# Patient Record
Sex: FEMALE | Race: WHITE | NOT HISPANIC OR LATINO | Employment: UNEMPLOYED | URBAN - METROPOLITAN AREA
[De-identification: names, ages, dates, MRNs, and addresses within clinical notes are randomized per-mention and may not be internally consistent; named-entity substitution may affect disease eponyms.]

---

## 2017-01-12 ENCOUNTER — GENERIC CONVERSION - ENCOUNTER (OUTPATIENT)
Dept: OTHER | Facility: OTHER | Age: 52
End: 2017-01-12

## 2017-01-20 ENCOUNTER — HOSPITAL ENCOUNTER (OUTPATIENT)
Dept: SLEEP CENTER | Facility: CLINIC | Age: 52
Discharge: HOME | End: 2017-01-20
Payer: COMMERCIAL

## 2017-01-20 DIAGNOSIS — G47.33 OBSTRUCTIVE SLEEP APNEA (ADULT) (PEDIATRIC): ICD-10-CM

## 2017-01-20 PROCEDURE — 95811 POLYSOM 6/>YRS CPAP 4/> PARM: CPT

## 2017-01-25 ENCOUNTER — HOSPITAL ENCOUNTER (OUTPATIENT)
Dept: MAMMOGRAPHY | Facility: MEDICAL CENTER | Age: 52
Discharge: HOME | End: 2017-01-25
Payer: COMMERCIAL

## 2017-01-25 DIAGNOSIS — Z12.31 ENCOUNTER FOR SCREENING MAMMOGRAM FOR MALIGNANT NEOPLASM OF BREAST: ICD-10-CM

## 2017-01-25 PROCEDURE — G0202 SCR MAMMO BI INCL CAD: HCPCS

## 2017-02-21 ENCOUNTER — GENERIC CONVERSION - ENCOUNTER (OUTPATIENT)
Dept: OTHER | Facility: OTHER | Age: 52
End: 2017-02-21

## 2017-02-23 ENCOUNTER — HOSPITAL ENCOUNTER (OUTPATIENT)
Dept: SLEEP CENTER | Facility: CLINIC | Age: 52
Discharge: HOME | End: 2017-02-23
Payer: COMMERCIAL

## 2017-02-23 DIAGNOSIS — G47.33 OBSTRUCTIVE SLEEP APNEA (ADULT) (PEDIATRIC): ICD-10-CM

## 2017-03-10 ENCOUNTER — APPOINTMENT (OUTPATIENT)
Dept: LAB | Facility: CLINIC | Age: 52
End: 2017-03-10
Payer: COMMERCIAL

## 2017-03-10 ENCOUNTER — TRANSCRIBE ORDERS (OUTPATIENT)
Dept: LAB | Facility: CLINIC | Age: 52
End: 2017-03-10

## 2017-03-10 ENCOUNTER — ALLSCRIPTS OFFICE VISIT (OUTPATIENT)
Dept: OTHER | Facility: OTHER | Age: 52
End: 2017-03-10

## 2017-03-10 DIAGNOSIS — M79.7 FIBROMYALGIA: ICD-10-CM

## 2017-03-10 DIAGNOSIS — M70.62 TROCHANTERIC BURSITIS OF LEFT HIP: ICD-10-CM

## 2017-03-10 DIAGNOSIS — M70.61 TROCHANTERIC BURSITIS OF RIGHT HIP: ICD-10-CM

## 2017-03-10 LAB
BASOPHILS # BLD AUTO: 0.05 THOUSANDS/ΜL (ref 0–0.1)
BASOPHILS NFR BLD AUTO: 0 % (ref 0–1)
EOSINOPHIL # BLD AUTO: 0.56 THOUSAND/ΜL (ref 0–0.61)
EOSINOPHIL NFR BLD AUTO: 4 % (ref 0–6)
ERYTHROCYTE [DISTWIDTH] IN BLOOD BY AUTOMATED COUNT: 13.8 % (ref 11.6–15.1)
HCT VFR BLD AUTO: 45.3 % (ref 34.8–46.1)
HGB BLD-MCNC: 15.1 G/DL (ref 11.5–15.4)
LYMPHOCYTES # BLD AUTO: 4.12 THOUSANDS/ΜL (ref 0.6–4.47)
LYMPHOCYTES NFR BLD AUTO: 32 % (ref 14–44)
MCH RBC QN AUTO: 29.8 PG (ref 26.8–34.3)
MCHC RBC AUTO-ENTMCNC: 33.3 G/DL (ref 31.4–37.4)
MCV RBC AUTO: 89 FL (ref 82–98)
MONOCYTES # BLD AUTO: 0.73 THOUSAND/ΜL (ref 0.17–1.22)
MONOCYTES NFR BLD AUTO: 6 % (ref 4–12)
NEUTROPHILS # BLD AUTO: 7.49 THOUSANDS/ΜL (ref 1.85–7.62)
NEUTS SEG NFR BLD AUTO: 58 % (ref 43–75)
NRBC BLD AUTO-RTO: 0 /100 WBCS
PLATELET # BLD AUTO: 348 THOUSANDS/UL (ref 149–390)
PMV BLD AUTO: 10.8 FL (ref 8.9–12.7)
RBC # BLD AUTO: 5.07 MILLION/UL (ref 3.81–5.12)
TSH SERPL DL<=0.05 MIU/L-ACNC: 0.7 UIU/ML (ref 0.36–3.74)
WBC # BLD AUTO: 12.98 THOUSAND/UL (ref 4.31–10.16)

## 2017-03-10 PROCEDURE — 84443 ASSAY THYROID STIM HORMONE: CPT

## 2017-03-10 PROCEDURE — 86618 LYME DISEASE ANTIBODY: CPT

## 2017-03-10 PROCEDURE — 86617 LYME DISEASE ANTIBODY: CPT

## 2017-03-10 PROCEDURE — 36415 COLL VENOUS BLD VENIPUNCTURE: CPT

## 2017-03-10 PROCEDURE — 85025 COMPLETE CBC W/AUTO DIFF WBC: CPT

## 2017-03-13 LAB
B BURGDOR IGG SER IA-ACNC: 0.34
B BURGDOR IGM SER IA-ACNC: 1.59

## 2017-03-15 LAB
B BURGDOR IGG PATRN SER IB-IMP: NEGATIVE
B BURGDOR IGM PATRN SER IB-IMP: NEGATIVE
B BURGDOR18KD IGG SER QL IB: ABNORMAL
B BURGDOR23KD IGG SER QL IB: ABNORMAL
B BURGDOR23KD IGM SER QL IB: PRESENT
B BURGDOR28KD IGG SER QL IB: ABNORMAL
B BURGDOR30KD IGG SER QL IB: ABNORMAL
B BURGDOR39KD IGG SER QL IB: ABNORMAL
B BURGDOR39KD IGM SER QL IB: ABNORMAL
B BURGDOR41KD IGG SER QL IB: ABNORMAL
B BURGDOR41KD IGM SER QL IB: ABNORMAL
B BURGDOR45KD IGG SER QL IB: ABNORMAL
B BURGDOR58KD IGG SER QL IB: ABNORMAL
B BURGDOR66KD IGG SER QL IB: ABNORMAL
B BURGDOR93KD IGG SER QL IB: ABNORMAL
LABORATORY COMMENT REPORT: NORMAL

## 2017-03-30 ENCOUNTER — GENERIC CONVERSION - ENCOUNTER (OUTPATIENT)
Dept: OTHER | Facility: OTHER | Age: 52
End: 2017-03-30

## 2017-04-03 ENCOUNTER — GENERIC CONVERSION - ENCOUNTER (OUTPATIENT)
Dept: OTHER | Facility: OTHER | Age: 52
End: 2017-04-03

## 2017-04-04 ENCOUNTER — GENERIC CONVERSION - ENCOUNTER (OUTPATIENT)
Dept: OTHER | Facility: OTHER | Age: 52
End: 2017-04-04

## 2017-04-06 ENCOUNTER — GENERIC CONVERSION - ENCOUNTER (OUTPATIENT)
Dept: OTHER | Facility: OTHER | Age: 52
End: 2017-04-06

## 2017-04-13 ENCOUNTER — ALLSCRIPTS OFFICE VISIT (OUTPATIENT)
Dept: OTHER | Facility: OTHER | Age: 52
End: 2017-04-13

## 2017-04-13 DIAGNOSIS — R21 RASH AND OTHER NONSPECIFIC SKIN ERUPTION: ICD-10-CM

## 2017-04-13 DIAGNOSIS — A69.20 LYME DISEASE: ICD-10-CM

## 2017-04-13 DIAGNOSIS — H81.12 BENIGN PAROXYSMAL VERTIGO OF LEFT EAR: ICD-10-CM

## 2017-04-19 ENCOUNTER — GENERIC CONVERSION - ENCOUNTER (OUTPATIENT)
Dept: OTHER | Facility: OTHER | Age: 52
End: 2017-04-19

## 2017-05-08 ENCOUNTER — ALLSCRIPTS OFFICE VISIT (OUTPATIENT)
Dept: OTHER | Facility: OTHER | Age: 52
End: 2017-05-08

## 2017-05-12 ENCOUNTER — APPOINTMENT (OUTPATIENT)
Dept: LAB | Facility: CLINIC | Age: 52
End: 2017-05-12
Payer: COMMERCIAL

## 2017-05-12 ENCOUNTER — ALLSCRIPTS OFFICE VISIT (OUTPATIENT)
Dept: OTHER | Facility: OTHER | Age: 52
End: 2017-05-12

## 2017-05-12 ENCOUNTER — TRANSCRIBE ORDERS (OUTPATIENT)
Dept: LAB | Facility: CLINIC | Age: 52
End: 2017-05-12

## 2017-05-12 DIAGNOSIS — R21 RASH AND OTHER NONSPECIFIC SKIN ERUPTION: ICD-10-CM

## 2017-05-12 DIAGNOSIS — A69.20 LYME DISEASE: ICD-10-CM

## 2017-05-12 PROCEDURE — 86618 LYME DISEASE ANTIBODY: CPT

## 2017-05-12 PROCEDURE — 86617 LYME DISEASE ANTIBODY: CPT

## 2017-05-12 PROCEDURE — 36415 COLL VENOUS BLD VENIPUNCTURE: CPT

## 2017-05-15 LAB
B BURGDOR IGG SER IA-ACNC: 0.38
B BURGDOR IGM SER IA-ACNC: 1.57

## 2017-05-17 LAB
B BURGDOR IGG PATRN SER IB-IMP: NEGATIVE
B BURGDOR IGM PATRN SER IB-IMP: NEGATIVE
B BURGDOR18KD IGG SER QL IB: ABNORMAL
B BURGDOR23KD IGG SER QL IB: ABNORMAL
B BURGDOR23KD IGM SER QL IB: PRESENT
B BURGDOR28KD IGG SER QL IB: ABNORMAL
B BURGDOR30KD IGG SER QL IB: PRESENT
B BURGDOR39KD IGG SER QL IB: ABNORMAL
B BURGDOR39KD IGM SER QL IB: ABNORMAL
B BURGDOR41KD IGG SER QL IB: ABNORMAL
B BURGDOR41KD IGM SER QL IB: ABNORMAL
B BURGDOR45KD IGG SER QL IB: ABNORMAL
B BURGDOR58KD IGG SER QL IB: ABNORMAL
B BURGDOR66KD IGG SER QL IB: ABNORMAL
B BURGDOR93KD IGG SER QL IB: ABNORMAL
LABORATORY COMMENT REPORT: NORMAL

## 2017-05-23 ENCOUNTER — GENERIC CONVERSION - ENCOUNTER (OUTPATIENT)
Dept: OTHER | Facility: OTHER | Age: 52
End: 2017-05-23

## 2017-05-25 ENCOUNTER — ALLSCRIPTS OFFICE VISIT (OUTPATIENT)
Dept: OTHER | Facility: OTHER | Age: 52
End: 2017-05-25

## 2017-05-25 DIAGNOSIS — A69.20 LYME DISEASE: ICD-10-CM

## 2017-05-25 DIAGNOSIS — M46.1 SACROILIITIS, NOT ELSEWHERE CLASSIFIED (HCC): ICD-10-CM

## 2017-05-25 DIAGNOSIS — M79.10 MYALGIA: ICD-10-CM

## 2017-05-25 DIAGNOSIS — R53.83 OTHER FATIGUE: ICD-10-CM

## 2017-05-25 DIAGNOSIS — M54.16 RADICULOPATHY OF LUMBAR REGION: ICD-10-CM

## 2017-05-26 ENCOUNTER — APPOINTMENT (OUTPATIENT)
Dept: LAB | Facility: CLINIC | Age: 52
End: 2017-05-26
Payer: COMMERCIAL

## 2017-05-26 DIAGNOSIS — R53.83 OTHER FATIGUE: ICD-10-CM

## 2017-05-26 DIAGNOSIS — M79.10 MYALGIA: ICD-10-CM

## 2017-05-26 DIAGNOSIS — M54.16 RADICULOPATHY OF LUMBAR REGION: ICD-10-CM

## 2017-05-26 DIAGNOSIS — A69.20 LYME DISEASE: ICD-10-CM

## 2017-05-26 DIAGNOSIS — M46.1 SACROILIITIS, NOT ELSEWHERE CLASSIFIED (HCC): ICD-10-CM

## 2017-05-26 LAB
25(OH)D3 SERPL-MCNC: 17.6 NG/ML (ref 30–100)
BASOPHILS # BLD AUTO: 0.06 THOUSANDS/ΜL (ref 0–0.1)
BASOPHILS NFR BLD AUTO: 1 % (ref 0–1)
EOSINOPHIL # BLD AUTO: 0.36 THOUSAND/ΜL (ref 0–0.61)
EOSINOPHIL NFR BLD AUTO: 3 % (ref 0–6)
ERYTHROCYTE [DISTWIDTH] IN BLOOD BY AUTOMATED COUNT: 14.6 % (ref 11.6–15.1)
ERYTHROCYTE [SEDIMENTATION RATE] IN BLOOD: 13 MM/HOUR (ref 0–20)
HCT VFR BLD AUTO: 42.5 % (ref 34.8–46.1)
HGB BLD-MCNC: 14.1 G/DL (ref 11.5–15.4)
LYMPHOCYTES # BLD AUTO: 3.93 THOUSANDS/ΜL (ref 0.6–4.47)
LYMPHOCYTES NFR BLD AUTO: 30 % (ref 14–44)
MCH RBC QN AUTO: 29.6 PG (ref 26.8–34.3)
MCHC RBC AUTO-ENTMCNC: 33.2 G/DL (ref 31.4–37.4)
MCV RBC AUTO: 89 FL (ref 82–98)
MONOCYTES # BLD AUTO: 0.94 THOUSAND/ΜL (ref 0.17–1.22)
MONOCYTES NFR BLD AUTO: 7 % (ref 4–12)
NEUTROPHILS # BLD AUTO: 7.79 THOUSANDS/ΜL (ref 1.85–7.62)
NEUTS SEG NFR BLD AUTO: 59 % (ref 43–75)
NRBC BLD AUTO-RTO: 0 /100 WBCS
PLATELET # BLD AUTO: 320 THOUSANDS/UL (ref 149–390)
PMV BLD AUTO: 11.1 FL (ref 8.9–12.7)
RBC # BLD AUTO: 4.76 MILLION/UL (ref 3.81–5.12)
WBC # BLD AUTO: 13.14 THOUSAND/UL (ref 4.31–10.16)

## 2017-05-26 PROCEDURE — 86038 ANTINUCLEAR ANTIBODIES: CPT

## 2017-05-26 PROCEDURE — 36415 COLL VENOUS BLD VENIPUNCTURE: CPT

## 2017-05-26 PROCEDURE — 85652 RBC SED RATE AUTOMATED: CPT

## 2017-05-26 PROCEDURE — 82306 VITAMIN D 25 HYDROXY: CPT

## 2017-05-26 PROCEDURE — 86200 CCP ANTIBODY: CPT

## 2017-05-26 PROCEDURE — 85025 COMPLETE CBC W/AUTO DIFF WBC: CPT

## 2017-05-28 LAB — CCP IGA+IGG SERPL IA-ACNC: 7 UNITS (ref 0–19)

## 2017-05-30 ENCOUNTER — GENERIC CONVERSION - ENCOUNTER (OUTPATIENT)
Dept: OTHER | Facility: OTHER | Age: 52
End: 2017-05-30

## 2017-05-30 LAB — RYE IGE QN: NEGATIVE

## 2017-08-23 ENCOUNTER — ALLSCRIPTS OFFICE VISIT (OUTPATIENT)
Dept: OTHER | Facility: OTHER | Age: 52
End: 2017-08-23

## 2017-08-23 DIAGNOSIS — Z00.00 ENCOUNTER FOR GENERAL ADULT MEDICAL EXAMINATION WITHOUT ABNORMAL FINDINGS: ICD-10-CM

## 2017-08-30 DIAGNOSIS — E55.9 VITAMIN D DEFICIENCY: ICD-10-CM

## 2017-09-20 ENCOUNTER — GENERIC CONVERSION - ENCOUNTER (OUTPATIENT)
Dept: OTHER | Facility: OTHER | Age: 52
End: 2017-09-20

## 2017-10-31 ENCOUNTER — GENERIC CONVERSION - ENCOUNTER (OUTPATIENT)
Dept: OTHER | Facility: OTHER | Age: 52
End: 2017-10-31

## 2017-11-08 ENCOUNTER — APPOINTMENT (OUTPATIENT)
Dept: LAB | Facility: HOSPITAL | Age: 52
End: 2017-11-08
Payer: COMMERCIAL

## 2017-11-08 DIAGNOSIS — Z00.00 ENCOUNTER FOR GENERAL ADULT MEDICAL EXAMINATION WITHOUT ABNORMAL FINDINGS: ICD-10-CM

## 2017-11-08 LAB — HEMOCCULT STL QL IA: NEGATIVE

## 2017-11-08 PROCEDURE — G0328 FECAL BLOOD SCRN IMMUNOASSAY: HCPCS

## 2017-11-09 ENCOUNTER — GENERIC CONVERSION - ENCOUNTER (OUTPATIENT)
Dept: OTHER | Facility: OTHER | Age: 52
End: 2017-11-09

## 2018-01-09 NOTE — RESULT NOTES
Message   Recorded as Task   Date: 06/02/2016 01:45 PM, Created By: Deisy Powell   Task Name: Follow Up   Assigned To: [id number] [address] clinical,Team   Regarding Patient: Janessa Valle, Status: In Progress   Comment:    Rae Helm - 02 Jun 2016 1:45 PM     TASK CREATED    Pt S/P LT L4-5  L5-S1 RFA~6/1/16~DR Maik Pedraza  no follow up    Attempted to contact pt, LMOM at home and cell for pt to [facility]  **********   Rae Helm - 02 Jun 2016 1:45 PM     TASK IN PROGRESS   Rae Helm - 06 Jun 2016 3:35 PM     TASK EDITED    I spoke with pt, states she has developed more twinges of pain when moving  States she is in excruciating pain when standing at the register at [facility]  Pt asking if this is normal  I advised that she can have more pain from the procedure until the nerves start dying off  Advised this can take 4-6 weeks to see the full effect  No S/S infection  F/U scheduled for 7/15/16 at 0915  Advised I will inform Dr Maik Pedraza of this conversation and CB with any recommendations  *****************   Roberto Lewis - 06 Jun 2016 4:24 PM     TASK REPLIED TO: Previously Assigned To Aleksey Salguero  aware and agree, no further recommendations currently   Rae Helm - 06 Jun 2016 4:26 PM     TASK IN PROGRESS        Signatures   Electronically signed by :  Sudeep Zayas, ; Jun 6 2016  4:26PM EST                       (Author)

## 2018-01-10 NOTE — PROGRESS NOTES
Assessment    1  Greater trochanteric bursitis of both hips (726 5) (M70 61,M70 62)   2  Lumbar spondylosis (721 3) (M47 816)   3  MARIE (obstructive sleep apnea) (327 23) (G47 33)   4  Current every day smoker (305 1) (F17 200)   5  Allergic rhinitis (477 9) (J30 9)   6  Fibromyalgia (729 1) (M79 7)    Plan  Allergic rhinitis    · Fexofenadine HCl - 180 MG Oral Tablet; Take one daily as needed   · Montelukast Sodium 10 MG Oral Tablet; Take one daily  Fibromyalgia    · (1) CBC/PLT/DIFF; Status:Active; Requested for:10Mar2017;    · (1) LYME ANTIBODY PROFILE W/REFLEX TO WESTERN BLOT; Status:Active; Requested for:10Mar2017;    · (1) TSH; Status:Active; Requested for:10Mar2017;   Greater trochanteric bursitis of both hips    · Meloxicam 15 MG Oral Tablet; TAKE 1 TABLET DAILY   · *1 - [facility] Physical Therapy  Consult  Status: Active  Requested for:  33KCM3778  Care Summary provided  : Yes  Lumbar spondylosis    · 3 - [facility] (ANESTHESIOLOGY ) Physician Referral  Consult Only: the  expectation is that the referring provider will communicate back to the patient on  treatment options  Evaluation and Treatment: the expectation is that the referred to  provider will communicate back to the patient on treatment options  Status: Hold For  - Scheduling  Requested for: 02XHR7541  are Referring to a non-[facility] Preferred Provider : 2nd/3rd Opinion  Care Summary provided  : Yes  SocHx: Current every day smoker    · *1 - [facility]oking Cessation [facility] Referral Physician Referral  Consult Only: the  expectation is that the referring provider will communicate back to the patient on  treatment options  Evaluation and Treatment: the expectation is that the referred to  provider will communicate back to the patient on treatment options    Status: Hold For  - Scheduling  Requested for: 14YQY9052  Care Summary provided  : Yes   · You need to quit smoking ; Status:Complete;   Done: 76KWQ1893 08:37AM   · Call 911 if: You have any symptoms of a stroke ; Status:Complete;   Done: 31VXP7805  08:38AM    Discussion/Summary    #1: Greater trochanteric bursitis: Patient does appear to have bilateral greater trochanteric bursitis  There is tenderness of both of them  At this point, would recommend physical therapy, meloxicam  Reviewed with her the rationale of nonsteroidal anti-inflammatories for one month straight, needed or not  #2: Lumbar spondylosis: Patient continues with significant pain and irritation  This is despite injections and ablations  Would recommend second opinion for pain management as she is not confident 100% at the moment  #3: Sleep apnea: Stable at the moment  #4: Current smoker: Recommend discontinue  Patient understands risks  She will consider for the future  #5: Allergic rhinitis: Patient does have a significant amount of allergic rhinitis symptoms  It is likely secondary to the new puppy that she has  If it is feasible, she should keep the dog away as much as possible  I would recommend that she change from Claritin to Allegra, add Singulair, and follow-up in one month  Previously she tried Flonase, and failed  #6: Fibromyalgia: Based on the multiple areas that she has of pain, it is likely that she does have fibromyalgia  Other options would include hypothyroid and Lyme  Based on that, we will check Lyme testing and TSH  Possible side effects of new medications were reviewed with the patient/guardian today  The treatment plan was reviewed with the patient/guardian  The patient/guardian understands and agrees with the treatment plan      Chief Complaint  Pt c/o increased allergy symptoms, clear mucous, watery eyes, worsening over the last month, it makes it difficult to use CPAP machine  Also pt is seeing Dr Baudilio Monge for her back but she has terrible pain in the hips and he has not addressed that   Also pt having weakness in both legs and pain, she questions if it's Fibromyalgia  kw      History of Present Illness  HPI: For her allergies, the patient is having a significant amount of problems  It is increased over the last month or so  She is using Claritin, however needs to use 3-4 tabs a day, and it still has not made a significant difference  She does have a new dog  Back pain: She has seen Addy Almonte, but no help  Her hips ache quite a bit  Worse in the cold  She has aches in multiple areas as well  Due to the large amount of aches, she wondered about FMS  Note: She was on Flonase in past, but no help  She has been on Antidepressant per Pain Management, but had severe SE, so D/C'd  Review of Systems    Constitutional: as noted in HPI    ENT: no ear ache, no loss of hearing, no nosebleeds or nasal discharge, no sore throat or hoarseness  Cardiovascular: no complaints of slow or fast heart rate, no chest pain, no palpitations, no leg claudication or lower extremity edema  Respiratory: no complaints of shortness of breath, no wheezing, no dyspnea on exertion, no orthopnea or PND  Breasts: no complaints of breast pain, breast lump or nipple discharge  Gastrointestinal: no complaints of abdominal pain, no constipation, no nausea or diarrhea, no vomiting, no bloody stools  Genitourinary: no complaints of dysuria, no incontinence, no pelvic pain, no dysmenorrhea, no vaginal discharge or abnormal vaginal bleeding  Musculoskeletal: as noted in HPI  Integumentary: no complaints of skin rash or lesion, no itching or dry skin, no skin wounds  Neurological: no complaints of headache, no confusion, no numbness or tingling, no dizziness or fainting  Active Problems    1  Allergic rhinitis (477 9) (J30 9)   2  Bacterial vaginosis (616 10,041 9) (N76 0,B96 89)   3  Depression (311) (F32 9)   4  Diarrhea (787 91) (R19 7)   5  Dyslipidemia (272 4) (E78 5)   6  Encounter for routine gynecological examination (V72 31) (Z01 419)   7   Encounter for screening mammogram for malignant neoplasm of breast (V76 12)   (Z12 31)   8  Endometriosis (617 9) (N80 9)   9  Fatigue (780 79) (R53 83)   10  Greater trochanteric bursitis of both hips (726 5) (M70 61,M70 62)   11  Heel pain (729 5) (M79 673)   12  Hydradenitis (705 83) (L73 2)   13  Hyperglycemia (790 29) (R73 9)   14  Hypertension (401 9) (I10)   15  Hypokalemia (276 8) (E87 6)   16  Hypothyroidism (244 9) (E03 9)   17  Low back pain (724 2) (M54 5)   18  Lumbar radiculopathy (724 4) (M54 16)   19  Lumbar spondylosis (721 3) (M47 816)   20  Lumbosacral spondylosis (721 3) (M47 817)   21  Need for prophylactic vaccination and inoculation against influenza (V04 81) (Z23)   22  MARIE (obstructive sleep apnea) (327 23) (G47 33)   23  Right upper quadrant pain (789 01) (R10 11)   24  Sacroiliitis (720 2) (M46 1)   25  Vitamin B12 deficiency (266 2) (E53 8)   26  Vulvar cyst (624 8) (N90 7)    Past Medical History    1  History of Acute bronchitis, unspecified organism (466 0) (J20 9)   2  History of Acute otitis media, unspecified laterality   3  History of Chronic venous embolism and thrombosis of deep vessels of lower extremity   (453 50) (I82 509)   4  History of Costochondritis (733 6) (M94 0)   5  History of Dysuria (788 1) (R30 0)   6  History of Endometriosis (617 9) (N80 9)   7  History of Fracture Of The Ankle (824 8)   8  History of fever (V13 89) (Z87 898)   9  History of hematuria (V13 09) (Z87 448)   10  History of sinusitis (V12 69) (Z87 09)   11  History of urinary frequency (V13 09) (Z87 898)   12  Hypertension (401 9) (I10)   13  History of Pulmonary Embolism (V12 55)   14  History of Type B influenza (487 1) (J10 1)   15  History of Umbilical hernia (547 5) (K42 9)   16  History of Urinary Tract Infection (V13 02)  Active Problems And Past Medical History Reviewed: The active problems and past medical history were reviewed and updated today  Family History  Mother    1  Family history of Hypertension (V17 49)  Father    2   Family history of Diabetes Mellitus (V18 0)   3  Family history of Hypertension (V17 49)   4  Family history of Obesity  Family History Reviewed: The family history was reviewed and updated today  Social History    · Being A Social Drinker   · Cigarette smoker (305 1) (F17 210)   · Current every day smoker (305 1) (F17 200)   · Denied: History of Drug Use   ·    · Occupation   · Cleaning Offices  The social history was reviewed and updated today  The social history was reviewed and is unchanged  Surgical History    1  History of Hernia Repair   2  History of Hysterectomy  Surgical History Reviewed: The surgical history was reviewed and updated today  Current Meds   1  Adult Aspirin Low Strength 81 MG TBDP; CHEW AND SWALLOW 1 TABLET DAILY AS   DIRECTED Recorded   2  Atenolol-Chlorthalidone 50-25 MG Oral Tablet; take 1 tablet by mouth every day; Therapy: 31IIS2855 to (Evaluate:11Vfx1602)  Requested for: 24QRN7283; Last   Rx:95Bbh3903 Ordered   3  B-12 1000 MCG Oral Capsule; Therapy: 17KXB7120 to Recorded   4  Claritin 10 MG Oral Tablet; TAKE 1 TABLET DAILY AS NEEDED; Therapy: (Recorded:03Iyd2232) to Recorded   5  Zantac 75 TABS Recorded    The medication list was reviewed and updated today  Allergies    1  Penicillins   2  Erythromycin TABS   3  Toradol SOLN   4  Ventolin HFA   5  Vicodin TABS    Vitals   Recorded: 76EHD4094 08:09AM   Heart Rate 64   Respiration 16   Systolic 171   Diastolic 82   Height 5 ft 6 in   Weight 238 lb 5 oz   BMI Calculated 38 47   BSA Calculated 2 15     Physical Exam    Constitutional   General appearance: No acute distress, well appearing and well nourished  Ears, Nose, Mouth, and Throat   External inspection of ears and nose: Normal     Otoscopic examination: Abnormal   The right tympanic membrane was red and was bulging, but was not obscured  The left tympanic membrane was red and was bulging, but was not obscured   The right external canal was normal  The left external canal was normal    Pulmonary   Respiratory effort: No increased work of breathing or signs of respiratory distress  Auscultation of lungs: Clear to auscultation  Cardiovascular   Auscultation of heart: Normal rate and rhythm, normal S1 and S2, without murmurs  Future Appointments    Date/Time Provider Specialty Site   03/30/2017 03:15 PM Debo Yadav, DO Pain Management [place] [address]   06/14/2017 03:00 PM GABBY Diamond  [address] [address]     Signatures   Electronically signed by :  GABBY Bailon ; Mar 10 2017  8:47AM EST                       (Author)

## 2018-01-11 NOTE — MISCELLANEOUS
Message   Recorded as Task   Date: 04/27/2016 10:19 AM, Created By: [facility]   Task Name: Call Back   Assigned To: [id number] [place] [address] clinical,Team   Regarding Patient: Darline Cruz, Status: Active   Comment:    Rae Helm - 27 Apr 2016 10:19 AM     TASK CREATED  Caller: Self; General Medical Question; [phone] (Home)    Pt called, asking if we could order anything for pain until she can get her injection  We called to reschedule due to Dr Mavis Davis unable to do injection today  Pt was scheduled for LEFT L4-5 L5-S1 MBB #2, did not reschedule at this time, having a hard time finding , but will not be able to schedule until 5/16 or 5/18  Pt states she was taking some meds that her PCP prescribed but they did not help with the pain    Advised I will need to discuss with [facility] and CB with recommendations  ************   Edward Ludwig - 27 Apr 2016 10:21 AM     TASK REPLIED TO: Previously Assigned To [id number] [place] [address] clinical,Team  patient can take ibuprofen 800mg TID, or Tylenol 1000mg three times daily   Rae Helm - 27 Apr 2016 11:05 AM     TASK EDITED      Pt did say she was taking tylenol and it was not helping  ************   Edward Ludwig - 27 Apr 2016 11:54 AM     TASK REPLIED TO: Previously Assigned To Mily Richardson  ok, try the ibuprofen if no relief let us know I can try a topical such as voltaren gel  Rae Helm - 27 Apr 2016 1:28 PM     TASK EDITED    I spoke with pt, states she also is taking ibuprofen 4 at a time and is not helping  I advised that [employer] suggested voltaren gel  Asked if that would be something she is interested in? Pt states "I have to do something "  Advised will send to her pharmacy  **********   Mliy Richardson - 27 Apr 2016 2:04 PM     TASK REPLIED TO: Previously Assigned To Mily Richardson sent to pharmacy [place]        Active Problems    1  Acute bronchitis, unspecified organism (466 0) (J20 9)   2  Allergic rhinitis (477 9) (J30 9)   3   Bacterial vaginosis (616 10,041 9) (N76 0,B96 89)   4  Depression (311) (F32 9)   5  Diarrhea (787 91) (R19 7)   6  Dyslipidemia (272 4) (E78 5)   7  Encounter for routine gynecological examination (V72 31) (Z01 419)   8  Encounter for screening mammogram for malignant neoplasm of breast (V76 12)   (Z12 31)   9  Endometriosis (617 9) (N80 9)   10  Fatigue (780 79) (R53 83)   11  Greater trochanteric bursitis of both hips (726 5) (M70 62,M70 61)   12  Heel pain (729 5) (M79 673)   13  Hydradenitis (705 83) (L73 2)   14  Hypertension (401 9) (I10)   15  Hypokalemia (276 8) (E87 6)   16  Hypothyroidism (244 9) (E03 9)   17  Low back pain (724 2) (M54 5)   18  Lumbar radiculopathy (724 4) (M54 16)   19  Lumbar spondylosis (721 3) (M47 816)   20  Lumbosacral spondylosis (721 3) (M47 817)   21  Need for prophylactic vaccination and inoculation against influenza (V04 81) (Z23)   22  MARIE (obstructive sleep apnea) (327 23) (G47 33)   23  Right upper quadrant pain (789 01) (R10 11)   24  Trochanteric bursitis, unspecified laterality (726 5) (M70 60)   25  Vitamin B12 deficiency (266 2) (E53 8)    Current Meds   1  Adult Aspirin Low Strength 81 MG TBDP; CHEW AND SWALLOW 1 TABLET DAILY AS   DIRECTED Recorded   2  Advair Diskus 100-50 MCG/DOSE Inhalation Aerosol Powder Breath Activated; INHALE   1 PUFF TWICE DAILY; Therapy: 73SHS4616 to [phone]  Requested for: 37RXQ6798; Last   Rx:02Mar2016 Ordered   3  Atenolol-Chlorthalidone 50-25 MG Oral Tablet; Take 1 tablet daily; Therapy: 44YKS1443 to (Evaluate:20Apr2016)  Requested for: 80QFO6117; Last   Rx:23Oct2015 Ordered   4  B-12 1000 MCG Oral Capsule; Therapy: 75ADN5946 to Recorded   5  Claritin 10 MG Oral Tablet Recorded   6  Sulfamethoxazole-Trimethoprim 800-160 MG Oral Tablet (Bactrim DS); Take 1 twice   daily; Therapy: 76XPK5352 to (Evaluate:12Mar2016)  Requested for: 32ABZ8243; Last   Rx:02Mar2016 Ordered   7   Voltaren 1 % Transdermal Gel (Diclofenac Sodium); apply to affected  area up to 4 times   daily; Therapy: 27Apr2016 to ([facility])  Requested for: 27Apr2016; Last   Rx:27Apr2016 Ordered   8  Zantac 75 TABS Recorded    Allergies    1  Penicillins   2  Erythromycin TABS   3  Toradol SOLN   4  Ventolin HFA   5  Vicodin TABS    Signatures   Electronically signed by :  Joanie Lyons, ; Apr 27 2016  3:26PM EST                       (Author)

## 2018-01-11 NOTE — MISCELLANEOUS
Message   Recorded as Task   Date: 12/23/2016 08:50 AM, Created By: Valarie Cortez   Task Name: Call Back   Assigned To: Markie Magdaleno   Regarding Patient: Levi Han, Status: Active   Comment:    Zainab Martin - 23 Dec 2016 8:50 AM     TASK CREATED  pt called stating she is starting with outbreak of hydradenitis - despite completing 7 day course of antibiotic (cipro 500mg b i d ) that was rx'd by GYN for "cyst on her private"  requests antibiotic - you rx'd Bactrim in the past   please call back on C# [phone]  she uses [facility] on [address] - 23 Dec 2016 2:23 PM     TASK EDITED  per [facility], ok to call in Bactrim DS to pharmacy  pt notified she needs OV in there is no improvement        Active Problems    1  Acute bronchitis, unspecified organism (466 0) (J20 9)   2  Allergic rhinitis (477 9) (J30 9)   3  Bacterial vaginosis (616 10,041 9) (N76 0,B96 89)   4  Depression (311) (F32 9)   5  Diarrhea (787 91) (R19 7)   6  Dyslipidemia (272 4) (E78 5)   7  Encounter for routine gynecological examination (V72 31) (Z01 419)   8  Encounter for screening mammogram for malignant neoplasm of breast (V76 12)   (Z12 31)   9  Endometriosis (617 9) (N80 9)   10  Fatigue (780 79) (R53 83)   11  Greater trochanteric bursitis of both hips (726 5) (M70 61,M70 62)   12  Heel pain (729 5) (M79 673)   13  Hydradenitis (705 83) (L73 2)   14  Hyperglycemia (790 29) (R73 9)   15  Hypertension (401 9) (I10)   16  Hypokalemia (276 8) (E87 6)   17  Hypothyroidism (244 9) (E03 9)   18  Low back pain (724 2) (M54 5)   19  Lumbar radiculopathy (724 4) (M54 16)   20  Lumbar spondylosis (721 3) (M47 816)   21  Lumbosacral spondylosis (721 3) (M47 817)   22  Need for prophylactic vaccination and inoculation against influenza (V04 81) (Z23)   23  MARIE (obstructive sleep apnea) (327 23) (G47 33)   24  Right upper quadrant pain (789 01) (R10 11)   25  Sacroiliitis (720 2) (M46 1)   26   Trochanteric bursitis, unspecified laterality (726 5) (M70 60)   27  Vitamin B12 deficiency (266 2) (E53 8)   28  Vulvar cyst (624 8) (N90 7)    Current Meds   1  Adult Aspirin Low Strength 81 MG TBDP; CHEW AND SWALLOW 1 TABLET DAILY AS   DIRECTED Recorded   2  Atenolol-Chlorthalidone 50-25 MG Oral Tablet; take 1 tablet by mouth every day; Therapy: 99HGW3771 to (Evaluate:51Hpj5339)  Requested for: 70DON5929; Last   Rx:97Lzo8273 Ordered   3  B-12 1000 MCG Oral Capsule; Therapy: 49OJV3283 to Recorded   4  Ciprofloxacin HCl - 500 MG Oral Tablet; TAKE 1 TABLET TWICE DAILY; Therapy: 25NRJ0718 to (Evaluate:87Osd1380)  Requested for: 13RBN1178; Last   Rx:54Wff1351 Ordered   5  Claritin 10 MG Oral Tablet Recorded   6  Zantac 75 TABS Recorded    Allergies    1  Penicillins   2  Erythromycin TABS   3  Toradol SOLN   4  Ventolin HFA   5  Vicodin TABS    Plan  Hydradenitis    · Sulfamethoxazole-Trimethoprim 800-160 MG Oral Tablet (Bactrim DS);  Take 1  tablet twice daily    Signatures   Electronically signed by : Christie Lim, ; Dec 23 2016  2:23PM EST                       (Author)

## 2018-01-12 VITALS
WEIGHT: 236 LBS | HEIGHT: 66 IN | DIASTOLIC BLOOD PRESSURE: 72 MMHG | HEART RATE: 64 BPM | BODY MASS INDEX: 37.93 KG/M2 | SYSTOLIC BLOOD PRESSURE: 110 MMHG

## 2018-01-12 NOTE — RESULT NOTES
Message   Recorded as Task   Date: 04/28/2016 10:57 AM, Created By: Husam Giles   Task Name: Follow Up   Assigned To: Desiree Garland   Regarding Patient: Navneet Martinez, Status: In Progress   Comment:    Desiree Garland - 28 Apr 2016 10:57 AM     TASK CREATED  PA request received rearding Voltaren 1% gel  PA completed on [employer] com  Awaiting responed for approval/denialColonel Donyaiel - 05 May 2016 9:24 AM     TASK EDITED  PT calling today to see if PA was approved, Sarah Busby, can you check your covermymeds account to see status of this PA? Thanks! Rae Helm - 05 May 2016 10:08 AM     TASK EDITED    Breanne Boateng (pt ) called, states he called insurance comp RE: PA  and they are requesting a call from our office at [phone]  Advised I will have someone look into this  Breanne Boateng upset that pt is waiting a long to get some pain relief   ***************   Desiree Garland - 05 May 2016 1:08 PM     TASK EDITED  I called the insurance company and was notified that they will only cover Voltaren gel if the pt has tried a generic on 30 day  Wilhemenia Runner - 05 May 2016 1:09 PM     TASK REPLIED TO: Previously Assigned To Wilhemenia Runner  ok will send diclofenac to her pharmacy which is the generic form  Desiree Garland - 05 May 2016 2:11 PM     TASK EDITED  I called pt, LM notifing pt the diclofenac was sent to pharmacy  Deshawn Carr - 06 May 2016 10:28 AM     TASK EDITED  Attempted to contact pt to follow up, and see if they were able to  diclofenac  Desiree Garland - 10 May 2016 8:29 AM     TASK EDITED  attempted to call pt again  LM for pt to CB  Signatures   Electronically signed by :  Daksha Mendez, ; May 10 2016  3:28PM EST                       (Author)

## 2018-01-12 NOTE — RESULT NOTES
Message   Recorded as Task   Date: 08/02/2016 06:50 AM, Created By: Samantha Neely   Task Name: Document Appointment   Assigned To: Samantha Neely   Regarding Patient: Reynold Goldstein, Status: Active   Comment:    Radhames Gonzalez - 02 Aug 2016 6:50 AM     TASK CREATED  Pt called to cx'l proc for 08 03 2016 wbc to r/s; no reason given          Signatures   Electronically signed by : Abelardo Mariee, ; Aug  2 2016  6:50AM EST                       (Author)

## 2018-01-12 NOTE — MISCELLANEOUS
Message  Received call from patient this morning Thursday 04/06/17 to provide me with Dr Gurdeep Neves Office Fax # , [phone]  I advised patient that her records will be faxed over right away  Her office notes and procedure notes were ALL faxed over to Dr Ricardo Abarca  Active Problems    1  Allergic rhinitis (477 9) (J30 9)   2  Bacterial vaginosis (616 10,041 9) (N76 0,B96 89)   3  Depression (311) (F32 9)   4  Diarrhea (787 91) (R19 7)   5  Dyslipidemia (272 4) (E78 5)   6  Encounter for routine gynecological examination (V72 31) (Z01 419)   7  Encounter for screening mammogram for malignant neoplasm of breast (V76 12)   (Z12 31)   8  Endometriosis (617 9) (N80 9)   9  Fatigue (780 79) (R53 83)   10  Fibromyalgia (729 1) (M79 7)   11  Greater trochanteric bursitis of both hips (726 5) (M70 61,M70 62)   12  Heel pain (729 5) (M79 673)   13  Hydradenitis (705 83) (L73 2)   14  Hyperglycemia (790 29) (R73 9)   15  Hypertension (401 9) (I10)   16  Hypokalemia (276 8) (E87 6)   17  Hypothyroidism (244 9) (E03 9)   18  Low back pain (724 2) (M54 5)   19  Lumbar radiculopathy (724 4) (M54 16)   20  Lumbar spondylosis (721 3) (M47 816)   21  Lumbosacral spondylosis (721 3) (M47 817)   22  Need for prophylactic vaccination and inoculation against influenza (V04 81) (Z23)   23  MARIE (obstructive sleep apnea) (327 23) (G47 33)   24  Right upper quadrant pain (789 01) (R10 11)   25  Sacroiliitis (720 2) (M46 1)   26  Vitamin B12 deficiency (266 2) (E53 8)   27  Vulvar cyst (624 8) (N90 7)    Current Meds   1  Adult Aspirin Low Strength 81 MG TBDP; CHEW AND SWALLOW 1 TABLET DAILY AS   DIRECTED Recorded   2  Atenolol-Chlorthalidone 50-25 MG Oral Tablet; take 1 tablet by mouth every day; Therapy: 79BFT6959 to (Evaluate:39Xds0385)  Requested for: 63PSZ4174; Last   Rx:35Atf5456 Ordered   3  B-12 1000 MCG Oral Capsule; Therapy: 65TKB5080 to Recorded   4   Doxycycline Hyclate 100 MG Oral Tablet; TAKE 1 TABLET TWICE DAILY Braxton Rod;   Therapy: 18CAV5190 to (Evaluate:20Apr2017)  Requested for: 42XUX6610; Last   Rx:30Mar2017 Ordered   5  Fexofenadine HCl - 180 MG Oral Tablet; Take one daily as needed; Therapy: 58AJY0973 to (Last Rx:10Mar2017)  Requested for: 33PQL3646 Ordered   6  Meloxicam 15 MG Oral Tablet; TAKE 1 TABLET DAILY; Therapy: 66TMZ9460 to (Evaluate:09Apr2017)  Requested for: 16ZFR9785; Last   Rx:10Mar2017 Ordered   7  Montelukast Sodium 10 MG Oral Tablet (Singulair); Take one daily; Therapy: 98ZWN7267 to (Last Rx:10Mar2017)  Requested for: 95DRZ6924 Ordered   8  Zantac 75 TABS Recorded    Allergies    1  Penicillins   2  Erythromycin TABS   3  Toradol SOLN   4  Ventolin HFA   5   Vicodin TABS    Signatures   Electronically signed by : Davy Zamarripa, ; Apr 6 2017 11:20AM EST                       (Author)

## 2018-01-12 NOTE — RESULT NOTES
Message   Recorded as Task   Date: 04/12/2016 11:22 AM, Created By: Swetha Ojeda   Task Name: Follow Up   Assigned To: [address] [place] [id number] clinical,Team   Regarding Patient: Jarvis Mendoza, Status: In Progress   Comment:    Desiree Garland - 12 Apr 2016 11:22 AM     TASK CREATED  Pt called stating she is scheduled for diagnostic testing on April 27 but can not take the pain and woulds like to know if there is anything else she could do  Please advise    Brice Atkinson - 12 Apr 2016 11:28 AM     TASK REPLIED TO: Previously Assigned To [address] [place] [id number] clinical,Team  VM left to cb  Brice Atkinson - 12 Apr 2016 11:28 AM     TASK IN PROGRESS   Brice Atkinson - 12 Apr 2016 1:37 PM     TASK REPLIED TO: Previously Assigned To Sheela Dorado  ***call pt on cell****    Spoke with pt who states she is scheduled for MBB on 4-27-16  States her pain was really bad on Sunday to her low back and B/L hips  States it has been getting worse  Rates her pain 9/10  Using ice/heat prn  Ibuprofen does not help  States she has taken flexeril in the past and it does not help  States she does alot of lifting at [address] [place] states she has to work and does not know what to do  You have one opening on the 25th to move up her procedure,but that's still a couple wks away  Please advise  Sheela Dorado - 12 Apr 2016 3:15 PM     TASK REPLIED TO: Previously Assigned To Sheela Dorado  double book at 1:45 tomorrow for procedure as long as she has had it approved   Brice Atkinson - 12 Apr 2016 3:59 PM     TASK REPLIED TO: Previously Assigned To [address] [place] [id number] [employer] with [address] required  Spoke with pt,ok to come in for procedure tomorrow but has no   DR Jaycee Obrien aware she will have to stay after procedure for about 1/2-1 hour  Pre procedure instructions reviewed again with pt     Sheela Dorado - 12 Apr 2016 4:02 PM     TASK REPLIED TO: Previously Assigned To Sheela Dorado  aware agree        [facility] Electronically signed by :  Frieda Gamez, ; Apr 12 2016  4:07PM EST                       (Author)

## 2018-01-13 VITALS
TEMPERATURE: 98.5 F | WEIGHT: 235.6 LBS | HEIGHT: 66 IN | DIASTOLIC BLOOD PRESSURE: 70 MMHG | HEART RATE: 68 BPM | BODY MASS INDEX: 37.86 KG/M2 | SYSTOLIC BLOOD PRESSURE: 102 MMHG

## 2018-01-13 VITALS
HEART RATE: 64 BPM | BODY MASS INDEX: 38.09 KG/M2 | SYSTOLIC BLOOD PRESSURE: 120 MMHG | DIASTOLIC BLOOD PRESSURE: 84 MMHG | TEMPERATURE: 99.2 F | WEIGHT: 236 LBS

## 2018-01-13 NOTE — MISCELLANEOUS
Message   Recorded as Task   Date: 12/07/2016 05:06 PM, Created By: Gilbert Abbott   Task Name: Follow Up   Assigned To: [address] ,Team   Regarding Patient: Mona Adam, Status: Active   CommentRussell Francois - 07 Dec 2016 5:06 PM     TASK CREATED  Pt  is S/P B/L L4-5 L5-S1 FJI on 11/30 by Dr Brittni Velazquez  needs appt  Gilbert Abbott - 08 Dec 2016 5:10 PM     TASK EDITED  1st attempt to call, no answer  LMOM for CB  Gilbert Dine - 13 Dec 2016 3:52 PM     TASK EDITED  2nd attempt to call, noanswer  LMOM for CB  Gilbert Dine - 76 Dec 2016 10:42 AM     TASK EDITED  please send [address] letter  Letter Sent      Active Problems    1  Acute bronchitis, unspecified organism (466 0) (J20 9)   2  Allergic rhinitis (477 9) (J30 9)   3  Bacterial vaginosis (616 10,041 9) (N76 0,B96 89)   4  Depression (311) (F32 9)   5  Diarrhea (787 91) (R19 7)   6  Dyslipidemia (272 4) (E78 5)   7  Encounter for routine gynecological examination (V72 31) (Z01 419)   8  Encounter for screening mammogram for malignant neoplasm of breast (V76 12)   (Z12 31)   9  Endometriosis (617 9) (N80 9)   10  Fatigue (780 79) (R53 83)   11  Greater trochanteric bursitis of both hips (726 5) (M70 61,M70 62)   12  Heel pain (729 5) (M79 673)   13  Hydradenitis (705 83) (L73 2)   14  Hyperglycemia (790 29) (R73 9)   15  Hypertension (401 9) (I10)   16  Hypokalemia (276 8) (E87 6)   17  Hypothyroidism (244 9) (E03 9)   18  Low back pain (724 2) (M54 5)   19  Lumbar radiculopathy (724 4) (M54 16)   20  Lumbar spondylosis (721 3) (M47 816)   21  Lumbosacral spondylosis (721 3) (M47 817)   22  Need for prophylactic vaccination and inoculation against influenza (V04 81) (Z23)   23  MARIE (obstructive sleep apnea) (327 23) (G47 33)   24  Right upper quadrant pain (789 01) (R10 11)   25  Sacroiliitis (720 2) (M46 1)   26  Trochanteric bursitis, unspecified laterality (726 5) (M70 60)   27  Vitamin B12 deficiency (266 2) (E53 8)   28   Vulvar cyst (624 8) (N90 7)    Current Meds   1  Adult Aspirin Low Strength 81 MG TBDP; CHEW AND SWALLOW 1 TABLET DAILY AS   DIRECTED Recorded   2  Atenolol-Chlorthalidone 50-25 MG Oral Tablet; take 1 tablet by mouth every day; Therapy: 32WQY8189 to (Evaluate:47Krb3172)  Requested for: 13XCJ6113; Last   Rx:76Ayc3688 Ordered   3  B-12 1000 MCG Oral Capsule; Therapy: 13MYL7954 to Recorded   4  Ciprofloxacin HCl - 500 MG Oral Tablet; TAKE 1 TABLET TWICE DAILY; Therapy: 03FSV7055 to (Evaluate:87Zgu6182)  Requested for: 83NLO0539; Last   Rx:85Jci4039 Ordered   5  Claritin 10 MG Oral Tablet Recorded   6  Zantac 75 TABS Recorded    Allergies    1  Penicillins   2  Erythromycin TABS   3  Toradol SOLN   4  Ventolin HFA   5   Vicodin TABS    Signatures   Electronically signed by : Dane Rooney, ; Dec 22 2016 10:51AM EST                       (Author)

## 2018-01-13 NOTE — RESULT NOTES
Message   Recorded as Task   Date: 04/21/2016 05:04 PM, Created By: Holly Noonan   Task Name: Follow Up   Assigned To: [address] [place] [address] clinical,Team   Regarding Patient: Era Jose, Status: Active   Comment:    Holly Noonan - 21 Apr 2016 5:04 PM     TASK CREATED  please schedule MBB#2   Rae Helm - 22 Apr 2016 9:42 AM     TASK REASSIGNED: Previously Assigned To [address] [place] [address] clinical,Team   Brice Atkinson - 25 Apr 2016 8:40 AM     TASK REASSIGNED: Previously Assigned To [facility] sched,Team  Pt left msg this am-she is under the impression she has MBB #2 scheduled for this Weds  Pt was originally scheduled on 4-27-16 for #1- this appt was moved up b/c of her pain and the 4-27 appt was cx  Would you like to fit her in this Weds? Otherwise, first available will be the week of 5-9-16  Thanks   Holly Noonan - 25 Apr 2016 8:51 AM     TASK REPLIED TO: Previously Assigned To [address] [place] [address] clinical,Team  1:30 pm on Wednesday would work   [facility] - 25 Apr 2016 9:12 AM     TASK REPLIED TO: Previously Assigned To [address] [place] [address] [employer] with pt and advised MBB #2 recommended by Dr Griselda aDugherty  Pt aware to arrive at 1315 on 4-27-16 and that pain level should be 5 or greater  Signatures   Electronically signed by :  Brice Atkinson, ; Apr 25 2016  9:12AM EST                       (Author)

## 2018-01-13 NOTE — MISCELLANEOUS
Message   Recorded as Task   Date: 08/18/2016 10:44 AM, Created By: Yamini Pool   Task Name: Follow Up   Assigned To: [id number] [address] clinical,Team   Regarding Patient: Jeny Massey, Status: Active   CommentReda Carrel - 18 Aug 2016 10:44 AM     TASK CREATED  Caller: Self; General Medical Question; [phone] (Home); [phone] (Work)  Received call from pt  on [facility] triage line  Pt  states that she is supposed to schedule a R side MBB, however, pt  states that she had the RFA for her L side done on 7/15/16  Per pt  she was feeling pretty good, and then about a week ago, the L side started feeling different, and now pt  states the pain is back to where it was to start  Pt  states that over the weekend she even started feeling the numbness in her L thigh again  Per pt  she does not want to proceed w/ scheduling anything for the R side yet, until Dr Stephon Bell reviews this information and decides if anything further needs to be done w/ the L side  Pt  was advised that Dr Stephon Bell is not in the office this week, however, he will be back in on Monday, at that time he can review this information and provide recommendations on how to proceed  Pt  verbalized understanding and was appreciative  Pt  states to please call her cell phone on Monday, and LMOM if she does not answer  Please advise  Thank you  [place] - 21 Aug 2016 3:27 PM     TASK REPLIED TO: Previously Assigned To [id number] [address] clinical,Team                      aware, please schedule a followup visit with me to review left-sided complaints further   Angela Berger - 22 Aug 2016 10:08 AM     TASK EDITED     Pt scheduled for appt on 8/30 @ 2:45pm per Dr Salguero  Active Problems    1  Acute bronchitis, unspecified organism (466 0) (J20 9)   2  Allergic rhinitis (477 9) (J30 9)   3  Bacterial vaginosis (616 10,041 9) (N76 0,B96 89)   4  Depression (311) (F32 9)   5  Diarrhea (787 91) (R19 7)   6   Dyslipidemia (272 4) (E78 5)   7  Encounter for routine gynecological examination (V72 31) (Z01 419)   8  Encounter for screening mammogram for malignant neoplasm of breast (V76 12)   (Z12 31)   9  Endometriosis (617 9) (N80 9)   10  Fatigue (780 79) (R53 83)   11  Greater trochanteric bursitis of both hips (726 5) (M70 62,M70 61)   12  Heel pain (729 5) (M79 673)   13  Hydradenitis (705 83) (L73 2)   14  Hypertension (401 9) (I10)   15  Hypokalemia (276 8) (E87 6)   16  Hypothyroidism (244 9) (E03 9)   17  Low back pain (724 2) (M54 5)   18  Lumbar radiculopathy (724 4) (M54 16)   19  Lumbar spondylosis (721 3) (M47 816)   20  Lumbosacral spondylosis (721 3) (M47 817)   21  Need for prophylactic vaccination and inoculation against influenza (V04 81) (Z23)   22  MARIE (obstructive sleep apnea) (327 23) (G47 33)   23  Right upper quadrant pain (789 01) (R10 11)   24  Trochanteric bursitis, unspecified laterality (726 5) (M70 60)   25  Vitamin B12 deficiency (266 2) (E53 8)    Current Meds   1  Adult Aspirin Low Strength 81 MG TBDP; CHEW AND SWALLOW 1 TABLET DAILY AS   DIRECTED Recorded   2  Atenolol-Chlorthalidone 50-25 MG Oral Tablet; take 1 tablet by mouth every day; Therapy: 41UOQ8753 to (Evaluate:68Bcj6771)  Requested for: 84MMB2717; Last   Rx:92Csb2131 Ordered   3  B-12 1000 MCG Oral Capsule; Therapy: 28CGJ6156 to Recorded   4  Claritin 10 MG Oral Tablet Recorded   5  Zantac 75 TABS Recorded    Allergies    1  Penicillins   2  Erythromycin TABS   3  Toradol SOLN   4  Ventolin HFA   5   Vicodin TABS    Signatures   Electronically signed by : Anyi Lantigua RN; Aug 22 2016 10:08AM EST                       (Author)

## 2018-01-13 NOTE — MISCELLANEOUS
Message   Recorded as Task   Date: 01/12/2017 02:09 PM, Created By: Denny Najera   Task Name: Follow Up   Assigned To: [id number] [place] [address] clinical,Team   Regarding Patient: Juliano Akins, Status: In Progress   Comment:    Rae Helm - 12 Jan 2017 2:09 PM     TASK CREATED  Caller: Self; General Medical Question; [phone] (Home); [phone] (Work)    *****Chelsie Mccann*****  Pt LM on [facility] triage line today at 91 [phone], said she is getting back to us after her last round of injections and she is worse than ever and wants to know what the next step is  I spoke with pt, states she seems to get worse after each injection  (last injection 11/30/16 B/L L4-5  L5-S1 FJI), I advised we will need to eval her in the office  Pt states she will need to CB to schedule  ***********   Meera Elder - 12 Jan 2017 2:28 PM     TASK REPLIED TO: Previously Assigned To [address] [address] clinical,Team                      yes agree with plan, thanks! Rae Helm - 12 Jan 2017 2:30 PM     TASK IN PROGRESS        Active Problems    1  Acute bronchitis, unspecified organism (466 0) (J20 9)   2  Allergic rhinitis (477 9) (J30 9)   3  Bacterial vaginosis (616 10,041 9) (N76 0,B96 89)   4  Depression (311) (F32 9)   5  Diarrhea (787 91) (R19 7)   6  Dyslipidemia (272 4) (E78 5)   7  Encounter for routine gynecological examination (V72 31) (Z01 419)   8  Encounter for screening mammogram for malignant neoplasm of breast (V76 12)   (Z12 31)   9  Endometriosis (617 9) (N80 9)   10  Fatigue (780 79) (R53 83)   11  Greater trochanteric bursitis of both hips (726 5) (M70 61,M70 62)   12  Heel pain (729 5) (M79 673)   13  Hydradenitis (705 83) (L73 2)   14  Hyperglycemia (790 29) (R73 9)   15  Hypertension (401 9) (I10)   16  Hypokalemia (276 8) (E87 6)   17  Hypothyroidism (244 9) (E03 9)   18  Low back pain (724 2) (M54 5)   19  Lumbar radiculopathy (724 4) (M54 16)   20  Lumbar spondylosis (721 3) (M47 816)   21   Lumbosacral spondylosis (721 3) (M47 817)   22  Need for prophylactic vaccination and inoculation against influenza (V04 81) (Z23)   23  MARIE (obstructive sleep apnea) (327 23) (G47 33)   24  Right upper quadrant pain (789 01) (R10 11)   25  Sacroiliitis (720 2) (M46 1)   26  Trochanteric bursitis, unspecified laterality (726 5) (M70 60)   27  Vitamin B12 deficiency (266 2) (E53 8)   28  Vulvar cyst (624 8) (N90 7)    Current Meds   1  Adult Aspirin Low Strength 81 MG TBDP; CHEW AND SWALLOW 1 TABLET DAILY AS   DIRECTED Recorded   2  Atenolol-Chlorthalidone 50-25 MG Oral Tablet; take 1 tablet by mouth every day; Therapy: 96PAJ9089 to (Evaluate:78Cte3592)  Requested for: 49AUX4344; Last   Rx:38Nan0855 Ordered   3  B-12 1000 MCG Oral Capsule; Therapy: 48TTA6030 to Recorded   4  Ciprofloxacin HCl - 500 MG Oral Tablet; TAKE 1 TABLET TWICE DAILY; Therapy: 15ZGE1583 to (Evaluate:23Myn1279)  Requested for: 21XDX0944; Last   Rx:91Etk7104 Ordered   5  Claritin 10 MG Oral Tablet Recorded   6  Sulfamethoxazole-Trimethoprim 800-160 MG Oral Tablet (Bactrim DS); Take 1 tablet   twice daily; Therapy: 08Pwn7413 to (Last Rx:34Ueu7352)  Requested for: 18Cbx6387; Status:   ACTIVE - Retrospective By Protocol Authorization Ordered   7  Zantac 75 TABS Recorded    Allergies    1  Penicillins   2  Erythromycin TABS   3  Toradol SOLN   4  Ventolin HFA   5  Vicodin TABS    Signatures   Electronically signed by :  Laura Harley, ; Jan 12 2017  2:31PM EST                       (Author)

## 2018-01-14 VITALS
HEIGHT: 66 IN | RESPIRATION RATE: 16 BRPM | DIASTOLIC BLOOD PRESSURE: 82 MMHG | SYSTOLIC BLOOD PRESSURE: 120 MMHG | HEART RATE: 64 BPM | BODY MASS INDEX: 38.3 KG/M2 | WEIGHT: 238.31 LBS

## 2018-01-14 VITALS
WEIGHT: 238.2 LBS | BODY MASS INDEX: 38.28 KG/M2 | DIASTOLIC BLOOD PRESSURE: 70 MMHG | SYSTOLIC BLOOD PRESSURE: 110 MMHG | HEART RATE: 72 BPM | HEIGHT: 66 IN

## 2018-01-14 VITALS
DIASTOLIC BLOOD PRESSURE: 68 MMHG | HEART RATE: 72 BPM | HEIGHT: 66 IN | WEIGHT: 238.8 LBS | BODY MASS INDEX: 38.38 KG/M2 | SYSTOLIC BLOOD PRESSURE: 110 MMHG

## 2018-01-15 NOTE — RESULT NOTES
Discussion/Summary   Hemoccult is negative     Verified Results  (1) OCCULT BLOOD, FECAL IMMUNOCHEMICAL TEST 28ZPY5238 02:59PM Hernando Velazquez    Order Number: [id number]     Test Name Result Flag Reference   OCCULT BLD, FECAL IMMUNOLOGICAL Negative  Negative   Performed by Fecal Immunochemical Test

## 2018-01-15 NOTE — RESULT NOTES
Verified Results  (1) LYME ANTIBODY PROFILE W/REFLEX TO WESTERN BLOT 01ASN7159 01:39PM Chrystal Marino    Order Number: [id number]     Test Name Result Flag Reference   LYME IGG 0 38  0 00-0 79   NEGATIVE(0 00-0 79)-Absence of detectable Borrelia IgG Antibodies  A negative result does not exclude the possibility of Borrelia infection  If early Lyme disease is suspected,a second sample should be collected & tested 4 weeks after initial testing  LYME IGM 1 57 H 0 00-0 79   POSITIVE (> or = 1 20) - Presence of Borrelia IgM Antibodies  Current testing guidelines recommend that all positive samples be supplemented by further testing  Sample forwarded to reference lab for western blot assay

## 2018-01-15 NOTE — RESULT NOTES
Verified Results  (1) CBC/PLT/DIFF 60RPA1175 07:55AM Kathya MALDONADO Order Number: [id number]     Test Name Result Flag Reference   WBC COUNT 13 14 Thousand/uL H 4 31-10 16   RBC COUNT 4 76 Million/uL  3 81-5 12   HEMOGLOBIN 14 1 g/dL  11 5-15 4   HEMATOCRIT 42 5 %  34 8-46  1   MCV 89 fL  82-98   MCH 29 6 pg  26 8-34 3   MCHC 33 2 g/dL  31 4-37 4   RDW 14 6 %  11 6-15 1   MPV 11 1 fL  8 9-12 7   PLATELET COUNT 101 Thousands/uL  149-390   nRBC AUTOMATED 0 /100 WBCs     NEUTROPHILS RELATIVE PERCENT 59 %  43-75   LYMPHOCYTES RELATIVE PERCENT 30 %  14-44   MONOCYTES RELATIVE PERCENT 7 %  4-12   EOSINOPHILS RELATIVE PERCENT 3 %  0-6   BASOPHILS RELATIVE PERCENT 1 %  0-1   NEUTROPHILS ABSOLUTE COUNT 7 79 Thousands/? ??L H 1 85-7 62   LYMPHOCYTES ABSOLUTE COUNT 3 93 Thousands/? ??L  0 60-4 47   MONOCYTES ABSOLUTE COUNT 0 94 Thousand/? ??L  0 17-1 22   EOSINOPHILS ABSOLUTE COUNT 0 36 Thousand/? ??L  0 00-0 61   BASOPHILS ABSOLUTE COUNT 0 06 Thousands/? ??L  0 00-0 10

## 2018-01-15 NOTE — MISCELLANEOUS
Message   Recorded as Task   Date: 11/01/2016 04:21 PM, Created By: Reid Pena   Task Name: Call Back   Assigned To: [facility] sched,Team   Regarding Patient: Kady Mccord, Status: In Progress   Comment:    Rae Helm - 01 Nov 2016 4:21 PM     TASK CREATED    Pt to be scheduled for B/L L4-5 L5-S1 FJI  LMOM at home and cell # for pt to CB to schedule  ************   [facility] - 02 Nov 7216 0:36 AM     TASK EDITED                 Lm [facility] Vm to [facility] - 02 Nov 7692 1:84 AM     TASK IN PROGRESS   [facility] - 03 Nov 4288 15:61 AM     TASK EDITED                 LM on cell # to [facility] - 11 Nov 2821 06:08 AM     TASK EDITED                 Lm on Home & Cell # to CB to schedule   Lilian Angeles - 11 Nov 2016 1:15 PM     TASK REASSIGNED: Previously Assigned To [facility] sched,Team  please send can not reach letter  Minus Chana - 23 Nov 3877 36:41 AM     TASK EDITED                 Patient called back and was scheduled for b/l l4-5, l5-s1 FJI  All preprocedure instructions were reviewed with patient  NPO 1 hour prior   No Antbx   Needs      Patient aware  Confirmed date, time, location        Active Problems    1  Acute bronchitis, unspecified organism (466 0) (J20 9)   2  Allergic rhinitis (477 9) (J30 9)   3  Bacterial vaginosis (616 10,041 9) (N76 0,B96 89)   4  Depression (311) (F32 9)   5  Diarrhea (787 91) (R19 7)   6  Dyslipidemia (272 4) (E78 5)   7  Encounter for routine gynecological examination (V72 31) (Z01 419)   8  Encounter for screening mammogram for malignant neoplasm of breast (V76 12)   (Z12 31)   9  Endometriosis (617 9) (N80 9)   10  Fatigue (780 79) (R53 83)   11  Greater trochanteric bursitis of both hips (726 5) (M70 62,M70 61)   12  Heel pain (729 5) (M79 673)   13  Hydradenitis (705 83) (L73 2)   14  Hypertension (401 9) (I10)   15  Hypokalemia (276 8) (E87 6)   16  Hypothyroidism (244 9) (E03 9)   17   Low back pain (724 2) (M54 5)   18  Lumbar radiculopathy (724 4) (M54 16)   19  Lumbar spondylosis (721 3) (M47 816)   20  Lumbosacral spondylosis (721 3) (M47 817)   21  Need for prophylactic vaccination and inoculation against influenza (V04 81) (Z23)   22  MARIE (obstructive sleep apnea) (327 23) (G47 33)   23  Right upper quadrant pain (789 01) (R10 11)   24  Sacroiliitis (720 2) (M46 1)   25  Trochanteric bursitis, unspecified laterality (726 5) (M70 60)   26  Vitamin B12 deficiency (266 2) (E53 8)    Current Meds   1  Adult Aspirin Low Strength 81 MG TBDP; CHEW AND SWALLOW 1 TABLET DAILY AS   DIRECTED Recorded   2  Atenolol-Chlorthalidone 50-25 MG Oral Tablet; take 1 tablet by mouth every day; Therapy: 96EPU9733 to (Evaluate:57Gwf6784)  Requested for: 71UAL3199; Last   Rx:13Jun2016 Ordered   3  B-12 1000 MCG Oral Capsule; Therapy: 68UAL8764 to Recorded   4  Claritin 10 MG Oral Tablet Recorded   5  DULoxetine HCl - 30 MG Oral Capsule Delayed Release Particles; TAKE 1 CAPSULE   EVERY DAY; Therapy: 55QQR1327 to (Evaluate:69Hzr9496)  Requested for: 84CJP0214; Last   Rx:27Oct2016 Ordered   6  Zantac 75 TABS Recorded    Allergies    1  Penicillins   2  Erythromycin TABS   3  Toradol SOLN   4  Ventolin HFA   5   Vicodin TABS    Signatures   Electronically signed by : Gabriela Harmon, ; Nov 16 2016 10:15AM EST                       (Author)

## 2018-01-15 NOTE — RESULT NOTES
Message   Lyme IgM pos, IgG neg  Can treat  Has had other labs, and WBC's were slightly elevated  Follow in 2 weeks  Will Rx Doxy  Verified Results  (1) CBC/PLT/DIFF [id number] 09:04AM [facility]    Order Number: [id number]     Test Name Result Flag Reference   WBC COUNT 12 98 Thousand/uL H 4 31-10 16   RBC COUNT 5 07 Million/uL  3 81-5 12   HEMOGLOBIN 15 1 g/dL  11 5-15 4   HEMATOCRIT 45 3 %  34 8-46  1   MCV 89 fL  82-98   MCH 29 8 pg  26 8-34 3   MCHC 33 3 g/dL  31 4-37 4   RDW 13 8 %  11 6-15 1   MPV 10 8 fL  8 9-12 7   PLATELET COUNT 596 Thousands/uL  149-390   nRBC AUTOMATED 0 /100 WBCs     NEUTROPHILS RELATIVE PERCENT 58 %  43-75   LYMPHOCYTES RELATIVE PERCENT 32 %  14-44   MONOCYTES RELATIVE PERCENT 6 %  4-12   EOSINOPHILS RELATIVE PERCENT 4 %  0-6   BASOPHILS RELATIVE PERCENT 0 %  0-1   NEUTROPHILS ABSOLUTE COUNT 7 49 Thousands/? ??L  1 85-7 62   LYMPHOCYTES ABSOLUTE COUNT 4 12 Thousands/? ??L  0 60-4 47   MONOCYTES ABSOLUTE COUNT 0 73 Thousand/? ??L  0 17-1 22   EOSINOPHILS ABSOLUTE COUNT 0 56 Thousand/? ??L  0 00-0 61   BASOPHILS ABSOLUTE COUNT 0 05 Thousands/? ??L  0 00-0 10   - Patient Instructions: This bloodwork is non-fasting  Please drink two glasses of water morning of bloodwork  - Patient Instructions: This bloodwork is non-fasting  Please drink two glasses of water morning of bloodwork  (1) TSH [id number] 09:04AM [facility]    Order Number: [id number]     Test Name Result Flag Reference   TSH 0 701 uIU/mL  0 358-3 740   - Patient Instructions: This bloodwork is non-fasting  Please drink two glasses of water morning of bloodwork  - Patient Instructions: This bloodwork is non-fasting  Please drink two glasses of water morning of bloodwork  Patients undergoing fluorescein dye angiography may retain small amounts of fluorescein in the body for 48-72 hours post procedure  Samples containing fluorescein can produce falsely depressed TSH values   If the patient had this procedure,a specimen should be resubmitted post fluorescein clearance  The recommended reference ranges for TSH during pregnancy are as follows:  First trimester 0 1 to 2 5 uIU/mL  Second trimester  0 2 to 3 0 uIU/mL  Third trimester 0 3 to 3 0 uIU/m     (1) LYME ANTIBODY PROFILE W/REFLEX TO WESTERN BLOT [id number] 09:04AM [facility] Order Number: [id number]     Test Name Result Flag Reference   LYME IGG 0 34  0 00-0 79   NEGATIVE(0 00-0 79)-Absence of detectable Borrelia IgG Antibodies  A negative result does not exclude the possibility of Borrelia infection  If early Lyme disease is suspected,a second sample should be collected & tested 4 weeks after initial testing  LYME IGM 1 59 H 0 00-0 79   POSITIVE (> or = 1 20) - Presence of Borrelia IgM Antibodies  Current testing guidelines recommend that all positive samples be supplemented by further testing  Sample forwarded to reference lab for western blot assay  (1) LYME ANTIBODY, WESTERN BLOT [id number] 09:04AM [facility] Order Number: [id number]     Test Name Result Flag Reference   PLEASE NOTE Comment     The date recorded on the requisition indicates the sample(s) received  were greater than 67 hours old upon arrival in our laboratory  Performed at:  [address] [place] [address]  [id number]  : Andrey Perez MD, Phone:  [phone]     (1) LYME ANTIBODY PROFILE W/REFLEX TO WESTERN BLOT [id number] 09:04AM [facility] Order Number: [id number]     Test Name Result Flag Reference   LYME IGG 0 34  0 00-0 79   NEGATIVE(0 00-0 79)-Absence of detectable Borrelia IgG Antibodies  A negative result does not exclude the possibility of Borrelia infection  If early Lyme disease is suspected,a second sample should be collected & tested 4 weeks after initial testing  LYME IGM 1 59 H 0 00-0 79   POSITIVE (> or = 1 20) - Presence of Borrelia IgM Antibodies    Current testing guidelines recommend that all positive samples be supplemented by further testing  Sample forwarded to reference lab for western blot assay  LYME 18 KD IGG Absent     LYME 23 KD IGG Absent     LYME 28 KD IGG Absent     LYME 30 KD IGG Absent     LYME 39 KD IGG Absent     LYME 39 KD IGM Absent     LYME 41 KD IGG Absent     LYME 45 KD IGG Absent     LYME 58 KD IGG Absent     LYME 66 KD IGG Absent     LYME 93 KD IGG Absent     LYME 23 KD IGM Present A    LYME 41 KD IGM Absent     LYME IGG WB INTERP  Negative     Positive: 5 of the following                                 Borrelia-specific bands:                                 18,23,28,30,39,41,45,58,                                 66, and 93  Negative: No bands or banding                                 patterns which do not                                 meet positive criteria  LYME IGM WB INTERP  Negative     Note: An equivocal or positive EIA result followed by a negative  Western Blot result is considered NEGATIVE  An equivocal or positive  EIA result followed by a positive Western Blot is considered POSITIVE  by the CDC  Positive: 2 of the following bands: 23,39 or 41  Negative: No bands or banding patterns which do not meet positive  criteria  Criteria for positivity are those recommended by CDC/ASTPHLD   p23=Osp C, k27=dfkdbravo  Note:  Sera from individuals with the following may cross react in the  Lyme Western Blot assays: other spirochetal diseases (periodontal  disease, leptospirosis, relapsing fever, yaws, and pinta);  connective autoimmune (Rheumatoid Arthritis and Systemic Lupus  Erythematosus and also individuals with Antinuclear Antibody);  other infections COFFEE [facility] Spotted Fever; Aparna-Barr Virus,  and Cytomegalovirus)      Performed at:  [address]  [id number]  : Sarah Dobson MD, Phone:  [phone]       Plan  Fibromyalgia, Lumbosacral spondylosis · Doxycycline Hyclate 100 MG Oral Tablet; TAKE 1 TABLET TWICE DAILY UNTIL  GONE

## 2018-01-17 NOTE — MISCELLANEOUS
Message   Recorded as Task   Date: 05/05/2016 01:11 PM, Created By: Mirza Restrepo   Task Name: Care Coordination   Assigned To: [address] end clinical,Team   Regarding Patient: Navid Morrow, Status: In Progress   CommentAmbrose Carter - 05 May 2016 1:11 PM     TASK CREATED  please let patient know her insurance will not cover voltaren gel until she has tried a generic for 30 days  I did send Diclofenac cream to her pharmacy, she can apply to painful areas up to 4 times daily TY   Rae Helm - 05 May 2016 1:16 PM     TASK EDITED    Late entry: pt called at [facility], asking about auth on her voltaren gel, states if she does not hear anything by today or early tomorrow, she will be pulling her records, and going somewhere else  Advised her  had called earlier and advised I have sent a message to my coordinator  Advised we will be contacting her after we hear back from her insurance comp  ************    Attempted to contact pt, LMOM for pt to CB to advise above  *************   Rae Helm - 06 May 2016 8:26 AM     TASK EDITED    Attempted to contact pt, LMOM at home and cell # for pt to Elma Noriega  **************   Rae Helm - 06 May 2016 8:30 AM     TASK EDITED    I spoke with pt, advised above  Pt verbalizes understanding  **********        Active Problems    1  Acute bronchitis, unspecified organism (466 0) (J20 9)   2  Allergic rhinitis (477 9) (J30 9)   3  Bacterial vaginosis (616 10,041 9) (N76 0,B96 89)   4  Depression (311) (F32 9)   5  Diarrhea (787 91) (R19 7)   6  Dyslipidemia (272 4) (E78 5)   7  Encounter for routine gynecological examination (V72 31) (Z01 419)   8  Encounter for screening mammogram for malignant neoplasm of breast (V76 12)   (Z12 31)   9  Endometriosis (617 9) (N80 9)   10  Fatigue (780 79) (R53 83)   11  Greater trochanteric bursitis of both hips (726 5) (M70 62,M70 61)   12  Heel pain (729 5) (M79 673)   13  Hydradenitis (705 83) (L73 2)   14   Hypertension (401 9) (I10)   15  Hypokalemia (276 8) (E87 6)   16  Hypothyroidism (244 9) (E03 9)   17  Low back pain (724 2) (M54 5)   18  Lumbar radiculopathy (724 4) (M54 16)   19  Lumbar spondylosis (721 3) (M47 816)   20  Lumbosacral spondylosis (721 3) (M47 817)   21  Need for prophylactic vaccination and inoculation against influenza (V04 81) (Z23)   22  MARIE (obstructive sleep apnea) (327 23) (G47 33)   23  Right upper quadrant pain (789 01) (R10 11)   24  Trochanteric bursitis, unspecified laterality (726 5) (M70 60)   25  Vitamin B12 deficiency (266 2) (E53 8)    Current Meds   1  Adult Aspirin Low Strength 81 MG TBDP; CHEW AND SWALLOW 1 TABLET DAILY AS   DIRECTED Recorded   2  Advair Diskus 100-50 MCG/DOSE Inhalation Aerosol Powder Breath Activated; INHALE   1 PUFF TWICE DAILY; Therapy: 69JBV0366 to ([id number])  Requested for: 75FQN2614; Last   Rx:02Mar2016 Ordered   3  Atenolol-Chlorthalidone 50-25 MG Oral Tablet; Take 1 tablet daily; Therapy: 93RAQ1085 to (Evaluate:20Apr2016)  Requested for: 31RPM7172; Last   Rx:62Ikt4562 Ordered   4  B-12 1000 MCG Oral Capsule; Therapy: 09MMI4386 to Recorded   5  Claritin 10 MG Oral Tablet Recorded   6  Diclofenac Sodium 1 5 % Transdermal Solution; apply to affected areas as directed; Therapy: 03WOF7343 to (Evaluate:30Ihc6542)  Requested for: 22GUX0624; Last   KD:68WGW5346 Ordered   7  Sulfamethoxazole-Trimethoprim 800-160 MG Oral Tablet (Bactrim DS); Take 1 twice   daily; Therapy: 52ERJ2771 to (Evaluate:12Mar2016)  Requested for: 67QRN7757; Last   Rx:02Mar2016 Ordered   8  Voltaren 1 % Transdermal Gel (Diclofenac Sodium); apply to affected  area up to 4 times   daily; Therapy: 58Llh7592 to ([place])  Requested for: 30Dqe6849; Last   Rx:27Apr2016 Ordered   9  Zantac 75 TABS Recorded    Allergies    1  Penicillins   2  Erythromycin TABS   3  Toradol SOLN   4  Ventolin HFA   5  Vicodin TABS    Signatures   Electronically signed by :  Dipesh Shields, ; May  6 2016  8:30AM EST                       (Author)

## 2018-01-17 NOTE — MISCELLANEOUS
Message   Recorded as Task   Date: 03/29/2017 11:15 AM, Created By: Rosey Mcnair   Task Name: Miscellaneous   Assigned To: [address] ,Team   Regarding Patient: Jai Patrick, Status: Active   CommentGwraquelmaximiliano Palma - 29 Mar 2017 11:15 AM     TASK CREATED  Pt called and was wanting to get 2nd opinion and states that the office she wants to see informed pt she needs to get a referral from Dr Eric Harmon  Pt has an appt  tomorrow, but is considering on cancelling it  Call back number: [phone]  [place]ant[place]ina - 29 Mar 2017 11:47 AM     TASK EDITED  S/w pt  regarding above  Per pt  she did receive a referral to go get a second opinion for her pcp, but [facility], per pt , is requiring a referral from Jolene Loving  Pt  requesting this referral     Per pt  she will also need her ov and opro notes for this visit with [facility]  Advised pt  that I would send this information to Jolene Loving and that he is in opro's at present and I will c/b with his suggestions and recommendations for same  Per pt  she spoke to her pcp and he feels she might also have fibromyalgia and along with her bilat  hip issues  RN questioned pt  as to the last part of the task and her not being sure if she wanted to cx her f/u apt with Jolene Loving for 3/30  Per pt  she is not sure why she should come in and pay for an ov when she doesn't feel that Jolene Loving has anything more to offer her in the line of pain relief  Per pt  if she needs to keep it to obtain the referral then she will  Advised pt  to keep ov until I receive JE's recommendations and we will decide on the ov after that  Pt  appreciative and pleasant  ***please advise, will you provide a referral to [facility], and should she cx the f/u ov w/JE for 3/30? ***   Aleksey Salguero - 29 Mar 2017 11:58 AM     TASK REPLIED TO: Previously Assigned To Jojo Jiménez                      referral printed, please assist patient with transition of care Rhonda Ross - 29 Mar 2017 1:30 PM     TASK REPLIED TO: Previously Assigned To [address] clinical,Team  **FYI**    Patient cx f/u with answering service  No reason provided  Nina Nelson - 29 Mar 2017 2:59 PM     TASK EDITED  S/w pt  regarding above  Per pt  she did cx tomorrow's f/u appt  due to not feeling there was a "good" reason to keep it  Pt  requested that we fax the new referral for pain management to her fax # instead of mailing it at Fax# [phone]  Pt  stated once she receives the referral and calls to make her appt  she will call to request the documents that she will need for the new office  Pt  appreciative of assistance  Nina Nelson - 29 Mar 2017 3:00 PM     TASK IN PROGRESS   Jose C Hagan - 30 Mar 2017 11:56 AM     TASK EDITED  patient called back requesting to s/w Nina stating she was calling back with info requested  Please call   [phone]   Nina Nelson - 30 Mar 2017 1:20 PM     TASK EDITED  ***please assist pt  in obtaining her [facility]  records***    S/w pt  regarding above  Per pt  she called the pain management office that Jonathan Small referred her to and they require the pt 's records before they can see her  Dr Courtney Joyner office # is [phone]  Advised pt  that I would send this request to our  team   Advised that [facility]  may call back for more information  Pt  requested a call on her cell ph# of [phone]    Pt  appreciative of assistance  Left message for patient this morning, Monday, 04/03/17 @ 11:08 am in regards to her request for her records getting sent to Dr Courtney Joyner office  Called patient 's cell ph# [phone] as she requested  Active Problems    1  Allergic rhinitis (477 9) (J30 9)   2  Bacterial vaginosis (616 10,041 9) (N76 0,B96 89)   3  Depression (311) (F32 9)   4  Diarrhea (787 91) (R19 7)   5  Dyslipidemia (272 4) (E78 5)   6  Encounter for routine gynecological examination (V72 31) (Z01 419)   7   Encounter for screening mammogram for malignant neoplasm of breast (V76 12)   (Z12 31)   8  Endometriosis (617 9) (N80 9)   9  Fatigue (780 79) (R53 83)   10  Fibromyalgia (729 1) (M79 7)   11  Greater trochanteric bursitis of both hips (726 5) (M70 61,M70 62)   12  Heel pain (729 5) (M79 673)   13  Hydradenitis (705 83) (L73 2)   14  Hyperglycemia (790 29) (R73 9)   15  Hypertension (401 9) (I10)   16  Hypokalemia (276 8) (E87 6)   17  Hypothyroidism (244 9) (E03 9)   18  Low back pain (724 2) (M54 5)   19  Lumbar radiculopathy (724 4) (M54 16)   20  Lumbar spondylosis (721 3) (M47 816)   21  Lumbosacral spondylosis (721 3) (M47 817)   22  Need for prophylactic vaccination and inoculation against influenza (V04 81) (Z23)   23  MARIE (obstructive sleep apnea) (327 23) (G47 33)   24  Right upper quadrant pain (789 01) (R10 11)   25  Sacroiliitis (720 2) (M46 1)   26  Vitamin B12 deficiency (266 2) (E53 8)   27  Vulvar cyst (624 8) (N90 7)    Current Meds   1  Adult Aspirin Low Strength 81 MG TBDP; CHEW AND SWALLOW 1 TABLET DAILY AS   DIRECTED Recorded   2  Atenolol-Chlorthalidone 50-25 MG Oral Tablet; take 1 tablet by mouth every day; Therapy: 53GME9889 to (Evaluate:64Xpa5078)  Requested for: 74ULC0096; Last   Rx:89Vca7379 Ordered   3  B-12 1000 MCG Oral Capsule; Therapy: 71UXU4083 to Recorded   4  Doxycycline Hyclate 100 MG Oral Tablet; TAKE 1 TABLET TWICE DAILY UNTIL GONE;   Therapy: 32CTC2540 to (Evaluate:20Apr2017)  Requested for: 47BSK7885; Last   Rx:30Mar2017 Ordered   5  Fexofenadine HCl - 180 MG Oral Tablet; Take one daily as needed; Therapy: 15ZCC1974 to (Last Rx:10Mar2017)  Requested for: 36UCX4441 Ordered   6  Meloxicam 15 MG Oral Tablet; TAKE 1 TABLET DAILY; Therapy: 51FFY5296 to (Evaluate:09Apr2017)  Requested for: 23WYO5222; Last   Rx:10Mar2017 Ordered   7  Montelukast Sodium 10 MG Oral Tablet (Singulair); Take one daily; Therapy: 03EAV4316 to (Last Rx:10Mar2017)  Requested for: 91YIW4172 Ordered   8   Zantac 75 TABS Recorded    Allergies    1  Penicillins   2  Erythromycin TABS   3  Toradol SOLN   4  Ventolin HFA   5   Vicodin TABS    Signatures   Electronically signed by : Sy Young, ; Apr  3 2017 11:09AM EST                       (Author)

## 2018-01-25 ENCOUNTER — OFFICE VISIT (OUTPATIENT)
Dept: FAMILY MEDICINE CLINIC | Facility: CLINIC | Age: 53
End: 2018-01-25
Payer: COMMERCIAL

## 2018-01-25 VITALS
BODY MASS INDEX: 38.89 KG/M2 | HEIGHT: 66 IN | DIASTOLIC BLOOD PRESSURE: 74 MMHG | SYSTOLIC BLOOD PRESSURE: 120 MMHG | WEIGHT: 242 LBS

## 2018-01-25 DIAGNOSIS — M79.7 FIBROMYALGIA: ICD-10-CM

## 2018-01-25 DIAGNOSIS — J30.89 CHRONIC NONSEASONAL ALLERGIC RHINITIS DUE TO POLLEN: ICD-10-CM

## 2018-01-25 DIAGNOSIS — G47.33 OSA (OBSTRUCTIVE SLEEP APNEA): ICD-10-CM

## 2018-01-25 DIAGNOSIS — M54.16 LUMBAR RADICULOPATHY: ICD-10-CM

## 2018-01-25 DIAGNOSIS — J40 BRONCHITIS: Primary | ICD-10-CM

## 2018-01-25 DIAGNOSIS — Z23 NEED FOR INFLUENZA VACCINATION: ICD-10-CM

## 2018-01-25 DIAGNOSIS — F33.1 MODERATE EPISODE OF RECURRENT MAJOR DEPRESSIVE DISORDER (HCC): ICD-10-CM

## 2018-01-25 DIAGNOSIS — I10 ESSENTIAL HYPERTENSION: ICD-10-CM

## 2018-01-25 DIAGNOSIS — E03.9 ACQUIRED HYPOTHYROIDISM: ICD-10-CM

## 2018-01-25 PROBLEM — E55.9 VITAMIN D DEFICIENCY: Status: ACTIVE | Noted: 2017-05-30

## 2018-01-25 PROBLEM — K64.9 BLEEDING HEMORRHOIDS: Status: ACTIVE | Noted: 2017-04-13

## 2018-01-25 PROCEDURE — 99214 OFFICE O/P EST MOD 30 MIN: CPT | Performed by: FAMILY MEDICINE

## 2018-01-25 PROCEDURE — 90686 IIV4 VACC NO PRSV 0.5 ML IM: CPT | Performed by: FAMILY MEDICINE

## 2018-01-25 PROCEDURE — 90471 IMMUNIZATION ADMIN: CPT | Performed by: FAMILY MEDICINE

## 2018-01-25 PROCEDURE — 94640 AIRWAY INHALATION TREATMENT: CPT | Performed by: FAMILY MEDICINE

## 2018-01-25 PROCEDURE — 90688 IIV4 VACCINE SPLT 0.5 ML IM: CPT

## 2018-01-25 RX ORDER — RANITIDINE 150 MG/1
TABLET ORAL
COMMUNITY
End: 2018-09-15

## 2018-01-25 RX ORDER — CHOLECALCIFEROL (VITAMIN D3) 1250 MCG
1 CAPSULE ORAL WEEKLY
COMMUNITY
Start: 2017-05-30 | End: 2018-10-02

## 2018-01-25 RX ORDER — CLINDAMYCIN PHOSPHATE 10 UG/ML
LOTION TOPICAL
Refills: 3 | COMMUNITY
Start: 2017-11-09

## 2018-01-25 RX ORDER — LEVALBUTEROL INHALATION SOLUTION 1.25 MG/3ML
1.25 SOLUTION RESPIRATORY (INHALATION) EVERY 8 HOURS PRN
Status: DISCONTINUED | OUTPATIENT
Start: 2018-01-25 | End: 2018-02-22 | Stop reason: ALTCHOICE

## 2018-01-25 RX ORDER — LORATADINE 10 MG/1
10 TABLET ORAL
COMMUNITY
Start: 2007-10-08

## 2018-01-25 RX ORDER — OMEPRAZOLE 40 MG/1
CAPSULE, DELAYED RELEASE ORAL
Refills: 6 | COMMUNITY
Start: 2018-01-09 | End: 2018-09-15

## 2018-01-25 RX ORDER — CHOLECALCIFEROL (VITAMIN D3) 25 MCG
TABLET,CHEWABLE ORAL
COMMUNITY
Start: 2012-09-06

## 2018-01-25 RX ORDER — MONTELUKAST SODIUM 10 MG/1
10 TABLET ORAL DAILY
Refills: 0 | COMMUNITY
Start: 2017-12-04 | End: 2018-03-09 | Stop reason: SDUPTHER

## 2018-01-25 RX ORDER — GABAPENTIN 100 MG/1
CAPSULE ORAL
Refills: 1 | COMMUNITY
Start: 2017-12-28 | End: 2018-09-26

## 2018-01-25 RX ORDER — LEVALBUTEROL TARTRATE 45 UG/1
1-2 AEROSOL, METERED ORAL EVERY 4 HOURS PRN
Qty: 1 INHALER | Refills: 0 | Status: SHIPPED | OUTPATIENT
Start: 2018-01-25 | End: 2018-02-22 | Stop reason: SDUPTHER

## 2018-01-25 RX ORDER — ATENOLOL AND CHLORTHALIDONE TABLET 50; 25 MG/1; MG/1
1 TABLET ORAL DAILY
Refills: 1 | COMMUNITY
Start: 2017-12-04 | End: 2018-06-03 | Stop reason: SDUPTHER

## 2018-01-25 RX ORDER — OLOPATADINE HYDROCHLORIDE 1 MG/ML
SOLUTION/ DROPS OPHTHALMIC
COMMUNITY
Start: 2017-04-13 | End: 2020-02-28

## 2018-01-25 RX ADMIN — LEVALBUTEROL INHALATION SOLUTION 1.25 MG: 1.25 SOLUTION RESPIRATORY (INHALATION) at 10:49

## 2018-01-25 NOTE — ASSESSMENT & PLAN NOTE
Continues to have increased symptoms of fibromyalgia  She is seeing specialist   No changes recommended at the moment

## 2018-01-25 NOTE — ASSESSMENT & PLAN NOTE
Continues with some symptoms  Currently is trying gabapentin, but with mixed effect  For the moment I would wonder if we would need to change this at all verses waiting till we see what Rheumatology would want to do

## 2018-01-25 NOTE — ASSESSMENT & PLAN NOTE
Blood pressure seems to be fairly well controlled today  No particular concerns, and will follow up in the future

## 2018-01-25 NOTE — ASSESSMENT & PLAN NOTE
Patient continues to have some symptoms, but she did not wish to have any treatment, i e  no medications  This was because she was having side effects previously on SNRI medications

## 2018-01-25 NOTE — ASSESSMENT & PLAN NOTE
She is not currently using her CPAP, mostly because she says it does not fit well, and she also has to lean over, then it leaks, and she is quite bothered by the machine in general   Consider re-evaluation with different mask, consider different machine, follow up with sleep specialist in the future

## 2018-01-25 NOTE — PATIENT INSTRUCTIONS
Diagnoses and all orders for this visit:    Bronchitis  Comments:  Patient seems to be doing relatively well at the moment, but she is having some increased shortness of breath  Recommend Xopenex, follow-up  Orders:  -     levalbuterol (XOPENEX) inhalation solution 1 25 mg; Take 3 mL by nebulization every 8 (eight) hours as needed for wheezing   -     Mini neb; Future  -     levalbuterol (XOPENEX HFA) 45 mcg/act inhaler; Inhale 1-2 puffs every 4 (four) hours as needed for wheezing    Fibromyalgia    Essential hypertension  -     TSH, 3rd generation; Future  -     TSH, 3rd generation    Acquired hypothyroidism  -     TSH, 3rd generation; Future  -     TSH, 3rd generation    Lumbar radiculopathy    MARIE (obstructive sleep apnea)    Moderate episode of recurrent major depressive disorder (HCC)    Chronic nonseasonal allergic rhinitis due to pollen    Fibromyalgia  Continues to have increased symptoms of fibromyalgia  She is seeing specialist   No changes recommended at the moment  Essential hypertension  Blood pressure seems to be fairly well controlled today  No particular concerns, and will follow up in the future  Acquired hypothyroidism  Stable at the moment  Will need to check in the future  Will confirm that the labs do not need to be ordered, but if they do we will take care of that  Chronic nonseasonal allergic rhinitis due to pollen  Stable at the moment  On Claritin  No changes  Depression  Patient continues to have some symptoms, but she did not wish to have any treatment, i e  no medications  This was because she was having side effects previously on SNRI medications  Lumbar radiculopathy  Continues with some symptoms  Currently is trying gabapentin, but with mixed effect  For the moment I would wonder if we would need to change this at all verses waiting till we see what Rheumatology would want to do      MARIE (obstructive sleep apnea)  She is not currently using her CPAP, mostly because she says it does not fit well, and she also has to lean over, then it leaks, and she is quite bothered by the machine in general   Consider re-evaluation with different mask, consider different machine, follow up with sleep specialist in the future

## 2018-01-25 NOTE — ASSESSMENT & PLAN NOTE
Stable at the moment  Will need to check in the future  Will confirm that the labs do not need to be ordered, but if they do we will take care of that

## 2018-02-13 ENCOUNTER — APPOINTMENT (OUTPATIENT)
Dept: LAB | Facility: CLINIC | Age: 53
End: 2018-02-13
Payer: COMMERCIAL

## 2018-02-13 ENCOUNTER — TRANSCRIBE ORDERS (OUTPATIENT)
Dept: LAB | Facility: CLINIC | Age: 53
End: 2018-02-13

## 2018-02-13 LAB — TSH SERPL DL<=0.05 MIU/L-ACNC: 0.77 UIU/ML (ref 0.36–3.74)

## 2018-02-13 PROCEDURE — 36415 COLL VENOUS BLD VENIPUNCTURE: CPT | Performed by: FAMILY MEDICINE

## 2018-02-13 PROCEDURE — 84443 ASSAY THYROID STIM HORMONE: CPT | Performed by: FAMILY MEDICINE

## 2018-02-14 NOTE — PROGRESS NOTES
Tests were normal  Please follow up at next office visit as scheduled  TSH was normal and unchanged

## 2018-02-22 ENCOUNTER — OFFICE VISIT (OUTPATIENT)
Dept: FAMILY MEDICINE CLINIC | Facility: CLINIC | Age: 53
End: 2018-02-22
Payer: COMMERCIAL

## 2018-02-22 VITALS
HEIGHT: 66 IN | WEIGHT: 243.6 LBS | SYSTOLIC BLOOD PRESSURE: 120 MMHG | HEART RATE: 64 BPM | DIASTOLIC BLOOD PRESSURE: 74 MMHG | BODY MASS INDEX: 39.15 KG/M2

## 2018-02-22 DIAGNOSIS — I10 ESSENTIAL HYPERTENSION: ICD-10-CM

## 2018-02-22 DIAGNOSIS — J40 BRONCHITIS: ICD-10-CM

## 2018-02-22 DIAGNOSIS — E03.9 ACQUIRED HYPOTHYROIDISM: ICD-10-CM

## 2018-02-22 DIAGNOSIS — J40 BRONCHITIS: Primary | ICD-10-CM

## 2018-02-22 DIAGNOSIS — M79.7 FIBROMYALGIA: ICD-10-CM

## 2018-02-22 PROCEDURE — 99214 OFFICE O/P EST MOD 30 MIN: CPT | Performed by: FAMILY MEDICINE

## 2018-02-22 RX ORDER — LEVALBUTEROL TARTRATE 45 UG/1
1-2 AEROSOL, METERED ORAL EVERY 4 HOURS PRN
Qty: 1 INHALER | Refills: 11 | Status: SHIPPED | OUTPATIENT
Start: 2018-02-22 | End: 2019-02-22

## 2018-02-22 RX ORDER — AMITRIPTYLINE HYDROCHLORIDE 10 MG/1
75 TABLET, FILM COATED ORAL
COMMUNITY
End: 2018-10-24

## 2018-02-22 NOTE — PROGRESS NOTES
Assessment/Plan:    Bronchitis  Patient has continued symptoms of bronchitis  At this point, given that she is needing to use the Xopenex, would recommend adding Stiolto 0  Should be done as 2 puffs daily  Follow-up in approximately 1 month  If this Stiolto works, would recommend get a prescription for at that we can sent to the pharmacy  She can call for this prescription  Essential hypertension  Blood pressure is excellent  No changes  Fibromyalgia  Patient is going to be following with Rheumatology in the near future  No changes at the moment  She does have amitriptyline, and will be taking that starting tomorrow  Diagnoses and all orders for this visit:    Bronchitis  -     Tiotropium Bromide-Olodaterol (STIOLTO RESPIMAT) 2 5-2 5 MCG/ACT AERS; Inhale 2 puffs daily for 15 days  -     POCT spirometry; Future  -     levalbuterol (XOPENEX HFA) 45 mcg/act inhaler; Inhale 1-2 puffs every 4 (four) hours as needed for wheezing    Essential hypertension    Acquired hypothyroidism    Fibromyalgia    Bronchitis  Comments:  Patient seems to be doing relatively well at the moment, but she is having some increased shortness of breath  Recommend Xopenex, follow-up  Orders:  -     Tiotropium Bromide-Olodaterol (STIOLTO RESPIMAT) 2 5-2 5 MCG/ACT AERS; Inhale 2 puffs daily for 15 days  -     POCT spirometry; Future  -     levalbuterol (XOPENEX HFA) 45 mcg/act inhaler; Inhale 1-2 puffs every 4 (four) hours as needed for wheezing    Other orders  -     amitriptyline (ELAVIL) 10 mg tablet; Take 25 mg by mouth daily at bedtime          Subjective:      Patient ID: Cierra Cuevas is a 50 y o  female  CC:  Follow up on Bronchitis and chronic health issues including hypothyroidism, fibromyalgia, HTN  Review labs  Pt will need order for mammo, dexa, and colonoscopy  She needs to schedule appt with her GYN (dr Chrystie Meigs) for routine pap  Patient wishes to discuss her hypothyroidism    She is not currently on medication  Current TSH is 0 766  With regard to Bronchitis, she is currently on Xopenex  Patient mentioned that she has had mostly good days since the diagnosis of bronchitis, but there are times where she will wake up and need to use the inhaler, or other times throughout the day which she needs to use it  It has not been completely resolved  Patient had been having coughing almost daily prior, and her allergist thought that it may have been allergies versus reflux  Multiple medications were changed then  Please see the last note with regard to that  At this point, she is continuing to have a coughing on and off  When she uses the Xopenex, the cough almost completely resolves  She does remember being on MDI as a kid  She does continue to smoke  The following portions of the patient's history were reviewed and updated as appropriate: allergies, current medications, past family history, past medical history, past social history, past surgical history and problem list     Review of Systems   Constitutional: Negative  HENT: Negative  Eyes: Negative  Respiratory:        Per HPI   Cardiovascular: Negative  Gastrointestinal: Negative  Endocrine: Negative  Genitourinary: Negative  Musculoskeletal: Negative  Skin: Negative  Allergic/Immunologic: Negative  Neurological: Negative  Hematological: Negative  Psychiatric/Behavioral: Negative  Objective:      Vitals:    02/22/18 1140   BP: 120/74   BP Location: Left arm   Patient Position: Sitting   Cuff Size: Large   Pulse: 64   Weight: 110 kg (243 lb 9 6 oz)   Height: 5' 5 5" (1 664 m)            Physical Exam   Constitutional: She appears well-developed and well-nourished  HENT:   Head: Normocephalic and atraumatic  Cardiovascular: Normal rate, regular rhythm and normal heart sounds  Exam reveals no gallop and no friction rub  No murmur heard    Pulmonary/Chest: Effort normal and breath sounds normal  She has no wheezes  She has no rales

## 2018-02-22 NOTE — ASSESSMENT & PLAN NOTE
Patient is going to be following with Rheumatology in the near future  No changes at the moment  She does have amitriptyline, and will be taking that starting tomorrow

## 2018-02-22 NOTE — PATIENT INSTRUCTIONS
Problem List Items Addressed This Visit     Fibromyalgia     Patient is going to be following with Rheumatology in the near future  No changes at the moment  She does have amitriptyline, and will be taking that starting tomorrow  Essential hypertension     Blood pressure is excellent  No changes  Acquired hypothyroidism    Bronchitis - Primary     Patient has continued symptoms of bronchitis  At this point, given that she is needing to use the Xopenex, would recommend adding Stiolto 0  Should be done as 2 puffs daily  Follow-up in approximately 1 month  If this Stiolto works, would recommend get a prescription for at that we can sent to the pharmacy  She can call for this prescription           Relevant Medications    Tiotropium Bromide-Olodaterol (STIOLTO RESPIMAT) 2 5-2 5 MCG/ACT AERS    levalbuterol (XOPENEX HFA) 45 mcg/act inhaler    Other Relevant Orders    POCT spirometry

## 2018-02-22 NOTE — ASSESSMENT & PLAN NOTE
Patient has continued symptoms of bronchitis  At this point, given that she is needing to use the Xopenex, would recommend adding Stiolto 0  Should be done as 2 puffs daily  Follow-up in approximately 1 month  If this Stiolto works, would recommend get a prescription for at that we can sent to the pharmacy  She can call for this prescription

## 2018-03-09 DIAGNOSIS — J30.89 CHRONIC NONSEASONAL ALLERGIC RHINITIS DUE TO POLLEN: Primary | ICD-10-CM

## 2018-03-10 PROBLEM — J30.9 CHRONIC ALLERGIC RHINITIS: Status: RESOLVED | Noted: 2018-03-10 | Resolved: 2018-03-10

## 2018-03-10 PROBLEM — J30.9 CHRONIC ALLERGIC RHINITIS: Status: ACTIVE | Noted: 2018-03-10

## 2018-03-10 RX ORDER — MONTELUKAST SODIUM 10 MG/1
TABLET ORAL
Qty: 90 TABLET | Refills: 0 | Status: SHIPPED | OUTPATIENT
Start: 2018-03-10 | End: 2018-06-05 | Stop reason: SDUPTHER

## 2018-06-03 DIAGNOSIS — I10 ESSENTIAL HYPERTENSION: Primary | ICD-10-CM

## 2018-06-05 DIAGNOSIS — J30.89 CHRONIC NONSEASONAL ALLERGIC RHINITIS DUE TO POLLEN: ICD-10-CM

## 2018-06-05 RX ORDER — MONTELUKAST SODIUM 10 MG/1
TABLET ORAL
Qty: 90 TABLET | Refills: 0 | Status: SHIPPED | OUTPATIENT
Start: 2018-06-05 | End: 2018-08-31 | Stop reason: SDUPTHER

## 2018-06-05 RX ORDER — ATENOLOL AND CHLORTHALIDONE TABLET 50; 25 MG/1; MG/1
TABLET ORAL
Qty: 30 TABLET | Refills: 1 | Status: SHIPPED | OUTPATIENT
Start: 2018-06-05 | End: 2018-08-04 | Stop reason: SDUPTHER

## 2018-08-04 DIAGNOSIS — I10 ESSENTIAL HYPERTENSION: ICD-10-CM

## 2018-08-07 RX ORDER — ATENOLOL AND CHLORTHALIDONE TABLET 50; 25 MG/1; MG/1
TABLET ORAL
Qty: 30 TABLET | Refills: 0 | Status: SHIPPED | OUTPATIENT
Start: 2018-08-07 | End: 2018-09-05 | Stop reason: SDUPTHER

## 2018-08-10 ENCOUNTER — OFFICE VISIT (OUTPATIENT)
Dept: FAMILY MEDICINE CLINIC | Facility: CLINIC | Age: 53
End: 2018-08-10
Payer: COMMERCIAL

## 2018-08-10 VITALS
BODY MASS INDEX: 39.98 KG/M2 | HEART RATE: 76 BPM | HEIGHT: 66 IN | WEIGHT: 248.8 LBS | DIASTOLIC BLOOD PRESSURE: 80 MMHG | SYSTOLIC BLOOD PRESSURE: 122 MMHG

## 2018-08-10 DIAGNOSIS — R73.9 HYPERGLYCEMIA: ICD-10-CM

## 2018-08-10 DIAGNOSIS — G47.33 OSA (OBSTRUCTIVE SLEEP APNEA): ICD-10-CM

## 2018-08-10 DIAGNOSIS — M46.1 SACROILIITIS (HCC): ICD-10-CM

## 2018-08-10 DIAGNOSIS — J40 BRONCHITIS: ICD-10-CM

## 2018-08-10 DIAGNOSIS — I10 ESSENTIAL HYPERTENSION: Primary | ICD-10-CM

## 2018-08-10 DIAGNOSIS — E78.5 DYSLIPIDEMIA: ICD-10-CM

## 2018-08-10 DIAGNOSIS — L73.2 HYDRADENITIS: ICD-10-CM

## 2018-08-10 PROCEDURE — 99214 OFFICE O/P EST MOD 30 MIN: CPT | Performed by: FAMILY MEDICINE

## 2018-08-10 NOTE — ASSESSMENT & PLAN NOTE
Patient does have sleep apnea  She is mentioning she will need to go to the sleep specialist to review mask and perhaps a change in machine  I reviewed with her about auto Pap, but it still requires a mask  This is likely worsened by higher BMI

## 2018-08-10 NOTE — ASSESSMENT & PLAN NOTE
Patient mentioned that the hidradenitis seems to be getting somewhat worse, i e  she can feel an area that seems to be starting  She is requesting to go back to Dermatology  [facility]  Referral entered for Dermatology

## 2018-08-10 NOTE — ASSESSMENT & PLAN NOTE
Patient's blood sugar was slightly elevated previously  Recheck labs, follow up after that  I did ask for an A1c as well  Of note, the patient likely has some hyperglycemia aspect secondary to her obesity

## 2018-08-10 NOTE — ASSESSMENT & PLAN NOTE
Patient continues to have a significant amount of sacroiliitis  Follow up with pain management as appropriate

## 2018-08-10 NOTE — PROGRESS NOTES
Assessment and Plan:    Problem List Items Addressed This Visit        Respiratory    MARIE (obstructive sleep apnea)     Patient does have sleep apnea  She is mentioning she will need to go to the sleep specialist to review mask and perhaps a change in machine  I reviewed with her about auto Pap, but it still requires a mask  This is likely worsened by higher BMI  Relevant Orders    Ambulatory referral to Sleep Medicine    Bronchitis     Breathing seems to be doing relatively well  She has used the Xopenex inhaler, but not a significant amount  If she is needing it more than 2 or 3 times per week, I would then at a controller medication to the Singulair that she is already taking  Cardiovascular and Mediastinum    Essential hypertension - Primary     Blood pressure today is quite good  No changes  Continue current treatments  Relevant Orders    TSH, 3rd generation    CBC and differential       Musculoskeletal and Integument    Hydradenitis     Patient mentioned that the hidradenitis seems to be getting somewhat worse, i e  she can feel an area that seems to be starting  She is requesting to go back to Dermatology  [facility]  Referral entered for Dermatology  Relevant Orders    Ambulatory referral to Dermatology    Sacroiliitis [facility])     Patient continues to have a significant amount of sacroiliitis  Follow up with pain management as appropriate  Other    Dyslipidemia     Patient did have high cholesterol previously  Will need to check labs, and follow up after that  Relevant Orders    Lipid Panel with Direct LDL reflex    Comprehensive metabolic panel    Hyperglycemia     Patient's blood sugar was slightly elevated previously  Recheck labs, follow up after that  I did ask for an A1c as well  Of note, the patient likely has some hyperglycemia aspect secondary to her obesity           Relevant Orders    Hemoglobin A1C    BMI 40 0-44 9, adult [facility])     Patient's BMI is quite elevated  She has effects from this, including hypertension, osteoarthritis, sleep apnea  She did ask about weight loss surgery  At this point, I would recommend that she talk with the weight loss surgery program, and consider medical bariatric as well  Relevant Orders    Ambulatory referral to Weight Management    TSH, 3rd generation    Hemoglobin A1C             Diagnoses and all orders for this visit:    Essential hypertension  -     TSH, 3rd generation; Future  -     CBC and differential; Future    Dyslipidemia  -     Lipid Panel with Direct LDL reflex; Future  -     Comprehensive metabolic panel; Future    MARIE (obstructive sleep apnea)  -     Ambulatory referral to Sleep Medicine; Future    Sacroiliitis (HCC)    Bronchitis    BMI 40 0-44 9, adult ([facility] [address] )  -     Ambulatory referral to Weight Management; Future  -     TSH, 3rd generation; Future  -     Hemoglobin A1C; Future    Hyperglycemia  -     Hemoglobin A1C; Future    Hydradenitis  -     Ambulatory referral to Dermatology; Future              Subjective: pt is here for follow up of hypertension~cd     Patient ID: Nicky Fay is a 48 y o  female  CC:    No chief complaint on file  HPI:    Patient is here to follow-up on her hypertension  She did go to rheum  She had CPAP, but is not using it  She has been feeling more sedation  In the AM after using the CPAP, she notes increased phlegm  FMS: Seeing Rheum  Bronchitis: She improved, so did not use the Stiolto  In the last 2-3 weeks, with the increased relative humidity, she has needed xopenex MDI about 2-3 times in those 3 weeks  She is using Singulair and Claritin  Weight: She is concerned about the weight as she is having increased DJD symptoms, has HTN, and had FMS  She did mention that she has done increased exercise, but the DJD has hampered those efforts              The following portions of the patient's history were reviewed and updated as appropriate: allergies, current medications, past family history, past medical history, past social history, past surgical history and problem list       Review of Systems   Constitutional: Positive for activity change and fatigue  HENT: Negative  Eyes: Negative  Respiratory: Negative  Cardiovascular: Negative  Gastrointestinal: Negative  Endocrine: Negative  Genitourinary: Negative  Musculoskeletal: Positive for myalgias  Skin: Negative  Allergic/Immunologic: Negative  Neurological: Negative  Hematological: Negative  Psychiatric/Behavioral: Negative  Data to review:   TSH from February was 0 766  Reviewed rheumatology note from July of 2018  Objective:    Vitals:    08/10/18 1456   BP: 122/80   BP Location: Left arm   Patient Position: Sitting   Cuff Size: Large   Pulse: 76   Weight: 113 kg (248 lb 12 8 oz)   Height: 5' 5 5" (1 664 m)        Physical Exam   Constitutional: She appears well-developed and well-nourished  HENT:   Head: Normocephalic and atraumatic  Cardiovascular: Normal rate, regular rhythm and normal heart sounds  Pulses:       Carotid pulses are 2+ on the right side, and 2+ on the left side  Pulmonary/Chest: Effort normal and breath sounds normal  She has no wheezes  She has no rales  She exhibits no tenderness  Nursing note and vitals reviewed

## 2018-08-10 NOTE — PATIENT INSTRUCTIONS
Problem List Items Addressed This Visit        Respiratory    MARIE (obstructive sleep apnea)     Patient does have sleep apnea  She is mentioning she will need to go to the sleep specialist to review mask and perhaps a change in machine  I reviewed with her about auto Pap, but it still requires a mask  This is likely worsened by higher BMI  Bronchitis     Breathing seems to be doing relatively well  She has used the Xopenex inhaler, but not a significant amount  If she is needing it more than 2 or 3 times per week, I would then at a controller medication to the Singulair that she is already taking  Cardiovascular and Mediastinum    Essential hypertension - Primary     Blood pressure today is quite good  No changes  Continue current treatments  Relevant Orders    TSH, 3rd generation    CBC and differential       Musculoskeletal and Integument    Sacroiliitis ([facility] [address] )     Patient continues to have a significant amount of sacroiliitis  Follow up with pain management as appropriate  Other    Dyslipidemia     Patient did have high cholesterol previously  Will need to check labs, and follow up after that  Relevant Orders    Lipid Panel with Direct LDL reflex    Comprehensive metabolic panel    Hyperglycemia     Patient's blood sugar was slightly elevated previously  Recheck labs, follow up after that  I did ask for an A1c as well  Of note, the patient likely has some hyperglycemia aspect secondary to her obesity  Relevant Orders    Hemoglobin A1C    BMI 40 0-44 9, adult ([facility] [address] )     Patient's BMI is quite elevated  She has effects from this, including hypertension, osteoarthritis, sleep apnea  She did ask about weight loss surgery  At this point, I would recommend that she talk with the weight loss surgery program, and consider medical bariatric as well           Relevant Orders    Ambulatory referral to Weight Management    TSH, 3rd generation    Hemoglobin A1C

## 2018-08-10 NOTE — ASSESSMENT & PLAN NOTE
Breathing seems to be doing relatively well  She has used the Xopenex inhaler, but not a significant amount  If she is needing it more than 2 or 3 times per week, I would then at a controller medication to the Singulair that she is already taking

## 2018-08-31 DIAGNOSIS — J30.89 CHRONIC NONSEASONAL ALLERGIC RHINITIS DUE TO POLLEN: ICD-10-CM

## 2018-09-01 RX ORDER — MONTELUKAST SODIUM 10 MG/1
TABLET ORAL
Qty: 90 TABLET | Refills: 0 | Status: SHIPPED | OUTPATIENT
Start: 2018-09-01 | End: 2018-12-26 | Stop reason: SDUPTHER

## 2018-09-05 DIAGNOSIS — I10 ESSENTIAL HYPERTENSION: ICD-10-CM

## 2018-09-05 RX ORDER — ATENOLOL AND CHLORTHALIDONE TABLET 50; 25 MG/1; MG/1
TABLET ORAL
Qty: 30 TABLET | Refills: 0 | Status: SHIPPED | OUTPATIENT
Start: 2018-09-05 | End: 2018-10-07 | Stop reason: SDUPTHER

## 2018-09-13 ENCOUNTER — HOSPITAL ENCOUNTER (OUTPATIENT)
Dept: ULTRASOUND IMAGING | Facility: HOSPITAL | Age: 53
Discharge: HOME | End: 2018-09-13
Payer: COMMERCIAL

## 2018-09-13 ENCOUNTER — LAB (OUTPATIENT)
Dept: LAB | Facility: HOSPITAL | Age: 53
End: 2018-09-13
Payer: COMMERCIAL

## 2018-09-13 ENCOUNTER — OFFICE VISIT (OUTPATIENT)
Dept: FAMILY MEDICINE CLINIC | Facility: CLINIC | Age: 53
End: 2018-09-13
Payer: COMMERCIAL

## 2018-09-13 VITALS
SYSTOLIC BLOOD PRESSURE: 120 MMHG | BODY MASS INDEX: 41.02 KG/M2 | HEART RATE: 68 BPM | TEMPERATURE: 97.7 F | HEIGHT: 65 IN | WEIGHT: 246.2 LBS | DIASTOLIC BLOOD PRESSURE: 84 MMHG

## 2018-09-13 DIAGNOSIS — R10.11 RIGHT UPPER QUADRANT ABDOMINAL PAIN: ICD-10-CM

## 2018-09-13 DIAGNOSIS — I45.10 RIGHT BUNDLE BRANCH BLOCK: ICD-10-CM

## 2018-09-13 DIAGNOSIS — E03.9 ACQUIRED HYPOTHYROIDISM: ICD-10-CM

## 2018-09-13 DIAGNOSIS — R07.9 CHEST PAIN, UNSPECIFIED TYPE: Primary | ICD-10-CM

## 2018-09-13 DIAGNOSIS — E78.5 DYSLIPIDEMIA: ICD-10-CM

## 2018-09-13 DIAGNOSIS — R07.9 CHEST PAIN, UNSPECIFIED TYPE: ICD-10-CM

## 2018-09-13 DIAGNOSIS — R10.11 ABDOMINAL PAIN, RIGHT UPPER QUADRANT: Primary | ICD-10-CM

## 2018-09-13 DIAGNOSIS — I10 ESSENTIAL HYPERTENSION: ICD-10-CM

## 2018-09-13 DIAGNOSIS — R10.11 ABDOMINAL PAIN, RIGHT UPPER QUADRANT: ICD-10-CM

## 2018-09-13 DIAGNOSIS — R73.9 HYPERGLYCEMIA: ICD-10-CM

## 2018-09-13 PROBLEM — R10.9 ABDOMINAL PAIN: Status: ACTIVE | Noted: 2018-09-13

## 2018-09-13 LAB
ALBUMIN SERPL BCP-MCNC: 3.4 G/DL (ref 3.5–5)
ALP SERPL-CCNC: 78 U/L (ref 46–116)
ALT SERPL W P-5'-P-CCNC: 86 U/L (ref 12–78)
AMYLASE SERPL-CCNC: 40 IU/L (ref 25–115)
ANION GAP SERPL CALCULATED.3IONS-SCNC: 7 MMOL/L (ref 4–13)
AST SERPL W P-5'-P-CCNC: 78 U/L (ref 5–45)
BASOPHILS # BLD AUTO: 0.06 THOUSANDS/ΜL (ref 0–0.1)
BASOPHILS NFR BLD AUTO: 1 % (ref 0–1)
BILIRUB SERPL-MCNC: 0.67 MG/DL (ref 0.2–1)
BUN SERPL-MCNC: 13 MG/DL (ref 5–25)
CALCIUM SERPL-MCNC: 9.2 MG/DL (ref 8.3–10.1)
CHLORIDE SERPL-SCNC: 102 MMOL/L (ref 100–108)
CHOLEST SERPL-MCNC: 176 MG/DL (ref 50–200)
CO2 SERPL-SCNC: 32 MMOL/L (ref 21–32)
CREAT SERPL-MCNC: 0.76 MG/DL (ref 0.6–1.3)
EOSINOPHIL # BLD AUTO: 0.29 THOUSAND/ΜL (ref 0–0.61)
EOSINOPHIL NFR BLD AUTO: 3 % (ref 0–6)
ERYTHROCYTE [DISTWIDTH] IN BLOOD BY AUTOMATED COUNT: 14.1 % (ref 11.6–15.1)
EST. AVERAGE GLUCOSE BLD GHB EST-MCNC: 137 MG/DL
GFR SERPL CREATININE-BSD FRML MDRD: 91 ML/MIN/1.73SQ M
GLUCOSE P FAST SERPL-MCNC: 122 MG/DL (ref 65–99)
HBA1C MFR BLD: 6.4 % (ref 4.2–6.3)
HCT VFR BLD AUTO: 41.5 % (ref 34.8–46.1)
HDLC SERPL-MCNC: 34 MG/DL (ref 40–60)
HGB BLD-MCNC: 13.9 G/DL (ref 11.5–15.4)
IMM GRANULOCYTES # BLD AUTO: 0.04 THOUSAND/UL (ref 0–0.2)
IMM GRANULOCYTES NFR BLD AUTO: 0 % (ref 0–2)
LDLC SERPL CALC-MCNC: 111 MG/DL (ref 0–100)
LIPASE SERPL-CCNC: 123 U/L (ref 73–393)
LYMPHOCYTES # BLD AUTO: 2.62 THOUSANDS/ΜL (ref 0.6–4.47)
LYMPHOCYTES NFR BLD AUTO: 28 % (ref 14–44)
MCH RBC QN AUTO: 29.7 PG (ref 26.8–34.3)
MCHC RBC AUTO-ENTMCNC: 33.5 G/DL (ref 31.4–37.4)
MCV RBC AUTO: 89 FL (ref 82–98)
MONOCYTES # BLD AUTO: 0.43 THOUSAND/ΜL (ref 0.17–1.22)
MONOCYTES NFR BLD AUTO: 5 % (ref 4–12)
NEUTROPHILS # BLD AUTO: 6.02 THOUSANDS/ΜL (ref 1.85–7.62)
NEUTS SEG NFR BLD AUTO: 63 % (ref 43–75)
NRBC BLD AUTO-RTO: 0 /100 WBCS
PLATELET # BLD AUTO: 319 THOUSANDS/UL (ref 149–390)
PMV BLD AUTO: 10.1 FL (ref 8.9–12.7)
POTASSIUM SERPL-SCNC: 3.7 MMOL/L (ref 3.5–5.3)
PROT SERPL-MCNC: 7.5 G/DL (ref 6.4–8.2)
RBC # BLD AUTO: 4.68 MILLION/UL (ref 3.81–5.12)
SODIUM SERPL-SCNC: 141 MMOL/L (ref 136–145)
TRIGL SERPL-MCNC: 156 MG/DL
TSH SERPL DL<=0.05 MIU/L-ACNC: 0.47 UIU/ML (ref 0.36–3.74)
WBC # BLD AUTO: 9.46 THOUSAND/UL (ref 4.31–10.16)

## 2018-09-13 PROCEDURE — 93000 ELECTROCARDIOGRAM COMPLETE: CPT | Performed by: FAMILY MEDICINE

## 2018-09-13 PROCEDURE — 82150 ASSAY OF AMYLASE: CPT

## 2018-09-13 PROCEDURE — 84443 ASSAY THYROID STIM HORMONE: CPT

## 2018-09-13 PROCEDURE — 83690 ASSAY OF LIPASE: CPT

## 2018-09-13 PROCEDURE — 76700 US EXAM ABDOM COMPLETE: CPT

## 2018-09-13 PROCEDURE — 83036 HEMOGLOBIN GLYCOSYLATED A1C: CPT

## 2018-09-13 PROCEDURE — 3074F SYST BP LT 130 MM HG: CPT | Performed by: FAMILY MEDICINE

## 2018-09-13 PROCEDURE — 80053 COMPREHEN METABOLIC PANEL: CPT

## 2018-09-13 PROCEDURE — 3079F DIAST BP 80-89 MM HG: CPT | Performed by: FAMILY MEDICINE

## 2018-09-13 PROCEDURE — 36415 COLL VENOUS BLD VENIPUNCTURE: CPT

## 2018-09-13 PROCEDURE — 80061 LIPID PANEL: CPT

## 2018-09-13 PROCEDURE — 85025 COMPLETE CBC W/AUTO DIFF WBC: CPT

## 2018-09-13 PROCEDURE — 99214 OFFICE O/P EST MOD 30 MIN: CPT | Performed by: FAMILY MEDICINE

## 2018-09-13 NOTE — ASSESSMENT & PLAN NOTE
Patient does appear to have chest pain, but I am not convinced that it is clearly cardiac  There are some changes on EKG, but minor  At this point, to further define the issues, I would recommend stress echo

## 2018-09-13 NOTE — PROGRESS NOTES
Assessment/Plan:    Right bundle branch block  Patient does have a new right bundle branch block on EKG  There is no old EKGs to compare to, so this is defined as new  At this point, there are no ST changes  Would recommend stress echo  The stress test is secondary to having some of the chest discomfort as well  Chest pain  Patient does appear to have chest pain, but I am not convinced that it is clearly cardiac  There are some changes on EKG, but minor  At this point, to further define the issues, I would recommend stress echo  Abdominal pain  Patient is having significant amount of abdominal pain that comes and goes  It is in the right upper quadrant  She has tenderness on palpation over the liver and gallbladder  At this point, recommend laboratory studies and ultrasound  Follow up after that  Acquired hypothyroidism  Patient does have some history of hypothyroidism  No changes at the moment  Will re-evaluate in the future  Essential hypertension  Blood pressure doing well today  No changes  Diagnoses and all orders for this visit:    Chest pain, unspecified type  -     POCT ECG  -     CBC and differential; Future  -     TSH, 3rd generation; Future  -     Echo stress test w contrast if indicated; Future    Right bundle branch block  -     TSH, 3rd generation; Future  -     Echo stress test w contrast if indicated; Future    Right upper quadrant abdominal pain  -     Amylase; Future  -     CBC and differential; Future  -     Comprehensive metabolic panel; Future  -     Cancel: CT abdomen pelvis w contrast; Future  -     Lipase; Future  -     US abdomen and pelvis with transvaginal; Future    Acquired hypothyroidism  -     TSH, 3rd generation; Future    Essential hypertension  -     CBC and differential; Future  -     Comprehensive metabolic panel; Future  -     TSH, 3rd generation; Future          Subjective: Pt reports episodes of chest pain accompanied by a warm rush feeling   Also episodes of  abdominal pain  She believes these may be caused from anxiety or panic  States they do not last more than a minute and seem to come on for no apparent reason  --bb     Patient ID: Britton Neely is a 48 y o  female  Patient is here because she is having an increased amount of chest discomfort, some abdominal discomfort, and wondered if it was anxiety or panic attack symptoms  Please see the chief complaint  She did have an EKG done today  Started Thursday when patient's  had CT  She was feeling "rushing" feeling going all over her body  Initially was chest, but later was the lower abdomen  Patient does have a history of hypertension, but no particular problems at the moment  Patient also does have a history of hypothyroid, but again she is not having any specific issues at the moment  The following portions of the patient's history were reviewed and updated as appropriate: allergies, current medications, past family history, past medical history, past social history, past surgical history and problem list     Review of Systems   Constitutional: Negative  HENT: Negative  Respiratory: Positive for chest tightness  Cardiovascular: Positive for chest pain and palpitations  Gastrointestinal: Positive for abdominal pain  All other systems reviewed and are negative  Objective:      /84 (BP Location: Left arm, Patient Position: Sitting, Cuff Size: Standard)   Pulse 68   Temp 97 7 °F (36 5 °C) (Tympanic)   Ht 5' 5" (1 651 m)   Wt 112 kg (246 lb 3 2 oz)   BMI 40 97 kg/m²          Physical Exam   Constitutional: She appears well-developed and well-nourished  HENT:   Head: Normocephalic and atraumatic  Cardiovascular: Normal rate, regular rhythm and normal heart sounds  Pulses:       Carotid pulses are 2+ on the right side, and 2+ on the left side  Pulmonary/Chest: Effort normal and breath sounds normal  She has no wheezes  She has no rales   She exhibits no tenderness  Nursing note and vitals reviewed

## 2018-09-13 NOTE — ASSESSMENT & PLAN NOTE
Patient is having significant amount of abdominal pain that comes and goes  It is in the right upper quadrant  She has tenderness on palpation over the liver and gallbladder  At this point, recommend laboratory studies and ultrasound  Follow up after that

## 2018-09-13 NOTE — ASSESSMENT & PLAN NOTE
Patient does have a new right bundle branch block on EKG  There is no old EKGs to compare to, so this is defined as new  At this point, there are no ST changes  Would recommend stress echo  The stress test is secondary to having some of the chest discomfort as well

## 2018-09-13 NOTE — ASSESSMENT & PLAN NOTE
Patient does have some history of hypothyroidism  No changes at the moment  Will re-evaluate in the future

## 2018-09-13 NOTE — PATIENT INSTRUCTIONS
Problem List Items Addressed This Visit        Endocrine    Acquired hypothyroidism     Patient does have some history of hypothyroidism  No changes at the moment  Will re-evaluate in the future  Relevant Orders    TSH, 3rd generation       Cardiovascular and Mediastinum    Essential hypertension     Blood pressure doing well today  No changes  Relevant Orders    CBC and differential    Comprehensive metabolic panel    TSH, 3rd generation    Right bundle branch block     Patient does have a new right bundle branch block on EKG  There is no old EKGs to compare to, so this is defined as new  At this point, there are no ST changes  Would recommend stress echo  The stress test is secondary to having some of the chest discomfort as well  Relevant Orders    TSH, 3rd generation    Echo stress test w contrast if indicated       Other    Chest pain - Primary     Patient does appear to have chest pain, but I am not convinced that it is clearly cardiac  There are some changes on EKG, but minor  At this point, to further define the issues, I would recommend stress echo  Relevant Orders    POCT ECG (Completed)    CBC and differential    TSH, 3rd generation    Echo stress test w contrast if indicated    Abdominal pain     Patient is having significant amount of abdominal pain that comes and goes  It is in the right upper quadrant  She has tenderness on palpation over the liver and gallbladder  At this point, recommend laboratory studies and ultrasound  Follow up after that           Relevant Orders    Amylase    CBC and differential    Comprehensive metabolic panel    Lipase    US abdomen and pelvis with transvaginal

## 2018-09-15 ENCOUNTER — HOSPITAL ENCOUNTER (EMERGENCY)
Facility: HOSPITAL | Age: 53
Discharge: HOME | End: 2018-09-15
Attending: EMERGENCY MEDICINE | Admitting: EMERGENCY MEDICINE
Payer: COMMERCIAL

## 2018-09-15 ENCOUNTER — APPOINTMENT (EMERGENCY)
Dept: RADIOLOGY | Facility: HOSPITAL | Age: 53
End: 2018-09-15
Payer: COMMERCIAL

## 2018-09-15 VITALS
OXYGEN SATURATION: 98 % | HEART RATE: 71 BPM | TEMPERATURE: 98.2 F | SYSTOLIC BLOOD PRESSURE: 155 MMHG | RESPIRATION RATE: 20 BRPM | DIASTOLIC BLOOD PRESSURE: 82 MMHG

## 2018-09-15 DIAGNOSIS — R10.13 COLICKY EPIGASTRIC PAIN: Primary | ICD-10-CM

## 2018-09-15 LAB
ALBUMIN SERPL BCP-MCNC: 3.5 G/DL (ref 3.5–5)
ALP SERPL-CCNC: 88 U/L (ref 46–116)
ALT SERPL W P-5'-P-CCNC: 71 U/L (ref 12–78)
ANION GAP SERPL CALCULATED.3IONS-SCNC: 5 MMOL/L (ref 4–13)
AST SERPL W P-5'-P-CCNC: 43 U/L (ref 5–45)
BASOPHILS # BLD AUTO: 0.1 THOUSANDS/ΜL (ref 0–0.1)
BASOPHILS NFR BLD AUTO: 1 % (ref 0–1)
BILIRUB SERPL-MCNC: 0.45 MG/DL (ref 0.2–1)
BUN SERPL-MCNC: 10 MG/DL (ref 5–25)
CALCIUM SERPL-MCNC: 9.2 MG/DL (ref 8.3–10.1)
CHLORIDE SERPL-SCNC: 100 MMOL/L (ref 100–108)
CO2 SERPL-SCNC: 30 MMOL/L (ref 21–32)
CREAT SERPL-MCNC: 0.87 MG/DL (ref 0.6–1.3)
EOSINOPHIL # BLD AUTO: 0.48 THOUSAND/ΜL (ref 0–0.61)
EOSINOPHIL NFR BLD AUTO: 4 % (ref 0–6)
ERYTHROCYTE [DISTWIDTH] IN BLOOD BY AUTOMATED COUNT: 13.7 % (ref 11.6–15.1)
GFR SERPL CREATININE-BSD FRML MDRD: 77 ML/MIN/1.73SQ M
GLUCOSE SERPL-MCNC: 178 MG/DL (ref 65–140)
HCT VFR BLD AUTO: 43.2 % (ref 34.8–46.1)
HGB BLD-MCNC: 14.3 G/DL (ref 11.5–15.4)
IMM GRANULOCYTES # BLD AUTO: 0.04 THOUSAND/UL (ref 0–0.2)
IMM GRANULOCYTES NFR BLD AUTO: 0 % (ref 0–2)
LIPASE SERPL-CCNC: 133 U/L (ref 73–393)
LYMPHOCYTES # BLD AUTO: 4.13 THOUSANDS/ΜL (ref 0.6–4.47)
LYMPHOCYTES NFR BLD AUTO: 35 % (ref 14–44)
MCH RBC QN AUTO: 29.2 PG (ref 26.8–34.3)
MCHC RBC AUTO-ENTMCNC: 33.1 G/DL (ref 31.4–37.4)
MCV RBC AUTO: 88 FL (ref 82–98)
MONOCYTES # BLD AUTO: 0.67 THOUSAND/ΜL (ref 0.17–1.22)
MONOCYTES NFR BLD AUTO: 6 % (ref 4–12)
NEUTROPHILS # BLD AUTO: 6.52 THOUSANDS/ΜL (ref 1.85–7.62)
NEUTS SEG NFR BLD AUTO: 54 % (ref 43–75)
NRBC BLD AUTO-RTO: 0 /100 WBCS
PLATELET # BLD AUTO: 343 THOUSANDS/UL (ref 149–390)
PMV BLD AUTO: 10.1 FL (ref 8.9–12.7)
POTASSIUM SERPL-SCNC: 3.1 MMOL/L (ref 3.5–5.3)
PROT SERPL-MCNC: 7.8 G/DL (ref 6.4–8.2)
RBC # BLD AUTO: 4.9 MILLION/UL (ref 3.81–5.12)
SODIUM SERPL-SCNC: 135 MMOL/L (ref 136–145)
TROPONIN I SERPL-MCNC: <0.02 NG/ML
WBC # BLD AUTO: 11.94 THOUSAND/UL (ref 4.31–10.16)

## 2018-09-15 PROCEDURE — 80053 COMPREHEN METABOLIC PANEL: CPT | Performed by: EMERGENCY MEDICINE

## 2018-09-15 PROCEDURE — 83690 ASSAY OF LIPASE: CPT | Performed by: EMERGENCY MEDICINE

## 2018-09-15 PROCEDURE — 36415 COLL VENOUS BLD VENIPUNCTURE: CPT

## 2018-09-15 PROCEDURE — C9113 INJ PANTOPRAZOLE SODIUM, VIA: HCPCS | Performed by: EMERGENCY MEDICINE

## 2018-09-15 PROCEDURE — 96374 THER/PROPH/DIAG INJ IV PUSH: CPT

## 2018-09-15 PROCEDURE — 85025 COMPLETE CBC W/AUTO DIFF WBC: CPT | Performed by: EMERGENCY MEDICINE

## 2018-09-15 PROCEDURE — 99285 EMERGENCY DEPT VISIT HI MDM: CPT

## 2018-09-15 PROCEDURE — 93005 ELECTROCARDIOGRAM TRACING: CPT

## 2018-09-15 PROCEDURE — 84484 ASSAY OF TROPONIN QUANT: CPT | Performed by: EMERGENCY MEDICINE

## 2018-09-15 PROCEDURE — 71046 X-RAY EXAM CHEST 2 VIEWS: CPT

## 2018-09-15 RX ORDER — OMEPRAZOLE 20 MG/1
40 CAPSULE, DELAYED RELEASE ORAL 2 TIMES DAILY
Qty: 120 CAPSULE | Refills: 0 | Status: SHIPPED | OUTPATIENT
Start: 2018-09-15 | End: 2018-09-26

## 2018-09-15 RX ORDER — ASPIRIN 81 MG/1
162 TABLET, CHEWABLE ORAL ONCE
Status: COMPLETED | OUTPATIENT
Start: 2018-09-15 | End: 2018-09-15

## 2018-09-15 RX ORDER — MAGNESIUM HYDROXIDE/ALUMINUM HYDROXICE/SIMETHICONE 120; 1200; 1200 MG/30ML; MG/30ML; MG/30ML
30 SUSPENSION ORAL ONCE
Status: COMPLETED | OUTPATIENT
Start: 2018-09-15 | End: 2018-09-15

## 2018-09-15 RX ORDER — SUCRALFATE ORAL 1 G/10ML
1 SUSPENSION ORAL
Qty: 420 ML | Refills: 0 | Status: SHIPPED | OUTPATIENT
Start: 2018-09-15 | End: 2018-10-24

## 2018-09-15 RX ORDER — PANTOPRAZOLE SODIUM 40 MG/1
40 INJECTION, POWDER, FOR SOLUTION INTRAVENOUS ONCE
Status: COMPLETED | OUTPATIENT
Start: 2018-09-15 | End: 2018-09-15

## 2018-09-15 RX ORDER — POTASSIUM CHLORIDE 20 MEQ/1
40 TABLET, EXTENDED RELEASE ORAL ONCE
Status: COMPLETED | OUTPATIENT
Start: 2018-09-15 | End: 2018-09-15

## 2018-09-15 RX ORDER — SUCRALFATE ORAL 1 G/10ML
1000 SUSPENSION ORAL ONCE
Status: COMPLETED | OUTPATIENT
Start: 2018-09-15 | End: 2018-09-15

## 2018-09-15 RX ADMIN — ALUMINUM HYDROXIDE, MAGNESIUM HYDROXIDE, AND SIMETHICONE 30 ML: 200; 200; 20 SUSPENSION ORAL at 21:27

## 2018-09-15 RX ADMIN — PANTOPRAZOLE SODIUM 40 MG: 40 INJECTION, POWDER, FOR SOLUTION INTRAVENOUS at 21:27

## 2018-09-15 RX ADMIN — SUCRALFATE 1000 MG: 1 SUSPENSION ORAL at 22:29

## 2018-09-15 RX ADMIN — POTASSIUM CHLORIDE 40 MEQ: 1500 TABLET, EXTENDED RELEASE ORAL at 21:44

## 2018-09-15 RX ADMIN — ASPIRIN 81 MG 162 MG: 81 TABLET ORAL at 21:26

## 2018-09-15 RX ADMIN — LIDOCAINE HYDROCHLORIDE 10 ML: 20 SOLUTION ORAL; TOPICAL at 21:27

## 2018-09-16 LAB
ATRIAL RATE: 75 BPM
P AXIS: 56 DEGREES
PR INTERVAL: 152 MS
QRS AXIS: 34 DEGREES
QRSD INTERVAL: 118 MS
QT INTERVAL: 412 MS
QTC INTERVAL: 460 MS
T WAVE AXIS: 40 DEGREES
VENTRICULAR RATE: 75 BPM

## 2018-09-16 PROCEDURE — 93010 ELECTROCARDIOGRAM REPORT: CPT | Performed by: INTERNAL MEDICINE

## 2018-09-16 NOTE — ED PROVIDER NOTES
History  Chief Complaint   Patient presents with    Chest Pain     cp, burning across upper chest into back and left arm, pain x1wk  seen by pcp dx with RBB, scheduled stress test fri  pt reports some sob  49 yo female with HTN, smoker, c/o onset of discomfort she localizes to the epigastric area, first onset while she was actually waiting for her  at a CT scan, she herself began to get a burning discomfort, and a flushed feeling, without nausea, vomiting, and she denies shortness  She has then been getting a similar syndrome over the past week, not clearly exacerbated or relieved by any particular factor  She saw her PCP 9/13/18 for this discomfort, and had labs and RUQ ultrasound, and is set up for stress test on 9/21/18, however, tonight it has been more continuous and more intense  History provided by:  Patient  Chest Pain   Pain location:  Epigastric  Pain quality: burning    Pain radiates to:  L shoulder, R shoulder and upper back  Pain radiates to the back: yes    Pain severity:  Moderate  Onset quality:  Gradual  Duration:  1 week  Timing:  Intermittent  Progression:  Waxing and waning  Chronicity:  New  Relieved by:  Nothing  Worsened by:  Nothing tried  Ineffective treatments:  Rest  Associated symptoms: abdominal pain and heartburn    Associated symptoms: no cough, no diaphoresis ("feels flushed"), no dizziness, no fever, no headache, no nausea, no palpitations, no shortness of breath, not vomiting and no weakness        Prior to Admission Medications   Prescriptions Last Dose Informant Patient Reported? Taking?    Aspirin 81 MG EC tablet   Yes Yes   Sig: Take by mouth   Cholecalciferol (VITAMIN D3) 39987 units CAPS   Yes Yes   Sig: Take 1 tablet by mouth once a week   Cyanocobalamin (B-12) 1000 MCG CAPS   Yes Yes   Sig: Take by mouth   POTASSIUM GLUCONATE PO   Yes Yes   Sig: Take by mouth   amitriptyline (ELAVIL) 10 mg tablet   Yes Yes   Sig: Take 75 mg by mouth daily at bedtime atenolol-chlorthalidone (TENORETIC) 50-25 mg per tablet   No Yes   Sig: TAKE 1 TABLET BY MOUTH EVERY DAY   clindamycin (CLEOCIN T) 1 % lotion   Yes Yes   Sig: APPLY ONCE DAILY TO AFFECTED AREAS UNDER ARMS    gabapentin (NEURONTIN) 100 mg capsule   Yes Yes   Si CAP AT BED FOR 3 DAYS, 2 CAPS AT BED FOR 3 DAYS, 3 CAPS AT BED FOR 3 DAYS, THEN 3 TABS 2/XDAY   levalbuterol (XOPENEX HFA) 45 mcg/act inhaler   No Yes   Sig: Inhale 1-2 puffs every 4 (four) hours as needed for wheezing   loratadine (CLARITIN) 10 mg tablet   Yes Yes   Sig: Take 10 mg by mouth   montelukast (SINGULAIR) 10 mg tablet   No Yes   Sig: TAKE 1 TABLET BY MOUTH DAILY   olopatadine (PATANOL) 0 1 % ophthalmic solution   Yes Yes   Sig: Apply to eye   omeprazole (PriLOSEC) 40 MG capsule   Yes Yes   Sig: TAKE 1 CAPSULE EVERY MORNING   ranitidine (ZANTAC) 150 mg tablet   Yes Yes   Sig: Take by mouth      Facility-Administered Medications: None       Past Medical History:   Diagnosis Date    Ankle fracture     Chronic venous embolism and thrombosis of deep vessels of lower extremity (HCC)     Costochondritis     RESOLVED: [id number]    Endometriosis     Fibromyalgia     Hematuria     LAST ASSESSED: [id number]    Hypertension     LAST ASSESSED: [id number]    Pulmonary embolism (HCC)     RESOLED: [id number]    Umbilical hernia     LAST ASSESSED: [id number]       Past Surgical History:   Procedure Laterality Date    HERNIA REPAIR      HYSTERECTOMY         Family History   Problem Relation Age of Onset    Hypertension Mother     Diabetes Father     Hypertension Father     Obesity Father      I have reviewed and agree with the history as documented      Social History   Substance Use Topics    Smoking status: Former Smoker     Types: Cigarettes     Quit date: 8/10/2018    Smokeless tobacco: Former User     Quit date: 8/10/2018      Comment: smokes 1 to 1 1/2 packs per day    Alcohol use Yes      Comment: SOCIAL         Review of Systems Constitutional: Negative for appetite change, chills, diaphoresis ("feels flushed") and fever  HENT: Negative for sore throat  Respiratory: Negative for cough, shortness of breath and wheezing  Cardiovascular: Positive for chest pain  Negative for palpitations  Gastrointestinal: Positive for abdominal pain and heartburn  Negative for diarrhea, nausea and vomiting  Genitourinary: Negative for dysuria and hematuria  Musculoskeletal: Negative for neck pain  Skin: Negative for rash  Neurological: Negative for dizziness, weakness and headaches  Psychiatric/Behavioral: Negative for suicidal ideas  All other systems reviewed and are negative  Physical Exam  Physical Exam   Constitutional: She is oriented to person, place, and time  Vital signs are normal  She appears well-developed and well-nourished  Non-toxic appearance  HENT:   Head: Normocephalic and atraumatic  Right Ear: Tympanic membrane and external ear normal    Left Ear: Tympanic membrane and external ear normal    Nose: Nose normal    Mouth/Throat: Oropharynx is clear and moist    Eyes: Conjunctivae and EOM are normal  Pupils are equal, round, and reactive to light  Neck: Normal range of motion and full passive range of motion without pain  Neck supple  No Brudzinski's sign and no Kernig's sign noted  Cardiovascular: Normal rate, regular rhythm, normal heart sounds, intact distal pulses and normal pulses  No murmur heard  Pulmonary/Chest: Effort normal and breath sounds normal  No tachypnea  No respiratory distress  She has no wheezes  Abdominal: Soft  Bowel sounds are normal  She exhibits no distension  There is tenderness in the epigastric area  There is no rigidity, no rebound and no guarding  Musculoskeletal: Normal range of motion  Right lower leg: She exhibits no swelling  Left lower leg: She exhibits no swelling  Lymphadenopathy:     She has no cervical adenopathy     Neurological: She is alert and oriented to person, place, and time  She has normal strength and normal reflexes  No cranial nerve deficit or sensory deficit  Coordination and gait normal  GCS eye subscore is 4  GCS verbal subscore is 5  GCS motor subscore is 6  Skin: Skin is warm and dry  No rash noted  She is not diaphoretic  No pallor  Psychiatric: She has a normal mood and affect  Her speech is normal and behavior is normal  Judgment and thought content normal  Cognition and memory are normal    Nursing note and vitals reviewed        Vital Signs  ED Triage Vitals   Temperature Pulse Respirations Blood Pressure SpO2   09/15/18 2029 09/15/18 2029 09/15/18 2029 09/15/18 2029 09/15/18 2029   98 2 °F (36 8 °C) 84 22 (!) 172/84 100 %      Temp Source Heart Rate Source Patient Position - Orthostatic VS BP Location FiO2 (%)   09/15/18 2029 09/15/18 2227 09/15/18 2029 09/15/18 2029 --   Oral Monitor Sitting Right arm       Pain Score       09/15/18 2029       7           Vitals:    09/15/18 2029 09/15/18 2227   BP: (!) 172/84 155/82   Pulse: 84 71   Patient Position - Orthostatic VS: Sitting Lying       Visual Acuity      ED Medications  Medications   aspirin chewable tablet 162 mg (162 mg Oral Given 9/15/18 2126)   pantoprazole (PROTONIX) injection 40 mg (40 mg Intravenous Given 9/15/18 2127)   lidocaine viscous (XYLOCAINE) 2 % mucosal solution 10 mL (10 mL Swish & Swallow Given 9/15/18 2127)   aluminum-magnesium hydroxide-simethicone (MYLANTA) 200-200-20 mg/5 mL oral suspension 30 mL (30 mL Oral Given 9/15/18 2127)   potassium chloride (K-DUR,KLOR-CON) CR tablet 40 mEq (40 mEq Oral Given 9/15/18 2144)   sucralfate (CARAFATE) oral suspension 1,000 mg (1,000 mg Oral Given 9/15/18 2229)       Diagnostic Studies  Results Reviewed     Procedure Component Value Units Date/Time    Lipase [[id number]]  (Normal) Collected:  09/15/18 2034    Lab Status:  Final result Specimen:  Blood from Arm, Right Updated:  09/15/18 2122     Lipase 133 u/L Comprehensive metabolic panel [[id number]]  (Abnormal) Collected:  09/15/18 2034    Lab Status:  Final result Specimen:  Blood from Arm, Right Updated:  09/15/18 2104     Sodium 135 (L) mmol/L      Potassium 3 1 (L) mmol/L      Chloride 100 mmol/L      CO2 30 mmol/L      ANION GAP 5 mmol/L      BUN 10 mg/dL      Creatinine 0 87 mg/dL      Glucose 178 (H) mg/dL      Calcium 9 2 mg/dL      AST 43 U/L      ALT 71 U/L      Alkaline Phosphatase 88 U/L      Total Protein 7 8 g/dL      Albumin 3 5 g/dL      Total Bilirubin 0 45 mg/dL      eGFR 77 ml/min/1 73sq m     Narrative:         National Kidney Disease Education Program recommendations are as follows:  GFR calculation is accurate only with a steady state creatinine  Chronic Kidney disease less than 60 ml/min/1 73 sq  meters  Kidney failure less than 15 ml/min/1 73 sq  meters      Troponin I [[id number]]  (Normal) Collected:  09/15/18 2034    Lab Status:  Final result Specimen:  Blood from Arm, Right Updated:  09/15/18 2058     Troponin I <0 02 ng/mL     CBC and differential [[id number]]  (Abnormal) Collected:  09/15/18 2034    Lab Status:  Final result Specimen:  Blood from Arm, Right Updated:  09/15/18 2039     WBC 11 94 (H) Thousand/uL      RBC 4 90 Million/uL      Hemoglobin 14 3 g/dL      Hematocrit 43 2 %      MCV 88 fL      MCH 29 2 pg      MCHC 33 1 g/dL      RDW 13 7 %      MPV 10 1 fL      Platelets 429 Thousands/uL      nRBC 0 /100 WBCs      Neutrophils Relative 54 %      Immat GRANS % 0 %      Lymphocytes Relative 35 %      Monocytes Relative 6 %      Eosinophils Relative 4 %      Basophils Relative 1 %      Neutrophils Absolute 6 52 Thousands/µL      Immature Grans Absolute 0 04 Thousand/uL      Lymphocytes Absolute 4 13 Thousands/µL      Monocytes Absolute 0 67 Thousand/µL      Eosinophils Absolute 0 48 Thousand/µL      Basophils Absolute 0 10 Thousands/µL                  X-ray chest 2 views   ED Interpretation by Daren Kilpatrick MD (09/15 2124)   Normal chest                 Procedures  Procedures       Phone Contacts  ED Phone Contact    ED Course  ED Course as of Sep 15 2237   Sat Sep 15, 2018   2229 Reviewed results with patient at bedside and updated on the plan  She has relief of most of the burning discomfort at this time  Troponin is negative and she is very consistent that she has had the discomfort at the level that brought her in all throughout the day, so with several hours of atypical discomfort, this is very much more c/w  GI source  Recent O/P testing is also suggestive, with mild elevation LFTs, US showing hepatic steatosis and gallstone  Today she is afebrile, mild leukocytosis, normal LFTs, so that I am inclined to treat with more directed treatment at GERD/PUD and referral to GI/Surgery to consider additional testing and cholecystectomy if recommended  MDM  Number of Diagnoses or Management Options  Colicky epigastric pain:      Amount and/or Complexity of Data Reviewed  Clinical lab tests: ordered and reviewed  Tests in the radiology section of CPT®: ordered and reviewed  Review and summarize past medical records: (From O/P ultrasound 9/13/18  Hepatomegaly and findings suggesting severe steatosis      The bladder remains normal in caliber and overall appearance  Solitary nonmobile calculus noted within the neck of uncertain significance in the absence of supporting findings  Labs reviewed)      CritCare Time    Disposition  Final diagnoses:   Colicky epigastric pain     Time reflects when diagnosis was documented in both MDM as applicable and the Disposition within this note     Time User Action Codes Description Comment    9/15/2018 10:34 PM Ba Flores Add [B75 87] Colicky epigastric pain       ED Disposition     ED Disposition Condition Comment    Discharge  Terrance Macias discharge to home/self care      Condition at discharge: Good        Follow-up Information     Follow up With Specialties Details Why Contact Junior Multani MD General Surgery Call For followup, If symptoms worsen [address] [facility]  [phone]            Patient's Medications   Discharge Prescriptions    OMEPRAZOLE (PRILOSEC) 20 MG DELAYED RELEASE CAPSULE    Take 2 capsules (40 mg total) by mouth 2 (two) times a day for 30 days       Start Date: 9/15/2018 End Date: 10/15/2018       Order Dose: 40 mg       Quantity: 120 capsule    Refills: 0    SUCRALFATE (CARAFATE) 1 G/10 ML SUSPENSION    Take 10 mL (1 g total) by mouth 4 (four) times a day (with meals and at bedtime) for 30 days       Start Date: 9/15/2018 End Date: 10/15/2018       Order Dose: 1 g       Quantity: 420 mL    Refills: 0     No discharge procedures on file      ED Provider  Electronically Signed by           Alyssa Hester MD  09/15/18 2766

## 2018-09-16 NOTE — ED NOTES
Patient transported to [address] [address], [address] [facility]  09/15/18 6833

## 2018-09-21 ENCOUNTER — HOSPITAL ENCOUNTER (OUTPATIENT)
Dept: NON INVASIVE DIAGNOSTICS | Facility: CLINIC | Age: 53
Discharge: HOME | End: 2018-09-21
Payer: COMMERCIAL

## 2018-09-21 ENCOUNTER — TELEPHONE (OUTPATIENT)
Dept: FAMILY MEDICINE CLINIC | Facility: CLINIC | Age: 53
End: 2018-09-21

## 2018-09-21 DIAGNOSIS — R07.9 CHEST PAIN, UNSPECIFIED TYPE: Primary | ICD-10-CM

## 2018-09-21 DIAGNOSIS — I45.10 RIGHT BUNDLE BRANCH BLOCK: ICD-10-CM

## 2018-09-21 DIAGNOSIS — R07.9 CHEST PAIN, UNSPECIFIED TYPE: ICD-10-CM

## 2018-09-21 LAB
ARRHY DURING EX: NORMAL
CHEST PAIN STATEMENT: NORMAL
MAX DIASTOLIC BP: 94 MMHG
MAX HEART RATE: 164 BPM
MAX PREDICTED HEART RATE: 169 BPM
MAX. SYSTOLIC BP: 188 MMHG
PROTOCOL NAME: NORMAL
TARGET HR FORMULA: NORMAL
TIME IN EXERCISE PHASE: NORMAL

## 2018-09-21 PROCEDURE — 93350 STRESS TTE ONLY: CPT

## 2018-09-21 PROCEDURE — 93351 STRESS TTE COMPLETE: CPT | Performed by: INTERNAL MEDICINE

## 2018-09-21 RX ADMIN — PERFLUTREN 4 ML/MIN: 6.52 INJECTION, SUSPENSION INTRAVENOUS at 10:19

## 2018-09-21 NOTE — PROGRESS NOTES
Pt was made aware of these results and she will attempt to increase fluids to flush her system and follow up with [facility] [place] 9/26/18

## 2018-09-21 NOTE — PROGRESS NOTES
Reviewed the stress test   It was negative for cardiac abnormalities  She is continuing to have some symptoms  I did review the ultrasound  The ultrasound appeared to be a problem, more specifically that there may be a stone in the bladder  This could be responsible for some of her discomfort  At this point, would encourage her to increase her water intake, and follow-up afterwards  This is likely kidney stone type pain which would explain some of her symptoms  Alternatively, the patient could have just panic with anxiety disorder, or reflux  At this point, would not make any adjustments in medication, rather use which she currently is using, then re-evaluate

## 2018-09-26 ENCOUNTER — OFFICE VISIT (OUTPATIENT)
Dept: FAMILY MEDICINE CLINIC | Facility: CLINIC | Age: 53
End: 2018-09-26
Payer: COMMERCIAL

## 2018-09-26 VITALS
SYSTOLIC BLOOD PRESSURE: 128 MMHG | BODY MASS INDEX: 40.15 KG/M2 | WEIGHT: 241 LBS | HEIGHT: 65 IN | HEART RATE: 72 BPM | DIASTOLIC BLOOD PRESSURE: 88 MMHG

## 2018-09-26 DIAGNOSIS — K21.9 GASTROESOPHAGEAL REFLUX DISEASE, ESOPHAGITIS PRESENCE NOT SPECIFIED: ICD-10-CM

## 2018-09-26 DIAGNOSIS — F33.1 MODERATE EPISODE OF RECURRENT MAJOR DEPRESSIVE DISORDER (HCC): Primary | ICD-10-CM

## 2018-09-26 DIAGNOSIS — Z23 NEED FOR INFLUENZA VACCINATION: ICD-10-CM

## 2018-09-26 DIAGNOSIS — R07.9 CHEST PAIN, UNSPECIFIED TYPE: ICD-10-CM

## 2018-09-26 PROCEDURE — 90682 RIV4 VACC RECOMBINANT DNA IM: CPT

## 2018-09-26 PROCEDURE — 99214 OFFICE O/P EST MOD 30 MIN: CPT | Performed by: FAMILY MEDICINE

## 2018-09-26 PROCEDURE — 3008F BODY MASS INDEX DOCD: CPT | Performed by: FAMILY MEDICINE

## 2018-09-26 PROCEDURE — 90471 IMMUNIZATION ADMIN: CPT

## 2018-09-26 RX ORDER — ESCITALOPRAM OXALATE 10 MG/1
10 TABLET ORAL DAILY
Qty: 30 TABLET | Refills: 2 | Status: SHIPPED | OUTPATIENT
Start: 2018-09-26 | End: 2018-11-14 | Stop reason: SDUPTHER

## 2018-09-26 RX ORDER — OMEPRAZOLE 40 MG/1
40 CAPSULE, DELAYED RELEASE ORAL EVERY MORNING
Refills: 6 | COMMUNITY
Start: 2018-09-20 | End: 2018-09-26 | Stop reason: ALTCHOICE

## 2018-09-26 RX ORDER — PANTOPRAZOLE SODIUM 40 MG/1
40 TABLET, DELAYED RELEASE ORAL DAILY
Qty: 30 TABLET | Refills: 2 | Status: SHIPPED | OUTPATIENT
Start: 2018-09-26 | End: 2018-12-26 | Stop reason: SDUPTHER

## 2018-09-26 NOTE — ASSESSMENT & PLAN NOTE
The symptoms that she has do have some aspect of reflux  Omeprazole at 40 b i d  has not really helped her  Recommend trial of Protonix 40 mg daily  We can from there add H2 blockers such as Zantac or Pepcid  Dosage for Zantac would be 150 mg twice a day  For Pepcid would be 40 mg daily

## 2018-09-26 NOTE — PROGRESS NOTES
Assessment/Plan:    Chest pain  At this point, the question would be if this is truly cardiac related, but it is extremely doubtful given the stress test and the fact that she seems to have given a maximal effort for the stress test   Based on this, is more likely that she is having anxiety, as well as reflux  We will treat both of these, and failing positive outcomes with that, I would recommend further evaluation with Cardiology  Depression  Patient does seem to have increased panic/anxiety symptoms  They are worse than before  This is resulting in some somatization in the chest   I would prefer to use duloxetine that she had significant side effects from that before  Based on this, trial of Lexapro 10 mg daily  Prescription sent to [facility]     GERD (gastroesophageal reflux disease)  The symptoms that she has do have some aspect of reflux  Omeprazole at 40 b i d  has not really helped her  Recommend trial of Protonix 40 mg daily  We can from there add H2 blockers such as Zantac or Pepcid  Dosage for Zantac would be 150 mg twice a day  For Pepcid would be 40 mg daily  Diagnoses and all orders for this visit:    Moderate episode of recurrent major depressive disorder (HCC)  -     escitalopram (LEXAPRO) 10 mg tablet; Take 1 tablet (10 mg total) by mouth daily for 90 days    Gastroesophageal reflux disease, esophagitis presence not specified  -     pantoprazole (PROTONIX) 40 mg tablet; Take 1 tablet (40 mg total) by mouth daily for 90 days    Chest pain, unspecified type    Other orders  -     Discontinue: omeprazole (PriLOSEC) 40 MG capsule; 40 mg every morning          Subjective:   1 month follow up to chest sensation  Continues with intermittent symptoms  Also goes into abdomen  mjs     Patient ID: Cathleen Pizarro is a 48 y o  female  Reviewed stress test   It was negative  Patient did mention that after doing the test she was quite tired for a bit  Ultrasound abdomen did show fatty liver    She did have some question about whether not there was a gallstone or a bladder stone based on the report of the ultrasound  I reviewed the report with her, and the description suggest that this is a gallstone, though the impression says bladder stone  In either case, there did not appear to be any inflammation or irritation of the gallbladder  She has hot or cold symptoms at times in waves  Comes over the chest or upper abdomen  It was chest before  It is both sides of chest to the center of the chest     She is on Carafate from ER as she had GERD dx from ER  Omeprazole is 40mg BID, and the carafate  She was remembering that she had panic  She recalls using Lorazepam before  The following portions of the patient's history were reviewed and updated as appropriate: allergies, current medications, past family history, past medical history, past social history, past surgical history and problem list     Review of Systems   Constitutional: Negative  HENT: Negative  Respiratory: Negative  Cardiovascular: Negative  Gastrointestinal:        Per HPI   Musculoskeletal: Negative  All other systems reviewed and are negative  Objective:      /88   Pulse 72   Ht 5' 5" (1 651 m)   Wt 109 kg (241 lb)   BMI 40 10 kg/m²          Physical Exam   Constitutional: She appears well-developed and well-nourished  HENT:   Head: Normocephalic and atraumatic  Cardiovascular: Normal rate, regular rhythm and normal heart sounds  Pulses:       Carotid pulses are 2+ on the right side, and 2+ on the left side  Pulmonary/Chest: Effort normal and breath sounds normal  She has no wheezes  She has no rales  She exhibits no tenderness  Nursing note and vitals reviewed

## 2018-09-26 NOTE — PATIENT INSTRUCTIONS
Problem List Items Addressed This Visit        Digestive    GERD (gastroesophageal reflux disease)     The symptoms that she has do have some aspect of reflux  Omeprazole at 40 b i d  has not really helped her  Recommend trial of Protonix 40 mg daily  We can from there add H2 blockers such as Zantac or Pepcid  Dosage for Zantac would be 150 mg twice a day  For Pepcid would be 40 mg daily  Relevant Medications    pantoprazole (PROTONIX) 40 mg tablet       Other    Depression - Primary     Patient does seem to have increased panic/anxiety symptoms  They are worse than before  This is resulting in some somatization in the chest   I would prefer to use duloxetine that she had significant side effects from that before  Based on this, trial of Lexapro 10 mg daily  Prescription sent to [facility]          Relevant Medications    escitalopram (LEXAPRO) 10 mg tablet    Chest pain     At this point, the question would be if this is truly cardiac related, but it is extremely doubtful given the stress test and the fact that she seems to have given a maximal effort for the stress test   Based on this, is more likely that she is having anxiety, as well as reflux  We will treat both of these, and failing positive outcomes with that, I would recommend further evaluation with Cardiology

## 2018-09-26 NOTE — ASSESSMENT & PLAN NOTE
Patient does seem to have increased panic/anxiety symptoms  They are worse than before  This is resulting in some somatization in the chest   I would prefer to use duloxetine that she had significant side effects from that before  Based on this, trial of Lexapro 10 mg daily    Prescription sent to [facility]

## 2018-09-26 NOTE — ASSESSMENT & PLAN NOTE
At this point, the question would be if this is truly cardiac related, but it is extremely doubtful given the stress test and the fact that she seems to have given a maximal effort for the stress test   Based on this, is more likely that she is having anxiety, as well as reflux  We will treat both of these, and failing positive outcomes with that, I would recommend further evaluation with Cardiology

## 2018-10-02 ENCOUNTER — HOSPITAL ENCOUNTER (OUTPATIENT)
Dept: RADIOLOGY | Facility: HOSPITAL | Age: 53
Discharge: HOME | End: 2018-10-02
Payer: COMMERCIAL

## 2018-10-02 ENCOUNTER — OFFICE VISIT (OUTPATIENT)
Dept: FAMILY MEDICINE CLINIC | Facility: CLINIC | Age: 53
End: 2018-10-02
Payer: COMMERCIAL

## 2018-10-02 VITALS
HEART RATE: 80 BPM | WEIGHT: 242 LBS | OXYGEN SATURATION: 98 % | DIASTOLIC BLOOD PRESSURE: 80 MMHG | BODY MASS INDEX: 40.32 KG/M2 | SYSTOLIC BLOOD PRESSURE: 126 MMHG | TEMPERATURE: 98.2 F | HEIGHT: 65 IN

## 2018-10-02 DIAGNOSIS — I45.10 RIGHT BUNDLE BRANCH BLOCK: ICD-10-CM

## 2018-10-02 DIAGNOSIS — R07.9 CHEST PAIN, UNSPECIFIED TYPE: ICD-10-CM

## 2018-10-02 DIAGNOSIS — F33.1 MODERATE EPISODE OF RECURRENT MAJOR DEPRESSIVE DISORDER (HCC): ICD-10-CM

## 2018-10-02 DIAGNOSIS — R10.13 EPIGASTRIC PAIN: ICD-10-CM

## 2018-10-02 DIAGNOSIS — K21.9 GASTROESOPHAGEAL REFLUX DISEASE, ESOPHAGITIS PRESENCE NOT SPECIFIED: ICD-10-CM

## 2018-10-02 DIAGNOSIS — R23.2 FLUSHING: ICD-10-CM

## 2018-10-02 DIAGNOSIS — R23.2 FLUSHING: Primary | ICD-10-CM

## 2018-10-02 PROBLEM — F41.9 ANXIETY: Status: ACTIVE | Noted: 2018-10-02

## 2018-10-02 PROBLEM — J40 BRONCHITIS: Status: RESOLVED | Noted: 2018-02-22 | Resolved: 2018-10-02

## 2018-10-02 PROCEDURE — 99214 OFFICE O/P EST MOD 30 MIN: CPT | Performed by: FAMILY MEDICINE

## 2018-10-02 PROCEDURE — 71275 CT ANGIOGRAPHY CHEST: CPT

## 2018-10-02 RX ADMIN — IOHEXOL 85 ML: 350 INJECTION, SOLUTION INTRAVENOUS at 19:33

## 2018-10-02 NOTE — ASSESSMENT & PLAN NOTE
Patient feels flushing sensation all over her body that happen randomly, differential diagnosis is broad, could be menopausal symptoms verses anxiety verses other  Patient had same symptoms when she had her pulmonary embolism many years ago, with the presence of right bundle branch block, and epigastric pain/chest pain need to rule out pulmonary embolism, CTA chest to be done stat

## 2018-10-02 NOTE — ASSESSMENT & PLAN NOTE
Had a stress test that was completely unremarkable, it still could be anxiety related but with a history of pulmonary embolism need to rule out new PE

## 2018-10-02 NOTE — PATIENT INSTRUCTIONS
Menopause   WHAT YOU NEED TO KNOW:   Menopause is a normal stage in a woman's life when her monthly periods stop  Menopause starts when the ovaries slowly stop making the female hormones estrogen and progesterone  A woman who has not had a period for a full year after the age of 39 is considered to be in menopause  Perimenopause is a stage before menopause that may cause signs and symptoms similar to menopause  Perimenopause can last an average of 4 to 5 years  DISCHARGE INSTRUCTIONS:   Follow up with your healthcare provider as directed:  Write down your questions so you remember to ask them during your visits  Self-care:   · Manage hot flashes  Hot flashes are brief periods of feeling very warm, flushed, and sweaty  Hot flashes can last from a few seconds to several minutes  They may happen many times during the day, and are common at night  Layer your clothing so that you can easily remove some clothing and cool yourself during a hot flash  Cold drinks may also be helpful  · Reduce vaginal dryness  by using over-the-counter vaginal creams  Vaginal dryness may cause you to have pain or discomfort during sex  Only use creams that are made for vaginal use  Do  not  use petroleum jelly  You may need an estrogen cream to put in and around your vagina  Estrogen cream may help decrease vaginal dryness and lower your risk of vaginal infections  · Continue to use birth control  during perimenopause if you do not want to get pregnant  You may need to use birth control until it has been 1 year since your periods stopped  Ask your healthcare provider when you can stop using birth control to prevent pregnancy  Lead a healthy lifestyle:  After menopause, your risk for heart disease and bone loss increases  Ask about these and other ways to stay healthy:  · Exercise regularly  Exercise helps you maintain a healthy weight  Exercise can also help to control your blood pressure and cholesterol levels   Include weight-bearing exercise for strong bones  Ask your healthcare provider about the best exercise plan for you  · Eat a variety of healthy foods  Include fruits, vegetables, whole grains (whole-wheat bread, pasta, and cereals), low-fat dairy, and lean protein foods (beans, poultry, and fish)  Limit foods high in sodium (salt)  Ask your healthcare provider for more information about a meal plan that is right for you  · Maintain a healthy weight  Check with your healthcare provider before you start any weight loss program      · Take supplements as directed  You may need extra calcium and vitamin D to help prevent osteoporosis  · Limit alcohol and caffeine  Alcohol and caffeine may worsen your symptoms  · Do not smoke  If you smoke, it is never too late to quit  You are more likely to have a heart attack, lung disease, blood clots, and cancer if you smoke  Ask your healthcare provider for information if you need help quitting  Contact your healthcare provider if:   · You have vaginal bleeding after menopause  · You have questions or concerns about your condition or care  © 2017 [facility] Information is for End User's use only and may not be sold, redistributed or otherwise used for commercial purposes  All illustrations and images included in CareNotes® are the copyrighted property of A [employer]  or [employer]  The above information is an  only  It is not intended as medical advice for individual conditions or treatments  Talk to your doctor, nurse or pharmacist before following any medical regimen to see if it is safe and effective for you

## 2018-10-02 NOTE — ASSESSMENT & PLAN NOTE
Patient was started recently on Lexapro 10 mg daily, this to early to  if the medication is working or not, advised patient to continue with the medication for now, patient denying it  I explained to the patient that SSRI is helpful if it is anxiety or if it is atypical menopausal symptoms  Patient to continue with Lexapro for now, to consider stopping the medication if no improvement in 4-6 weeks

## 2018-10-02 NOTE — ASSESSMENT & PLAN NOTE
Advised patient to continue with Carafate, pantoprazole 40 mg daily  To return to the office on her next scheduled appointment with Dr Torie Tran   If not better with more symptoms of indigestion and excess burping then to consider Gastroenterology follow-up

## 2018-10-02 NOTE — PROGRESS NOTES
Assessment/Plan:    Flushing  Patient feels flushing sensation all over her body that happen randomly, differential diagnosis is broad, could be menopausal symptoms verses anxiety verses other  Patient had same symptoms when she had her pulmonary embolism many years ago, with the presence of right bundle branch block, and epigastric pain/chest pain need to rule out pulmonary embolism, CTA chest to be done stat  Chest pain  Had a stress test that was completely unremarkable, it still could be anxiety related but with a history of pulmonary embolism need to rule out new PE  Abdominal pain  Advised patient to continue with Carafate, pantoprazole 40 mg daily  To return to the office on her next scheduled appointment with Dr Jules Borges   If not better with more symptoms of indigestion and excess burping then to consider Gastroenterology follow-up  Right bundle branch block  This seem to be a new finding, pulmonary embolism to be done to rule out any correlation with these  GERD (gastroesophageal reflux disease)  To continue with pantoprazole  Anxiety  Patient was started recently on Lexapro 10 mg daily, this to early to  if the medication is working or not, advised patient to continue with the medication for now, patient denying it  I explained to the patient that SSRI is helpful if it is anxiety or if it is atypical menopausal symptoms  Patient to continue with Lexapro for now, to consider stopping the medication if no improvement in 4-6 weeks  Diagnoses and all orders for this visit:    Flushing  -     CTA chest pe study; Future  -     CBC and differential  -     Basic metabolic panel    Chest pain, unspecified type  -     CTA chest pe study; Future    Right bundle branch block  -     CTA chest pe study;  Future    Moderate episode of recurrent major depressive disorder (HCC)    Gastroesophageal reflux disease, esophagitis presence not specified    Epigastric pain          Subjective: Burning in chest, metallic taste in mouth off / on x "weeks"  She also notes that she had an unusual pain in her left leg today and then a "weird rush" that comes over her body like "when you have contrast dye injected"  She questions if any of this is related to new meds started over the weekend (protonix & Lexapro)  - [facility]     Patient ID: Randy Yee is a 48 y o  female  This is here for multiple issues   Stomach issues for the last few weeks , mainly epigastric burning sensation followed by a flush feeling that lasts for few seconds,  Indigestion, burping, and had burp undigested food sometimes , metal taste,   Patient was started on Protonix 2 days ago,   she quit smoking recently 8/10/2018  Flushing fee like if she injected of contrast dye, no specific aggravating or relieving factors, avoidance of food help with the epigastric burning sensation, the flushing epigastric pain not related to meals, patient denied any chest pain, shortness of breath or any other symptoms  Pt with h/o PE before that was treated with [facility] for 6 months           The following portions of the patient's history were reviewed and updated as appropriate: allergies, current medications, past family history, past medical history, past social history, past surgical history and problem list     Review of Systems   Constitutional: Positive for diaphoresis  Negative for appetite change, chills, fever and unexpected weight change  HENT: Negative for congestion, sore throat and voice change  Eyes: Negative for pain and visual disturbance  Respiratory: Negative for cough  Cardiovascular: Negative for chest pain and palpitations  Gastrointestinal: Positive for abdominal pain and nausea  Negative for abdominal distention, constipation, diarrhea and rectal pain  Endocrine: Positive for cold intolerance and heat intolerance  Negative for polyuria  Genitourinary: Negative for dysuria, enuresis, flank pain and frequency  Musculoskeletal: Negative for gait problem, joint swelling and neck pain  Skin: Negative for color change, pallor, rash and wound  Neurological: Negative for dizziness, syncope, speech difficulty, numbness and headaches  Psychiatric/Behavioral: Negative for behavioral problems and confusion  The patient is not nervous/anxious  Objective:      /80 (BP Location: Left arm, Patient Position: Sitting, Cuff Size: Large)   Pulse 80   Temp 98 2 °F (36 8 °C) (Oral)   Ht 5' 5" (1 651 m)   Wt 110 kg (242 lb)   SpO2 98%   BMI 40 27 kg/m²        Physical Exam   Constitutional: She is oriented to person, place, and time  She appears well-developed and well-nourished  HENT:   Head: Normocephalic and atraumatic  Eyes: Pupils are equal, round, and reactive to light  Conjunctivae and EOM are normal    Neck: Normal range of motion  Neck supple  Cardiovascular: Normal rate, regular rhythm, normal heart sounds and intact distal pulses  Pulmonary/Chest: Effort normal and breath sounds normal    Abdominal: Soft  Bowel sounds are normal    Musculoskeletal: Normal range of motion  Neurological: She is alert and oriented to person, place, and time  She has normal reflexes  Skin: Skin is warm and dry  Psychiatric: She has a normal mood and affect  Her behavior is normal    Vitals reviewed

## 2018-10-07 DIAGNOSIS — I10 ESSENTIAL HYPERTENSION: ICD-10-CM

## 2018-10-08 RX ORDER — ATENOLOL AND CHLORTHALIDONE TABLET 50; 25 MG/1; MG/1
TABLET ORAL
Qty: 30 TABLET | Refills: 0 | Status: SHIPPED | OUTPATIENT
Start: 2018-10-08 | End: 2018-11-03 | Stop reason: SDUPTHER

## 2018-10-24 ENCOUNTER — OFFICE VISIT (OUTPATIENT)
Dept: FAMILY MEDICINE CLINIC | Facility: CLINIC | Age: 53
End: 2018-10-24
Payer: COMMERCIAL

## 2018-10-24 VITALS
HEART RATE: 84 BPM | BODY MASS INDEX: 40.32 KG/M2 | SYSTOLIC BLOOD PRESSURE: 132 MMHG | DIASTOLIC BLOOD PRESSURE: 84 MMHG | HEIGHT: 65 IN | WEIGHT: 242 LBS

## 2018-10-24 DIAGNOSIS — I10 ESSENTIAL HYPERTENSION: ICD-10-CM

## 2018-10-24 DIAGNOSIS — K21.00 GASTROESOPHAGEAL REFLUX DISEASE WITH ESOPHAGITIS: Primary | ICD-10-CM

## 2018-10-24 DIAGNOSIS — N95.1 MENOPAUSE SYNDROME: ICD-10-CM

## 2018-10-24 DIAGNOSIS — K76.0 FATTY LIVER: ICD-10-CM

## 2018-10-24 DIAGNOSIS — J30.89 CHRONIC NONSEASONAL ALLERGIC RHINITIS DUE TO POLLEN: ICD-10-CM

## 2018-10-24 PROCEDURE — 3075F SYST BP GE 130 - 139MM HG: CPT | Performed by: FAMILY MEDICINE

## 2018-10-24 PROCEDURE — 99214 OFFICE O/P EST MOD 30 MIN: CPT | Performed by: FAMILY MEDICINE

## 2018-10-24 PROCEDURE — 3079F DIAST BP 80-89 MM HG: CPT | Performed by: FAMILY MEDICINE

## 2018-10-24 RX ORDER — AMITRIPTYLINE HYDROCHLORIDE 25 MG/1
75 TABLET, FILM COATED ORAL
Refills: 3 | COMMUNITY
Start: 2018-10-03 | End: 2019-03-14 | Stop reason: ALTCHOICE

## 2018-10-24 RX ORDER — FAMOTIDINE 40 MG/1
40 TABLET, FILM COATED ORAL DAILY
Qty: 30 TABLET | Refills: 2 | Status: SHIPPED | OUTPATIENT
Start: 2018-10-24 | End: 2018-12-18 | Stop reason: SDUPTHER

## 2018-10-24 NOTE — ASSESSMENT & PLAN NOTE
Patient's reflux appears to be somewhat worse  I would recommend that we add Pepcid 40 mg daily, continue on the pantoprazole, and if this improves her symptoms overall, no further treatment is necessary at this time  If it does not help, or is only minimally assisting her, patient should follow with ENT and Gastroenterology

## 2018-10-24 NOTE — PROGRESS NOTES
Assessment/Plan:    Chronic nonseasonal allergic rhinitis due to pollen  Has continued to have some issues with this  At this time, no changes  Follow with OV in a couple weeks to a month  Essential hypertension  Stable  BP is good  Continue current treatment  Fatty liver  Noted on CTA  No changes  Follow in future  Weight loss and limiting carbohydrates will help  GERD (gastroesophageal reflux disease)  Patient's reflux appears to be somewhat worse  I would recommend that we add Pepcid 40 mg daily, continue on the pantoprazole, and if this improves her symptoms overall, no further treatment is necessary at this time  If it does not help, or is only minimally assisting her, patient should follow with ENT and Gastroenterology  Menopause syndrome  Patient does continue to have a significant amount of menopause symptoms  Reviewed the potential of hormone replacement, no treatment, herbal over-the-counter treatment  Patient is concerned about her risk of DVT and PE, so I would recommend she continue without treatment changes  Diagnoses and all orders for this visit:    Gastroesophageal reflux disease with esophagitis  -     famotidine (PEPCID) 40 MG tablet; Take 1 tablet (40 mg total) by mouth daily for 90 days    Essential hypertension    Fatty liver    Chronic nonseasonal allergic rhinitis due to pollen    Menopause syndrome    Other orders  -     amitriptyline (ELAVIL) 25 mg tablet; Take 75 mg by mouth daily at bedtime          Subjective: Follow up to chronic conditions  [facility]     Patient ID: Kathie Gonzáles is a 48 y o  female  Patient reports that the symptoms she was having previously, i e  Flushing and chest discomfort  Patient did mention that she has a significant amount of belching  No matter what she does, she has a significant amount  It does not matter if she eats or drinks anything, she will have to belch  She did mention she has nausea along with this    She also mention that she had an odd taste in her mouth at times, initially metallic, later became much more difficult to describe  Patient does continue on Protonix for reflux, and was on Carafate at 1 point, but she is no longer on that  Patient did have a pulmonary CTA recently, on that it noted that she had fatty liver, but no specific mention of gallbladder or gallstones  Of note, there was no pulmonary embolus noted  She did mention that she has a significant amount of symptoms consistent with menopause as well  Vasomotor flushing, polyuria, except that that has improved a bit on amitriptyline, mood swings  She did mention that she feels that her neck has increased swelling at times, my glands are acting up at times    She does have a significant amount of postnasal drip and drainage  After stopping smoking, the amount of postnasal drip is gotten better, but is not gone yet  The following portions of the patient's history were reviewed and updated as appropriate: allergies, current medications, past family history, past medical history, past social history, past surgical history and problem list     Review of Systems   Constitutional: Negative  HENT:        Per HPI   Eyes: Negative  Respiratory: Negative  Cardiovascular: Negative  Gastrointestinal:        Per HPI   All other systems reviewed and are negative  Objective:      /84   Pulse 84   Ht 5' 5" (1 651 m)   Wt 110 kg (242 lb)   BMI 40 27 kg/m²          Physical Exam   Constitutional: She appears well-developed and well-nourished  HENT:   Head: Normocephalic and atraumatic  Nose: Nose normal    Mouth/Throat: Uvula is midline, oropharynx is clear and moist and mucous membranes are normal    Neck: Trachea normal and normal range of motion  Neck supple  Normal carotid pulses present  Carotid bruit is not present  No thyroid mass and no thyromegaly present     Cardiovascular: Normal rate, regular rhythm and normal heart sounds  Pulses:       Carotid pulses are 2+ on the right side, and 2+ on the left side  Pulmonary/Chest: Effort normal and breath sounds normal  She has no wheezes  She has no rales  She exhibits no tenderness  Lymphadenopathy:     She has no cervical adenopathy  Nursing note and vitals reviewed

## 2018-10-24 NOTE — PATIENT INSTRUCTIONS
Problem List Items Addressed This Visit        Digestive    GERD (gastroesophageal reflux disease) - Primary     Patient's reflux appears to be somewhat worse  I would recommend that we add Pepcid 40 mg daily, continue on the pantoprazole, and if this improves her symptoms overall, no further treatment is necessary at this time  If it does not help, or is only minimally assisting her, patient should follow with ENT and Gastroenterology  Relevant Medications    famotidine (PEPCID) 40 MG tablet    Fatty liver     Noted on CTA  No changes  Follow in future  Weight loss and limiting carbohydrates will help  Respiratory    Chronic nonseasonal allergic rhinitis due to pollen     Has continued to have some issues with this  At this time, no changes  Follow with OV in a couple weeks to a month  Cardiovascular and Mediastinum    Essential hypertension     Stable  BP is good  Continue current treatment  Other    Menopause syndrome     Patient does continue to have a significant amount of menopause symptoms  Reviewed the potential of hormone replacement, no treatment, herbal over-the-counter treatment  Patient is concerned about her risk of DVT and PE, so I would recommend she continue without treatment changes

## 2018-10-24 NOTE — ASSESSMENT & PLAN NOTE
Has continued to have some issues with this  At this time, no changes  Follow with OV in a couple weeks to a month

## 2018-10-24 NOTE — ASSESSMENT & PLAN NOTE
Patient does continue to have a significant amount of menopause symptoms  Reviewed the potential of hormone replacement, no treatment, herbal over-the-counter treatment  Patient is concerned about her risk of DVT and PE, so I would recommend she continue without treatment changes

## 2018-11-03 DIAGNOSIS — I10 ESSENTIAL HYPERTENSION: ICD-10-CM

## 2018-11-05 RX ORDER — ATENOLOL AND CHLORTHALIDONE TABLET 50; 25 MG/1; MG/1
TABLET ORAL
Qty: 30 TABLET | Refills: 0 | Status: SHIPPED | OUTPATIENT
Start: 2018-11-05 | End: 2018-12-04 | Stop reason: SDUPTHER

## 2018-11-14 DIAGNOSIS — F33.1 MODERATE EPISODE OF RECURRENT MAJOR DEPRESSIVE DISORDER (HCC): ICD-10-CM

## 2018-11-14 RX ORDER — ESCITALOPRAM OXALATE 10 MG/1
10 TABLET ORAL DAILY
Qty: 90 TABLET | Refills: 1 | Status: SHIPPED | OUTPATIENT
Start: 2018-11-14 | End: 2019-05-20 | Stop reason: SDUPTHER

## 2018-11-26 ENCOUNTER — OFFICE VISIT (OUTPATIENT)
Dept: FAMILY MEDICINE CLINIC | Facility: CLINIC | Age: 53
End: 2018-11-26
Payer: COMMERCIAL

## 2018-11-26 VITALS
BODY MASS INDEX: 41.15 KG/M2 | HEART RATE: 80 BPM | WEIGHT: 247 LBS | SYSTOLIC BLOOD PRESSURE: 132 MMHG | DIASTOLIC BLOOD PRESSURE: 92 MMHG | HEIGHT: 65 IN

## 2018-11-26 DIAGNOSIS — F33.1 MODERATE EPISODE OF RECURRENT MAJOR DEPRESSIVE DISORDER (HCC): ICD-10-CM

## 2018-11-26 DIAGNOSIS — K21.00 GASTROESOPHAGEAL REFLUX DISEASE WITH ESOPHAGITIS: Primary | ICD-10-CM

## 2018-11-26 DIAGNOSIS — J30.89 CHRONIC NONSEASONAL ALLERGIC RHINITIS DUE TO POLLEN: ICD-10-CM

## 2018-11-26 PROCEDURE — 1036F TOBACCO NON-USER: CPT | Performed by: FAMILY MEDICINE

## 2018-11-26 PROCEDURE — 3008F BODY MASS INDEX DOCD: CPT | Performed by: FAMILY MEDICINE

## 2018-11-26 PROCEDURE — 99213 OFFICE O/P EST LOW 20 MIN: CPT | Performed by: FAMILY MEDICINE

## 2018-11-26 NOTE — PROGRESS NOTES
Assessment/Plan:    GERD (gastroesophageal reflux disease)  Patient is having significant increase in her symptoms, i e  When she had Thanksgiving dinner she had a significant amount of reflux  She noticed it, to the point where she was almost vomiting  Recommend follow with Gastroenterology  This is despite using pantoprazole, as well as Pepcid  Chronic nonseasonal allergic rhinitis due to pollen  Continue with some symptoms related to allergies  Follow-up with ENT at some point  Depression  Improved after restarting Lexapro  Continue on same  No changes for the moment  Negative SI, positive contract  Diagnoses and all orders for this visit:    Gastroesophageal reflux disease with esophagitis  -     Ambulatory referral to Gastroenterology; Future  -     Ambulatory Referral to Otolaryngology; Future    Chronic nonseasonal allergic rhinitis due to pollen  -     Ambulatory Referral to Otolaryngology; Future    Moderate episode of recurrent major depressive disorder (HCC)          Subjective:   1 month follow up to GERD  mjs     Patient ID: Lina Velazco is a 48 y o  female  Patient reports that she had some increased gas lately, with malodorous gas  She has had increase in belching  Increasing in nausea, along with near vomiting of food  She is quite bothered by all of this  This is despite having the increase in PPI and H2 blocker  She has an increased amount of stress with family, leading to her using the Lexapro  She was off the Lexapro for a while  She was doing quite well prior to that, but then needed to restart the Lexapro with the increased stress that was going on  That has seemed to help with the stress level              The following portions of the patient's history were reviewed and updated as appropriate: allergies, current medications, past family history, past medical history, past social history, past surgical history and problem list     Review of Systems   Constitutional: Negative  HENT: Negative  Respiratory: Negative  Cardiovascular: Negative  Objective:      /92   Pulse 80   Ht 5' 5" (1 651 m)   Wt 112 kg (247 lb)   BMI 41 10 kg/m²          Physical Exam   Constitutional: She appears well-developed and well-nourished  HENT:   Head: Normocephalic and atraumatic  Cardiovascular: Normal rate, regular rhythm and normal heart sounds  Pulses:       Carotid pulses are 2+ on the right side, and 2+ on the left side  Pulmonary/Chest: Effort normal and breath sounds normal  She has no wheezes  She has no rales  She exhibits no tenderness  Nursing note and vitals reviewed

## 2018-11-26 NOTE — ASSESSMENT & PLAN NOTE
Improved after restarting Lexapro  Continue on same  No changes for the moment  Negative SI, positive contract

## 2018-11-26 NOTE — ASSESSMENT & PLAN NOTE
Patient is having significant increase in her symptoms, i e  When she had Thanksgiving dinner she had a significant amount of reflux  She noticed it, to the point where she was almost vomiting  Recommend follow with Gastroenterology  This is despite using pantoprazole, as well as Pepcid

## 2018-11-26 NOTE — PATIENT INSTRUCTIONS
Problem List Items Addressed This Visit        Digestive    GERD (gastroesophageal reflux disease) - Primary     Patient is having significant increase in her symptoms, i e  When she had Thanksgiving dinner she had a significant amount of reflux  She noticed it, to the point where she was almost vomiting  Recommend follow with Gastroenterology  This is despite using pantoprazole, as well as Pepcid  Relevant Orders    Ambulatory referral to Gastroenterology    Ambulatory Referral to Otolaryngology       Respiratory    Chronic nonseasonal allergic rhinitis due to pollen     Continue with some symptoms related to allergies  Follow-up with ENT at some point  Relevant Orders    Ambulatory Referral to Otolaryngology       Other    Depression     Improved after restarting Lexapro  Continue on same  No changes for the moment  Negative SI, positive contract

## 2018-11-27 ENCOUNTER — APPOINTMENT (OUTPATIENT)
Dept: LAB | Facility: CLINIC | Age: 53
End: 2018-11-27
Payer: COMMERCIAL

## 2018-11-27 LAB
ANION GAP SERPL CALCULATED.3IONS-SCNC: 7 MMOL/L (ref 4–13)
BASOPHILS # BLD AUTO: 0.09 THOUSANDS/ΜL (ref 0–0.1)
BASOPHILS NFR BLD AUTO: 1 % (ref 0–1)
BUN SERPL-MCNC: 15 MG/DL (ref 5–25)
CALCIUM SERPL-MCNC: 9.5 MG/DL (ref 8.3–10.1)
CHLORIDE SERPL-SCNC: 99 MMOL/L (ref 100–108)
CO2 SERPL-SCNC: 30 MMOL/L (ref 21–32)
CREAT SERPL-MCNC: 0.8 MG/DL (ref 0.6–1.3)
EOSINOPHIL # BLD AUTO: 0.41 THOUSAND/ΜL (ref 0–0.61)
EOSINOPHIL NFR BLD AUTO: 4 % (ref 0–6)
ERYTHROCYTE [DISTWIDTH] IN BLOOD BY AUTOMATED COUNT: 14.3 % (ref 11.6–15.1)
GFR SERPL CREATININE-BSD FRML MDRD: 86 ML/MIN/1.73SQ M
GLUCOSE P FAST SERPL-MCNC: 132 MG/DL (ref 65–99)
HCT VFR BLD AUTO: 43.3 % (ref 34.8–46.1)
HGB BLD-MCNC: 14.1 G/DL (ref 11.5–15.4)
IMM GRANULOCYTES # BLD AUTO: 0.04 THOUSAND/UL (ref 0–0.2)
IMM GRANULOCYTES NFR BLD AUTO: 0 % (ref 0–2)
LYMPHOCYTES # BLD AUTO: 3.76 THOUSANDS/ΜL (ref 0.6–4.47)
LYMPHOCYTES NFR BLD AUTO: 37 % (ref 14–44)
MCH RBC QN AUTO: 29.1 PG (ref 26.8–34.3)
MCHC RBC AUTO-ENTMCNC: 32.6 G/DL (ref 31.4–37.4)
MCV RBC AUTO: 90 FL (ref 82–98)
MONOCYTES # BLD AUTO: 0.68 THOUSAND/ΜL (ref 0.17–1.22)
MONOCYTES NFR BLD AUTO: 7 % (ref 4–12)
NEUTROPHILS # BLD AUTO: 5.13 THOUSANDS/ΜL (ref 1.85–7.62)
NEUTS SEG NFR BLD AUTO: 51 % (ref 43–75)
NRBC BLD AUTO-RTO: 0 /100 WBCS
PLATELET # BLD AUTO: 343 THOUSANDS/UL (ref 149–390)
PMV BLD AUTO: 10.5 FL (ref 8.9–12.7)
POTASSIUM SERPL-SCNC: 3.3 MMOL/L (ref 3.5–5.3)
RBC # BLD AUTO: 4.84 MILLION/UL (ref 3.81–5.12)
SODIUM SERPL-SCNC: 136 MMOL/L (ref 136–145)
WBC # BLD AUTO: 10.11 THOUSAND/UL (ref 4.31–10.16)

## 2018-11-27 PROCEDURE — 85025 COMPLETE CBC W/AUTO DIFF WBC: CPT | Performed by: FAMILY MEDICINE

## 2018-11-27 PROCEDURE — 80048 BASIC METABOLIC PNL TOTAL CA: CPT | Performed by: FAMILY MEDICINE

## 2018-11-27 PROCEDURE — 36415 COLL VENOUS BLD VENIPUNCTURE: CPT | Performed by: FAMILY MEDICINE

## 2018-12-04 DIAGNOSIS — I10 ESSENTIAL HYPERTENSION: ICD-10-CM

## 2018-12-04 RX ORDER — ATENOLOL AND CHLORTHALIDONE TABLET 50; 25 MG/1; MG/1
TABLET ORAL
Qty: 30 TABLET | Refills: 0 | Status: SHIPPED | OUTPATIENT
Start: 2018-12-04 | End: 2019-01-06 | Stop reason: SDUPTHER

## 2018-12-17 ENCOUNTER — APPOINTMENT (OUTPATIENT)
Dept: LAB | Facility: CLINIC | Age: 53
End: 2018-12-17
Payer: COMMERCIAL

## 2018-12-17 ENCOUNTER — OFFICE VISIT (OUTPATIENT)
Dept: FAMILY MEDICINE CLINIC | Facility: CLINIC | Age: 53
End: 2018-12-17
Payer: COMMERCIAL

## 2018-12-17 VITALS
DIASTOLIC BLOOD PRESSURE: 68 MMHG | SYSTOLIC BLOOD PRESSURE: 144 MMHG | HEART RATE: 64 BPM | WEIGHT: 244 LBS | HEIGHT: 66 IN | BODY MASS INDEX: 39.21 KG/M2

## 2018-12-17 DIAGNOSIS — E87.6 HYPOKALEMIA: Primary | ICD-10-CM

## 2018-12-17 DIAGNOSIS — E87.6 HYPOKALEMIA: ICD-10-CM

## 2018-12-17 LAB
ANION GAP SERPL CALCULATED.3IONS-SCNC: 9 MMOL/L (ref 4–13)
BUN SERPL-MCNC: 13 MG/DL (ref 5–25)
CALCIUM SERPL-MCNC: 9.3 MG/DL (ref 8.3–10.1)
CHLORIDE SERPL-SCNC: 101 MMOL/L (ref 100–108)
CO2 SERPL-SCNC: 30 MMOL/L (ref 21–32)
CREAT SERPL-MCNC: 0.95 MG/DL (ref 0.6–1.3)
GFR SERPL CREATININE-BSD FRML MDRD: 70 ML/MIN/1.73SQ M
GLUCOSE SERPL-MCNC: 118 MG/DL (ref 65–140)
POTASSIUM SERPL-SCNC: 3.2 MMOL/L (ref 3.5–5.3)
SODIUM SERPL-SCNC: 140 MMOL/L (ref 136–145)

## 2018-12-17 PROCEDURE — 1036F TOBACCO NON-USER: CPT | Performed by: FAMILY MEDICINE

## 2018-12-17 PROCEDURE — 3008F BODY MASS INDEX DOCD: CPT | Performed by: FAMILY MEDICINE

## 2018-12-17 PROCEDURE — 80048 BASIC METABOLIC PNL TOTAL CA: CPT

## 2018-12-17 PROCEDURE — 36415 COLL VENOUS BLD VENIPUNCTURE: CPT

## 2018-12-17 PROCEDURE — 99213 OFFICE O/P EST LOW 20 MIN: CPT | Performed by: FAMILY MEDICINE

## 2018-12-17 RX ORDER — POTASSIUM CHLORIDE 20 MEQ/1
20 TABLET, EXTENDED RELEASE ORAL DAILY
Qty: 30 TABLET | Refills: 2 | Status: SHIPPED | OUTPATIENT
Start: 2018-12-17 | End: 2019-03-16 | Stop reason: SDUPTHER

## 2018-12-17 NOTE — PROGRESS NOTES
Assessment/Plan:    Hypokalemia  Patient's potassium was quite a bit low  Question if this was fasting hypokalemia  At this point, recheck basic metabolic profile, then follow up after that  Of note, the patient was on potassium supplementation before, but did not purchase more over-the-counter  Diagnoses and all orders for this visit:    Hypokalemia  -     Basic metabolic panel; Future          Subjective: patient is here for a 1 month f/u re: GERD, AR, depression  Patient needs to review blood work results  ak     Patient ID: Sabiha Sebastian is a 48 y o  female  Labs reviewed  Potassium was low  She is not having cardiac problems  No palpitations or other issues  She did mention that she has had some issues with sweating  Reviewed Lexapro with regard to sweating  The following portions of the patient's history were reviewed and updated as appropriate: allergies, current medications and problem list     Review of Systems   Constitutional: Negative  HENT: Negative  Eyes: Negative  Respiratory: Negative  Cardiovascular: Negative  Gastrointestinal: Negative  Endocrine: Negative  Genitourinary: Negative  Musculoskeletal: Negative  Skin: Negative  Allergic/Immunologic: Negative  Neurological: Negative  Hematological: Negative  Psychiatric/Behavioral: Negative  Labs reviewed  Please see copies on the chart  CBC was normal   Potassium 3 3  Objective:      /68   Pulse 64   Ht 5' 6" (1 676 m)   Wt 111 kg (244 lb)   BMI 39 38 kg/m²          Physical Exam   Constitutional: She appears well-developed and well-nourished  HENT:   Head: Normocephalic and atraumatic  Cardiovascular: Normal rate, regular rhythm and normal heart sounds  Pulses:       Carotid pulses are 2+ on the right side, and 2+ on the left side  Pulmonary/Chest: Effort normal and breath sounds normal  She has no wheezes  She has no rales  She exhibits no tenderness  Nursing note and vitals reviewed

## 2018-12-17 NOTE — ASSESSMENT & PLAN NOTE
Patient's potassium was quite a bit low  Question if this was fasting hypokalemia  At this point, recheck basic metabolic profile, then follow up after that  Of note, the patient was on potassium supplementation before, but did not purchase more over-the-counter

## 2018-12-17 NOTE — PATIENT INSTRUCTIONS
Problem List Items Addressed This Visit        Other    Hypokalemia - Primary     Patient's potassium was quite a bit low  Question if this was fasting hypokalemia  At this point, recheck basic metabolic profile, then follow up after that  Of note, the patient was on potassium supplementation before, but did not purchase more over-the-counter           Relevant Orders    Basic metabolic panel

## 2018-12-18 DIAGNOSIS — K21.00 GASTROESOPHAGEAL REFLUX DISEASE WITH ESOPHAGITIS: ICD-10-CM

## 2018-12-18 RX ORDER — FAMOTIDINE 40 MG/1
40 TABLET, FILM COATED ORAL DAILY
Qty: 90 TABLET | Refills: 3 | Status: SHIPPED | OUTPATIENT
Start: 2018-12-18 | End: 2019-12-12 | Stop reason: SDUPTHER

## 2018-12-26 DIAGNOSIS — J30.89 CHRONIC NONSEASONAL ALLERGIC RHINITIS DUE TO POLLEN: ICD-10-CM

## 2018-12-26 DIAGNOSIS — K21.9 GASTROESOPHAGEAL REFLUX DISEASE, ESOPHAGITIS PRESENCE NOT SPECIFIED: ICD-10-CM

## 2018-12-26 RX ORDER — PANTOPRAZOLE SODIUM 40 MG/1
40 TABLET, DELAYED RELEASE ORAL DAILY
Qty: 30 TABLET | Refills: 0 | Status: SHIPPED | OUTPATIENT
Start: 2018-12-26 | End: 2019-01-29 | Stop reason: SDUPTHER

## 2018-12-27 RX ORDER — MONTELUKAST SODIUM 10 MG/1
TABLET ORAL
Qty: 90 TABLET | Refills: 0 | Status: SHIPPED | OUTPATIENT
Start: 2018-12-27 | End: 2019-03-30 | Stop reason: SDUPTHER

## 2019-01-02 DIAGNOSIS — K21.9 GASTROESOPHAGEAL REFLUX DISEASE, ESOPHAGITIS PRESENCE NOT SPECIFIED: ICD-10-CM

## 2019-01-02 RX ORDER — PANTOPRAZOLE SODIUM 40 MG/1
40 TABLET, DELAYED RELEASE ORAL DAILY
Qty: 30 TABLET | Refills: 2 | OUTPATIENT
Start: 2019-01-02 | End: 2019-04-02

## 2019-01-06 DIAGNOSIS — I10 ESSENTIAL HYPERTENSION: ICD-10-CM

## 2019-01-06 RX ORDER — ATENOLOL AND CHLORTHALIDONE TABLET 50; 25 MG/1; MG/1
TABLET ORAL
Qty: 30 TABLET | Refills: 0 | Status: SHIPPED | OUTPATIENT
Start: 2019-01-06 | End: 2019-01-07

## 2019-01-07 RX ORDER — ATENOLOL AND CHLORTHALIDONE TABLET 50; 25 MG/1; MG/1
TABLET ORAL
Qty: 30 TABLET | Refills: 0 | Status: SHIPPED | OUTPATIENT
Start: 2019-01-07 | End: 2019-02-04 | Stop reason: SDUPTHER

## 2019-01-16 ENCOUNTER — APPOINTMENT (OUTPATIENT)
Dept: LAB | Facility: CLINIC | Age: 54
End: 2019-01-16
Payer: COMMERCIAL

## 2019-01-16 DIAGNOSIS — E87.6 HYPOKALEMIA: ICD-10-CM

## 2019-01-16 LAB
ANION GAP SERPL CALCULATED.3IONS-SCNC: 7 MMOL/L (ref 4–13)
BUN SERPL-MCNC: 15 MG/DL (ref 5–25)
CALCIUM SERPL-MCNC: 9.2 MG/DL (ref 8.3–10.1)
CHLORIDE SERPL-SCNC: 97 MMOL/L (ref 100–108)
CO2 SERPL-SCNC: 30 MMOL/L (ref 21–32)
CREAT SERPL-MCNC: 0.65 MG/DL (ref 0.6–1.3)
GFR SERPL CREATININE-BSD FRML MDRD: 103 ML/MIN/1.73SQ M
GLUCOSE SERPL-MCNC: 142 MG/DL (ref 65–140)
POTASSIUM SERPL-SCNC: 3.6 MMOL/L (ref 3.5–5.3)
SODIUM SERPL-SCNC: 134 MMOL/L (ref 136–145)

## 2019-01-16 PROCEDURE — 36415 COLL VENOUS BLD VENIPUNCTURE: CPT

## 2019-01-16 PROCEDURE — 80048 BASIC METABOLIC PNL TOTAL CA: CPT

## 2019-01-28 DIAGNOSIS — K21.9 GASTROESOPHAGEAL REFLUX DISEASE, ESOPHAGITIS PRESENCE NOT SPECIFIED: ICD-10-CM

## 2019-01-28 RX ORDER — PANTOPRAZOLE SODIUM 40 MG/1
TABLET, DELAYED RELEASE ORAL
Qty: 30 TABLET | Refills: 0 | OUTPATIENT
Start: 2019-01-28

## 2019-01-29 DIAGNOSIS — K21.9 GASTROESOPHAGEAL REFLUX DISEASE, ESOPHAGITIS PRESENCE NOT SPECIFIED: ICD-10-CM

## 2019-01-29 RX ORDER — PANTOPRAZOLE SODIUM 40 MG/1
TABLET, DELAYED RELEASE ORAL
Qty: 30 TABLET | Refills: 0 | Status: SHIPPED | OUTPATIENT
Start: 2019-01-29 | End: 2019-02-22 | Stop reason: SDUPTHER

## 2019-02-04 ENCOUNTER — OFFICE VISIT (OUTPATIENT)
Dept: FAMILY MEDICINE CLINIC | Facility: CLINIC | Age: 54
End: 2019-02-04
Payer: COMMERCIAL

## 2019-02-04 VITALS
BODY MASS INDEX: 40.18 KG/M2 | HEIGHT: 66 IN | SYSTOLIC BLOOD PRESSURE: 122 MMHG | HEART RATE: 80 BPM | WEIGHT: 250 LBS | DIASTOLIC BLOOD PRESSURE: 80 MMHG

## 2019-02-04 DIAGNOSIS — M79.604 PAIN OF RIGHT LOWER EXTREMITY: ICD-10-CM

## 2019-02-04 DIAGNOSIS — E87.6 HYPOKALEMIA: ICD-10-CM

## 2019-02-04 DIAGNOSIS — Z12.39 SCREENING FOR BREAST CANCER: Primary | ICD-10-CM

## 2019-02-04 DIAGNOSIS — M79.7 FIBROMYALGIA: ICD-10-CM

## 2019-02-04 DIAGNOSIS — I10 ESSENTIAL HYPERTENSION: ICD-10-CM

## 2019-02-04 PROBLEM — M79.606 LEG PAIN: Status: ACTIVE | Noted: 2019-02-04

## 2019-02-04 PROCEDURE — 3008F BODY MASS INDEX DOCD: CPT | Performed by: FAMILY MEDICINE

## 2019-02-04 PROCEDURE — 3079F DIAST BP 80-89 MM HG: CPT | Performed by: FAMILY MEDICINE

## 2019-02-04 PROCEDURE — 3074F SYST BP LT 130 MM HG: CPT | Performed by: FAMILY MEDICINE

## 2019-02-04 PROCEDURE — 99214 OFFICE O/P EST MOD 30 MIN: CPT | Performed by: FAMILY MEDICINE

## 2019-02-04 RX ORDER — ATENOLOL AND CHLORTHALIDONE TABLET 50; 25 MG/1; MG/1
TABLET ORAL
Qty: 30 TABLET | Refills: 0 | Status: SHIPPED | OUTPATIENT
Start: 2019-02-04 | End: 2019-03-25 | Stop reason: SDUPTHER

## 2019-02-04 NOTE — PROGRESS NOTES
Assessment/Plan:    Essential hypertension  Blood pressure today is doing quite well  No changes  Fibromyalgia  At this point the patient is still having increasing symptoms of fibromyalgia verses polymyalgia  Check labs  Follow up after that  She is currently on amitriptyline, and does have some dry mouth with that  She is not noting any changes with sleep despite reasonable dose  I would recommend that she at this point decrease the amitriptyline to 50 mg for a week, then decrease to 25 for 1 week, then discontinue  It does not seem to be helping at all  Hypokalemia  Potassium level seems to be doing quite well at this point  No changes  Of note, she was talking about dysphagia, i e  Some difficulty with swallowing with this  Question if this is secondary to the Elavil or other issues  Would re-evaluate after Elavil as discontinued, and then consider further steps such as GI for possible dysphagia  Leg pain  She does have right lower extremity pains  Question this is arthritis  Check x-ray of the knee  Follow up after that  Diagnoses and all orders for this visit:    Screening for breast cancer  -     Mammo screening bilateral w 3d & cad; Future    Fibromyalgia  -     Sedimentation rate, automated; Future  -     C-reactive protein; Future  -     TSH, 3rd generation; Future  -     CBC and differential; Future    Essential hypertension  -     TSH, 3rd generation; Future  -     CBC and differential; Future    Hypokalemia    Pain of right lower extremity  -     XR knee 4+ vw right injury; Future          Subjective: Follow up to chronic conditions and review bw results  C/o right knee hurts and clicking with certain movements  Onset a couple weeks  mjs     Patient ID: Sabiha Reason is a 48 y o  female  Patient is taking the potassium supplementation, however she mentioned that the pills sometimes feel like they are getting stuck    She did try half pills, but it seemed to not make that much of a difference  She has noticed on occasion that she does have some problems with things getting stuck or having some issues with swallowing  Right knee seems to be more problematic for her lately  She notices that at full extension and flexion she has discomfort behind the knee  She did mention that she also has some pain on the outside of the leg, especially with driving  She is now driving a [facility], something she does not do on a regular basis  This is relatively new or for her  Since driving she noted the increased discomfort  With her driving, she is in the [facility] from 7:45AM - 4-5 PM   She is getting up from the [facility] multiple times in a day  This is 2-3 times a day  The following portions of the patient's history were reviewed and updated as appropriate: allergies, current medications, past family history, past medical history, past social history, past surgical history and problem list     Review of Systems   Constitutional: Positive for activity change and fatigue  HENT: Negative  Respiratory: Negative  Cardiovascular: Negative  Musculoskeletal: Positive for joint swelling and myalgias  Skin: Negative  All other systems reviewed and are negative  Sodium 134, K 3 6  Creat 0 65    Blood sugar 142  Non fasting  Objective:      /80   Pulse 80   Ht 5' 6" (1 676 m)   Wt 113 kg (250 lb)   BMI 40 35 kg/m²          Physical Exam   Constitutional: She appears well-developed and well-nourished  HENT:   Head: Normocephalic and atraumatic  Cardiovascular: Normal rate, regular rhythm and normal heart sounds  Pulses:       Carotid pulses are 2+ on the right side, and 2+ on the left side  Pulmonary/Chest: Effort normal and breath sounds normal  She has no wheezes  She has no rales  She exhibits no tenderness  Musculoskeletal:   Some swelling of the right lower extremity, but minimal   Skin is not taut in the calf    There is some discomfort when you palpate posterior aspect of the right knee  Nursing note and vitals reviewed

## 2019-02-04 NOTE — PATIENT INSTRUCTIONS
Problem List Items Addressed This Visit     Fibromyalgia     At this point the patient is still having increasing symptoms of fibromyalgia verses polymyalgia  Check labs  Follow up after that  She is currently on amitriptyline, and does have some dry mouth with that  She is not noting any changes with sleep despite reasonable dose  I would recommend that she at this point decrease the amitriptyline to 50 mg for a week, then decrease to 25 for 1 week, then discontinue  It does not seem to be helping at all  Relevant Orders    Sedimentation rate, automated    C-reactive protein    TSH, 3rd generation    CBC and differential    Essential hypertension     Blood pressure today is doing quite well  No changes  Relevant Orders    TSH, 3rd generation    CBC and differential    Hypokalemia     Potassium level seems to be doing quite well at this point  No changes  Of note, she was talking about dysphagia, i e  Some difficulty with swallowing with this  Question if this is secondary to the Elavil or other issues  Would re-evaluate after Elavil as discontinued, and then consider further steps such as GI for possible dysphagia  Leg pain     She does have right lower extremity pains  Question this is arthritis  Check x-ray of the knee  Follow up after that           Relevant Orders    XR knee 4+ vw right injury      Other Visit Diagnoses     Screening for breast cancer    -  Primary    Relevant Orders    Mammo screening bilateral w 3d & cad

## 2019-02-04 NOTE — ASSESSMENT & PLAN NOTE
Potassium level seems to be doing quite well at this point  No changes  Of note, she was talking about dysphagia, i e  Some difficulty with swallowing with this  Question if this is secondary to the Elavil or other issues  Would re-evaluate after Elavil as discontinued, and then consider further steps such as GI for possible dysphagia

## 2019-02-04 NOTE — ASSESSMENT & PLAN NOTE
At this point the patient is still having increasing symptoms of fibromyalgia verses polymyalgia  Check labs  Follow up after that  She is currently on amitriptyline, and does have some dry mouth with that  She is not noting any changes with sleep despite reasonable dose  I would recommend that she at this point decrease the amitriptyline to 50 mg for a week, then decrease to 25 for 1 week, then discontinue  It does not seem to be helping at all

## 2019-02-05 ENCOUNTER — APPOINTMENT (OUTPATIENT)
Dept: LAB | Facility: CLINIC | Age: 54
End: 2019-02-05
Payer: COMMERCIAL

## 2019-02-05 DIAGNOSIS — M79.7 FIBROMYALGIA: ICD-10-CM

## 2019-02-05 DIAGNOSIS — I10 ESSENTIAL HYPERTENSION: ICD-10-CM

## 2019-02-05 LAB
BASOPHILS # BLD AUTO: 0.11 THOUSANDS/ΜL (ref 0–0.1)
BASOPHILS NFR BLD AUTO: 1 % (ref 0–1)
CRP SERPL QL: 26.2 MG/L
EOSINOPHIL # BLD AUTO: 0.38 THOUSAND/ΜL (ref 0–0.61)
EOSINOPHIL NFR BLD AUTO: 4 % (ref 0–6)
ERYTHROCYTE [DISTWIDTH] IN BLOOD BY AUTOMATED COUNT: 13.7 % (ref 11.6–15.1)
ERYTHROCYTE [SEDIMENTATION RATE] IN BLOOD: 25 MM/HOUR (ref 0–20)
HCT VFR BLD AUTO: 44.5 % (ref 34.8–46.1)
HGB BLD-MCNC: 14.3 G/DL (ref 11.5–15.4)
IMM GRANULOCYTES # BLD AUTO: 0.04 THOUSAND/UL (ref 0–0.2)
IMM GRANULOCYTES NFR BLD AUTO: 0 % (ref 0–2)
LYMPHOCYTES # BLD AUTO: 4.27 THOUSANDS/ΜL (ref 0.6–4.47)
LYMPHOCYTES NFR BLD AUTO: 39 % (ref 14–44)
MCH RBC QN AUTO: 28.6 PG (ref 26.8–34.3)
MCHC RBC AUTO-ENTMCNC: 32.1 G/DL (ref 31.4–37.4)
MCV RBC AUTO: 89 FL (ref 82–98)
MONOCYTES # BLD AUTO: 0.67 THOUSAND/ΜL (ref 0.17–1.22)
MONOCYTES NFR BLD AUTO: 6 % (ref 4–12)
NEUTROPHILS # BLD AUTO: 5.51 THOUSANDS/ΜL (ref 1.85–7.62)
NEUTS SEG NFR BLD AUTO: 50 % (ref 43–75)
NRBC BLD AUTO-RTO: 0 /100 WBCS
PLATELET # BLD AUTO: 400 THOUSANDS/UL (ref 149–390)
PMV BLD AUTO: 10.2 FL (ref 8.9–12.7)
RBC # BLD AUTO: 5 MILLION/UL (ref 3.81–5.12)
TSH SERPL DL<=0.05 MIU/L-ACNC: 0.95 UIU/ML (ref 0.36–3.74)
WBC # BLD AUTO: 10.98 THOUSAND/UL (ref 4.31–10.16)

## 2019-02-05 PROCEDURE — 85652 RBC SED RATE AUTOMATED: CPT

## 2019-02-05 PROCEDURE — 85025 COMPLETE CBC W/AUTO DIFF WBC: CPT

## 2019-02-05 PROCEDURE — 36415 COLL VENOUS BLD VENIPUNCTURE: CPT

## 2019-02-05 PROCEDURE — 84443 ASSAY THYROID STIM HORMONE: CPT

## 2019-02-05 PROCEDURE — 86140 C-REACTIVE PROTEIN: CPT

## 2019-02-07 DIAGNOSIS — M35.3 PMR (POLYMYALGIA RHEUMATICA) (HCC): ICD-10-CM

## 2019-02-07 DIAGNOSIS — M79.7 FIBROMYALGIA: Primary | ICD-10-CM

## 2019-02-07 RX ORDER — PREDNISONE 1 MG/1
5 TABLET ORAL DAILY
Qty: 15 TABLET | Refills: 0 | Status: SHIPPED | OUTPATIENT
Start: 2019-02-07 | End: 2019-02-21 | Stop reason: SDUPTHER

## 2019-02-07 NOTE — PROGRESS NOTES
Problem List Items Addressed This Visit     Fibromyalgia - Primary    Relevant Medications    predniSONE 5 mg tablet    PMR (polymyalgia rheumatica) (HCC)    Relevant Medications    predniSONE 5 mg tablet

## 2019-02-21 DIAGNOSIS — M35.3 PMR (POLYMYALGIA RHEUMATICA) (HCC): ICD-10-CM

## 2019-02-21 DIAGNOSIS — M79.7 FIBROMYALGIA: ICD-10-CM

## 2019-02-22 DIAGNOSIS — K21.9 GASTROESOPHAGEAL REFLUX DISEASE, ESOPHAGITIS PRESENCE NOT SPECIFIED: ICD-10-CM

## 2019-02-25 ENCOUNTER — OFFICE VISIT (OUTPATIENT)
Dept: GASTROENTEROLOGY | Facility: MEDICAL CENTER | Age: 54
End: 2019-02-25
Payer: COMMERCIAL

## 2019-02-25 VITALS
BODY MASS INDEX: 40.02 KG/M2 | DIASTOLIC BLOOD PRESSURE: 62 MMHG | SYSTOLIC BLOOD PRESSURE: 124 MMHG | TEMPERATURE: 97.6 F | HEART RATE: 68 BPM | HEIGHT: 66 IN | WEIGHT: 249 LBS

## 2019-02-25 DIAGNOSIS — K21.00 GASTROESOPHAGEAL REFLUX DISEASE WITH ESOPHAGITIS: Primary | ICD-10-CM

## 2019-02-25 DIAGNOSIS — K64.9 BLEEDING HEMORRHOIDS: ICD-10-CM

## 2019-02-25 DIAGNOSIS — K58.2 IRRITABLE BOWEL SYNDROME WITH BOTH CONSTIPATION AND DIARRHEA: ICD-10-CM

## 2019-02-25 DIAGNOSIS — E53.8 VITAMIN B12 DEFICIENCY: ICD-10-CM

## 2019-02-25 DIAGNOSIS — R13.19 ESOPHAGEAL DYSPHAGIA: ICD-10-CM

## 2019-02-25 DIAGNOSIS — K76.0 FATTY LIVER: ICD-10-CM

## 2019-02-25 PROCEDURE — 99244 OFF/OP CNSLTJ NEW/EST MOD 40: CPT | Performed by: INTERNAL MEDICINE

## 2019-02-25 RX ORDER — PANTOPRAZOLE SODIUM 40 MG/1
40 TABLET, DELAYED RELEASE ORAL DAILY
Qty: 90 TABLET | Refills: 3 | Status: SHIPPED | OUTPATIENT
Start: 2019-02-25 | End: 2020-03-02

## 2019-02-25 RX ORDER — PREDNISONE 1 MG/1
5 TABLET ORAL DAILY
Qty: 15 TABLET | Refills: 0 | Status: SHIPPED | OUTPATIENT
Start: 2019-02-25 | End: 2019-03-12

## 2019-02-25 NOTE — PATIENT INSTRUCTIONS
The patient is scheduled at [facility] for an EGD with Dr Zan Hope on 5/2/19  Prep was gone over with by the MA  The Patient is aware that she will be called the day prior with an arrival time

## 2019-02-25 NOTE — LETTER
February 25, 2019     Saad Mcdowell MD  [address] [place] [address] [address]    Patient: Alma Dempsey   YOB: 1965   Date of Visit: 2/25/2019       Dear Dr Cornelius Dennis: Thank you for referring Alma Dempsey to me for evaluation  Below are my notes for this consultation  If you have questions, please do not hesitate to call me  I look forward to following your patient along with you  Sincerely,        Lynette Ramesh MD        CC: No Recipients  Lynette Ramesh MD  2/25/2019 10:20 AM  Sign at close encounter  [facility] - Outpatient Consultation  Alma Dempsey 48 y o  female MRN: [id number]  Encounter: [id number]          ASSESSMENT AND PLAN:      1  Gastroesophageal reflux disease with esophagitis-  discuss anti-reflux measures  EGD plan for further evaluation especially in setting of longstanding reflux  Discuss side effects of PPI  Continue PPI and H2 blocker for now  2  Fatty liver  -discuss 5-10% weight loss  3  Bleeding hemorrhoids 2 complicated by anal fissure currently getting followed up by colorectal surgery Donny Hinds for this  we discuss importance of getting random biopsy when undergoing colonoscopy for further evaluation of microscopic colitis especially in setting of longstanding change in bowel habits   - Calprotectin,Fecal; Future    4  Vitamin B12 deficiency- repeat labs due to longstanding history of vitamin B12 deficiency  She is currently taking supplements  - Vitamin B12; Future    5   Esophageal dysphagia-this is mostly to solids this may be secondary to polymyalgia rheumatica resulting in a motility disorder versus pill esophagitis as she is taking potassium versus esophageal stricture versus esophageal lesion versus esophagitis  -discuss upright positioning after taking potassium for at least 30 minutes  -EGD plan for further evaluation  -discussed weight loss which will help with fatty liver disease as well as reflux episodes  - Case request operating room: ESOPHAGOGASTRODUODENOSCOPY (EGD)    6  Irritable bowel syndrome with both constipation and diarrhea-she has longstanding history of IBS D currently describes her [place] stool as type of 4  Due to longstanding history of a change in bowel habits will check for celiac disease and H pylori  Since her bowel movements have normalized will not plan for extensive evaluation at this time  Will also check for fecal calprotectin due to elevation inflammatory markers of likely related to polymyalgia rheumatica  - Celiac Disease Antibody Profile; Future  - H  pylori antigen, stool; Future    ______________________________________________________________________    HPI:      She is a 60-year-old female presents here for multiple GI complaints including history of IBS, new onset of esophageal dysphagia associated with solid food dysphagia, history of anal fissure/hemorrhoids, history of fatty liver disease and history of longstanding reflux  She has been recently diagnosed with polymyalgia rheumatica currently being worked up by rheumatologist   Diagnosis is not official and will follow-up Rheumatology for this  She was started on low-dose prednisone which has helped with her symptoms of myalgia and also has helped with esophageal dysphagia  She states esophageal dysphagia to solids with food getting stuck in the substernal area  No prior endoscopic evaluation  She also has longstanding reflux which has been overall well controlled currently taking PPI and H2 blocker  Denies any alarm symptoms  She does have a elevated inflammatory markers which are likely secondary to polymyalgia rheumatica but she has not been ruled out for inflammatory bowel disease especially in setting of longstanding history of change in bowel habits  She was diagnosed with anal fissures and plan to get further evaluation with colonoscopy by colorectal surgery    She has not had a colonoscopy in the past   Hemoccult testing the past has been negative  She does have history abdominal surgeries with hysterectomy secondary to endometriosis  And previous history of vitamin B12 deficiency  Lastly abdominal ultrasounds have been within normal limits without any evidence of gallstones but did identify fatty liver disease  Her BMI is 40  REVIEW OF SYSTEMS:    CONSTITUTIONAL: Denies any fever, chills, rigors, and weight loss  HEENT: No earache or tinnitus  Denies hearing loss or visual disturbances  CARDIOVASCULAR: No chest pain or palpitations  RESPIRATORY: Denies any cough, hemoptysis, shortness of breath or dyspnea on exertion  GASTROINTESTINAL: As noted in the History of Present Illness  GENITOURINARY: No problems with urination  Denies any hematuria or dysuria  NEUROLOGIC: No dizziness or vertigo, denies headaches  MUSCULOSKELETAL: Denies any muscle or joint pain  SKIN: Denies skin rashes or itching  ENDOCRINE: Denies excessive thirst  Denies intolerance to heat or cold  PSYCHOSOCIAL: Denies depression or anxiety  Denies any recent memory loss         Historical Information   Past Medical History:   Diagnosis Date    Ankle fracture     Chronic venous embolism and thrombosis of deep vessels of lower extremity (HCC)     Costochondritis     RESOLVED: [id number]    Endometriosis     Fibromyalgia     Hematuria     LAST ASSESSED: [id number]    Hypertension     LAST ASSESSED: [id number]    Pulmonary embolism ([facility] [address] )     RESOLED: [id number]    Umbilical hernia     LAST ASSESSED: [id number]     Past Surgical History:   Procedure Laterality Date    HERNIA REPAIR      HYSTERECTOMY       Social History   Social History     Substance and Sexual Activity   Alcohol Use No     Social History     Substance and Sexual Activity   Drug Use No     Social History     Tobacco Use   Smoking Status Former Smoker    Types: Cigarettes    Last attempt to quit: 8/10/2018    Years since quittin 5   Smokeless Tobacco Never Used   Tobacco Comment    smokes 1 to 1 1/2 packs per day     Family History   Problem Relation Age of Onset    Hypertension Mother     Diabetes Father     Hypertension Father     Obesity Father        Meds/Allergies       Current Outpatient Medications:     amitriptyline (ELAVIL) 25 mg tablet    Aspirin 81 MG EC tablet    atenolol-chlorthalidone (TENORETIC) 50-25 mg per tablet    clindamycin (CLEOCIN T) 1 % lotion    Cyanocobalamin (B-12) 1000 MCG CAPS    escitalopram (LEXAPRO) 10 mg tablet    famotidine (PEPCID) 40 MG tablet    loratadine (CLARITIN) 10 mg tablet    montelukast (SINGULAIR) 10 mg tablet    olopatadine (PATANOL) 0 1 % ophthalmic solution    pantoprazole (PROTONIX) 40 mg tablet    potassium chloride (K-DUR,KLOR-CON) 20 mEq tablet    POTASSIUM GLUCONATE PO    Allergies   Allergen Reactions    Albuterol      Other reaction(s): felt weird    Azithromycin GI Intolerance    Duloxetine      Category: Adverse Reaction; Annotation - [id number]: Sweating    Erythromycin Vomiting    Hydrocodone-Acetaminophen      Other reaction(s): sweating, feel faint, diarrhea    Ketorolac Diarrhea and Nausea Only     Category: Adverse Reaction; Annotation - [id number]: Symptoms were noted after injection into bursa with Toradol at orthopedics    Penicillins Hives and Vomiting           Objective     Blood pressure 124/62, pulse 68, temperature 97 6 °F (36 4 °C), temperature source Tympanic, height 5' 6" (1 676 m), weight 113 kg (249 lb)  Body mass index is 40 19 kg/m²  PHYSICAL EXAM:      General Appearance:   Alert, cooperative, no distress   HEENT:   Normocephalic, atraumatic, anicteric      Neck:  Supple, symmetrical, trachea midline   Lungs:   Clear to auscultation bilaterally; no rales, rhonchi or wheezing; respirations unlabored    Heart[de-identified]   Regular rate and rhythm; no murmur, rub, or gallop     Abdomen:   Soft, non-tender, non-distended; normal bowel sounds; no masses, no organomegaly    Genitalia:   Deferred    Rectal:   Deferred    Extremities:  No cyanosis, clubbing or edema    Pulses:  2+ and symmetric    Skin:  No jaundice, rashes, or lesions    Lymph nodes:  No palpable cervical lymphadenopathy        Lab Results:   No visits with results within 1 Day(s) from this visit  Latest known visit with results is:   Appointment on 02/05/2019   Component Date Value    Sed Rate 02/05/2019 25*    CRP 02/05/2019 26 2*    TSH 3RD GENERATON 02/05/2019 0 948     WBC 02/05/2019 10 98*    RBC 02/05/2019 5 00     Hemoglobin 02/05/2019 14 3     Hematocrit 02/05/2019 44 5     MCV 02/05/2019 89     MCH 02/05/2019 28 6     MCHC 02/05/2019 32 1     RDW 02/05/2019 13 7     MPV 02/05/2019 10 2     Platelets 14/13/1427 400*    nRBC 02/05/2019 0     Neutrophils Relative 02/05/2019 50     Immat GRANS % 02/05/2019 0     Lymphocytes Relative 02/05/2019 39     Monocytes Relative 02/05/2019 6     Eosinophils Relative 02/05/2019 4     Basophils Relative 02/05/2019 1     Neutrophils Absolute 02/05/2019 5 51     Immature Grans Absolute 02/05/2019 0 04     Lymphocytes Absolute 02/05/2019 4 27     Monocytes Absolute 02/05/2019 0 67     Eosinophils Absolute 02/05/2019 0 38     Basophils Absolute 02/05/2019 0 11*         Radiology Results:   No results found

## 2019-02-25 NOTE — PROGRESS NOTES
[facility] - Outpatient Consultation  Cierra Cuevas 48 y o  female MRN: [id number]  Encounter: [id number]          ASSESSMENT AND PLAN:      1  Gastroesophageal reflux disease with esophagitis-  discuss anti-reflux measures  EGD plan for further evaluation especially in setting of longstanding reflux  Discuss side effects of PPI  Continue PPI and H2 blocker for now  2  Fatty liver  -discuss 5-10% weight loss  3  Bleeding hemorrhoids 2 complicated by anal fissure currently getting followed up by colorectal surgery Janice Manzo for this  we discuss importance of getting random biopsy when undergoing colonoscopy for further evaluation of microscopic colitis especially in setting of longstanding change in bowel habits   - Calprotectin,Fecal; Future    4  Vitamin B12 deficiency- repeat labs due to longstanding history of vitamin B12 deficiency  She is currently taking supplements  - Vitamin B12; Future    5  Esophageal dysphagia-this is mostly to solids this may be secondary to polymyalgia rheumatica resulting in a motility disorder versus pill esophagitis as she is taking potassium versus esophageal stricture versus esophageal lesion versus esophagitis  -discuss upright positioning after taking potassium for at least 30 minutes  -EGD plan for further evaluation  -discussed weight loss which will help with fatty liver disease as well as reflux episodes  - Case request operating room: ESOPHAGOGASTRODUODENOSCOPY (EGD)    6  Irritable bowel syndrome with both constipation and diarrhea-she has longstanding history of IBS D currently describes her [place] stool as type of 4  Due to longstanding history of a change in bowel habits will check for celiac disease and H pylori  Since her bowel movements have normalized will not plan for extensive evaluation at this time  Will also check for fecal calprotectin due to elevation inflammatory markers of likely related to polymyalgia rheumatica    - Celiac Disease Antibody Profile; Future  - H  pylori antigen, stool; Future    ______________________________________________________________________    HPI:      She is a 61-year-old female presents here for multiple GI complaints including history of IBS, new onset of esophageal dysphagia associated with solid food dysphagia, history of anal fissure/hemorrhoids, history of fatty liver disease and history of longstanding reflux  She has been recently diagnosed with polymyalgia rheumatica currently being worked up by rheumatologist   Diagnosis is not official and will follow-up Rheumatology for this  She was started on low-dose prednisone which has helped with her symptoms of myalgia and also has helped with esophageal dysphagia  She states esophageal dysphagia to solids with food getting stuck in the substernal area  No prior endoscopic evaluation  She also has longstanding reflux which has been overall well controlled currently taking PPI and H2 blocker  Denies any alarm symptoms  She does have a elevated inflammatory markers which are likely secondary to polymyalgia rheumatica but she has not been ruled out for inflammatory bowel disease especially in setting of longstanding history of change in bowel habits  She was diagnosed with anal fissures and plan to get further evaluation with colonoscopy by colorectal surgery  She has not had a colonoscopy in the past   Hemoccult testing the past has been negative  She does have history abdominal surgeries with hysterectomy secondary to endometriosis  And previous history of vitamin B12 deficiency  Lastly abdominal ultrasounds have been within normal limits without any evidence of gallstones but did identify fatty liver disease  Her BMI is 40  REVIEW OF SYSTEMS:    CONSTITUTIONAL: Denies any fever, chills, rigors, and weight loss  HEENT: No earache or tinnitus  Denies hearing loss or visual disturbances  CARDIOVASCULAR: No chest pain or palpitations  RESPIRATORY: Denies any cough, hemoptysis, shortness of breath or dyspnea on exertion  GASTROINTESTINAL: As noted in the History of Present Illness  GENITOURINARY: No problems with urination  Denies any hematuria or dysuria  NEUROLOGIC: No dizziness or vertigo, denies headaches  MUSCULOSKELETAL: Denies any muscle or joint pain  SKIN: Denies skin rashes or itching  ENDOCRINE: Denies excessive thirst  Denies intolerance to heat or cold  PSYCHOSOCIAL: Denies depression or anxiety  Denies any recent memory loss         Historical Information   Past Medical History:   Diagnosis Date    Ankle fracture     Chronic venous embolism and thrombosis of deep vessels of lower extremity (HCC)     Costochondritis     RESOLVED: [id number]    Endometriosis     Fibromyalgia     Hematuria     LAST ASSESSED: [id number]    Hypertension     LAST ASSESSED: [id number]    Pulmonary embolism ([facility] [address] )     RESOLED: [id number]    Umbilical hernia     LAST ASSESSED: [id number]     Past Surgical History:   Procedure Laterality Date    HERNIA REPAIR      HYSTERECTOMY       Social History   Social History     Substance and Sexual Activity   Alcohol Use No     Social History     Substance and Sexual Activity   Drug Use No     Social History     Tobacco Use   Smoking Status Former Smoker    Types: Cigarettes    Last attempt to quit: 8/10/2018    Years since quittin 5   Smokeless Tobacco Never Used   Tobacco Comment    smokes 1 to 1 1/2 packs per day     Family History   Problem Relation Age of Onset    Hypertension Mother     Diabetes Father     Hypertension Father     Obesity Father        Meds/Allergies       Current Outpatient Medications:     amitriptyline (ELAVIL) 25 mg tablet    Aspirin 81 MG EC tablet    atenolol-chlorthalidone (TENORETIC) 50-25 mg per tablet    clindamycin (CLEOCIN T) 1 % lotion    Cyanocobalamin (B-12) 1000 MCG CAPS    escitalopram (LEXAPRO) 10 mg tablet    famotidine (PEPCID) 40 MG tablet   loratadine (CLARITIN) 10 mg tablet    montelukast (SINGULAIR) 10 mg tablet    olopatadine (PATANOL) 0 1 % ophthalmic solution    pantoprazole (PROTONIX) 40 mg tablet    potassium chloride (K-DUR,KLOR-CON) 20 mEq tablet    POTASSIUM GLUCONATE PO    Allergies   Allergen Reactions    Albuterol      Other reaction(s): felt weird    Azithromycin GI Intolerance    Duloxetine      Category: Adverse Reaction; Annotation - [id number]: Sweating    Erythromycin Vomiting    Hydrocodone-Acetaminophen      Other reaction(s): sweating, feel faint, diarrhea    Ketorolac Diarrhea and Nausea Only     Category: Adverse Reaction; Annotation - [id number]: Symptoms were noted after injection into bursa with Toradol at orthopedics    Penicillins Hives and Vomiting           Objective     Blood pressure 124/62, pulse 68, temperature 97 6 °F (36 4 °C), temperature source Tympanic, height 5' 6" (1 676 m), weight 113 kg (249 lb)  Body mass index is 40 19 kg/m²  PHYSICAL EXAM:      General Appearance:   Alert, cooperative, no distress   HEENT:   Normocephalic, atraumatic, anicteric      Neck:  Supple, symmetrical, trachea midline   Lungs:   Clear to auscultation bilaterally; no rales, rhonchi or wheezing; respirations unlabored    Heart[de-identified]   Regular rate and rhythm; no murmur, rub, or gallop  Abdomen:   Soft, non-tender, non-distended; normal bowel sounds; no masses, no organomegaly    Genitalia:   Deferred    Rectal:   Deferred    Extremities:  No cyanosis, clubbing or edema    Pulses:  2+ and symmetric    Skin:  No jaundice, rashes, or lesions    Lymph nodes:  No palpable cervical lymphadenopathy        Lab Results:   No visits with results within 1 Day(s) from this visit     Latest known visit with results is:   Appointment on 02/05/2019   Component Date Value    Sed Rate 02/05/2019 25*    CRP 02/05/2019 26 2*    TSH 3RD GENERATON 02/05/2019 0 948     WBC 02/05/2019 10 98*    RBC 02/05/2019 5 00     Hemoglobin 02/05/2019 14 3     Hematocrit 02/05/2019 44 5     MCV 02/05/2019 89     MCH 02/05/2019 28 6     MCHC 02/05/2019 32 1     RDW 02/05/2019 13 7     MPV 02/05/2019 10 2     Platelets 96/35/4458 400*    nRBC 02/05/2019 0     Neutrophils Relative 02/05/2019 50     Immat GRANS % 02/05/2019 0     Lymphocytes Relative 02/05/2019 39     Monocytes Relative 02/05/2019 6     Eosinophils Relative 02/05/2019 4     Basophils Relative 02/05/2019 1     Neutrophils Absolute 02/05/2019 5 51     Immature Grans Absolute 02/05/2019 0 04     Lymphocytes Absolute 02/05/2019 4 27     Monocytes Absolute 02/05/2019 0 67     Eosinophils Absolute 02/05/2019 0 38     Basophils Absolute 02/05/2019 0 11*         Radiology Results:   No results found

## 2019-03-14 ENCOUNTER — OFFICE VISIT (OUTPATIENT)
Dept: FAMILY MEDICINE CLINIC | Facility: CLINIC | Age: 54
End: 2019-03-14
Payer: COMMERCIAL

## 2019-03-14 VITALS
DIASTOLIC BLOOD PRESSURE: 84 MMHG | SYSTOLIC BLOOD PRESSURE: 120 MMHG | HEART RATE: 68 BPM | TEMPERATURE: 97.3 F | HEIGHT: 65 IN | BODY MASS INDEX: 41.44 KG/M2

## 2019-03-14 DIAGNOSIS — H65.03 BILATERAL ACUTE SEROUS OTITIS MEDIA, RECURRENCE NOT SPECIFIED: ICD-10-CM

## 2019-03-14 DIAGNOSIS — J01.40 ACUTE NON-RECURRENT PANSINUSITIS: Primary | ICD-10-CM

## 2019-03-14 PROCEDURE — 1036F TOBACCO NON-USER: CPT | Performed by: FAMILY MEDICINE

## 2019-03-14 PROCEDURE — 99213 OFFICE O/P EST LOW 20 MIN: CPT | Performed by: FAMILY MEDICINE

## 2019-03-14 RX ORDER — DOXYCYCLINE HYCLATE 100 MG/1
100 CAPSULE ORAL EVERY 12 HOURS SCHEDULED
Qty: 20 CAPSULE | Refills: 0 | Status: SHIPPED | OUTPATIENT
Start: 2019-03-14 | End: 2019-03-24

## 2019-03-14 NOTE — PATIENT INSTRUCTIONS
Problem List Items Addressed This Visit     None      Visit Diagnoses     Acute non-recurrent pansinusitis    -  Primary    Positive sinus infection  Recommend doxycycline secondary to allergies  Follow-up in 2 weeks if needed  Flonase, Mucinex to continue  Relevant Medications    doxycycline hyclate (VIBRAMYCIN) 100 mg capsule    Bilateral acute serous otitis media, recurrence not specified        As per sinus infection, doxycycline      Relevant Medications    doxycycline hyclate (VIBRAMYCIN) 100 mg capsule

## 2019-03-14 NOTE — PROGRESS NOTES
Assessment and Plan:    Problem List Items Addressed This Visit     None      Visit Diagnoses     Acute non-recurrent pansinusitis    -  Primary    Positive sinus infection  Recommend doxycycline secondary to allergies  Follow-up in 2 weeks if needed  Flonase, Mucinex to continue  Relevant Medications    doxycycline hyclate (VIBRAMYCIN) 100 mg capsule    Bilateral acute serous otitis media, recurrence not specified        As per sinus infection, doxycycline  Relevant Medications    doxycycline hyclate (VIBRAMYCIN) 100 mg capsule                 Diagnoses and all orders for this visit:    Acute non-recurrent pansinusitis  Comments:  Positive sinus infection  Recommend doxycycline secondary to allergies  Follow-up in 2 weeks if needed  Flonase, Mucinex to continue  Orders:  -     doxycycline hyclate (VIBRAMYCIN) 100 mg capsule; Take 1 capsule (100 mg total) by mouth every 12 (twelve) hours for 10 days    Bilateral acute serous otitis media, recurrence not specified  Comments:  As per sinus infection, doxycycline  Orders:  -     doxycycline hyclate (VIBRAMYCIN) 100 mg capsule; Take 1 capsule (100 mg total) by mouth every 12 (twelve) hours for 10 days              Subjective:      Patient ID: Lisandra Yip is a 48 y o  female  CC:    Chief Complaint   Patient presents with    Cold Like Symptoms       HPI:    Patient has been having some increasing cold symptoms  Fever, sore throat, swollen glands, postnasal drip, chest congestion, runny nose, watery eyes, increased myalgia  She does have tooth pain along with this  Right-sided facial pain, both maxillary and frontal sinus areas  She did have DayQuil for a little bit, and then also tried Mucinex  Neither seem to make a difference  She also is using Tylenol and ibuprofen for the myalgias  Tylenol works better on her fever, ibuprofen works better on her pain        The following portions of the patient's history were reviewed and updated as appropriate: allergies, current medications, past family history, past medical history, past social history, past surgical history and problem list       Review of Systems   Constitutional: Positive for activity change, chills, diaphoresis, fatigue and fever  Negative for appetite change  HENT: Positive for congestion, dental problem, postnasal drip, rhinorrhea, sinus pressure, sinus pain and sore throat  Respiratory: Positive for cough and wheezing (at times)  Negative for shortness of breath  Cardiovascular: Negative  All other systems reviewed and are negative  Data to review:       Objective:    Vitals:    03/14/19 1530   BP: 120/84   BP Location: Left arm   Patient Position: Sitting   Cuff Size: Large   Pulse: 68   Temp: (!) 97 3 °F (36 3 °C)   TempSrc: Tympanic   Height: 5' 5" (1 651 m)        Physical Exam   Constitutional: She appears well-developed and well-nourished  HENT:   Head: Normocephalic and atraumatic  Right Ear: Tympanic membrane is erythematous and bulging  Left Ear: Tympanic membrane is erythematous and bulging  Nose: Right sinus exhibits maxillary sinus tenderness and frontal sinus tenderness  Left sinus exhibits maxillary sinus tenderness  Left sinus exhibits no frontal sinus tenderness  Mouth/Throat: Uvula is midline, oropharynx is clear and moist and mucous membranes are normal    Cardiovascular: Normal rate, regular rhythm and normal heart sounds  Pulses:       Carotid pulses are 2+ on the right side, and 2+ on the left side  Pulmonary/Chest: Effort normal and breath sounds normal  She has no wheezes  She has no rales  She exhibits no tenderness  Lymphadenopathy:     She has no cervical adenopathy  Nursing note and vitals reviewed

## 2019-03-16 DIAGNOSIS — E87.6 HYPOKALEMIA: ICD-10-CM

## 2019-03-18 RX ORDER — POTASSIUM CHLORIDE 1500 MG/1
TABLET, EXTENDED RELEASE ORAL
Qty: 30 TABLET | Refills: 2 | Status: SHIPPED | OUTPATIENT
Start: 2019-03-18 | End: 2019-06-18 | Stop reason: SDUPTHER

## 2019-03-25 DIAGNOSIS — I10 ESSENTIAL HYPERTENSION: ICD-10-CM

## 2019-03-25 RX ORDER — ATENOLOL AND CHLORTHALIDONE TABLET 50; 25 MG/1; MG/1
TABLET ORAL
Qty: 30 TABLET | Refills: 5 | Status: SHIPPED | OUTPATIENT
Start: 2019-03-25 | End: 2019-09-27 | Stop reason: SDUPTHER

## 2019-03-30 DIAGNOSIS — J30.89 CHRONIC NONSEASONAL ALLERGIC RHINITIS DUE TO POLLEN: ICD-10-CM

## 2019-04-02 RX ORDER — MONTELUKAST SODIUM 10 MG/1
TABLET ORAL
Qty: 90 TABLET | Refills: 0 | Status: SHIPPED | OUTPATIENT
Start: 2019-04-02 | End: 2019-07-07 | Stop reason: SDUPTHER

## 2019-04-30 ENCOUNTER — APPOINTMENT (OUTPATIENT)
Dept: LAB | Facility: CLINIC | Age: 54
End: 2019-04-30
Payer: COMMERCIAL

## 2019-04-30 DIAGNOSIS — K58.2 IRRITABLE BOWEL SYNDROME WITH BOTH CONSTIPATION AND DIARRHEA: ICD-10-CM

## 2019-04-30 DIAGNOSIS — E53.8 VITAMIN B12 DEFICIENCY: ICD-10-CM

## 2019-04-30 LAB — VIT B12 SERPL-MCNC: 1403 PG/ML (ref 100–900)

## 2019-04-30 PROCEDURE — 86255 FLUORESCENT ANTIBODY SCREEN: CPT

## 2019-04-30 PROCEDURE — 82784 ASSAY IGA/IGD/IGG/IGM EACH: CPT

## 2019-04-30 PROCEDURE — 82607 VITAMIN B-12: CPT

## 2019-04-30 PROCEDURE — 83516 IMMUNOASSAY NONANTIBODY: CPT

## 2019-04-30 PROCEDURE — 36415 COLL VENOUS BLD VENIPUNCTURE: CPT

## 2019-05-01 ENCOUNTER — ANESTHESIA EVENT (OUTPATIENT)
Dept: GASTROENTEROLOGY | Facility: HOSPITAL | Age: 54
End: 2019-05-01
Payer: COMMERCIAL

## 2019-05-01 ENCOUNTER — TELEPHONE (OUTPATIENT)
Dept: GASTROENTEROLOGY | Facility: MEDICAL CENTER | Age: 54
End: 2019-05-01

## 2019-05-01 LAB
ENDOMYSIUM IGA SER QL: NEGATIVE
GLIADIN PEPTIDE IGA SER-ACNC: 4 UNITS (ref 0–19)
GLIADIN PEPTIDE IGG SER-ACNC: 2 UNITS (ref 0–19)
IGA SERPL-MCNC: 175 MG/DL (ref 87–352)
TTG IGA SER-ACNC: <2 U/ML (ref 0–3)
TTG IGG SER-ACNC: 3 U/ML (ref 0–5)

## 2019-05-02 ENCOUNTER — ANESTHESIA (OUTPATIENT)
Dept: GASTROENTEROLOGY | Facility: HOSPITAL | Age: 54
End: 2019-05-02
Payer: COMMERCIAL

## 2019-05-02 ENCOUNTER — HOSPITAL ENCOUNTER (OUTPATIENT)
Facility: HOSPITAL | Age: 54
Setting detail: OUTPATIENT SURGERY
Discharge: HOME | End: 2019-05-02
Attending: INTERNAL MEDICINE | Admitting: INTERNAL MEDICINE
Payer: COMMERCIAL

## 2019-05-02 VITALS
OXYGEN SATURATION: 96 % | DIASTOLIC BLOOD PRESSURE: 55 MMHG | HEART RATE: 66 BPM | RESPIRATION RATE: 22 BRPM | BODY MASS INDEX: 42.68 KG/M2 | HEIGHT: 64 IN | SYSTOLIC BLOOD PRESSURE: 99 MMHG | TEMPERATURE: 98.9 F | WEIGHT: 250 LBS

## 2019-05-02 DIAGNOSIS — R13.19 ESOPHAGEAL DYSPHAGIA: ICD-10-CM

## 2019-05-02 PROCEDURE — 88305 TISSUE EXAM BY PATHOLOGIST: CPT | Performed by: PATHOLOGY

## 2019-05-02 PROCEDURE — 43239 EGD BIOPSY SINGLE/MULTIPLE: CPT | Performed by: INTERNAL MEDICINE

## 2019-05-02 PROCEDURE — NC001 PR NO CHARGE: Performed by: INTERNAL MEDICINE

## 2019-05-02 RX ORDER — SODIUM CHLORIDE 9 MG/ML
125 INJECTION, SOLUTION INTRAVENOUS CONTINUOUS
Status: DISCONTINUED | OUTPATIENT
Start: 2019-05-02 | End: 2019-05-02 | Stop reason: HOSPADM

## 2019-05-02 RX ORDER — ONDANSETRON 2 MG/ML
4 INJECTION INTRAMUSCULAR; INTRAVENOUS EVERY 6 HOURS PRN
Status: DISCONTINUED | OUTPATIENT
Start: 2019-05-02 | End: 2019-05-02 | Stop reason: HOSPADM

## 2019-05-02 RX ORDER — PROPOFOL 10 MG/ML
INJECTION, EMULSION INTRAVENOUS AS NEEDED
Status: DISCONTINUED | OUTPATIENT
Start: 2019-05-02 | End: 2019-05-02 | Stop reason: SURG

## 2019-05-02 RX ORDER — LOPERAMIDE HYDROCHLORIDE 2 MG/1
2 TABLET ORAL 4 TIMES DAILY PRN
COMMUNITY

## 2019-05-02 RX ADMIN — PROPOFOL 160 MG: 10 INJECTION, EMULSION INTRAVENOUS at 13:57

## 2019-05-02 RX ADMIN — SODIUM CHLORIDE: 0.9 INJECTION, SOLUTION INTRAVENOUS at 13:52

## 2019-05-02 RX ADMIN — LIDOCAINE HYDROCHLORIDE 50 MG: 20 INJECTION, SOLUTION INTRAVENOUS at 13:57

## 2019-05-02 RX ADMIN — PROPOFOL 40 MG: 10 INJECTION, EMULSION INTRAVENOUS at 13:58

## 2019-05-06 ENCOUNTER — TELEPHONE (OUTPATIENT)
Dept: GASTROENTEROLOGY | Facility: MEDICAL CENTER | Age: 54
End: 2019-05-06

## 2019-05-20 DIAGNOSIS — F33.1 MODERATE EPISODE OF RECURRENT MAJOR DEPRESSIVE DISORDER (HCC): ICD-10-CM

## 2019-05-21 RX ORDER — ESCITALOPRAM OXALATE 10 MG/1
10 TABLET ORAL DAILY
Qty: 90 TABLET | Refills: 1 | Status: SHIPPED | OUTPATIENT
Start: 2019-05-21 | End: 2019-09-26 | Stop reason: SDUPTHER

## 2019-06-04 ENCOUNTER — OFFICE VISIT (OUTPATIENT)
Dept: FAMILY MEDICINE CLINIC | Facility: CLINIC | Age: 54
End: 2019-06-04
Payer: COMMERCIAL

## 2019-06-04 ENCOUNTER — TELEPHONE (OUTPATIENT)
Dept: GASTROENTEROLOGY | Facility: AMBULARY SURGERY CENTER | Age: 54
End: 2019-06-04

## 2019-06-04 VITALS
HEART RATE: 72 BPM | WEIGHT: 255 LBS | TEMPERATURE: 98.3 F | SYSTOLIC BLOOD PRESSURE: 102 MMHG | HEIGHT: 64 IN | BODY MASS INDEX: 43.54 KG/M2 | RESPIRATION RATE: 16 BRPM | DIASTOLIC BLOOD PRESSURE: 60 MMHG

## 2019-06-04 DIAGNOSIS — J01.40 ACUTE NON-RECURRENT PANSINUSITIS: Primary | ICD-10-CM

## 2019-06-04 DIAGNOSIS — E66.01 CLASS 3 SEVERE OBESITY DUE TO EXCESS CALORIES WITH SERIOUS COMORBIDITY AND BODY MASS INDEX (BMI) OF 40.0 TO 44.9 IN ADULT (HCC): ICD-10-CM

## 2019-06-04 PROBLEM — E66.9 OBESITY: Status: ACTIVE | Noted: 2018-08-10

## 2019-06-04 PROCEDURE — 99213 OFFICE O/P EST LOW 20 MIN: CPT | Performed by: FAMILY MEDICINE

## 2019-06-04 PROCEDURE — 4004F PT TOBACCO SCREEN RCVD TLK: CPT | Performed by: FAMILY MEDICINE

## 2019-06-04 PROCEDURE — 3008F BODY MASS INDEX DOCD: CPT | Performed by: FAMILY MEDICINE

## 2019-06-04 RX ORDER — SULFAMETHOXAZOLE AND TRIMETHOPRIM 800; 160 MG/1; MG/1
1 TABLET ORAL EVERY 12 HOURS SCHEDULED
Qty: 20 TABLET | Refills: 0 | Status: SHIPPED | OUTPATIENT
Start: 2019-06-04 | End: 2019-06-14

## 2019-06-18 DIAGNOSIS — E87.6 HYPOKALEMIA: ICD-10-CM

## 2019-06-18 RX ORDER — POTASSIUM CHLORIDE 1500 MG/1
TABLET, EXTENDED RELEASE ORAL
Qty: 30 TABLET | Refills: 2 | Status: SHIPPED | OUTPATIENT
Start: 2019-06-18 | End: 2019-07-13 | Stop reason: SDUPTHER

## 2019-07-07 DIAGNOSIS — J30.89 CHRONIC NONSEASONAL ALLERGIC RHINITIS DUE TO POLLEN: ICD-10-CM

## 2019-07-08 RX ORDER — MONTELUKAST SODIUM 10 MG/1
TABLET ORAL
Qty: 90 TABLET | Refills: 3 | Status: SHIPPED | OUTPATIENT
Start: 2019-07-08 | End: 2020-06-26

## 2019-07-11 NOTE — PROGRESS NOTES
Assessment/Plan:    Fibromyalgia  Continues to have increased symptoms of fibromyalgia  She is seeing specialist   No changes recommended at the moment  Essential hypertension  Blood pressure seems to be fairly well controlled today  No particular concerns, and will follow up in the future  Acquired hypothyroidism  Stable at the moment  Will need to check in the future  Will confirm that the labs do not need to be ordered, but if they do we will take care of that  Chronic nonseasonal allergic rhinitis due to pollen  Stable at the moment  On Claritin  No changes  Depression  Patient continues to have some symptoms, but she did not wish to have any treatment, i e  no medications  This was because she was having side effects previously on SNRI medications  Lumbar radiculopathy  Continues with some symptoms  Currently is trying gabapentin, but with mixed effect  For the moment I would wonder if we would need to change this at all verses waiting till we see what Rheumatology would want to do  MARIE (obstructive sleep apnea)  She is not currently using her CPAP, mostly because she says it does not fit well, and she also has to lean over, then it leaks, and she is quite bothered by the machine in general   Consider re-evaluation with different mask, consider different machine, follow up with sleep specialist in the future  Diagnoses and all orders for this visit:    Bronchitis  Comments:  Patient seems to be doing relatively well at the moment, but she is having some increased shortness of breath  Recommend Xopenex, follow-up  Orders:  -     levalbuterol (XOPENEX) inhalation solution 1 25 mg; Take 3 mL by nebulization every 8 (eight) hours as needed for wheezing   -     Mini neb; Future  -     levalbuterol (XOPENEX HFA) 45 mcg/act inhaler; Inhale 1-2 puffs every 4 (four) hours as needed for wheezing    Fibromyalgia    Essential hypertension  -     TSH, 3rd generation;  Future  -     TSH, 3rd generation    Acquired hypothyroidism  -     TSH, 3rd generation; Future  -     TSH, 3rd generation    Lumbar radiculopathy    MARIE (obstructive sleep apnea)    Moderate episode of recurrent major depressive disorder (HCC)    Chronic nonseasonal allergic rhinitis due to pollen    Other orders  -     Aspirin 81 MG EC tablet; Take by mouth  -     atenolol-chlorthalidone (TENORETIC) 50-25 mg per tablet; Take 1 tablet by mouth daily  -     Cyanocobalamin (B-12) 1000 MCG CAPS; Take by mouth  -     clindamycin (CLEOCIN T) 1 % lotion; APPLY ONCE DAILY TO AFFECTED AREAS UNDER ARMS  -     gabapentin (NEURONTIN) 100 mg capsule; 1 CAP AT BED FOR 3 DAYS, 2 CAPS AT BED FOR 3 DAYS, 3 CAPS AT BED FOR 3 DAYS, THEN 3 TABS 2/XDAY  -     loratadine (CLARITIN) 10 mg tablet; Take 10 mg by mouth  -     montelukast (SINGULAIR) 10 mg tablet; Take 10 mg by mouth daily  -     olopatadine (PATANOL) 0 1 % ophthalmic solution; Apply to eye  -     omeprazole (PriLOSEC) 40 MG capsule; TAKE 1 CAPSULE EVERY MORNING  -     POTASSIUM GLUCONATE PO; Take by mouth  -     ranitidine (ZANTAC) 150 mg tablet; Take by mouth  -     Cholecalciferol (VITAMIN D3) 76461 units CAPS; Take 1 tablet by mouth once a week          Subjective:      Patient ID: Carlos Enrique Penaloza is a 50 y o  female  Seen at urgent care 1 week ago and was dx with bronchitis  Completed ABT but symptoms continue  BB  Please see chief complaint  Patient continues to have some symptoms of bronchitis  She is not sure what's going on with that  She is currently using gabapentin for chronic leg pain and radiculopathy  She is unfortunately experiencing some side effects:  Sweating, some changes in the voice, some sore throat  She was unsure if this was related to bronchitis symptoms, or if it was actually related to gabapentin              The following portions of the patient's history were reviewed and updated as appropriate: allergies, current medications, past family history, past medical history, past social history, past surgical history and problem list     Review of Systems   Constitutional: Negative  HENT: Positive for rhinorrhea and sore throat  Negative for sinus pain and sinus pressure  Ear pain: Right ear seems to have some clicking and pain at times, but was normal on exam at urgent care  Eyes: Negative  Respiratory: Positive for cough  Cardiovascular: Negative  Gastrointestinal: Negative  Endocrine: Negative  Genitourinary: Negative  Musculoskeletal: Negative  Skin: Negative  Allergic/Immunologic: Negative  Neurological: Negative  Hematological: Negative  Psychiatric/Behavioral: Negative  Objective:  Vitals:    01/25/18 0940   BP: 120/74   BP Location: Left arm   Patient Position: Sitting   Cuff Size: Large   Weight: 110 kg (242 lb)   Height: 5' 5 5" (1 664 m)      Physical Exam   Constitutional: She appears well-developed and well-nourished  HENT:   Head: Normocephalic and atraumatic  Nose: Nose normal    Mouth/Throat: Oropharynx is clear and moist    Cardiovascular: Normal rate and normal heart sounds  Pulmonary/Chest: Effort normal  No accessory muscle usage  No respiratory distress  She has rhonchi in the left upper field  None at this time

## 2019-07-13 DIAGNOSIS — E87.6 HYPOKALEMIA: ICD-10-CM

## 2019-07-15 DIAGNOSIS — J30.89 CHRONIC NONSEASONAL ALLERGIC RHINITIS DUE TO POLLEN: Primary | ICD-10-CM

## 2019-07-15 RX ORDER — ALBUTEROL SULFATE 90 UG/1
2 AEROSOL, METERED RESPIRATORY (INHALATION) EVERY 6 HOURS PRN
COMMUNITY
End: 2019-07-15 | Stop reason: SDUPTHER

## 2019-07-15 RX ORDER — ALBUTEROL SULFATE 90 UG/1
2 AEROSOL, METERED RESPIRATORY (INHALATION) EVERY 6 HOURS PRN
Qty: 1 INHALER | Refills: 1 | Status: SHIPPED | OUTPATIENT
Start: 2019-07-15 | End: 2019-09-20 | Stop reason: SDUPTHER

## 2019-07-15 RX ORDER — POTASSIUM CHLORIDE 1500 MG/1
TABLET, EXTENDED RELEASE ORAL
Qty: 30 TABLET | Refills: 2 | Status: SHIPPED | OUTPATIENT
Start: 2019-07-15 | End: 2019-10-11 | Stop reason: SDUPTHER

## 2019-09-20 DIAGNOSIS — J30.89 CHRONIC NONSEASONAL ALLERGIC RHINITIS DUE TO POLLEN: ICD-10-CM

## 2019-09-20 RX ORDER — ALBUTEROL SULFATE 90 UG/1
AEROSOL, METERED RESPIRATORY (INHALATION)
Qty: 18 INHALER | Refills: 1 | Status: SHIPPED | OUTPATIENT
Start: 2019-09-20 | End: 2021-01-23

## 2019-09-26 ENCOUNTER — OFFICE VISIT (OUTPATIENT)
Dept: FAMILY MEDICINE CLINIC | Facility: CLINIC | Age: 54
End: 2019-09-26
Payer: COMMERCIAL

## 2019-09-26 VITALS
WEIGHT: 256 LBS | HEIGHT: 64 IN | DIASTOLIC BLOOD PRESSURE: 72 MMHG | BODY MASS INDEX: 43.71 KG/M2 | RESPIRATION RATE: 18 BRPM | TEMPERATURE: 97.6 F | SYSTOLIC BLOOD PRESSURE: 120 MMHG | HEART RATE: 56 BPM

## 2019-09-26 DIAGNOSIS — F33.1 MODERATE EPISODE OF RECURRENT MAJOR DEPRESSIVE DISORDER (HCC): ICD-10-CM

## 2019-09-26 DIAGNOSIS — E03.9 ACQUIRED HYPOTHYROIDISM: ICD-10-CM

## 2019-09-26 DIAGNOSIS — I10 ESSENTIAL HYPERTENSION: ICD-10-CM

## 2019-09-26 DIAGNOSIS — M25.562 ACUTE PAIN OF LEFT KNEE: Primary | ICD-10-CM

## 2019-09-26 PROBLEM — M25.569 KNEE PAIN: Status: ACTIVE | Noted: 2019-09-26

## 2019-09-26 PROCEDURE — 3074F SYST BP LT 130 MM HG: CPT | Performed by: FAMILY MEDICINE

## 2019-09-26 PROCEDURE — 3008F BODY MASS INDEX DOCD: CPT | Performed by: FAMILY MEDICINE

## 2019-09-26 PROCEDURE — 3078F DIAST BP <80 MM HG: CPT | Performed by: FAMILY MEDICINE

## 2019-09-26 PROCEDURE — 99214 OFFICE O/P EST MOD 30 MIN: CPT | Performed by: FAMILY MEDICINE

## 2019-09-26 RX ORDER — BIOTIN 1 MG
2000 TABLET ORAL
COMMUNITY

## 2019-09-26 RX ORDER — ESCITALOPRAM OXALATE 10 MG/1
20 TABLET ORAL DAILY
Qty: 90 TABLET | Refills: 1 | Status: SHIPPED | OUTPATIENT
Start: 2019-09-26 | End: 2019-10-03 | Stop reason: SDUPTHER

## 2019-09-26 RX ORDER — NAPROXEN SODIUM 220 MG
440 TABLET ORAL 2 TIMES DAILY WITH MEALS
Qty: 120 TABLET | Refills: 0
Start: 2019-09-26 | End: 2020-08-10

## 2019-09-26 NOTE — ASSESSMENT & PLAN NOTE
Increasing symptoms of depression anxiety recently with stress from family  Recommend increase Lexapro to 2 tablets daily, 20 mg total   Recheck in the future  Negative SI, positive contract

## 2019-09-26 NOTE — PATIENT INSTRUCTIONS
Problem List Items Addressed This Visit     Depression     Increasing symptoms of depression anxiety recently with stress from family  Recommend increase Lexapro to 2 tablets daily, 20 mg total   Recheck in the future  Negative SI, positive contract  Relevant Medications    escitalopram (LEXAPRO) 10 mg tablet    Hypertension     Stable for now  No changes  Hypothyroid     Stable  No changes  Follow as sched  Knee pain - Primary     Patient is a significant amount of discomfort in the left knee  Where the pain is appears to be more in the calf  There is also laxity of the medial collateral ligament  Recommend ACE-wrap, consider physical therapy  Certainly ice, heat, range of motion  Also, ibuprofen, 200 mg tablets, 4 every 6 hours, or Aleve 2 pills twice a day  If there is no improvement with this, physical therapy, x-ray, MRI, ortho  Relevant Medications    naproxen sodium (ALEVE) 220 MG tablet        Knee Sprain   AMBULATORY CARE:   A knee sprain  A knee sprain occurs when one or more ligaments in your knee are suddenly stretched or torn  Ligaments are tissues that hold bones together  Ligaments support the knee and keep the joint and bones in the correct position  Common symptoms include the following:   · Stiffness or decreased movement     · Pain or tenderness     · Painful pop that you can hear or feel    · Swelling or bruising    · Knee that manish or gives out when you try to walk  Seek care immediately if:   · Any part of your leg feels cold, numb, or looks pale     Contact your healthcare provider if:   · You have new or increased swelling, bruising, or pain in your knee  · Your symptoms do not improve within 6 weeks, even with treatment  · You have questions or concerns about your condition or care  Treatment for a knee sprain  depends on the type and cause of your knee sprain   You may need any of the following:  · NSAIDs , such as ibuprofen, help decrease swelling, pain, and fever  This medicine is available with or without a doctor's order  NSAIDs can cause stomach bleeding or kidney problems in certain people  If you take blood thinner medicine, always ask your healthcare provider if NSAIDs are safe for you  Always read the medicine label and follow directions  · Acetaminophen  decreases pain and fever  It is available without a doctor's order  Ask how much to take and how often to take it  Follow directions  Read the labels of all other medicines you are using to see if they also contain acetaminophen, or ask your doctor or pharmacist  Acetaminophen can cause liver damage if not taken correctly  Do not use more than 4 grams (4,000 milligrams) total of acetaminophen in one day  · Prescription pain medicine  may be given  Ask how to take this medicine safely  · Support devices  such as a splint or brace may be needed  These devices limit movement and protect your joint while it heals  You may be given crutches to use until you can stand on your injured leg without pain  Use devices as directed  · Physical therapy  may be needed  A physical therapist teaches you exercises to help improve movement and strength, and to decrease pain  · Surgery  may be needed if other treatments do not work or your strain is severe  Surgery may include a knee arthroscopy to look inside your knee joint and repair damage  Self-care:   · Rest  your knee and do not exercise  You may be told to keep weight off your knee  This means that you should not walk on your injured leg  Rest helps decrease swelling and allows the injury to heal  You can do gentle range of motion (ROM) exercises as directed  This will prevent stiffness  · Apply ice  on your knee for 15 to 20 minutes every hour or as directed  Use an ice pack, or put crushed ice in a plastic bag  Cover it with a towel  Ice helps prevent tissue damage and decreases swelling and pain      · Apply compression to your knee as directed  You may need to wear an elastic bandage  This helps keep your injured knee from moving too much while it heals  You can loosen or tighten the elastic bandage to make it comfortable  It should be tight enough for you to feel support  It should not be so tight that it causes your toes to feel numb or tingly  If you are wearing an elastic bandage, take it off and rewrap it once a day  · Elevate your knee  above the level of your heart as often as you can  This will help decrease swelling and pain  Prop your leg on pillows or blankets to keep it elevated comfortably  Do not put pillows directly behind your knee  Prevent another knee sprain:  Exercise your legs to keep your muscles strong  Strong leg muscles help protect your knee and prevent strain  The following may also prevent a knee sprain:  · Slowly start your exercise or training program   Slowly increase the time, distance, and intensity of your exercise  Sudden increases in training may cause you to injure your knee again  · Wear protective braces and equipment as directed  Braces may prevent your knee from moving the wrong way and causing another sprain  Protective equipment may support your bones and ligaments to prevent injury  · Warm up and stretch before exercise  Warm up by walking or using an exercise bike before starting your regular exercise  Do gentle stretches after warming up  This helps to loosen your muscles and decrease stress on your knee  Cool down and stretch after you exercise  · Wear shoes that fit correctly and support your feet  Replace your running or exercise shoes before the padding or shock absorption is worn out  Ask your healthcare provider which exercise shoes are best for you  Ask if you should wear special shoe inserts  Shoe inserts can help support your heels and arches or keep your foot lined up correctly in your shoes  Exercise on flat surfaces    Follow up with your healthcare provider as directed:  Write down your questions so you remember to ask them during your visits  © 2017 [employer] Information is for End User's use only and may not be sold, redistributed or otherwise used for commercial purposes  All illustrations and images included in CareNotes® are the copyrighted property of [employer]  or [employer]  The above information is an  only  It is not intended as medical advice for individual conditions or treatments  Talk to your doctor, nurse or pharmacist before following any medical regimen to see if it is safe and effective for you

## 2019-09-26 NOTE — ASSESSMENT & PLAN NOTE
Patient is a significant amount of discomfort in the left knee  Where the pain is appears to be more in the calf  There is also laxity of the medial collateral ligament  Recommend ACE-wrap, consider physical therapy  Certainly ice, heat, range of motion  Also, ibuprofen, 200 mg tablets, 4 every 6 hours, or Aleve 2 pills twice a day  If there is no improvement with this, physical therapy, x-ray, MRI, ortho

## 2019-09-26 NOTE — PROGRESS NOTES
Assessment and Plan:    Problem List Items Addressed This Visit     Depression     Increasing symptoms of depression anxiety recently with stress from family  Recommend increase Lexapro to 2 tablets daily, 20 mg total   Recheck in the future  Negative SI, positive contract  Relevant Medications    escitalopram (LEXAPRO) 10 mg tablet    Hypertension     Stable for now  No changes  Hypothyroid     Stable  No changes  Follow as sched  Knee pain - Primary     Patient is a significant amount of discomfort in the left knee  Where the pain is appears to be more in the calf  There is also laxity of the medial collateral ligament  Recommend ACE-wrap, consider physical therapy  Certainly ice, heat, range of motion  Also, ibuprofen, 200 mg tablets, 4 every 6 hours, or Aleve 2 pills twice a day  If there is no improvement with this, physical therapy, x-ray, MRI, ortho  Relevant Medications    naproxen sodium (ALEVE) 220 MG tablet                 Diagnoses and all orders for this visit:    Acute pain of left knee  -     naproxen sodium (ALEVE) 220 MG tablet; Take 2 tablets (440 mg total) by mouth 2 (two) times a day with meals    Essential hypertension    Acquired hypothyroidism    Moderate episode of recurrent major depressive disorder (HCC)  -     escitalopram (LEXAPRO) 10 mg tablet; Take 2 tablets (20 mg total) by mouth daily    Other orders  -     Cholecalciferol (VITAMIN D3) 1000 units CAPS; Take 2,000 Units by mouth              Subjective:      Patient ID: Verna Barnard is a 48 y o  female  CC:    Chief Complaint   Patient presents with    Knee Pain     Patient present today for left knee soreness radiating down to her calf for a few months  Per patient sheis having problems bending it and walking  HPI:    Patient is here because her left knee bothers her  Previously she had right knee problems, but that has resolved on its own      Left knee seems to continue to ache for quite a while now  Has been at least several months for it bothering her  Activities of Daily Living seem to make it worse, i e  Getting dressed  Monday and Tuesday it was quite significant  She did note an increased amount of pressure in the left knee on Tuesday after her 3rd  With her work, felt a pop when she got out of the car, and then had significant difficulty with walking  There was pain down into the calf, as well as in the knee  Using rest, ice, ibuprofen, Ace wrap  Today it is a little bit better  Lateral superior knee hurt Monday  Hypertension: Has been OK  No concerns at the moment  Has been on meds  Hypothyroid: No issues today  Increased anxiety and stress trying to take care of he mother and father  Has increased issues with feeling down          The following portions of the patient's history were reviewed and updated as appropriate: allergies, current medications, past family history, past medical history, past social history, past surgical history and problem list       Review of Systems      Data to review:       Objective:    Vitals:    09/26/19 1011   BP: 120/72   BP Location: Left arm   Patient Position: Sitting   Cuff Size: Large   Pulse: 56   Resp: 18   Temp: 97 6 °F (36 4 °C)   TempSrc: Temporal   Weight: 116 kg (256 lb)   Height: 5' 4" (1 626 m)        Physical Exam

## 2019-09-27 DIAGNOSIS — I10 ESSENTIAL HYPERTENSION: ICD-10-CM

## 2019-09-27 RX ORDER — ATENOLOL AND CHLORTHALIDONE TABLET 50; 25 MG/1; MG/1
TABLET ORAL
Qty: 90 TABLET | Refills: 1 | Status: SHIPPED | OUTPATIENT
Start: 2019-09-27 | End: 2020-03-28

## 2019-10-03 ENCOUNTER — TELEPHONE (OUTPATIENT)
Dept: FAMILY MEDICINE CLINIC | Facility: CLINIC | Age: 54
End: 2019-10-03

## 2019-10-03 DIAGNOSIS — F33.1 MODERATE EPISODE OF RECURRENT MAJOR DEPRESSIVE DISORDER (HCC): ICD-10-CM

## 2019-10-03 RX ORDER — ESCITALOPRAM OXALATE 20 MG/1
20 TABLET ORAL DAILY
Qty: 90 TABLET | Refills: 0 | Status: SHIPPED | OUTPATIENT
Start: 2019-10-03 | End: 2019-11-20

## 2019-10-03 NOTE — TELEPHONE ENCOUNTER
Pharmacy stated that the pt's Escitalopram 10 mg tablets does not cover 2 times a day  Pharmacy is stating to USE A HIGHER STRENGTH

## 2019-10-11 DIAGNOSIS — E87.6 HYPOKALEMIA: ICD-10-CM

## 2019-10-11 RX ORDER — POTASSIUM CHLORIDE 1500 MG/1
TABLET, EXTENDED RELEASE ORAL
Qty: 90 TABLET | Refills: 0 | Status: SHIPPED | OUTPATIENT
Start: 2019-10-11 | End: 2020-01-13

## 2019-10-21 ENCOUNTER — TELEPHONE (OUTPATIENT)
Dept: FAMILY MEDICINE CLINIC | Facility: CLINIC | Age: 54
End: 2019-10-21

## 2019-10-21 DIAGNOSIS — M25.562 ACUTE PAIN OF LEFT KNEE: Primary | ICD-10-CM

## 2019-10-21 NOTE — TELEPHONE ENCOUNTER
PATIENT CALLED IN STATED SHE WAS IN COUPLE WEEKS AGO FOR LEFT KNEE PAIN STATES THIS HAS BEEN GOING ON FOR 4 WEEKS NOW , PATIENT WOULD LIKE TO KNOW IF DR FLORES CAN WRITE OUT SCRIPT FOR XRAY PT STATES SHE IS IN PAIN & BOTHERING HER A LOT PT IS HAVING TROUBLE WITH STEPS & STRAIGHTENING THE KNEE OUT  PT WOULD LIKE XRAY & THEN FOLLOWED BY THAT MRI PT STATED WHICH EVER DR Phylicia Livingston   PLEASE CALL PATIENT BACK [phone]

## 2019-10-22 NOTE — TELEPHONE ENCOUNTER
Problem List Items Addressed This Visit     Knee pain - Primary     Patient calling stating that she has increased knee pain  I will order the x-ray, but would also recommend physical therapy             Relevant Orders    Ambulatory referral to Physical Therapy    XR knee 3 vw left non injury

## 2019-10-22 NOTE — ASSESSMENT & PLAN NOTE
Patient calling stating that she has increased knee pain  I will order the x-ray, but would also recommend physical therapy

## 2019-10-25 ENCOUNTER — HOSPITAL ENCOUNTER (OUTPATIENT)
Dept: RADIOLOGY | Facility: HOSPITAL | Age: 54
Discharge: HOME | End: 2019-10-25
Payer: COMMERCIAL

## 2019-10-25 ENCOUNTER — TRANSCRIBE ORDERS (OUTPATIENT)
Dept: RADIOLOGY | Facility: HOSPITAL | Age: 54
End: 2019-10-25

## 2019-10-25 DIAGNOSIS — M25.562 ACUTE PAIN OF LEFT KNEE: ICD-10-CM

## 2019-10-25 DIAGNOSIS — M79.604 PAIN OF RIGHT LOWER EXTREMITY: ICD-10-CM

## 2019-10-25 PROCEDURE — 73562 X-RAY EXAM OF KNEE 3: CPT

## 2019-10-25 PROCEDURE — 73564 X-RAY EXAM KNEE 4 OR MORE: CPT

## 2019-11-01 ENCOUNTER — TELEPHONE (OUTPATIENT)
Dept: FAMILY MEDICINE CLINIC | Facility: CLINIC | Age: 54
End: 2019-11-01

## 2019-11-04 ENCOUNTER — TELEPHONE (OUTPATIENT)
Dept: FAMILY MEDICINE CLINIC | Facility: CLINIC | Age: 54
End: 2019-11-04

## 2019-11-04 NOTE — TELEPHONE ENCOUNTER
I reviewed pt's chart and do not see final results/ reviewed  Can you please review xray so we can call pt back? Thank you!

## 2019-11-07 ENCOUNTER — IMMUNIZATIONS (OUTPATIENT)
Dept: FAMILY MEDICINE CLINIC | Facility: CLINIC | Age: 54
End: 2019-11-07
Payer: COMMERCIAL

## 2019-11-07 ENCOUNTER — TELEPHONE (OUTPATIENT)
Dept: FAMILY MEDICINE CLINIC | Facility: CLINIC | Age: 54
End: 2019-11-07

## 2019-11-07 DIAGNOSIS — Z23 NEED FOR INFLUENZA VACCINATION: Primary | ICD-10-CM

## 2019-11-07 PROCEDURE — 90471 IMMUNIZATION ADMIN: CPT

## 2019-11-07 PROCEDURE — 90682 RIV4 VACC RECOMBINANT DNA IM: CPT

## 2019-11-11 DIAGNOSIS — M25.562 ACUTE PAIN OF LEFT KNEE: Primary | ICD-10-CM

## 2019-11-11 NOTE — PROGRESS NOTES
Problem List Items Addressed This Visit     Knee pain - Primary    Relevant Orders    MRI knee left  wo contrast

## 2019-11-20 DIAGNOSIS — F33.1 MODERATE EPISODE OF RECURRENT MAJOR DEPRESSIVE DISORDER (HCC): ICD-10-CM

## 2019-11-20 RX ORDER — ESCITALOPRAM OXALATE 10 MG/1
10 TABLET ORAL DAILY
Qty: 90 TABLET | Refills: 1 | Status: SHIPPED | OUTPATIENT
Start: 2019-11-20 | End: 2020-02-12 | Stop reason: ALTCHOICE

## 2019-11-25 ENCOUNTER — PROCEDURE VISIT (OUTPATIENT)
Dept: FAMILY MEDICINE CLINIC | Facility: CLINIC | Age: 54
End: 2019-11-25
Payer: COMMERCIAL

## 2019-11-25 VITALS
SYSTOLIC BLOOD PRESSURE: 132 MMHG | HEIGHT: 64 IN | BODY MASS INDEX: 43.71 KG/M2 | WEIGHT: 256 LBS | HEART RATE: 76 BPM | DIASTOLIC BLOOD PRESSURE: 84 MMHG

## 2019-11-25 DIAGNOSIS — R22.32 MASS OF ARM, LEFT: ICD-10-CM

## 2019-11-25 DIAGNOSIS — E66.01 CLASS 3 SEVERE OBESITY DUE TO EXCESS CALORIES WITH SERIOUS COMORBIDITY AND BODY MASS INDEX (BMI) OF 40.0 TO 44.9 IN ADULT (HCC): ICD-10-CM

## 2019-11-25 DIAGNOSIS — M17.0 PRIMARY OSTEOARTHRITIS OF BOTH KNEES: Primary | ICD-10-CM

## 2019-11-25 DIAGNOSIS — Z12.4 CERVICAL CANCER SCREENING: ICD-10-CM

## 2019-11-25 PROCEDURE — 99213 OFFICE O/P EST LOW 20 MIN: CPT | Performed by: FAMILY MEDICINE

## 2019-11-25 PROCEDURE — 20610 DRAIN/INJ JOINT/BURSA W/O US: CPT | Performed by: FAMILY MEDICINE

## 2019-11-25 RX ORDER — TRIAMCINOLONE ACETONIDE 40 MG/ML
80 INJECTION, SUSPENSION INTRA-ARTICULAR; INTRAMUSCULAR
Status: COMPLETED | OUTPATIENT
Start: 2019-11-25 | End: 2019-11-25

## 2019-11-25 RX ORDER — LIDOCAINE HYDROCHLORIDE 10 MG/ML
3 INJECTION, SOLUTION INFILTRATION; PERINEURAL
Status: COMPLETED | OUTPATIENT
Start: 2019-11-25 | End: 2019-11-25

## 2019-11-25 RX ADMIN — LIDOCAINE HYDROCHLORIDE 3 ML: 10 INJECTION, SOLUTION INFILTRATION; PERINEURAL at 08:20

## 2019-11-25 RX ADMIN — TRIAMCINOLONE ACETONIDE 80 MG: 40 INJECTION, SUSPENSION INTRA-ARTICULAR; INTRAMUSCULAR at 08:20

## 2019-11-25 RX ADMIN — LIDOCAINE HYDROCHLORIDE 3 ML: 10 INJECTION, SOLUTION INFILTRATION; PERINEURAL at 08:36

## 2019-11-25 RX ADMIN — TRIAMCINOLONE ACETONIDE 80 MG: 40 INJECTION, SUSPENSION INTRA-ARTICULAR; INTRAMUSCULAR at 08:36

## 2019-11-25 NOTE — PATIENT INSTRUCTIONS
Problem List Items Addressed This Visit     Mass of arm, left     Patient does have an area as described in physical exam   Would recommend seeing plastics or General surgery for this  Of note, this area is irritated, so this is not a cosmetic procedure  Relevant Orders    Ambulatory referral to Plastic Surgery    Obesity     Patient's weight is elevated  She discussed with me today about possibly going to bariatric surgery  I would agree that this would be a reasonable attempt to try and lose weight which would help out with osteoarthritis, as well as her hypertension and hyperglycemia  Patient will follow-up in the future when she decides that it would be appropriate to go  Osteoarthritis of both knees - Primary     X-rays are positive for osteoarthritis  At this point, would recommend injections  We did review the procedure, and then injected both knees  Patient tolerated relatively well  Patient will follow-up in the future as needed           Relevant Orders    Large joint arthrocentesis: R knee    Large joint arthrocentesis: L knee      Other Visit Diagnoses     Cervical cancer screening        Relevant Orders    Ambulatory referral to Obstetrics / Gynecology

## 2019-11-25 NOTE — ASSESSMENT & PLAN NOTE
Patient does have an area as described in physical exam   Would recommend seeing plastics or General surgery for this  Of note, this area is irritated, so this is not a cosmetic procedure

## 2019-11-25 NOTE — ASSESSMENT & PLAN NOTE
Patient's weight is elevated  She discussed with me today about possibly going to bariatric surgery  I would agree that this would be a reasonable attempt to try and lose weight which would help out with osteoarthritis, as well as her hypertension and hyperglycemia  Patient will follow-up in the future when she decides that it would be appropriate to go

## 2019-11-25 NOTE — PROGRESS NOTES
Assessment and Plan:    Problem List Items Addressed This Visit     Mass of arm, left     Patient does have an area as described in physical exam   Would recommend seeing plastics or General surgery for this  Of note, this area is irritated, so this is not a cosmetic procedure  Relevant Orders    Ambulatory referral to Plastic Surgery    Obesity     Patient's weight is elevated  She discussed with me today about possibly going to bariatric surgery  I would agree that this would be a reasonable attempt to try and lose weight which would help out with osteoarthritis, as well as her hypertension and hyperglycemia  Patient will follow-up in the future when she decides that it would be appropriate to go  Osteoarthritis of both knees - Primary     X-rays are positive for osteoarthritis  At this point, would recommend injections  We did review the procedure, and then injected both knees  Patient tolerated relatively well  Patient will follow-up in the future as needed  Other Visit Diagnoses     Cervical cancer screening        Relevant Orders    Ambulatory referral to Obstetrics / Gynecology                 Diagnoses and all orders for this visit:    Primary osteoarthritis of both knees    Mass of arm, left  -     Ambulatory referral to Plastic Surgery; Future    Class 3 severe obesity due to excess calories with serious comorbidity and body mass index (BMI) of 40 0 to 44 9 in adult [facility])    Cervical cancer screening  -     Ambulatory referral to Obstetrics / Gynecology; Future    Other orders  -     Large joint arthrocentesis: R knee  -     Large joint arthrocentesis              Subjective:      Patient ID: Harish Freire is a 48 y o  female  CC:    Chief Complaint   Patient presents with    Knee Pain     c/o continues with left knee pain and swelling  mjs       HPI:    Patient is having significant amount of arthritis symptoms in both knees  She has a significant amount of crepitation  There was some swelling as well  She would like to have injections or other treatment in the knee  Reviewed x-rays from before  It does show osteoarthritis bilaterally  Reviewed with her about knee injections, i e  Steroids  Patient also noticed in the lesion on her left elbow  Has normally been scaly, but now is larger and slightly swollen  She was not sure what it was  The following portions of the patient's history were reviewed and updated as appropriate: allergies, current medications, past family history, past medical history, past social history, past surgical history and problem list       Review of Systems   Constitutional: Negative  HENT: Negative  Eyes: Negative  Respiratory: Negative  Cardiovascular: Negative  Gastrointestinal: Negative  Endocrine: Negative  Genitourinary: Negative  Musculoskeletal: Positive for arthralgias and gait problem  Skin: Negative  Allergic/Immunologic: Negative  Hematological: Negative  Psychiatric/Behavioral: Negative  Data to review:       Objective:    Vitals:    11/25/19 0806   BP: 132/84   Pulse: 76   Weight: 116 kg (256 lb)   Height: 5' 4" (1 626 m)        Physical Exam   Constitutional: She appears well-developed and well-nourished  Musculoskeletal:        Right knee: She exhibits decreased range of motion, swelling and effusion (minimal)  No tenderness found  Left knee: She exhibits decreased range of motion, swelling and effusion  Skin:        Nursing note and vitals reviewed            Large joint arthrocentesis: R knee  Date/Time: 11/25/2019 8:20 AM  Consent given by: patient  Timeout: Immediately prior to procedure a time out was called to verify the correct patient, procedure, equipment, support staff and site/side marked as required   Supporting Documentation  Indications: pain and joint swelling   Procedure Details  Location: knee - R knee  Needle size: 22 G  Ultrasound guidance: no  Approach: anterior  Medications administered: 3 mL lidocaine 1 %; 80 mg triamcinolone acetonide 40 mg/mL    Aspirate: clear and serous    Patient tolerance: patient tolerated the procedure well with no immediate complications  Dressing:  Sterile dressing applied    Large joint arthrocentesis: L knee  Date/Time: 11/25/2019 8:36 AM  Consent given by: patient  Timeout: Immediately prior to procedure a time out was called to verify the correct patient, procedure, equipment, support staff and site/side marked as required   Supporting Documentation  Indications: pain and joint swelling   Procedure Details  Location: knee - L knee  Needle size: 22 G  Ultrasound guidance: no  Approach: anterior  Medications administered: 3 mL lidocaine 1 %; 80 mg triamcinolone acetonide 40 mg/mL    Aspirate: clear    Patient tolerance: patient tolerated the procedure well with no immediate complications  Dressing:  Sterile dressing applied

## 2019-11-25 NOTE — ASSESSMENT & PLAN NOTE
X-rays are positive for osteoarthritis  At this point, would recommend injections  We did review the procedure, and then injected both knees  Patient tolerated relatively well  Patient will follow-up in the future as needed

## 2019-12-12 DIAGNOSIS — K21.00 GASTROESOPHAGEAL REFLUX DISEASE WITH ESOPHAGITIS: ICD-10-CM

## 2019-12-12 RX ORDER — FAMOTIDINE 40 MG/1
40 TABLET, FILM COATED ORAL DAILY
Qty: 90 TABLET | Refills: 3 | Status: SHIPPED | OUTPATIENT
Start: 2019-12-12 | End: 2020-03-13

## 2019-12-18 ENCOUNTER — OFFICE VISIT (OUTPATIENT)
Dept: FAMILY MEDICINE CLINIC | Facility: CLINIC | Age: 54
End: 2019-12-18
Payer: COMMERCIAL

## 2019-12-18 VITALS
SYSTOLIC BLOOD PRESSURE: 112 MMHG | WEIGHT: 254 LBS | BODY MASS INDEX: 43.36 KG/M2 | HEIGHT: 64 IN | TEMPERATURE: 98.8 F | HEART RATE: 76 BPM | DIASTOLIC BLOOD PRESSURE: 70 MMHG

## 2019-12-18 DIAGNOSIS — M17.0 PRIMARY OSTEOARTHRITIS OF BOTH KNEES: ICD-10-CM

## 2019-12-18 DIAGNOSIS — S83.242A ACUTE MEDIAL MENISCUS TEAR OF LEFT KNEE, INITIAL ENCOUNTER: ICD-10-CM

## 2019-12-18 DIAGNOSIS — H65.03 NON-RECURRENT ACUTE SEROUS OTITIS MEDIA OF BOTH EARS: Primary | ICD-10-CM

## 2019-12-18 PROBLEM — S83.249A ACUTE MEDIAL MENISCUS TEAR: Status: ACTIVE | Noted: 2019-12-18

## 2019-12-18 PROCEDURE — 4004F PT TOBACCO SCREEN RCVD TLK: CPT | Performed by: FAMILY MEDICINE

## 2019-12-18 PROCEDURE — 99213 OFFICE O/P EST LOW 20 MIN: CPT | Performed by: FAMILY MEDICINE

## 2019-12-18 PROCEDURE — 3008F BODY MASS INDEX DOCD: CPT | Performed by: FAMILY MEDICINE

## 2019-12-18 RX ORDER — SULFAMETHOXAZOLE AND TRIMETHOPRIM 800; 160 MG/1; MG/1
1 TABLET ORAL EVERY 12 HOURS SCHEDULED
Qty: 20 TABLET | Refills: 0 | Status: SHIPPED | OUTPATIENT
Start: 2019-12-18 | End: 2019-12-28

## 2019-12-18 NOTE — ASSESSMENT & PLAN NOTE
As the patient's pain did not improve after injection, and physical therapy, and the x-ray did not show any significant abnormalities, it is likely that she has a meniscus tear  It is more likely a medial meniscus tear  Will need MRI to evaluate  Again, the patient did have physical therapy at home, as well as medication  More specifically, she needs to use nonsteroidal anti-inflammatories

## 2019-12-18 NOTE — ASSESSMENT & PLAN NOTE
Despite the fact that the patient's injections seemed to help quite well on the right, the left is continuing to have a significant amount of discomfort  The pattern of discomfort suggest that this is related to a meniscus tear  MRI would be recommended  The really are not any other options for her as she has already done home physical therapy, as well as medication  Left knee does show osteoarthritis on x-ray

## 2019-12-18 NOTE — PATIENT INSTRUCTIONS
Problem List Items Addressed This Visit     Acute medial meniscus tear     As the patient's pain did not improve after injection, and physical therapy, and the x-ray did not show any significant abnormalities, it is likely that she has a meniscus tear  It is more likely a medial meniscus tear  Will need MRI to evaluate  Again, the patient did have physical therapy at home, as well as medication  More specifically, she needs to use nonsteroidal anti-inflammatories  Relevant Orders    MRI knee left  wo contrast    Osteoarthritis of both knees     Despite the fact that the patient's injections seemed to help quite well on the right, the left is continuing to have a significant amount of discomfort  The pattern of discomfort suggest that this is related to a meniscus tear  MRI would be recommended  The really are not any other options for her as she has already done home physical therapy, as well as medication  Left knee does show osteoarthritis on x-ray               Other Visit Diagnoses     Non-recurrent acute serous otitis media of both ears    -  Primary    Relevant Medications    sulfamethoxazole-trimethoprim (BACTRIM DS) 800-160 mg per tablet

## 2019-12-18 NOTE — PROGRESS NOTES
Assessment and Plan:    Problem List Items Addressed This Visit     Acute medial meniscus tear     As the patient's pain did not improve after injection, and physical therapy, and the x-ray did not show any significant abnormalities, it is likely that she has a meniscus tear  It is more likely a medial meniscus tear  Will need MRI to evaluate  Again, the patient did have physical therapy at home, as well as medication  More specifically, she needs to use nonsteroidal anti-inflammatories  Relevant Orders    MRI knee left  wo contrast    Osteoarthritis of both knees     Despite the fact that the patient's injections seemed to help quite well on the right, the left is continuing to have a significant amount of discomfort  The pattern of discomfort suggest that this is related to a meniscus tear  MRI would be recommended  The really are not any other options for her as she has already done home physical therapy, as well as medication  Left knee does show osteoarthritis on x-ray  Other Visit Diagnoses     Non-recurrent acute serous otitis media of both ears    -  Primary    Relevant Medications    sulfamethoxazole-trimethoprim (BACTRIM DS) 800-160 mg per tablet                 Diagnoses and all orders for this visit:    Non-recurrent acute serous otitis media of both ears  -     sulfamethoxazole-trimethoprim (BACTRIM DS) 800-160 mg per tablet; Take 1 tablet by mouth every 12 (twelve) hours for 10 days    Primary osteoarthritis of both knees    Acute medial meniscus tear of left knee, initial encounter  -     MRI knee left  wo contrast; Future              Subjective:      Patient ID: Uzma Baer is a 48 y o  female  CC:    Chief Complaint   Patient presents with    Cold Like Symptoms     Onset 3 weeks  mjs       HPI:    Patient feels that she has a sinusitis or cold that won't go away  Voice off, ST, HA, congestion  Worse at night  Has been for 3 weeks  No tooth pain   No face pain     Some productive cough  Right ear popped the other day  Knee pain: She did have injection  Helped for 4 days on the left  Left is still aching  If she is going up/down steps, or twisting it hurts  Pain is medial from tibial plateau to the patella  She has done exercises and home PT, as well as meds (NSAID's)  Again, right knee is fine  The following portions of the patient's history were reviewed and updated as appropriate: allergies, current medications, past family history, past medical history, past social history, past surgical history and problem list       Review of Systems   Constitutional: Negative  HENT:        Per HPI   Respiratory: Positive for cough  Cardiovascular: Negative  Gastrointestinal: Negative  Musculoskeletal: Positive for arthralgias, gait problem and joint swelling  Data to review:       Objective:    Vitals:    12/18/19 1334   BP: 112/70   Pulse: 76   Temp: 98 8 °F (37 1 °C)   Weight: 115 kg (254 lb)   Height: 5' 4" (1 626 m)        Physical Exam   Constitutional: She appears well-developed and well-nourished  HENT:   Head: Normocephalic and atraumatic  Right Ear: Hearing, external ear and ear canal normal  Tympanic membrane is erythematous and bulging  Left Ear: Hearing, external ear and ear canal normal  Tympanic membrane is erythematous and bulging  Cardiovascular: Normal rate, regular rhythm and normal heart sounds  Pulses:       Carotid pulses are 2+ on the right side, and 2+ on the left side  Pulmonary/Chest: Effort normal and breath sounds normal  She has no wheezes  She has no rales  She exhibits no tenderness  Musculoskeletal:        Left knee: She exhibits decreased range of motion, swelling and effusion  She exhibits no erythema, no LCL laxity, normal patellar mobility, no bony tenderness and no MCL laxity  Tenderness found  Medial joint line and patellar tendon tenderness noted     Lymphadenopathy:     She has no cervical adenopathy  Nursing note and vitals reviewed  Tobacco Cessation Counseling: Tobacco cessation counseling was provided  The patient is sincerely urged to quit consumption of tobacco  She is not ready to quit tobacco  Medication options and side effects of medication discussed  Patient refused medication

## 2019-12-28 ENCOUNTER — HOSPITAL ENCOUNTER (OUTPATIENT)
Dept: MRI IMAGING | Facility: HOSPITAL | Age: 54
Discharge: HOME | End: 2019-12-28
Payer: COMMERCIAL

## 2019-12-28 DIAGNOSIS — S83.242A ACUTE MEDIAL MENISCUS TEAR OF LEFT KNEE, INITIAL ENCOUNTER: ICD-10-CM

## 2019-12-28 PROCEDURE — 73721 MRI JNT OF LWR EXTRE W/O DYE: CPT

## 2019-12-31 DIAGNOSIS — S83.242A ACUTE MEDIAL MENISCUS TEAR OF LEFT KNEE, INITIAL ENCOUNTER: Primary | ICD-10-CM

## 2020-01-05 DIAGNOSIS — F33.1 MODERATE EPISODE OF RECURRENT MAJOR DEPRESSIVE DISORDER (HCC): ICD-10-CM

## 2020-01-06 RX ORDER — ESCITALOPRAM OXALATE 20 MG/1
TABLET ORAL
Qty: 90 TABLET | Refills: 0 | Status: SHIPPED | OUTPATIENT
Start: 2020-01-06 | End: 2020-04-20

## 2020-01-10 ENCOUNTER — OFFICE VISIT (OUTPATIENT)
Dept: FAMILY MEDICINE CLINIC | Facility: CLINIC | Age: 55
End: 2020-01-10
Payer: COMMERCIAL

## 2020-01-10 VITALS
HEIGHT: 64 IN | SYSTOLIC BLOOD PRESSURE: 136 MMHG | TEMPERATURE: 98.4 F | BODY MASS INDEX: 44.15 KG/M2 | DIASTOLIC BLOOD PRESSURE: 82 MMHG | HEART RATE: 64 BPM | WEIGHT: 258.6 LBS

## 2020-01-10 DIAGNOSIS — H65.03 NON-RECURRENT ACUTE SEROUS OTITIS MEDIA OF BOTH EARS: Primary | ICD-10-CM

## 2020-01-10 DIAGNOSIS — J30.89 CHRONIC NONSEASONAL ALLERGIC RHINITIS DUE TO POLLEN: ICD-10-CM

## 2020-01-10 DIAGNOSIS — I10 ESSENTIAL HYPERTENSION: ICD-10-CM

## 2020-01-10 PROCEDURE — 3008F BODY MASS INDEX DOCD: CPT | Performed by: FAMILY MEDICINE

## 2020-01-10 PROCEDURE — 3075F SYST BP GE 130 - 139MM HG: CPT | Performed by: FAMILY MEDICINE

## 2020-01-10 PROCEDURE — 99213 OFFICE O/P EST LOW 20 MIN: CPT | Performed by: FAMILY MEDICINE

## 2020-01-10 PROCEDURE — 3079F DIAST BP 80-89 MM HG: CPT | Performed by: FAMILY MEDICINE

## 2020-01-10 RX ORDER — DOXYCYCLINE HYCLATE 100 MG
100 TABLET ORAL 2 TIMES DAILY
Qty: 20 TABLET | Refills: 0 | Status: SHIPPED | OUTPATIENT
Start: 2020-01-10 | End: 2020-01-20

## 2020-01-10 RX ORDER — MOMETASONE FUROATE 50 UG/1
2 SPRAY, METERED NASAL DAILY
Qty: 17 G | Refills: 5 | Status: SHIPPED | OUTPATIENT
Start: 2020-01-10 | End: 2020-07-13

## 2020-01-10 NOTE — PATIENT INSTRUCTIONS
Problem List Items Addressed This Visit     Chronic nonseasonal allergic rhinitis due to pollen     The patient continues with coughing  It is occasional now, where as it was quite significant before  Recommend Nasonex, 2 sprays each nostril once a day  Relevant Medications    mometasone (NASONEX) 50 mcg/act nasal spray    Hypertension     Stable  No change  Other Visit Diagnoses     Non-recurrent acute serous otitis media of both ears    -  Primary    Patient was on Bactrim  Will change to doxycycline  Continues with some findings of otitis media      Relevant Medications    doxycycline hyclate (VIBRA-TABS) 100 mg tablet

## 2020-01-10 NOTE — PROGRESS NOTES
Assessment and Plan:    Problem List Items Addressed This Visit     Chronic nonseasonal allergic rhinitis due to pollen     The patient continues with coughing  It is occasional now, where as it was quite significant before  Recommend Nasonex, 2 sprays each nostril once a day  Relevant Medications    mometasone (NASONEX) 50 mcg/act nasal spray    Hypertension     Stable  No change  Other Visit Diagnoses     Non-recurrent acute serous otitis media of both ears    -  Primary    Patient was on Bactrim  Will change to doxycycline  Continues with some findings of otitis media  Relevant Medications    doxycycline hyclate (VIBRA-TABS) 100 mg tablet                 Diagnoses and all orders for this visit:    Non-recurrent acute serous otitis media of both ears  Comments:  Patient was on Bactrim  Will change to doxycycline  Continues with some findings of otitis media  Orders:  -     doxycycline hyclate (VIBRA-TABS) 100 mg tablet; Take 1 tablet (100 mg total) by mouth 2 (two) times a day for 10 days    Essential hypertension    Chronic nonseasonal allergic rhinitis due to pollen  -     mometasone (NASONEX) 50 mcg/act nasal spray; 2 sprays into each nostril daily              Subjective:      Patient ID: Sujata Dennis is a 48 y o  female  CC:    Chief Complaint   Patient presents with    Follow-up     patient in office for follow up; patient states that cough is getting better but ears still feel clogged  HPI:    Patient is here follow-up on multiple issues  With regard to cough:She did use Abx, but was coughing a lot still  Some productive cough  Ears continue to feel congested and stuffy  The following portions of the patient's history were reviewed and updated as appropriate: allergies, current medications and problem list       Review of Systems   HENT: Positive for congestion, postnasal drip, rhinorrhea and sore throat (Occasional)   Negative for ear discharge, ear pain, sinus pressure and sinus pain  Respiratory: Positive for cough  Negative for shortness of breath and wheezing  Cardiovascular: Negative  Gastrointestinal: Negative  Data to review:       Objective:    Vitals:    01/10/20 0939   BP: 136/82   BP Location: Left arm   Patient Position: Sitting   Cuff Size: Standard   Pulse: 64   Temp: 98 4 °F (36 9 °C)   TempSrc: Tympanic   Weight: 117 kg (258 lb 9 6 oz)   Height: 5' 4" (1 626 m)        Physical Exam   Constitutional: She appears well-developed and well-nourished  HENT:   Head: Normocephalic and atraumatic  Right Ear: Tympanic membrane is erythematous and bulging  Left Ear: Tympanic membrane is bulging  Tympanic membrane is not erythematous  Nose: Nose normal  Right sinus exhibits no maxillary sinus tenderness and no frontal sinus tenderness  Left sinus exhibits no maxillary sinus tenderness and no frontal sinus tenderness  Mouth/Throat: Uvula is midline, oropharynx is clear and moist and mucous membranes are normal  Tonsils are 0 on the right  Tonsils are 0 on the left  No tonsillar exudate  Cardiovascular: Normal rate, regular rhythm and normal heart sounds  Pulses:       Carotid pulses are 2+ on the right side, and 2+ on the left side  Pulmonary/Chest: Effort normal and breath sounds normal  She has no wheezes  She has no rales  She exhibits no tenderness  Lymphadenopathy:     She has no cervical adenopathy  Nursing note and vitals reviewed  BMI Counseling: Body mass index is 44 39 kg/m²  The BMI is above normal  Nutrition recommendations include decreasing portion sizes, encouraging healthy choices of fruits and vegetables, decreasing fast food intake, consuming healthier snacks, limiting drinks that contain sugar, moderation in carbohydrate intake, increasing intake of lean protein, reducing intake of saturated and trans fat and reducing intake of cholesterol   Exercise recommendations include exercising 3-5 times per week  No pharmacotherapy was ordered

## 2020-01-10 NOTE — ASSESSMENT & PLAN NOTE
The patient continues with coughing  It is occasional now, where as it was quite significant before  Recommend Nasonex, 2 sprays each nostril once a day

## 2020-01-12 DIAGNOSIS — E87.6 HYPOKALEMIA: ICD-10-CM

## 2020-01-13 RX ORDER — POTASSIUM CHLORIDE 1500 MG/1
TABLET, EXTENDED RELEASE ORAL
Qty: 90 TABLET | Refills: 1 | Status: SHIPPED | OUTPATIENT
Start: 2020-01-13 | End: 2020-07-31

## 2020-01-13 NOTE — ASSESSMENT & PLAN NOTE
Stable  No change  [FreeTextEntry1] : 20 y/o male presenting for flu vaccine.  No contraindications.\par \par Plan\par - Flu vaccine administered in L deltoid without incident.  Pt tolerated injection well.  All vaccines now UTD.  Copy of CIR record provided to pt.  RTC PRN.

## 2020-01-28 ENCOUNTER — CONSULT (OUTPATIENT)
Dept: OBGYN CLINIC | Facility: MEDICAL CENTER | Age: 55
End: 2020-01-28
Payer: COMMERCIAL

## 2020-01-28 ENCOUNTER — APPOINTMENT (OUTPATIENT)
Dept: RADIOLOGY | Facility: MEDICAL CENTER | Age: 55
End: 2020-01-28
Payer: COMMERCIAL

## 2020-01-28 VITALS
SYSTOLIC BLOOD PRESSURE: 121 MMHG | HEART RATE: 60 BPM | HEIGHT: 65 IN | BODY MASS INDEX: 42.32 KG/M2 | DIASTOLIC BLOOD PRESSURE: 82 MMHG | WEIGHT: 254 LBS

## 2020-01-28 DIAGNOSIS — S83.242A ACUTE MEDIAL MENISCUS TEAR OF LEFT KNEE, INITIAL ENCOUNTER: ICD-10-CM

## 2020-01-28 DIAGNOSIS — S83.242A ACUTE MEDIAL MENISCUS TEAR OF LEFT KNEE, INITIAL ENCOUNTER: Primary | ICD-10-CM

## 2020-01-28 PROCEDURE — 73560 X-RAY EXAM OF KNEE 1 OR 2: CPT

## 2020-01-28 PROCEDURE — 99203 OFFICE O/P NEW LOW 30 MIN: CPT | Performed by: ORTHOPAEDIC SURGERY

## 2020-01-28 NOTE — PROGRESS NOTES
Orthopaedic Surgery - Office Note  Lety Pate (24 y o  female)   : 1965   MRN: [id number]  Encounter Date: 2020    Chief Complaint   Patient presents with    Left Knee - Pain       Assessment / Plan  Left knee, medial meniscus posterior root tear    · Begin outpatient PT for medial menisucs posterior root tear  Transition to HEP  · 2 Aleve twice a day for pain  · XR AT NEXT VISIT:  left knee, knee series views  · If she fails physical therapy we will consider surgical intervention under next appointment  Return in about 4 weeks (around 2020)  History of Present Illness  Lety Pate is a 48 y o  female who presents for initial evaluation of left knee pain  She reports back in September she was walking and felt a pop in her knee  She experienced extreme pain in her knee and down into her calf  She presented to her PCP who ordered an MRI  PCP performed a CSI in bilateral knees and she only received 4 days of pain relief for her left knee  The MRI demonstrated a medial meniscus posterior root tear  She does report that she experiences a daily moderate constant pain in her left knee  She is unable to receive pain relief from ice, heat or Tylenol  She does report she experiences clicking and catching in an instability sensation and specially while going up stairs  She indicates her pain is on the medial aspect of her left knee  She denies any further injury or trauma to her left knee  She denies any distal paresthesias    csi at pcp 4 days    Review of Systems  Pertinent items are noted in HPI  All other systems were reviewed and are negative  MSK:positive for arthralgias and myalgias  Psych: positive for depression    Physical Exam  /82   Pulse 60   Ht 5' 5" (1 651 m)   Wt 115 kg (254 lb)   BMI 42 27 kg/m²   Cons: Appears well  No apparent distress  Psych: Alert  Oriented x3  Mood and affect normal   Eyes: PERRLA, EOMI  Resp: Normal effort    No audible wheezing or stridor  CV: Palpable pulse  No discernable arrhythmia  No LE edema  Lymph:  No palpable cervical, axillary, or inguinal lymphadenopathy  Skin: Warm  No palpable masses  No visible lesions  Neuro: Normal muscle tone  Normal and symmetric DTR's  Left Knee Exam  Alignment:  Normal knee alignment  Inspection:  No swelling  No ecchymosis  Palpation:  medial joint line  tenderness  ROM:  Knee Extension 0  Knee Flexion 115  Strength:  5/5 quadriceps and hamstrings  Stability:  No objective knee instability  Stable Varus / Valgus stress, Lachman, and Posterior drawer  Tests:  (+) Cathleen  Patella:  Patella tracks centrally with crepitus  Neurovascular:  Sensation intact in DP/SP/Villavicencio/Sa/T nerve distributions  2+ DP & PT pulses  Gait:  Normal     Studies Reviewed  XR of left knee - obtained on 1/28/2020 demonstrated   MRI of left knee - obtained on 12/28/2019 demonstrated complex medial meniscus posterior root tear    Procedures  No procedures today  Medical, Surgical, Family, and Social History  The patient's medical history, family history, and social history, were reviewed and updated as appropriate      Past Medical History:   Diagnosis Date    Ankle fracture     Anxiety     Asthma     Chronic venous embolism and thrombosis of deep vessels of lower extremity (HCC)     Costochondritis     RESOLVED: [id number]    Depression     Endometriosis     Fibromyalgia     GERD (gastroesophageal reflux disease)     Hematuria     LAST ASSESSED: [id number]    Hypertension     LAST ASSESSED: [id number]    Irregular heart beat     Pulmonary embolism ([address] )     RESOLED: [id number]    RBBB (right bundle branch block)     Sleep apnea     Cannot tolerate CPAP    Umbilical hernia     LAST ASSESSED: [id number]       Past Surgical History:   Procedure Laterality Date    HERNIA REPAIR      HYSTERECTOMY      PELVIC LAPAROSCOPY      x4 for endometriosis    CO ESOPHAGOGASTRODUODENOSCOPY TRANSORAL DIAGNOSTIC N/A 2019    Procedure: ESOPHAGOGASTRODUODENOSCOPY (EGD) with bx;  Surgeon: Natalie Fu MD;  Location: [facility]; Service: Gastroenterology       Family History   Problem Relation Age of Onset    Hypertension Mother    [place] Diabetes Father     Hypertension Father     Obesity Father        Social History     Occupational History    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   Tobacco Use    Smoking status: Former Smoker     Packs/day: 0 50     Types: Cigarettes     Last attempt to quit: 8/10/2018     Years since quittin 4    Smokeless tobacco: Never Used    Tobacco comment: smokes 1 cigarette occasionally   Substance and Sexual Activity    Alcohol use: No    Drug use: No    Sexual activity: Not on file       Allergies   Allergen Reactions    Albuterol      Other reaction(s): felt weird    Azithromycin GI Intolerance    Duloxetine      Category: Adverse Reaction; Annotation - [id number]: Sweating    Erythromycin Vomiting    Hydrocodone-Acetaminophen      Other reaction(s): sweating, feel faint, diarrhea    Hydrocodone-Acetaminophen     Ketorolac Diarrhea and Nausea Only     Category: Adverse Reaction;  Annotation - [id number]: Symptoms were noted after injection into bursa with Toradol at orthopedics    Penicillin G     Penicillins Hives and Vomiting    Tramadol          Current Outpatient Medications:     albuterol (PROVENTIL HFA,VENTOLIN HFA) 90 mcg/act inhaler, TAKE 2 PUFFS BY MOUTH EVERY 6 HOURS AS NEEDED FOR WHEEZE, Disp: 18 Inhaler, Rfl: 1    Aspirin 81 MG EC tablet, Take by mouth, Disp: , Rfl:     atenolol-chlorthalidone (TENORETIC) 50-25 mg per tablet, TAKE 1 TABLET BY MOUTH EVERY DAY, Disp: 90 tablet, Rfl: 1    Cholecalciferol (VITAMIN D3) 1000 units CAPS, Take 2,000 Units by mouth, Disp: , Rfl:     clindamycin (CLEOCIN T) 1 % lotion, APPLY ONCE DAILY TO AFFECTED AREAS UNDER ARMS , Disp: , Rfl: 3    Cyanocobalamin (B-12) 1000 MCG CAPS, Take by mouth, Disp: , Rfl:     escitalopram (LEXAPRO) 20 mg tablet, TAKE 1 TABLET BY MOUTH EVERY DAY, Disp: 90 tablet, Rfl: 0    famotidine (PEPCID) 40 MG tablet, TAKE 1 TABLET (40 MG TOTAL) BY MOUTH DAILY FOR 90 DAYS, Disp: 90 tablet, Rfl: 3    KLOR-CON M20 20 MEQ tablet, TAKE 1 TABLET (20 MEQ TOTAL) BY MOUTH DAILY FOR 90 DAYS, Disp: 90 tablet, Rfl: 1    loperamide (IMODIUM A-D) 2 MG tablet, Take 2 mg by mouth 4 (four) times a day as needed for diarrhea, Disp: , Rfl:     loratadine (CLARITIN) 10 mg tablet, Take 10 mg by mouth, Disp: , Rfl:     mometasone (NASONEX) 50 mcg/act nasal spray, 2 sprays into each nostril daily, Disp: 17 g, Rfl: 5    montelukast (SINGULAIR) 10 mg tablet, TAKE 1 TABLET BY MOUTH EVERY DAY, Disp: 90 tablet, Rfl: 3    olopatadine (PATANOL) 0 1 % ophthalmic solution, Apply to eye, Disp: , Rfl:     pantoprazole (PROTONIX) 40 mg tablet, Take 1 tablet (40 mg total) by mouth daily, Disp: 90 tablet, Rfl: 3    escitalopram (LEXAPRO) 10 mg tablet, TAKE 1 TABLET (10 MG TOTAL) BY MOUTH DAILY FOR 90 DAYS (Patient not taking: Reported on 1/28/2020), Disp: 90 tablet, Rfl: 1    naproxen sodium (ALEVE) 220 MG tablet, Take 2 tablets (440 mg total) by mouth 2 (two) times a day with meals, Disp: 120 tablet, Rfl: 0      Alina Arreaga    Scribe Attestation    I,:   Wiliam Baker am acting as a scribe while in the presence of the attending physician :        I,:   Almaz Dickson MD personally performed the services described in this documentation    as scribed in my presence :

## 2020-02-12 ENCOUNTER — OFFICE VISIT (OUTPATIENT)
Dept: FAMILY MEDICINE CLINIC | Facility: CLINIC | Age: 55
End: 2020-02-12
Payer: COMMERCIAL

## 2020-02-12 VITALS
HEART RATE: 56 BPM | BODY MASS INDEX: 43.12 KG/M2 | DIASTOLIC BLOOD PRESSURE: 78 MMHG | SYSTOLIC BLOOD PRESSURE: 120 MMHG | HEIGHT: 65 IN | TEMPERATURE: 98.1 F | WEIGHT: 258.8 LBS

## 2020-02-12 DIAGNOSIS — J01.41 ACUTE RECURRENT PANSINUSITIS: Primary | ICD-10-CM

## 2020-02-12 DIAGNOSIS — R23.2 FLUSHING: ICD-10-CM

## 2020-02-12 PROCEDURE — 3074F SYST BP LT 130 MM HG: CPT | Performed by: FAMILY MEDICINE

## 2020-02-12 PROCEDURE — 99213 OFFICE O/P EST LOW 20 MIN: CPT | Performed by: FAMILY MEDICINE

## 2020-02-12 PROCEDURE — 3008F BODY MASS INDEX DOCD: CPT | Performed by: FAMILY MEDICINE

## 2020-02-12 PROCEDURE — 3078F DIAST BP <80 MM HG: CPT | Performed by: FAMILY MEDICINE

## 2020-02-12 PROCEDURE — 1036F TOBACCO NON-USER: CPT | Performed by: FAMILY MEDICINE

## 2020-02-12 NOTE — ASSESSMENT & PLAN NOTE
Patient does have significant amount of vasomotor symptoms of menopause  Given her prior DVT, she was advised not to take estrogen  Based on this, would avoid using estrogen where possible, including over-the-counter preparations such as black cohosh  That is a phyto-estrogen, but still estrogen

## 2020-02-12 NOTE — PROGRESS NOTES
Assessment and Plan:    Problem List Items Addressed This Visit     Flushing     Patient does have significant amount of vasomotor symptoms of menopause  Given her prior DVT, she was advised not to take estrogen  Based on this, would avoid using estrogen where possible, including over-the-counter preparations such as black cohosh  That is a phyto-estrogen, but still estrogen  Other Visit Diagnoses     Acute recurrent pansinusitis    -  Primary    Patient appears to have a recurrent sinus infection  Given that she has been on antibiotics x2, and has multiple allergies, recommend CT sinuses  Consider ENT    Relevant Orders    CT sinus wo contrast                 Diagnoses and all orders for this visit:    Acute recurrent pansinusitis  Comments:  Patient appears to have a recurrent sinus infection  Given that she has been on antibiotics x2, and has multiple allergies, recommend CT sinuses  Consider ENT  Orders:  -     CT sinus wo contrast; Future    Flushing              Subjective:      Patient ID: Mariana Bird is a 48 y o  female  CC:    Chief Complaint   Patient presents with    Headache     Pt states she is still with slight ear discomfort and " crackling" in the right more often then the left  kw    Earache    Fatigue    Cough       HPI:    Please see chief complaint  Patient is having some discomfort, especially in the ears  She does have some face discomfort at times, but not really tooth pain  She has been using Flonase, and it seems to work quite well on the left but not well on the right  Patient did have sinus infections in January, but feels that this never really resolved completely since January  She was already on 2 rounds of antibiotics  Patient also mentioned that she is quite bothered by the flushing symptoms that she has been having  We have reviewed multiple different things with regard to this, it does seem to come and go at times    She does have a significant amount of times where she has perfectly fine, but then other times where she increases temperature, sweats, feels quite horrible  The following portions of the patient's history were reviewed and updated as appropriate: allergies, current medications, past family history, past medical history, past social history, past surgical history and problem list       Review of Systems   Constitutional: Negative  HENT: Positive for congestion, dental problem, nosebleeds, postnasal drip, sinus pressure, sinus pain and trouble swallowing  Eyes: Negative  Respiratory: Negative  Cardiovascular: Negative  Gastrointestinal: Negative  Endocrine: Negative  Genitourinary: Negative  Musculoskeletal: Negative  Skin: Negative  Allergic/Immunologic: Negative  Neurological: Negative  Hematological: Negative  Psychiatric/Behavioral: Negative  Data to review:       Objective:    Vitals:    02/12/20 1253   BP: 120/78   BP Location: Left arm   Patient Position: Sitting   Pulse: 56   Temp: 98 1 °F (36 7 °C)   TempSrc: Tympanic   Weight: 117 kg (258 lb 12 8 oz)   Height: 5' 5" (1 651 m)        Physical Exam   Constitutional: She appears well-developed and well-nourished  HENT:   Head: Normocephalic and atraumatic  Right Ear: Hearing normal    Left Ear: Hearing normal    Nose: Nose normal  Right sinus exhibits no maxillary sinus tenderness and no frontal sinus tenderness  Left sinus exhibits no maxillary sinus tenderness and no frontal sinus tenderness  Cardiovascular: Normal rate, regular rhythm and normal heart sounds  Pulses:       Carotid pulses are 2+ on the right side, and 2+ on the left side  Pulmonary/Chest: Effort normal and breath sounds normal  She has no wheezes  She has no rales  She exhibits no tenderness  Lymphadenopathy:     She has no cervical adenopathy  Nursing note and vitals reviewed

## 2020-02-16 ENCOUNTER — HOSPITAL ENCOUNTER (OUTPATIENT)
Dept: CT IMAGING | Facility: HOSPITAL | Age: 55
Discharge: HOME | End: 2020-02-16
Payer: COMMERCIAL

## 2020-02-16 DIAGNOSIS — J01.41 ACUTE RECURRENT PANSINUSITIS: ICD-10-CM

## 2020-02-16 PROCEDURE — 70486 CT MAXILLOFACIAL W/O DYE: CPT

## 2020-02-27 ENCOUNTER — HOSPITAL ENCOUNTER (OUTPATIENT)
Dept: MAMMOGRAPHY | Facility: MEDICAL CENTER | Age: 55
Discharge: HOME | End: 2020-02-27
Payer: COMMERCIAL

## 2020-02-27 VITALS — HEIGHT: 65 IN | WEIGHT: 258 LBS | BODY MASS INDEX: 42.99 KG/M2

## 2020-02-27 DIAGNOSIS — Z12.39 SCREENING FOR BREAST CANCER: ICD-10-CM

## 2020-02-27 PROCEDURE — 77067 SCR MAMMO BI INCL CAD: CPT

## 2020-02-27 PROCEDURE — 77063 BREAST TOMOSYNTHESIS BI: CPT

## 2020-02-28 ENCOUNTER — OFFICE VISIT (OUTPATIENT)
Dept: FAMILY MEDICINE CLINIC | Facility: CLINIC | Age: 55
End: 2020-02-28
Payer: COMMERCIAL

## 2020-02-28 ENCOUNTER — TELEPHONE (OUTPATIENT)
Dept: ADMINISTRATIVE | Facility: OTHER | Age: 55
End: 2020-02-28

## 2020-02-28 VITALS
HEIGHT: 65 IN | WEIGHT: 257 LBS | TEMPERATURE: 96.5 F | SYSTOLIC BLOOD PRESSURE: 118 MMHG | DIASTOLIC BLOOD PRESSURE: 66 MMHG | BODY MASS INDEX: 42.82 KG/M2 | RESPIRATION RATE: 18 BRPM | HEART RATE: 56 BPM

## 2020-02-28 DIAGNOSIS — R23.2 FLUSHING: ICD-10-CM

## 2020-02-28 DIAGNOSIS — Z12.11 COLON CANCER SCREENING: ICD-10-CM

## 2020-02-28 DIAGNOSIS — I10 ESSENTIAL HYPERTENSION: ICD-10-CM

## 2020-02-28 DIAGNOSIS — J30.89 CHRONIC NONSEASONAL ALLERGIC RHINITIS DUE TO POLLEN: ICD-10-CM

## 2020-02-28 DIAGNOSIS — J01.41 ACUTE RECURRENT PANSINUSITIS: Primary | ICD-10-CM

## 2020-02-28 PROCEDURE — 3078F DIAST BP <80 MM HG: CPT | Performed by: FAMILY MEDICINE

## 2020-02-28 PROCEDURE — 1036F TOBACCO NON-USER: CPT | Performed by: FAMILY MEDICINE

## 2020-02-28 PROCEDURE — 3074F SYST BP LT 130 MM HG: CPT | Performed by: FAMILY MEDICINE

## 2020-02-28 PROCEDURE — 99214 OFFICE O/P EST MOD 30 MIN: CPT | Performed by: FAMILY MEDICINE

## 2020-02-28 PROCEDURE — 3008F BODY MASS INDEX DOCD: CPT | Performed by: FAMILY MEDICINE

## 2020-02-28 RX ORDER — OLOPATADINE HYDROCHLORIDE 2 MG/ML
1 SOLUTION/ DROPS OPHTHALMIC DAILY
Qty: 2.5 ML | Refills: 5 | Status: SHIPPED | OUTPATIENT
Start: 2020-02-28 | End: 2021-04-28

## 2020-02-28 NOTE — PROGRESS NOTES
Assessment and Plan:    Problem List Items Addressed This Visit     Acute recurrent pansinusitis - Primary     Continues with problems with regard to sinus infection  Because of this, would recommend ENT  She did have a CT scan recently which was read as negative  Relevant Orders    Ambulatory Referral to Otolaryngology    Chronic nonseasonal allergic rhinitis due to pollen     Patient does have a significant amount of issues with allergies  She is taking Claritin, Nasonex, Singulair, but no real change  She has used eyedrops before  Relevant Medications    olopatadine HCl (PATADAY) 0 2 % opth drops    Flushing     Patient continues with a significant amount of issues with flushing  She question whether not this is related to the adrenal adenoma that she had noted in 2018 on CT a  It was listed as a benign adenoma, but will check cortisol level, electrolytes, CBC  Relevant Orders    Cortisol Level, AM Specimen    CBC and differential    Comprehensive metabolic panel    TSH, 3rd generation    Hypertension     Blood pressure today was excellent  No change  Relevant Orders    Cortisol Level, AM Specimen    CBC and differential    Comprehensive metabolic panel    TSH, 3rd generation      Other Visit Diagnoses     Colon cancer screening        Relevant Orders    Ambulatory referral to Colorectal Surgery                 Diagnoses and all orders for this visit:    Acute recurrent pansinusitis  -     Ambulatory Referral to Otolaryngology; Future    Flushing  -     Cortisol Level, AM Specimen; Future  -     CBC and differential; Future  -     Comprehensive metabolic panel; Future  -     TSH, 3rd generation; Future    Colon cancer screening  -     Ambulatory referral to Colorectal Surgery; Future    Essential hypertension  -     Cortisol Level, AM Specimen; Future  -     CBC and differential; Future  -     Comprehensive metabolic panel;  Future  -     TSH, 3rd generation; Future    Chronic nonseasonal allergic rhinitis due to pollen  -     olopatadine HCl (PATADAY) 0 2 % opth drops; Administer 1 drop to both eyes daily    Other orders  -     Cancel: Ambulatory referral to Gastroenterology; Future              Subjective:      Patient ID: Abraham Leija is a 48 y o  female  CC:    Chief Complaint   Patient presents with    Follow-up     Patient present today for her follow up and to discuss with Dr Ck Latham about a tumor on her adrenal gland back in 2018   Cold Like Symptoms     inccluding runny/stuffy nose, productive cough and watery eyes for the last 3 days  Pt will also like a referral for ENT due to ongoing cold symptoms  HPI:    Patient is here to follow-up on the chronic sinus infection  CT scan was done  It did find a deviated nasal septum, but the sinuses were clear  Unfortunately, the patient is continuing to have pressure and symptoms consistent with the sinusitis, despite having been on antibiotics  Multiple episodes of this  Sweating:  Continues to be a significant issue for her  She reviewed her on chart BMI chart, and found that in 2018 there was a benign adrenal abnormality noted  She wondered if this could be part of why she is having the problems  Hypertension:  Currently on Tenoretic  Patient has had history of allergies  She was on Pataday previously, and felt that that worked better than the Patanol that she is currently on  The following portions of the patient's history were reviewed and updated as appropriate: allergies, current medications, past family history, past medical history, past social history, past surgical history and problem list The following portions of the patient's history were reviewed and updated as appropriate: allergies, current medications, past family history, past medical history, past social history, past surgical history and problem list       Review of Systems   Constitutional: Negative  HENT: Negative  Eyes: Negative  Respiratory: Negative  Cardiovascular: Negative  Gastrointestinal: Negative  Endocrine: Negative  Genitourinary: Negative  Musculoskeletal: Negative  Skin: Negative  Allergic/Immunologic: Negative  Neurological: Negative  Hematological: Negative  Psychiatric/Behavioral: Negative  Data to review:       Objective:    Vitals:    02/28/20 0922   BP: 118/66   BP Location: Left arm   Patient Position: Sitting   Cuff Size: Large   Pulse: 56   Resp: 18   Temp: (!) 96 5 °F (35 8 °C)   TempSrc: Temporal   Weight: 117 kg (257 lb)   Height: 5' 5" (1 651 m)        Physical Exam   Constitutional: She appears well-developed and well-nourished  HENT:   Head: Normocephalic and atraumatic  Cardiovascular: Normal rate, regular rhythm and normal heart sounds  Pulses:       Carotid pulses are 2+ on the right side, and 2+ on the left side  Pulmonary/Chest: Effort normal and breath sounds normal  She has no wheezes  She has no rales  She exhibits no tenderness  Nursing note and vitals reviewed

## 2020-02-28 NOTE — PATIENT INSTRUCTIONS
Problem List Items Addressed This Visit     Acute recurrent pansinusitis - Primary     Continues with problems with regard to sinus infection  Because of this, would recommend ENT  She did have a CT scan recently which was read as negative  Relevant Orders    Ambulatory Referral to Otolaryngology    Chronic nonseasonal allergic rhinitis due to pollen     Patient does have a significant amount of issues with allergies  She is taking Claritin, Nasonex, Singulair, but no real change  She has used eyedrops before  Relevant Medications    olopatadine HCl (PATADAY) 0 2 % opth drops    Flushing     Patient continues with a significant amount of issues with flushing  She question whether not this is related to the adrenal adenoma that she had noted in 2018 on CT a  It was listed as a benign adenoma, but will check cortisol level, electrolytes, CBC  Relevant Orders    Cortisol Level, AM Specimen    CBC and differential    Comprehensive metabolic panel    TSH, 3rd generation    Hypertension     Blood pressure today was excellent  No change           Relevant Orders    Cortisol Level, AM Specimen    CBC and differential    Comprehensive metabolic panel    TSH, 3rd generation      Other Visit Diagnoses     Colon cancer screening        Relevant Orders    Ambulatory referral to Colorectal Surgery

## 2020-02-28 NOTE — TELEPHONE ENCOUNTER
Upon review of the In Basket request we were able to locate, review, and update the patient chart as requested for Mammogram     Any additional questions or concerns should be emailed to the Practice Liaisons via [email or web address] email, please do not reply via In Basket      Thank you  Arpit Corral MA

## 2020-02-28 NOTE — ASSESSMENT & PLAN NOTE
Patient does have a significant amount of issues with allergies  She is taking Claritin, Nasonex, Singulair, but no real change  She has used eyedrops before

## 2020-02-28 NOTE — TELEPHONE ENCOUNTER
----- Message from Sylvester Ormond Mattix sent at 2/27/2020  6:35 PM EST -----  Regarding: PAP RESULTS  02/27/20 6:35 PM    Hello, our patient Eugene Rowley has had Pap Smear (HPV) aka Cervical Cancer Screening completed/performed  Please assist in updating the patient chart by pulling the document from LAB Tab within Chart Review  The date of service is 09/24/2014     DOCUMENT MARKED LAB RESULTS 12/09/2015    Thank you,  Sylvester Ormond Mattix  [facility]

## 2020-02-28 NOTE — TELEPHONE ENCOUNTER
Upon review of the In Basket request we were able to locate, review, and update the patient chart as requested for Pap Smear (HPV) aka Cervical Cancer Screening  Any additional questions or concerns should be emailed to the Practice Liaisons via [email or web address] email, please do not reply via In Basket      Thank you  Zari Zhang MA

## 2020-02-28 NOTE — ASSESSMENT & PLAN NOTE
Continues with problems with regard to sinus infection  Because of this, would recommend ENT  She did have a CT scan recently which was read as negative

## 2020-02-28 NOTE — ASSESSMENT & PLAN NOTE
Patient continues with a significant amount of issues with flushing  She question whether not this is related to the adrenal adenoma that she had noted in 2018 on CT a  It was listed as a benign adenoma, but will check cortisol level, electrolytes, CBC

## 2020-02-28 NOTE — TELEPHONE ENCOUNTER
----- Message from Destiney Hardy sent at 2/28/2020  9:15 AM EST -----  Regarding: Mammogram  02/28/20 9:16 AM    Michael, our patient Harish Freire has had Mammogram completed/performed  Please assist in updating the patient chart by pulling the document from the Media Tab  The date of service is 02/27/2020       Thank you,  Deisi Jacobson MA  [facility]

## 2020-03-01 DIAGNOSIS — K21.9 GASTROESOPHAGEAL REFLUX DISEASE, ESOPHAGITIS PRESENCE NOT SPECIFIED: ICD-10-CM

## 2020-03-02 RX ORDER — PANTOPRAZOLE SODIUM 40 MG/1
TABLET, DELAYED RELEASE ORAL
Qty: 90 TABLET | Refills: 3 | Status: SHIPPED | OUTPATIENT
Start: 2020-03-02 | End: 2021-04-14

## 2020-03-09 ENCOUNTER — APPOINTMENT (OUTPATIENT)
Dept: LAB | Facility: CLINIC | Age: 55
End: 2020-03-09
Payer: COMMERCIAL

## 2020-03-09 DIAGNOSIS — R23.2 FLUSHING: ICD-10-CM

## 2020-03-09 DIAGNOSIS — I10 ESSENTIAL HYPERTENSION: ICD-10-CM

## 2020-03-09 LAB
ALBUMIN SERPL BCP-MCNC: 3.6 G/DL (ref 3.5–5)
ALP SERPL-CCNC: 81 U/L (ref 46–116)
ALT SERPL W P-5'-P-CCNC: 95 U/L (ref 12–78)
ANION GAP SERPL CALCULATED.3IONS-SCNC: 10 MMOL/L (ref 4–13)
AST SERPL W P-5'-P-CCNC: 61 U/L (ref 5–45)
BASOPHILS # BLD AUTO: 0.08 THOUSANDS/ΜL (ref 0–0.1)
BASOPHILS NFR BLD AUTO: 1 % (ref 0–1)
BILIRUB SERPL-MCNC: 0.53 MG/DL (ref 0.2–1)
BUN SERPL-MCNC: 13 MG/DL (ref 5–25)
CALCIUM SERPL-MCNC: 9.7 MG/DL (ref 8.3–10.1)
CHLORIDE SERPL-SCNC: 101 MMOL/L (ref 100–108)
CO2 SERPL-SCNC: 30 MMOL/L (ref 21–32)
CORTIS AM PEAK SERPL-MCNC: 11.6 UG/DL (ref 4.2–22.4)
CREAT SERPL-MCNC: 0.73 MG/DL (ref 0.6–1.3)
EOSINOPHIL # BLD AUTO: 0.49 THOUSAND/ΜL (ref 0–0.61)
EOSINOPHIL NFR BLD AUTO: 3 % (ref 0–6)
ERYTHROCYTE [DISTWIDTH] IN BLOOD BY AUTOMATED COUNT: 14.5 % (ref 11.6–15.1)
GFR SERPL CREATININE-BSD FRML MDRD: 95 ML/MIN/1.73SQ M
GLUCOSE P FAST SERPL-MCNC: 151 MG/DL (ref 65–99)
HCT VFR BLD AUTO: 46.3 % (ref 34.8–46.1)
HGB BLD-MCNC: 14.9 G/DL (ref 11.5–15.4)
IMM GRANULOCYTES # BLD AUTO: 0.1 THOUSAND/UL (ref 0–0.2)
IMM GRANULOCYTES NFR BLD AUTO: 1 % (ref 0–2)
LYMPHOCYTES # BLD AUTO: 5.26 THOUSANDS/ΜL (ref 0.6–4.47)
LYMPHOCYTES NFR BLD AUTO: 33 % (ref 14–44)
MCH RBC QN AUTO: 28.9 PG (ref 26.8–34.3)
MCHC RBC AUTO-ENTMCNC: 32.2 G/DL (ref 31.4–37.4)
MCV RBC AUTO: 90 FL (ref 82–98)
MONOCYTES # BLD AUTO: 0.81 THOUSAND/ΜL (ref 0.17–1.22)
MONOCYTES NFR BLD AUTO: 5 % (ref 4–12)
NEUTROPHILS # BLD AUTO: 9.39 THOUSANDS/ΜL (ref 1.85–7.62)
NEUTS SEG NFR BLD AUTO: 57 % (ref 43–75)
NRBC BLD AUTO-RTO: 0 /100 WBCS
PLATELET # BLD AUTO: 368 THOUSANDS/UL (ref 149–390)
PMV BLD AUTO: 11.4 FL (ref 8.9–12.7)
POTASSIUM SERPL-SCNC: 4 MMOL/L (ref 3.5–5.3)
PROT SERPL-MCNC: 7.9 G/DL (ref 6.4–8.2)
RBC # BLD AUTO: 5.15 MILLION/UL (ref 3.81–5.12)
SODIUM SERPL-SCNC: 141 MMOL/L (ref 136–145)
TSH SERPL DL<=0.05 MIU/L-ACNC: 1.29 UIU/ML (ref 0.36–3.74)
WBC # BLD AUTO: 16.13 THOUSAND/UL (ref 4.31–10.16)

## 2020-03-09 PROCEDURE — 84443 ASSAY THYROID STIM HORMONE: CPT

## 2020-03-09 PROCEDURE — 80053 COMPREHEN METABOLIC PANEL: CPT

## 2020-03-09 PROCEDURE — 85025 COMPLETE CBC W/AUTO DIFF WBC: CPT

## 2020-03-09 PROCEDURE — 82533 TOTAL CORTISOL: CPT

## 2020-03-09 PROCEDURE — 36415 COLL VENOUS BLD VENIPUNCTURE: CPT

## 2020-03-17 ENCOUNTER — OFFICE VISIT (OUTPATIENT)
Dept: OBGYN CLINIC | Facility: MEDICAL CENTER | Age: 55
End: 2020-03-17
Payer: COMMERCIAL

## 2020-03-17 ENCOUNTER — APPOINTMENT (OUTPATIENT)
Dept: RADIOLOGY | Facility: MEDICAL CENTER | Age: 55
End: 2020-03-17
Payer: COMMERCIAL

## 2020-03-17 VITALS
DIASTOLIC BLOOD PRESSURE: 82 MMHG | WEIGHT: 252 LBS | BODY MASS INDEX: 41.99 KG/M2 | HEIGHT: 65 IN | HEART RATE: 62 BPM | TEMPERATURE: 97.2 F | SYSTOLIC BLOOD PRESSURE: 120 MMHG

## 2020-03-17 DIAGNOSIS — M17.0 BILATERAL PRIMARY OSTEOARTHRITIS OF KNEE: ICD-10-CM

## 2020-03-17 DIAGNOSIS — S83.242A ACUTE MEDIAL MENISCUS TEAR OF LEFT KNEE, INITIAL ENCOUNTER: ICD-10-CM

## 2020-03-17 DIAGNOSIS — S83.242A ACUTE MEDIAL MENISCUS TEAR OF LEFT KNEE, INITIAL ENCOUNTER: Primary | ICD-10-CM

## 2020-03-17 PROCEDURE — 99213 OFFICE O/P EST LOW 20 MIN: CPT | Performed by: ORTHOPAEDIC SURGERY

## 2020-03-17 PROCEDURE — 3074F SYST BP LT 130 MM HG: CPT | Performed by: ORTHOPAEDIC SURGERY

## 2020-03-17 PROCEDURE — 20610 DRAIN/INJ JOINT/BURSA W/O US: CPT | Performed by: ORTHOPAEDIC SURGERY

## 2020-03-17 PROCEDURE — 73564 X-RAY EXAM KNEE 4 OR MORE: CPT

## 2020-03-17 PROCEDURE — 1036F TOBACCO NON-USER: CPT | Performed by: ORTHOPAEDIC SURGERY

## 2020-03-17 PROCEDURE — 3008F BODY MASS INDEX DOCD: CPT | Performed by: FAMILY MEDICINE

## 2020-03-17 PROCEDURE — 3008F BODY MASS INDEX DOCD: CPT | Performed by: ORTHOPAEDIC SURGERY

## 2020-03-17 PROCEDURE — 3079F DIAST BP 80-89 MM HG: CPT | Performed by: ORTHOPAEDIC SURGERY

## 2020-03-17 RX ORDER — BUPIVACAINE HYDROCHLORIDE 2.5 MG/ML
4 INJECTION, SOLUTION INFILTRATION; PERINEURAL
Status: COMPLETED | OUTPATIENT
Start: 2020-03-17 | End: 2020-03-17

## 2020-03-17 RX ORDER — METHYLPREDNISOLONE ACETATE 40 MG/ML
1 INJECTION, SUSPENSION INTRA-ARTICULAR; INTRALESIONAL; INTRAMUSCULAR; SOFT TISSUE
Status: COMPLETED | OUTPATIENT
Start: 2020-03-17 | End: 2020-03-17

## 2020-03-17 RX ORDER — FAMOTIDINE 40 MG/1
TABLET, FILM COATED ORAL
COMMUNITY
Start: 2020-03-16 | End: 2021-04-15

## 2020-03-17 RX ADMIN — METHYLPREDNISOLONE ACETATE 1 ML: 40 INJECTION, SUSPENSION INTRA-ARTICULAR; INTRALESIONAL; INTRAMUSCULAR; SOFT TISSUE at 09:40

## 2020-03-17 RX ADMIN — BUPIVACAINE HYDROCHLORIDE 4 ML: 2.5 INJECTION, SOLUTION INFILTRATION; PERINEURAL at 09:40

## 2020-03-17 NOTE — PROGRESS NOTES
Orthopaedic Surgery - Office Note  Gato Quesada (30 y o  female)   : 1965   MRN: [id number]  Encounter Date: 3/17/2020    Chief Complaint   Patient presents with    Left Knee - Follow-up       Assessment / Plan  Left knee, medial meniscus posterior root tear  Bilateral knee mild osteoarthritis     · CSI of bilateral knee joint was performed   · Patient will proceed with a HEP at this time due to COVID-19  Return if symptoms worsen or fail to improve  History of Present Illness  Gato Quesada is a 48 y o  female who presents Left knee, medial meniscus posterior root tear  Since her last visit she has not been able to get the formal physical therapy due to 2 sinus infections and a death in the family  However with no treatment at this time she states that her knee pain has slightly improved since her last visit  She does have a history of having corticosteroid injections from her PCP about 3 months ago which gave her about 4 days of relief  Patient states that her left knee is more symptomatic than her right  She denies any numbness or tingling to her bilateral lower extremities today  Review of Systems  Pertinent items are noted in HPI  All other systems were reviewed and are negative  Physical Exam  /82   Pulse 62   Temp (!) 97 2 °F (36 2 °C)   Ht 5' 5" (1 651 m)   Wt 114 kg (252 lb)   BMI 41 93 kg/m²   Cons: Appears well  No apparent distress  Psych: Alert  Oriented x3  Mood and affect normal   Eyes: PERRLA, EOMI  Resp: Normal effort  No audible wheezing or stridor  CV: Palpable pulse  No discernable arrhythmia  No LE edema  Lymph:  No palpable cervical, axillary, or inguinal lymphadenopathy  Skin: Warm  No palpable masses  No visible lesions  Neuro: Normal muscle tone  Normal and symmetric DTR's  Bilateral Knee Exam  Alignment:  Normal knee alignment  Inspection:  mild medial knee  swelling  Palpation:  medial joint line tenderness  ROM:  Knee Extension 0   Knee Flexion 125   Strength:  Able to actively extend knee against gravity  Stability:  No objective knee instability  Stable Varus / Valgus stress, Lachman, and Posterior drawer  Tests:  No pertinent positive or negative tests  Patella:  Patella tracks centrally with crepitus  Neurovascular:  Sensation intact in DP/SP/Villavicencio/Sa/T nerve distributions  2+ DP & PT pulses  Gait:  Antalgic  Studies Reviewed  I have personally reviewed pertinent films in PACS and my interpretation is xray of left knee on 3/17/2020 demonstrates mild degenerative changes with mild medial joint space narrowing  Xray of right knee on 10/25/2019 demonstrates mild degenerative changes with mild medial joint space narrowing  Large joint arthrocentesis: bilateral knee  Date/Time: 3/17/2020 9:40 AM  Consent given by: patient  Site marked: site marked  Supporting Documentation  Indications: pain   Procedure Details  Location: knee - bilateral knee    Medications (Right): 4 mL bupivacaine 0 25 %; 1 mL methylPREDNISolone acetate 40 mg/mLMedications (Left): 4 mL bupivacaine 0 25 %; 1 mL methylPREDNISolone acetate 40 mg/mL   Patient tolerance: patient tolerated the procedure well with no immediate complications  Dressing:  Sterile dressing applied             Medical, Surgical, Family, and Social History  The patient's medical history, family history, and social history, were reviewed and updated as appropriate      Past Medical History:   Diagnosis Date    Ankle fracture     Anxiety     Asthma     Chronic venous embolism and thrombosis of deep vessels of lower extremity (HCC)     Costochondritis     RESOLVED: [id number]    Depression     Endometriosis     Fibromyalgia     GERD (gastroesophageal reflux disease)     Hematuria     LAST ASSESSED: [id number]    Hydradenitis     axillary area and under breast    Hypertension     LAST ASSESSED: [id number]    Irregular heart beat     Pulmonary embolism ([address] )     RESOLED: [id number] - secondary to a lupron injection for endometriosis    RBBB (right bundle branch block)     Sleep apnea     Cannot tolerate CPAP    Umbilical hernia     LAST ASSESSED: [id number]       Past Surgical History:   Procedure Laterality Date    HERNIA REPAIR      umbilical    HUMERUS FRACTURE SURGERY W/ IMPLANT Left     HYSTERECTOMY      partial    PELVIC LAPAROSCOPY      x4 for endometriosis    OR ESOPHAGOGASTRODUODENOSCOPY TRANSORAL DIAGNOSTIC N/A 2019    Procedure: ESOPHAGOGASTRODUODENOSCOPY (EGD) with bx;  Surgeon: Jacey Dacosta MD;  Location: [facility]; Service: Gastroenterology       Family History   Problem Relation Age of Onset    Hypertension Mother     Diabetes Father     Hypertension Father     Obesity Father     No Known Problems Maternal Grandmother     No Known Problems Maternal Grandfather     Breast cancer Paternal Grandmother 80    No Known Problems Paternal Grandfather     No Known Problems Maternal Aunt        Social History     Occupational History    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   Tobacco Use    Smoking status: Former Smoker     Packs/day: 0 50     Types: Cigarettes     Last attempt to quit: 8/10/2018     Years since quittin 6    Smokeless tobacco: Never Used    Tobacco comment: smokes 1 cigarette occasionally   Substance and Sexual Activity    Alcohol use: No    Drug use: No    Sexual activity: Not on file       Allergies   Allergen Reactions    Albuterol      Other reaction(s): felt weird    Azithromycin GI Intolerance    Duloxetine      Category: Adverse Reaction; Annotation - [id number]: Sweating    Erythromycin Vomiting    Hydrocodone-Acetaminophen      Other reaction(s): sweating, feel faint, diarrhea    Hydrocodone-Acetaminophen     Ketorolac Diarrhea and Nausea Only     Category: Adverse Reaction;  Annotation - [id number]: Symptoms were noted after injection into bursa with Toradol at orthopedics    Penicillin G     Penicillins Hives and Vomiting    Tramadol Current Outpatient Medications:     albuterol (PROVENTIL HFA,VENTOLIN HFA) 90 mcg/act inhaler, TAKE 2 PUFFS BY MOUTH EVERY 6 HOURS AS NEEDED FOR WHEEZE, Disp: 18 Inhaler, Rfl: 1    Aspirin 81 MG EC tablet, Take by mouth, Disp: , Rfl:     atenolol-chlorthalidone (TENORETIC) 50-25 mg per tablet, TAKE 1 TABLET BY MOUTH EVERY DAY, Disp: 90 tablet, Rfl: 1    Cholecalciferol (VITAMIN D3) 1000 units CAPS, Take 2,000 Units by mouth, Disp: , Rfl:     clindamycin (CLEOCIN T) 1 % lotion, APPLY ONCE DAILY TO AFFECTED AREAS UNDER ARMS , Disp: , Rfl: 3    Cyanocobalamin (B-12) 1000 MCG CAPS, Take by mouth, Disp: , Rfl:     escitalopram (LEXAPRO) 20 mg tablet, TAKE 1 TABLET BY MOUTH EVERY DAY, Disp: 90 tablet, Rfl: 0    famotidine (PEPCID) 40 MG tablet, , Disp: , Rfl:     KLOR-CON M20 20 MEQ tablet, TAKE 1 TABLET (20 MEQ TOTAL) BY MOUTH DAILY FOR 90 DAYS, Disp: 90 tablet, Rfl: 1    loperamide (IMODIUM A-D) 2 MG tablet, Take 2 mg by mouth 4 (four) times a day as needed for diarrhea, Disp: , Rfl:     loratadine (CLARITIN) 10 mg tablet, Take 10 mg by mouth, Disp: , Rfl:     mometasone (NASONEX) 50 mcg/act nasal spray, 2 sprays into each nostril daily, Disp: 17 g, Rfl: 5    montelukast (SINGULAIR) 10 mg tablet, TAKE 1 TABLET BY MOUTH EVERY DAY, Disp: 90 tablet, Rfl: 3    olopatadine HCl (PATADAY) 0 2 % opth drops, Administer 1 drop to both eyes daily, Disp: 2 5 mL, Rfl: 5    pantoprazole (PROTONIX) 40 mg tablet, TAKE 1 TABLET BY MOUTH EVERY DAY, Disp: 90 tablet, Rfl: 3    famotidine (PEPCID) 40 MG tablet, TAKE 1 TABLET (40 MG TOTAL) BY MOUTH DAILY FOR 90 DAYS, Disp: 90 tablet, Rfl: 3    naproxen sodium (ALEVE) 220 MG tablet, Take 2 tablets (440 mg total) by mouth 2 (two) times a day with meals, Disp: 120 tablet, Rfl: 0        Scribe Attestation    I,:   Roberto García am acting as a scribe while in the presence of the attending physician :        I,:   Jose Richardson MD personally performed the services described in this documentation    as scribed in my presence :

## 2020-03-19 ENCOUNTER — TELEPHONE (OUTPATIENT)
Dept: FAMILY MEDICINE CLINIC | Facility: CLINIC | Age: 55
End: 2020-03-19

## 2020-03-19 DIAGNOSIS — R79.89 ELEVATED LFTS: ICD-10-CM

## 2020-03-19 DIAGNOSIS — D72.829 LEUKOCYTOSIS, UNSPECIFIED TYPE: Primary | ICD-10-CM

## 2020-03-28 DIAGNOSIS — I10 ESSENTIAL HYPERTENSION: ICD-10-CM

## 2020-03-28 RX ORDER — ATENOLOL AND CHLORTHALIDONE TABLET 50; 25 MG/1; MG/1
TABLET ORAL
Qty: 90 TABLET | Refills: 1 | Status: SHIPPED | OUTPATIENT
Start: 2020-03-28 | End: 2020-09-24

## 2020-04-17 ENCOUNTER — TELEPHONE (OUTPATIENT)
Dept: GASTROENTEROLOGY | Facility: MEDICAL CENTER | Age: 55
End: 2020-04-17

## 2020-04-20 ENCOUNTER — TELEPHONE (OUTPATIENT)
Dept: GASTROENTEROLOGY | Facility: MEDICAL CENTER | Age: 55
End: 2020-04-20

## 2020-04-20 DIAGNOSIS — F33.1 MODERATE EPISODE OF RECURRENT MAJOR DEPRESSIVE DISORDER (HCC): ICD-10-CM

## 2020-04-20 RX ORDER — ESCITALOPRAM OXALATE 20 MG/1
TABLET ORAL
Qty: 90 TABLET | Refills: 0 | Status: SHIPPED | OUTPATIENT
Start: 2020-04-20 | End: 2020-07-15

## 2020-06-26 DIAGNOSIS — J30.89 CHRONIC NONSEASONAL ALLERGIC RHINITIS DUE TO POLLEN: ICD-10-CM

## 2020-06-26 RX ORDER — MONTELUKAST SODIUM 10 MG/1
TABLET ORAL
Qty: 90 TABLET | Refills: 3 | Status: SHIPPED | OUTPATIENT
Start: 2020-06-26 | End: 2021-07-02

## 2020-07-13 DIAGNOSIS — J30.89 CHRONIC NONSEASONAL ALLERGIC RHINITIS DUE TO POLLEN: ICD-10-CM

## 2020-07-13 RX ORDER — MOMETASONE FUROATE 50 UG/1
SPRAY, METERED NASAL
Qty: 17 G | Refills: 1 | Status: SHIPPED | OUTPATIENT
Start: 2020-07-13 | End: 2020-08-06

## 2020-07-15 DIAGNOSIS — F33.1 MODERATE EPISODE OF RECURRENT MAJOR DEPRESSIVE DISORDER (HCC): ICD-10-CM

## 2020-07-15 RX ORDER — ESCITALOPRAM OXALATE 20 MG/1
TABLET ORAL
Qty: 90 TABLET | Refills: 0 | Status: SHIPPED | OUTPATIENT
Start: 2020-07-15 | End: 2020-10-14

## 2020-07-30 DIAGNOSIS — E87.6 HYPOKALEMIA: ICD-10-CM

## 2020-07-31 RX ORDER — POTASSIUM CHLORIDE 1500 MG/1
TABLET, EXTENDED RELEASE ORAL
Qty: 90 TABLET | Refills: 1 | Status: SHIPPED | OUTPATIENT
Start: 2020-07-31 | End: 2020-12-09

## 2020-08-06 DIAGNOSIS — J30.89 CHRONIC NONSEASONAL ALLERGIC RHINITIS DUE TO POLLEN: ICD-10-CM

## 2020-08-06 RX ORDER — MOMETASONE FUROATE 50 UG/1
SPRAY, METERED NASAL
Qty: 17 G | Refills: 1 | Status: SHIPPED | OUTPATIENT
Start: 2020-08-06 | End: 2020-09-01

## 2020-08-10 ENCOUNTER — TELEMEDICINE (OUTPATIENT)
Dept: FAMILY MEDICINE CLINIC | Facility: CLINIC | Age: 55
End: 2020-08-10
Payer: COMMERCIAL

## 2020-08-10 VITALS — TEMPERATURE: 98.7 F

## 2020-08-10 DIAGNOSIS — L73.2 HYDRADENITIS: ICD-10-CM

## 2020-08-10 DIAGNOSIS — F33.1 MODERATE EPISODE OF RECURRENT MAJOR DEPRESSIVE DISORDER (HCC): ICD-10-CM

## 2020-08-10 DIAGNOSIS — J02.9 PHARYNGITIS, UNSPECIFIED ETIOLOGY: Primary | ICD-10-CM

## 2020-08-10 DIAGNOSIS — I10 ESSENTIAL HYPERTENSION: ICD-10-CM

## 2020-08-10 PROCEDURE — 3079F DIAST BP 80-89 MM HG: CPT | Performed by: FAMILY MEDICINE

## 2020-08-10 PROCEDURE — 3074F SYST BP LT 130 MM HG: CPT | Performed by: FAMILY MEDICINE

## 2020-08-10 PROCEDURE — 99214 OFFICE O/P EST MOD 30 MIN: CPT | Performed by: FAMILY MEDICINE

## 2020-08-10 PROCEDURE — 1036F TOBACCO NON-USER: CPT | Performed by: FAMILY MEDICINE

## 2020-08-10 RX ORDER — SULFAMETHOXAZOLE AND TRIMETHOPRIM 800; 160 MG/1; MG/1
1 TABLET ORAL EVERY 12 HOURS SCHEDULED
Qty: 20 TABLET | Refills: 0 | Status: SHIPPED | OUTPATIENT
Start: 2020-08-10 | End: 2020-08-20

## 2020-08-10 RX ORDER — TRAZODONE HYDROCHLORIDE 50 MG/1
50 TABLET ORAL
Qty: 30 TABLET | Refills: 5 | Status: SHIPPED | OUTPATIENT
Start: 2020-08-10 | End: 2021-01-12

## 2020-08-10 NOTE — PROGRESS NOTES
Virtual Regular Visit      Assessment/Plan:    Problem List Items Addressed This Visit     Depression     Patient reports increased anxiety lately  She is also having worsening depression symptoms  She is quite concerned about COVID-19, as well as her mother's health, and her father coming home to live with her mom (from nursing home)  At this point, she is not able to get to sleep well  Recommend add trazodone, 50 mg at HS  Follow in 2 weeks if needed  Relevant Medications    traZODone (DESYREL) 50 mg tablet    Hydradenitis     Patient is using topical clindamycin  She is noting increasing symptoms lately  Given that she also has the possibility of pharyngitis, will start Bactrim DS  Patient is allergic to multiple medications including penicillins  She may hold the clindamycin cream while she is using the oral Bactrim  Follow with dermatology in the future  Relevant Medications    sulfamethoxazole-trimethoprim (Bactrim DS) 800-160 mg per tablet    Hypertension     No changes  Other Visit Diagnoses     Pharyngitis, unspecified etiology    -  Primary    Recommend Bactrim DS  Patient is allergic to multiple medications  This should help out with strep  No changes otherwise  Relevant Medications    sulfamethoxazole-trimethoprim (Bactrim DS) 800-160 mg per tablet               Reason for visit is   Chief Complaint   Patient presents with    Sore Throat     c/o very sore throat, tonsils swollen, neck swollen   Cough     Slight Cough    Fever     Low fever  Onset 3 days  mjs    Virtual Regular Visit        Encounter provider Tara Salinas MD    Provider located at [address] [place] [address] PRIMARY CARE  [address] [place] [address]      Recent Visits  No visits were found meeting these conditions     Showing recent visits within past 7 days and meeting all other requirements     Today's Visits  Date Type Provider Dept   08/10/20 Telemedicine Elmer William Winston Roland, [address] [place] [address] Primary Care   Showing today's visits and meeting all other requirements     Future Appointments  No visits were found meeting these conditions  Showing future appointments within next 150 days and meeting all other requirements        The patient was identified by name and date of birth  Harish Puri was informed that this is a telemedicine visit and that the visit is being conducted through [employer] and patient was informed that this is not a secure, HIPAA-complaint platform  She agrees to proceed     My office door was closed  No one else was in the room  She acknowledged consent and understanding of privacy and security of the video platform  The patient has agreed to participate and understands they can discontinue the visit at any time  Patient is aware this is a billable service  Subjective  Harish Puri is a 50 y o  female   Chief Complaint   Patient presents with    Sore Throat     c/o very sore throat, tonsils swollen, neck swollen   Cough     Slight Cough    Fever     Low fever  Onset 3 days  mjs    Virtual Regular Visit        Patient with ST, tonsil irritation, left neck discomfort as well  Has been for 3 days  She noted some material on the tonsil  Noted on both sides, but left worse  Patient did mention that she has some irritation in the axilla bilaterally  Has been for a bit now  She was seeing dermatology before for hidradenitis  They recommended that she use antibacterial wash, and then switched her to clindamycin topical   Patient feels the clindamycin is no longer doing much for her  Has been having increased depression and stress at home with family  She is concerned about her mother, and her father returning home from nursing home  She is currently on Lexapro 20 mg, but does not feel that it is working as well as it used to  Hypertension:  Denies any current issues         Past Medical History:   Diagnosis Date    Ankle fracture     Anxiety     Asthma     Chronic venous embolism and thrombosis of deep vessels of lower extremity (HCC)     Costochondritis     RESOLVED: [id number]    Depression     Endometriosis     Fibromyalgia     GERD (gastroesophageal reflux disease)     Hematuria     LAST ASSESSED: [id number]    Hydradenitis     axillary area and under breast    Hypertension     LAST ASSESSED: [id number]    Irregular heart beat     Pulmonary embolism ([address] )     RESOLED: [id number] - secondary to a lupron injection for endometriosis    RBBB (right bundle branch block)     Sleep apnea     Cannot tolerate CPAP    Umbilical hernia     LAST ASSESSED: [id number]       Past Surgical History:   Procedure Laterality Date    HERNIA REPAIR      umbilical    HUMERUS FRACTURE SURGERY W/ IMPLANT Left     HYSTERECTOMY  2014    partial    PELVIC LAPAROSCOPY      x4 for endometriosis    SC ESOPHAGOGASTRODUODENOSCOPY TRANSORAL DIAGNOSTIC N/A 5/2/2019    Procedure: ESOPHAGOGASTRODUODENOSCOPY (EGD) with bx;  Surgeon: Nicolas Barrera MD;  Location: [facility]; Service: Gastroenterology       Current Outpatient Medications   Medication Sig Dispense Refill    albuterol (PROVENTIL HFA,VENTOLIN HFA) 90 mcg/act inhaler TAKE 2 PUFFS BY MOUTH EVERY 6 HOURS AS NEEDED FOR WHEEZE 18 Inhaler 1    Aspirin 81 MG EC tablet Take by mouth      atenolol-chlorthalidone (TENORETIC) 50-25 mg per tablet TAKE 1 TABLET BY MOUTH EVERY DAY 90 tablet 1    Cholecalciferol (VITAMIN D3) 1000 units CAPS Take 2,000 Units by mouth      clindamycin (CLEOCIN T) 1 % lotion APPLY ONCE DAILY TO AFFECTED AREAS UNDER ARMS    3    Cyanocobalamin (B-12) 1000 MCG CAPS Take by mouth      escitalopram (LEXAPRO) 20 mg tablet TAKE 1 TABLET BY MOUTH EVERY DAY 90 tablet 0    famotidine (PEPCID) 40 MG tablet       KLOR-CON M20 20 MEQ tablet TAKE 1 TABLET (20 MEQ TOTAL) BY MOUTH DAILY 90 tablet 1    loperamide (IMODIUM A-D) 2 MG tablet Take 2 mg by mouth 4 (four) times a day as needed for diarrhea      loratadine (CLARITIN) 10 mg tablet Take 10 mg by mouth      mometasone (NASONEX) 50 mcg/act nasal spray SPRAY 2 SPRAYS INTO EACH NOSTRIL EVERY DAY 17 g 1    montelukast (SINGULAIR) 10 mg tablet TAKE 1 TABLET BY MOUTH EVERY DAY 90 tablet 3    olopatadine HCl (PATADAY) 0 2 % opth drops Administer 1 drop to both eyes daily 2 5 mL 5    pantoprazole (PROTONIX) 40 mg tablet TAKE 1 TABLET BY MOUTH EVERY DAY 90 tablet 3    famotidine (PEPCID) 40 MG tablet TAKE 1 TABLET (40 MG TOTAL) BY MOUTH DAILY FOR 90 DAYS 90 tablet 3    sulfamethoxazole-trimethoprim (Bactrim DS) 800-160 mg per tablet Take 1 tablet by mouth every 12 (twelve) hours for 10 days 20 tablet 0    traZODone (DESYREL) 50 mg tablet Take 1 tablet (50 mg total) by mouth daily at bedtime 30 tablet 5     No current facility-administered medications for this visit  Allergies   Allergen Reactions    Albuterol      Other reaction(s): felt weird    Azithromycin GI Intolerance    Duloxetine      Category: Adverse Reaction; Annotation - [id number]: Sweating    Erythromycin Vomiting    Hydrocodone-Acetaminophen      Other reaction(s): sweating, feel faint, diarrhea    Hydrocodone-Acetaminophen     Ketorolac Diarrhea and Nausea Only     Category: Adverse Reaction; Annotation - [id number]: Symptoms were noted after injection into bursa with Toradol at orthopedics    Penicillin G     Penicillins Hives and Vomiting    Tramadol        Review of Systems   Constitutional: Positive for fever (99 6 this AM )  Negative for activity change, appetite change and chills  HENT: Positive for congestion, postnasal drip (no different vs normal), sore throat and trouble swallowing  Negative for rhinorrhea  Eyes: Negative  Respiratory: Positive for cough (productive cough)  Negative for choking and shortness of breath  Cardiovascular: Negative  Gastrointestinal: Negative  Skin: Positive for rash         Video Exam    Vitals:    08/10/20 1418   Temp: 98 7 °F (37 1 °C)       Physical Exam  Vitals signs reviewed  Constitutional:       General: She is not in acute distress  Appearance: She is well-developed  HENT:      Head: Normocephalic and atraumatic  Mouth/Throat:      Comments: Unable to examine  Pulmonary:      Effort: Pulmonary effort is normal       Breath sounds: Normal breath sounds  Neurological:      Mental Status: She is alert  I spent 15 minutes directly with the patient during this visit      VIRTUAL VISIT DISCLAIMER    Carlos Enrique Penaloza acknowledges that she has consented to an online visit or consultation  She understands that the online visit is based solely on information provided by her, and that, in the absence of a face-to-face physical evaluation by the physician, the diagnosis she receives is both limited and provisional in terms of accuracy and completeness  This is not intended to replace a full medical face-to-face evaluation by the physician  Carlos Enrique Penaloza understands and accepts these terms

## 2020-08-10 NOTE — PATIENT INSTRUCTIONS
Problem List Items Addressed This Visit     Depression     Patient reports increased anxiety lately  She is also having worsening depression symptoms  She is quite concerned about COVID-19, as well as her mother's health, and her father coming home to live with her mom (from nursing home)  At this point, she is not able to get to sleep well  Recommend add trazodone, 50 mg at HS  Follow in 2 weeks if needed  Relevant Medications    traZODone (DESYREL) 50 mg tablet    Hydradenitis     Patient is using topical clindamycin  She is noting increasing symptoms lately  Given that she also has the possibility of pharyngitis, will start Bactrim DS  Patient is allergic to multiple medications including penicillins  She may hold the clindamycin cream while she is using the oral Bactrim  Follow with dermatology in the future  Relevant Medications    sulfamethoxazole-trimethoprim (Bactrim DS) 800-160 mg per tablet    Hypertension     No changes  Other Visit Diagnoses     Pharyngitis, unspecified etiology    -  Primary    Recommend Bactrim DS  Patient is allergic to multiple medications  This should help out with strep  No changes otherwise  Relevant Medications    sulfamethoxazole-trimethoprim (Bactrim DS) 800-160 mg per tablet          COVID 19 Instructions    Theresa Lopez was advised to limit contact with others to essential tasks such as getting food, medications, and medical care  Proper handwashing reviewed, and Hand sanitzer when washing is not available  If the patient develops symptoms of COVID 19, the patient should call the office as soon as possible  Please try to download [employer] Du[employer]  Once you do download this on your phone, you will be prompted to add your phone number to the account  After that, he should receive a text from [employer], and use that code to verify your phone number    After that, you should be able to use Google Duo to receive and make video calls  Please download Microsoft Teams to your phone or computer  We will be transitioning to this platform for Video Visits  Instructions for downloading this are available from the office

## 2020-08-10 NOTE — ASSESSMENT & PLAN NOTE
Patient reports increased anxiety lately  She is also having worsening depression symptoms  She is quite concerned about COVID-19, as well as her mother's health, and her father coming home to live with her mom (from nursing home)  At this point, she is not able to get to sleep well  Recommend add trazodone, 50 mg at HS  Follow in 2 weeks if needed

## 2020-08-10 NOTE — ASSESSMENT & PLAN NOTE
Patient is using topical clindamycin  She is noting increasing symptoms lately  Given that she also has the possibility of pharyngitis, will start Bactrim DS  Patient is allergic to multiple medications including penicillins  She may hold the clindamycin cream while she is using the oral Bactrim  Follow with dermatology in the future

## 2020-09-01 DIAGNOSIS — J30.89 CHRONIC NONSEASONAL ALLERGIC RHINITIS DUE TO POLLEN: ICD-10-CM

## 2020-09-01 RX ORDER — MOMETASONE FUROATE 50 UG/1
SPRAY, METERED NASAL
Qty: 17 G | Refills: 1 | Status: SHIPPED | OUTPATIENT
Start: 2020-09-01 | End: 2020-10-01

## 2020-09-24 DIAGNOSIS — I10 ESSENTIAL HYPERTENSION: ICD-10-CM

## 2020-09-24 RX ORDER — ATENOLOL AND CHLORTHALIDONE TABLET 50; 25 MG/1; MG/1
TABLET ORAL
Qty: 90 TABLET | Refills: 1 | Status: SHIPPED | OUTPATIENT
Start: 2020-09-24 | End: 2021-02-19

## 2020-10-01 DIAGNOSIS — J30.89 CHRONIC NONSEASONAL ALLERGIC RHINITIS DUE TO POLLEN: ICD-10-CM

## 2020-10-01 RX ORDER — MOMETASONE FUROATE 50 UG/1
SPRAY, METERED NASAL
Qty: 17 G | Refills: 5 | Status: SHIPPED | OUTPATIENT
Start: 2020-10-01 | End: 2021-04-23

## 2020-10-14 DIAGNOSIS — F33.1 MODERATE EPISODE OF RECURRENT MAJOR DEPRESSIVE DISORDER (HCC): ICD-10-CM

## 2020-10-14 RX ORDER — ESCITALOPRAM OXALATE 20 MG/1
TABLET ORAL
Qty: 90 TABLET | Refills: 0 | Status: SHIPPED | OUTPATIENT
Start: 2020-10-14 | End: 2021-01-28 | Stop reason: SDUPTHER

## 2020-10-28 ENCOUNTER — OFFICE VISIT (OUTPATIENT)
Dept: FAMILY MEDICINE CLINIC | Facility: CLINIC | Age: 55
End: 2020-10-28
Payer: MEDICARE

## 2020-10-28 VITALS
WEIGHT: 254 LBS | BODY MASS INDEX: 42.32 KG/M2 | HEART RATE: 72 BPM | DIASTOLIC BLOOD PRESSURE: 82 MMHG | SYSTOLIC BLOOD PRESSURE: 114 MMHG | HEIGHT: 65 IN | TEMPERATURE: 97.5 F

## 2020-10-28 DIAGNOSIS — Z23 NEED FOR INFLUENZA VACCINATION: ICD-10-CM

## 2020-10-28 DIAGNOSIS — M77.11 LATERAL EPICONDYLITIS OF RIGHT ELBOW: Primary | ICD-10-CM

## 2020-10-28 PROCEDURE — G0008 ADMIN INFLUENZA VIRUS VAC: HCPCS | Performed by: FAMILY MEDICINE

## 2020-10-28 PROCEDURE — 99213 OFFICE O/P EST LOW 20 MIN: CPT | Performed by: FAMILY MEDICINE

## 2020-10-28 PROCEDURE — 90682 RIV4 VACC RECOMBINANT DNA IM: CPT | Performed by: FAMILY MEDICINE

## 2020-10-28 NOTE — ASSESSMENT & PLAN NOTE
Patient's BMI is quite elevated  She has effects from this, including hypertension, osteoarthritis, sleep apnea  She did ask about weight loss surgery  At this point, I would recommend that she talk with the weight loss surgery program, and consider medical bariatric as well  normal (ped)...

## 2020-12-09 DIAGNOSIS — E87.6 HYPOKALEMIA: ICD-10-CM

## 2020-12-09 RX ORDER — POTASSIUM CHLORIDE 1500 MG/1
TABLET, EXTENDED RELEASE ORAL
Qty: 90 TABLET | Refills: 1 | Status: SHIPPED | OUTPATIENT
Start: 2020-12-09 | End: 2021-01-12

## 2021-01-12 DIAGNOSIS — F33.1 MODERATE EPISODE OF RECURRENT MAJOR DEPRESSIVE DISORDER (HCC): ICD-10-CM

## 2021-01-12 DIAGNOSIS — E87.6 HYPOKALEMIA: ICD-10-CM

## 2021-01-12 RX ORDER — TRAZODONE HYDROCHLORIDE 50 MG/1
TABLET ORAL
Qty: 90 TABLET | Refills: 1 | Status: SHIPPED | OUTPATIENT
Start: 2021-01-12 | End: 2021-07-12

## 2021-01-12 RX ORDER — POTASSIUM CHLORIDE 1500 MG/1
TABLET, EXTENDED RELEASE ORAL
Qty: 90 TABLET | Refills: 1 | Status: SHIPPED | OUTPATIENT
Start: 2021-01-12 | End: 2021-07-12

## 2021-01-22 DIAGNOSIS — J30.89 CHRONIC NONSEASONAL ALLERGIC RHINITIS DUE TO POLLEN: ICD-10-CM

## 2021-01-23 RX ORDER — ALBUTEROL SULFATE 90 UG/1
AEROSOL, METERED RESPIRATORY (INHALATION)
Qty: 18 INHALER | Refills: 1 | Status: SHIPPED | OUTPATIENT
Start: 2021-01-23 | End: 2021-03-26

## 2021-01-28 DIAGNOSIS — F33.1 MODERATE EPISODE OF RECURRENT MAJOR DEPRESSIVE DISORDER (HCC): ICD-10-CM

## 2021-01-28 RX ORDER — ESCITALOPRAM OXALATE 20 MG/1
20 TABLET ORAL DAILY
Qty: 90 TABLET | Refills: 1 | Status: SHIPPED | OUTPATIENT
Start: 2021-01-28 | End: 2021-07-30

## 2021-02-16 DIAGNOSIS — R13.19 ESOPHAGEAL DYSPHAGIA: Primary | ICD-10-CM

## 2021-02-16 DIAGNOSIS — K21.00 GASTROESOPHAGEAL REFLUX DISEASE WITH ESOPHAGITIS, UNSPECIFIED WHETHER HEMORRHAGE: ICD-10-CM

## 2021-02-17 RX ORDER — FAMOTIDINE 40 MG/1
TABLET, FILM COATED ORAL
Qty: 90 TABLET | Refills: 3 | Status: SHIPPED | OUTPATIENT
Start: 2021-02-17 | End: 2022-02-16

## 2021-02-19 DIAGNOSIS — I10 ESSENTIAL HYPERTENSION: ICD-10-CM

## 2021-02-19 RX ORDER — ATENOLOL AND CHLORTHALIDONE TABLET 50; 25 MG/1; MG/1
TABLET ORAL
Qty: 90 TABLET | Refills: 1 | Status: SHIPPED | OUTPATIENT
Start: 2021-02-19 | End: 2021-08-19

## 2021-02-23 ENCOUNTER — TELEPHONE (OUTPATIENT)
Dept: BARIATRICS | Facility: CLINIC | Age: 56
End: 2021-02-23

## 2021-03-26 DIAGNOSIS — J30.89 CHRONIC NONSEASONAL ALLERGIC RHINITIS DUE TO POLLEN: ICD-10-CM

## 2021-03-26 RX ORDER — ALBUTEROL SULFATE 90 UG/1
AEROSOL, METERED RESPIRATORY (INHALATION)
Qty: 18 INHALER | Refills: 2 | Status: SHIPPED | OUTPATIENT
Start: 2021-03-26 | End: 2021-07-02

## 2021-04-13 ENCOUNTER — OFFICE VISIT (OUTPATIENT)
Dept: OBGYN CLINIC | Facility: MEDICAL CENTER | Age: 56
End: 2021-04-13
Payer: MEDICARE

## 2021-04-13 VITALS
HEIGHT: 65 IN | WEIGHT: 253 LBS | BODY MASS INDEX: 42.15 KG/M2 | HEART RATE: 73 BPM | SYSTOLIC BLOOD PRESSURE: 156 MMHG | DIASTOLIC BLOOD PRESSURE: 90 MMHG

## 2021-04-13 DIAGNOSIS — M17.0 PRIMARY OSTEOARTHRITIS OF BOTH KNEES: Primary | ICD-10-CM

## 2021-04-13 PROCEDURE — 99213 OFFICE O/P EST LOW 20 MIN: CPT | Performed by: PHYSICIAN ASSISTANT

## 2021-04-13 PROCEDURE — 20610 DRAIN/INJ JOINT/BURSA W/O US: CPT | Performed by: PHYSICIAN ASSISTANT

## 2021-04-13 RX ORDER — METHYLPREDNISOLONE ACETATE 40 MG/ML
1 INJECTION, SUSPENSION INTRA-ARTICULAR; INTRALESIONAL; INTRAMUSCULAR; SOFT TISSUE
Status: COMPLETED | OUTPATIENT
Start: 2021-04-13 | End: 2021-04-13

## 2021-04-13 RX ORDER — BUPIVACAINE HYDROCHLORIDE 2.5 MG/ML
4 INJECTION, SOLUTION INFILTRATION; PERINEURAL
Status: COMPLETED | OUTPATIENT
Start: 2021-04-13 | End: 2021-04-13

## 2021-04-13 RX ADMIN — BUPIVACAINE HYDROCHLORIDE 4 ML: 2.5 INJECTION, SOLUTION INFILTRATION; PERINEURAL at 13:09

## 2021-04-13 RX ADMIN — METHYLPREDNISOLONE ACETATE 1 ML: 40 INJECTION, SUSPENSION INTRA-ARTICULAR; INTRALESIONAL; INTRAMUSCULAR; SOFT TISSUE at 13:09

## 2021-04-13 NOTE — PROGRESS NOTES
Orthopaedic Surgery - Office Note  Peyton Constantino (89 y o  female)   : 1965   MRN: [id number]  Encounter Date: 2021    Chief Complaint   Patient presents with    Left Knee - Follow-up    Right Knee - Follow-up       Assessment / Plan  Left knee, medial meniscus posterior root tear  Bilateral knee mild osteoarthritis     · We did have a long discussion about treatment options for osteoarthritis in the knees  These included conservative options such as weight loss, analgesics/NSAIDs, therapy and the different types of injections  We also briefly discussed surgical treatment which would most likely be a knee replacement when the patient has progressed  · After discussion of risk and benefits the patient did agree to CSI of the bilateral knee joints  These were prepared injected sterilely and tolerated well  · Patient was given a handout with home exercise program to continue at this time  · Continue with ice and analgesics as needed  · The patient will consider viscosupplementation as an option going forward  She will contact us if she would like to pursue this  Return if symptoms worsen or fail to improve  History of Present Illness  Peyton Constantino is a 50 y o  female   Follow-up for bilateral knee osteoarthritis  She is coming today for as steroid injection shin she has had good success with injections in the past   The last injections that she had a both knees was on 2020 which gave her pretty significantly for while  She has been doing a home exercise program from previous handouts that were given up to this point and feels that due to her pain is limiting for her to try to do it at this time  She has also been icing occasionally and taking Tylenol or Advil as needed which she feels offers little relief  She has had steroid injections in the past which she feels has helped  She has not had viscosupplementation or been to outpatient physical therapy up to this point    She has not used any bracing  Review of Systems  Pertinent items are noted in HPI  All other systems were reviewed and are negative  Physical Exam  /90   Pulse 73   Ht 5' 5" (1 651 m)   Wt 115 kg (253 lb)   BMI 42 10 kg/m²   Cons: Appears well  No apparent distress  Psych: Alert  Oriented x3  Mood and affect normal   Eyes: PERRLA, EOMI  Resp: Normal effort  No audible wheezing or stridor  CV: Palpable pulse  No discernable arrhythmia  No LE edema  Lymph:  No palpable cervical, axillary, or inguinal lymphadenopathy  Skin: Warm  No palpable masses  No visible lesions  Neuro: Normal muscle tone  Normal and symmetric DTR's  Bilateral Knee Exam  Alignment:  Normal knee alignment  Inspection:  mild medial knee  swelling  Palpation:  medial joint line tenderness  ROM:  Knee Extension 0  Knee Flexion 125  Strength:  Able to actively extend knee against gravity  Stability:  No objective knee instability  Stable Varus / Valgus stress, Lachman, and Posterior drawer  Tests:  No pertinent positive or negative tests  Patella:  Patella tracks centrally with crepitus  Neurovascular:  Sensation intact in DP/SP/Villavicencio/Sa/T nerve distributions  2+ DP & PT pulses  Gait:  Antalgic  Studies Reviewed  I have personally reviewed pertinent films in PACS and my interpretation is xray of left knee on 3/17/2020 demonstrates mild degenerative changes with mild medial joint space narrowing  Xray of right knee on 10/25/2019 demonstrates mild degenerative changes with mild medial joint space narrowing      Large joint arthrocentesis: bilateral knee  Universal Protocol:  Consent given by: patient  Patient identity confirmed: verbally with patient    Supporting Documentation  Indications: pain   Procedure Details  Location: knee - bilateral knee  Needle size: 25 G  Approach: anterolateral    Medications (Right): 4 mL bupivacaine 0 25 %; 1 mL methylPREDNISolone acetate 40 mg/mLMedications (Left): 4 mL bupivacaine 0 25 %; 1 mL methylPREDNISolone acetate 40 mg/mL   Patient tolerance: patient tolerated the procedure well with no immediate complications  Dressing:  Sterile dressing applied             Medical, Surgical, Family, and Social History  The patient's medical history, family history, and social history, were reviewed and updated as appropriate  Past Medical History:   Diagnosis Date    Ankle fracture     Anxiety     Asthma     Chronic venous embolism and thrombosis of deep vessels of lower extremity (HCC)     Costochondritis     RESOLVED: [id number]    Depression     Endometriosis     Fibromyalgia     GERD (gastroesophageal reflux disease)     Hematuria     LAST ASSESSED: [id number]    Hydradenitis     axillary area and under breast    Hypertension     LAST ASSESSED: [id number]    Irregular heart beat     Pulmonary embolism ([address] )     RESOLED: [id number] - secondary to a lupron injection for endometriosis    RBBB (right bundle branch block)     Sleep apnea     Cannot tolerate CPAP    Umbilical hernia     LAST ASSESSED: [id number]       Past Surgical History:   Procedure Laterality Date    HERNIA REPAIR      umbilical    HUMERUS FRACTURE SURGERY W/ IMPLANT Left     HYSTERECTOMY  2014    partial    PELVIC LAPAROSCOPY      x4 for endometriosis    SD ESOPHAGOGASTRODUODENOSCOPY TRANSORAL DIAGNOSTIC N/A 5/2/2019    Procedure: ESOPHAGOGASTRODUODENOSCOPY (EGD) with bx;  Surgeon: Vinicius Bautista MD;  Location: [facility];   Service: Gastroenterology       Family History   Problem Relation Age of Onset   Exelie Martineze Hypertension Mother     Diabetes Father     Hypertension Father     Obesity Father     No Known Problems Maternal Grandmother     No Known Problems Maternal Grandfather     Breast cancer Paternal Grandmother 80    No Known Problems Paternal Grandfather     No Known Problems Maternal Aunt        Social History     Occupational History    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   Tobacco Use    Smoking status: Former Smoker Packs/day: 0 50     Types: Cigarettes     Quit date: 8/10/2018     Years since quittin 6    Smokeless tobacco: Never Used    Tobacco comment: smokes 1 cigarette occasionally   Substance and Sexual Activity    Alcohol use: No    Drug use: No    Sexual activity: Not on file       Allergies   Allergen Reactions    Albuterol      Other reaction(s): felt weird    Azithromycin GI Intolerance    Duloxetine      Category: Adverse Reaction; Annotation - [id number]: Sweating    Erythromycin Vomiting    Hydrocodone-Acetaminophen      Other reaction(s): sweating, feel faint, diarrhea    Hydrocodone-Acetaminophen     Ketorolac Diarrhea and Nausea Only     Category: Adverse Reaction;  Annotation - [id number]: Symptoms were noted after injection into bursa with Toradol at orthopedics    Penicillin G     Penicillins Hives and Vomiting    Tramadol          Current Outpatient Medications:     albuterol (PROVENTIL HFA,VENTOLIN HFA) 90 mcg/act inhaler, INHALE 2 PUFFS EVERY 6 HOURS AS NEEDED FOR WHEEZE, Disp: 18 Inhaler, Rfl: 2    Aspirin 81 MG EC tablet, Take by mouth, Disp: , Rfl:     atenolol-chlorthalidone (TENORETIC) 50-25 mg per tablet, TAKE 1 TABLET BY MOUTH EVERY DAY, Disp: 90 tablet, Rfl: 1    Cholecalciferol (VITAMIN D3) 1000 units CAPS, Take 2,000 Units by mouth, Disp: , Rfl:     clindamycin (CLEOCIN T) 1 % lotion, APPLY ONCE DAILY TO AFFECTED AREAS UNDER ARMS , Disp: , Rfl: 3    Cyanocobalamin (B-12) 1000 MCG CAPS, Take by mouth, Disp: , Rfl:     Elastic Bandages & Supports (Aircast Tennis Elbow Support) MISC, by Does not apply route daily, Disp: 1 each, Rfl: 0    escitalopram (LEXAPRO) 20 mg tablet, Take 1 tablet (20 mg total) by mouth daily, Disp: 90 tablet, Rfl: 1    famotidine (PEPCID) 40 MG tablet, , Disp: , Rfl:     famotidine (PEPCID) 40 MG tablet, TAKE 1 TABLET (40 MG TOTAL) BY MOUTH DAILY FOR 90 DAYS, Disp: 90 tablet, Rfl: 3    Klor-Con M20 20 MEQ tablet, TAKE 1 TABLET BY MOUTH EVERY DAY, Disp: 90 tablet, Rfl: 1    loperamide (IMODIUM A-D) 2 MG tablet, Take 2 mg by mouth 4 (four) times a day as needed for diarrhea, Disp: , Rfl:     loratadine (CLARITIN) 10 mg tablet, Take 10 mg by mouth, Disp: , Rfl:     mometasone (NASONEX) 50 mcg/act nasal spray, SPRAY 2 SPRAYS INTO EACH NOSTRIL EVERY DAY, Disp: 17 g, Rfl: 5    montelukast (SINGULAIR) 10 mg tablet, TAKE 1 TABLET BY MOUTH EVERY DAY, Disp: 90 tablet, Rfl: 3    pantoprazole (PROTONIX) 40 mg tablet, TAKE 1 TABLET BY MOUTH EVERY DAY, Disp: 90 tablet, Rfl: 3    traZODone (DESYREL) 50 mg tablet, TAKE 1 TABLET BY MOUTH DAILY AT BEDTIME, Disp: 90 tablet, Rfl: 1    olopatadine HCl (PATADAY) 0 2 % opth drops, Administer 1 drop to both eyes daily, Disp: 2 5 mL, Rfl: 5        Scribe Attestation    I,:   am acting as a scribe while in the presence of the attending physician :       I,:   personally performed the services described in this documentation    as scribed in my presence :

## 2021-04-14 DIAGNOSIS — K21.9 GASTROESOPHAGEAL REFLUX DISEASE: ICD-10-CM

## 2021-04-14 RX ORDER — PANTOPRAZOLE SODIUM 40 MG/1
TABLET, DELAYED RELEASE ORAL
Qty: 90 TABLET | Refills: 3 | Status: SHIPPED | OUTPATIENT
Start: 2021-04-14 | End: 2022-04-05

## 2021-04-15 ENCOUNTER — TELEMEDICINE (OUTPATIENT)
Dept: FAMILY MEDICINE CLINIC | Facility: CLINIC | Age: 56
End: 2021-04-15
Payer: MEDICARE

## 2021-04-15 DIAGNOSIS — J30.89 CHRONIC NONSEASONAL ALLERGIC RHINITIS DUE TO POLLEN: ICD-10-CM

## 2021-04-15 DIAGNOSIS — B34.9 VIRAL INFECTION, UNSPECIFIED: ICD-10-CM

## 2021-04-15 DIAGNOSIS — I10 ESSENTIAL HYPERTENSION: ICD-10-CM

## 2021-04-15 DIAGNOSIS — Z03.818 ENCOUNTER FOR OBSERVATION FOR SUSPECTED EXPOSURE TO OTHER BIOLOGICAL AGENTS RULED OUT: ICD-10-CM

## 2021-04-15 DIAGNOSIS — J01.41 ACUTE RECURRENT PANSINUSITIS: Primary | ICD-10-CM

## 2021-04-15 PROBLEM — K64.9 BLEEDING HEMORRHOIDS: Status: RESOLVED | Noted: 2017-04-13 | Resolved: 2021-04-15

## 2021-04-15 PROCEDURE — 99214 OFFICE O/P EST MOD 30 MIN: CPT | Performed by: FAMILY MEDICINE

## 2021-04-15 RX ORDER — SULFAMETHOXAZOLE AND TRIMETHOPRIM 800; 160 MG/1; MG/1
1 TABLET ORAL EVERY 12 HOURS SCHEDULED
Qty: 20 TABLET | Refills: 0 | Status: SHIPPED | OUTPATIENT
Start: 2021-04-15 | End: 2021-04-25

## 2021-04-15 NOTE — PROGRESS NOTES
Virtual Regular Visit      Assessment/Plan:    Problem List Items Addressed This Visit     Acute recurrent pansinusitis - Primary     Patient likely has a recurrent sinus infection  This is been a problem for her ongoing  At this point, would recommend Bactrim DS, follow-up in the future as needed  Relevant Medications    sulfamethoxazole-trimethoprim (Bactrim DS) 800-160 mg per tablet    Chronic nonseasonal allergic rhinitis due to pollen     Patient was on mometasone previously by nasal spray, but that does not appear to be covered by her insurance  Will change to budesonide  Brand name is Rhinocort  If this is covered, that would be reasonable  If it is not covered, will request a list of what is covered so that we can choose the appropriate spray  Of note, the patient has been off nasal spray for about the last month or so, as had a worsening in her allergy symptoms while she is off the spray  Relevant Medications    budesonide (RINOCORT AQUA) 32 MCG/ACT nasal spray    Hypertension     Blood pressure today is unable to be checked  At Orthopedics, it was elevated  Her last visit here was reasonable  Would recommend follow-up at office visit to review  Other Visit Diagnoses     Encounter for observation for suspected exposure to other biological agents ruled out        Possible COVID given the current climate of virus being around and her symptoms  Check swab  Relevant Orders    Novel Coronavirus (Covid-19),PCR [facility] - Collected at [facility] or [facility]    Viral infection, unspecified        Possible COVID, but likely is just sinus infection in allergies  Would recommend check swab      Relevant Orders    Novel Coronavirus (Covid-19),PCR [facility] - Collected at [facility] or [facility]               Reason for visit is   Chief Complaint   Patient presents with    Virtual Regular Visit    Sore Throat     Onset Sunday    Cough     Monday    Fever    Earache     Right    Medication Refill     She needs a refill on a nasal spray  It was denied  mjs    Virtual Regular Visit        Encounter provider Yamilka Jones MD    Provider located at [address] [place] [address] PRIMARY CARE  [address]      Recent Visits  No visits were found meeting these conditions  Showing recent visits within past 7 days and meeting all other requirements     Today's Visits  Date Type Provider Dept   04/15/21 Telemedicine Yamilka Jones MD [facility]   Showing today's visits and meeting all other requirements     Future Appointments  No visits were found meeting these conditions  Showing future appointments within next 150 days and meeting all other requirements        The patient was identified by name and date of birth  Chang Postal was informed that this is a telemedicine visit and that the visit is being conducted through [employer] and patient was informed that this is not a secure, HIPAA-compliant platform  She agrees to proceed     My office door was closed  No one else was in the room  She acknowledged consent and understanding of privacy and security of the video platform  The patient has agreed to participate and understands they can discontinue the visit at any time  Patient is aware this is a billable service  Subjective  Chang Postal is a 50 y o  female   Chief Complaint   Patient presents with    Virtual Regular Visit    Sore Throat     Onset Sunday    Cough     Monday    Fever    Earache     Right    Medication Refill     She needs a refill on a nasal spray  It was denied  mjs    Virtual Regular Visit        Cough, ST, emesis of mucous  Congestion  Sunday, noted swollen and red uvula  Some tightness in the throat  Post nasal drip  Runny nose as well  No tooth or face pain  Has HA  Some ear pain on the left  Has had some increased HR, and noted an increase in BP at ortho  She does not have a BP cuff at home      She does have severe seasonal allergies  Past Medical History:   Diagnosis Date    Ankle fracture     Anxiety     Asthma     Bleeding hemorrhoids 4/13/2017    Chronic venous embolism and thrombosis of deep vessels of lower extremity (HCC)     Costochondritis     RESOLVED: [id number]    Depression     Endometriosis     Fibromyalgia     GERD (gastroesophageal reflux disease)     Hematuria     LAST ASSESSED: [id number]    Hydradenitis     axillary area and under breast    Hypertension     LAST ASSESSED: [id number]    Irregular heart beat     Pulmonary embolism ([address] )     RESOLED: [id number] - secondary to a lupron injection for endometriosis    RBBB (right bundle branch block)     Sleep apnea     Cannot tolerate CPAP    Umbilical hernia     LAST ASSESSED: [id number]       Past Surgical History:   Procedure Laterality Date    HERNIA REPAIR      umbilical    HUMERUS FRACTURE SURGERY W/ IMPLANT Left     HYSTERECTOMY  2014    partial    PELVIC LAPAROSCOPY      x4 for endometriosis    MO ESOPHAGOGASTRODUODENOSCOPY TRANSORAL DIAGNOSTIC N/A 5/2/2019    Procedure: ESOPHAGOGASTRODUODENOSCOPY (EGD) with bx;  Surgeon: Leopold Ewings, MD;  Location: [facility]; Service: Gastroenterology       Current Outpatient Medications   Medication Sig Dispense Refill    albuterol (PROVENTIL HFA,VENTOLIN HFA) 90 mcg/act inhaler INHALE 2 PUFFS EVERY 6 HOURS AS NEEDED FOR WHEEZE 18 Inhaler 2    Aspirin 81 MG EC tablet Take by mouth      atenolol-chlorthalidone (TENORETIC) 50-25 mg per tablet TAKE 1 TABLET BY MOUTH EVERY DAY 90 tablet 1    Cholecalciferol (VITAMIN D3) 1000 units CAPS Take 2,000 Units by mouth      clindamycin (CLEOCIN T) 1 % lotion APPLY ONCE DAILY TO AFFECTED AREAS UNDER ARMS    3    Cyanocobalamin (B-12) 1000 MCG CAPS Take by mouth      Elastic Bandages & Supports (Aircast Tennis Elbow Support) MISC by Does not apply route daily 1 each 0    escitalopram (LEXAPRO) 20 mg tablet Take 1 tablet (20 mg total) by mouth daily 90 tablet 1    famotidine (PEPCID) 40 MG tablet TAKE 1 TABLET (40 MG TOTAL) BY MOUTH DAILY FOR 90 DAYS 90 tablet 3    Klor-Con M20 20 MEQ tablet TAKE 1 TABLET BY MOUTH EVERY DAY 90 tablet 1    loperamide (IMODIUM A-D) 2 MG tablet Take 2 mg by mouth 4 (four) times a day as needed for diarrhea      loratadine (CLARITIN) 10 mg tablet Take 10 mg by mouth      montelukast (SINGULAIR) 10 mg tablet TAKE 1 TABLET BY MOUTH EVERY DAY 90 tablet 3    pantoprazole (PROTONIX) 40 mg tablet TAKE 1 TABLET BY MOUTH EVERY DAY 90 tablet 3    traZODone (DESYREL) 50 mg tablet TAKE 1 TABLET BY MOUTH DAILY AT BEDTIME 90 tablet 1    budesonide (RINOCORT AQUA) 32 MCG/ACT nasal spray 2 sprays into each nostril daily 8 6 g 5    mometasone (NASONEX) 50 mcg/act nasal spray SPRAY 2 SPRAYS INTO EACH NOSTRIL EVERY DAY (Patient not taking: Reported on 4/15/2021) 17 g 5    olopatadine HCl (PATADAY) 0 2 % opth drops Administer 1 drop to both eyes daily 2 5 mL 5    sulfamethoxazole-trimethoprim (Bactrim DS) 800-160 mg per tablet Take 1 tablet by mouth every 12 (twelve) hours for 10 days 20 tablet 0     No current facility-administered medications for this visit  Allergies   Allergen Reactions    Albuterol      Other reaction(s): felt weird    Azithromycin GI Intolerance    Duloxetine      Category: Adverse Reaction; Annotation - [id number]: Sweating    Erythromycin Vomiting    Hydrocodone-Acetaminophen      Other reaction(s): sweating, feel faint, diarrhea    Hydrocodone-Acetaminophen     Ketorolac Diarrhea and Nausea Only     Category: Adverse Reaction; Annotation - [id number]: Symptoms were noted after injection into bursa with Toradol at orthopedics    Penicillin G     Penicillins Hives and Vomiting    Tramadol        Review of Systems   Constitutional: Positive for fatigue  Negative for fever  HENT: Positive for congestion, postnasal drip, rhinorrhea, sinus pressure and sore throat      Eyes: Negative  Respiratory: Positive for cough and shortness of breath  Cardiovascular: Negative  Gastrointestinal: Positive for nausea and vomiting  Genitourinary: Negative  All other systems reviewed and are negative  Video Exam    Vitals:       Physical Exam  Nursing note reviewed  Constitutional:       General: She is not in acute distress  Appearance: She is well-developed  HENT:      Head: Normocephalic and atraumatic  Pulmonary:      Effort: Pulmonary effort is normal       Breath sounds: Normal breath sounds  Comments: Some coughing, minor wheeze with cough  I spent 15 minutes directly with the patient during this visit      Payton Pete acknowledges that she has consented to an online visit or consultation  She understands that the online visit is based solely on information provided by her, and that, in the absence of a face-to-face physical evaluation by the physician, the diagnosis she receives is both limited and provisional in terms of accuracy and completeness  This is not intended to replace a full medical face-to-face evaluation by the physician  Jsa Renee understands and accepts these terms

## 2021-04-15 NOTE — PATIENT INSTRUCTIONS
[facility] COVID TESTING SITES FOR SURGICAL/PROCEDURAL PATIENTS     [facility] Location  Address  Hours   Monday-Friday Saturday Hours    [place] [address] [place]  8am-5pm  CLOSED    [place]  [address] [facility] [address], [place]  8am-5pm  CLOSED      [id number]  Problem List Items Addressed This Visit     Acute recurrent pansinusitis - Primary     Patient likely has a recurrent sinus infection  This is been a problem for her ongoing  At this point, would recommend Bactrim DS, follow-up in the future as needed  Relevant Medications    sulfamethoxazole-trimethoprim (Bactrim DS) 800-160 mg per tablet    Chronic nonseasonal allergic rhinitis due to pollen     Patient was on mometasone previously by nasal spray, but that does not appear to be covered by her insurance  Will change to budesonide  Brand name is Rhinocort  If this is covered, that would be reasonable  If it is not covered, will request a list of what is covered so that we can choose the appropriate spray  Of note, the patient has been off nasal spray for about the last month or so, as had a worsening in her allergy symptoms while she is off the spray  Relevant Medications    budesonide (RINOCORT AQUA) 32 MCG/ACT nasal spray    Hypertension     Blood pressure today is unable to be checked  At Orthopedics, it was elevated  Her last visit here was reasonable  Would recommend follow-up at office visit to review  Other Visit Diagnoses     Encounter for observation for suspected exposure to other biological agents ruled out        Possible COVID given the current climate of virus being around and her symptoms  Check swab  Relevant Orders    Novel Coronavirus (Covid-19),PCR [facility] - Collected at [facility] or [facility]    Viral infection, unspecified        Possible COVID, but likely is just sinus infection in allergies  Would recommend check swab      Relevant Orders    Novel Coronavirus (Covid-19),PCR [facility] - Collected at [facility] or [facility] Now          COVID 19 Instructions    Abena Doherty was advised to limit contact with others to essential tasks such as getting food, medications, and medical care  Proper handwashing reviewed, and Hand sanitzer when washing is not available  If the patient develops symptoms of COVID 19, the patient should call the office as soon as possible  For 5298-6855 Flu season, it is strongly recommended that Flu Vaccinations be obtained  Please try to download Google Duo  Once you do download this on your phone, you will be prompted to add your phone number to the account  After that, he should receive a text from [employer], and use that code to verify your phone number  After that, you should be able to use Google Duo to receive and make video calls  Please download Microsoft Teams to your phone or computer  We will be transitioning to this platform for Video Visits  Instructions for downloading this are available from the office  We are committed to getting you vaccinated as soon as possible and will be closely following CDC and [facility]  guidelines as they are released and revised  Please refer to our COVID-19 vaccine webpage for the most up to date information on the vaccine and our distribution efforts      Xander tn

## 2021-04-15 NOTE — ASSESSMENT & PLAN NOTE
Patient was on mometasone previously by nasal spray, but that does not appear to be covered by her insurance  Will change to budesonide  Brand name is Rhinocort  If this is covered, that would be reasonable  If it is not covered, will request a list of what is covered so that we can choose the appropriate spray  Of note, the patient has been off nasal spray for about the last month or so, as had a worsening in her allergy symptoms while she is off the spray

## 2021-04-15 NOTE — ASSESSMENT & PLAN NOTE
Blood pressure today is unable to be checked  At Orthopedics, it was elevated  Her last visit here was reasonable  Would recommend follow-up at office visit to review

## 2021-04-15 NOTE — ASSESSMENT & PLAN NOTE
Patient likely has a recurrent sinus infection  This is been a problem for her ongoing  At this point, would recommend Bactrim DS, follow-up in the future as needed

## 2021-04-16 DIAGNOSIS — Z03.818 ENCOUNTER FOR OBSERVATION FOR SUSPECTED EXPOSURE TO OTHER BIOLOGICAL AGENTS RULED OUT: ICD-10-CM

## 2021-04-16 DIAGNOSIS — B34.9 VIRAL INFECTION, UNSPECIFIED: ICD-10-CM

## 2021-04-16 PROCEDURE — U0005 INFEC AGEN DETEC AMPLI PROBE: HCPCS | Performed by: FAMILY MEDICINE

## 2021-04-16 PROCEDURE — U0003 INFECTIOUS AGENT DETECTION BY NUCLEIC ACID (DNA OR RNA); SEVERE ACUTE RESPIRATORY SYNDROME CORONAVIRUS 2 (SARS-COV-2) (CORONAVIRUS DISEASE [COVID-19]), AMPLIFIED PROBE TECHNIQUE, MAKING USE OF HIGH THROUGHPUT TECHNOLOGIES AS DESCRIBED BY CMS-2020-01-R: HCPCS | Performed by: FAMILY MEDICINE

## 2021-04-17 LAB — SARS-COV-2 RNA RESP QL NAA+PROBE: NEGATIVE

## 2021-04-19 NOTE — RESULT ENCOUNTER NOTE
Cologuard negative, therefore, recommend repeat in 3 years  If any questions, follow at [address]

## 2021-04-23 ENCOUNTER — OFFICE VISIT (OUTPATIENT)
Dept: FAMILY MEDICINE CLINIC | Facility: CLINIC | Age: 56
End: 2021-04-23
Payer: MEDICARE

## 2021-04-23 VITALS
HEART RATE: 80 BPM | WEIGHT: 257.2 LBS | DIASTOLIC BLOOD PRESSURE: 86 MMHG | HEIGHT: 65 IN | BODY MASS INDEX: 42.85 KG/M2 | SYSTOLIC BLOOD PRESSURE: 126 MMHG

## 2021-04-23 DIAGNOSIS — J01.41 ACUTE RECURRENT PANSINUSITIS: ICD-10-CM

## 2021-04-23 DIAGNOSIS — E53.8 VITAMIN B12 DEFICIENCY: ICD-10-CM

## 2021-04-23 DIAGNOSIS — J30.89 CHRONIC NONSEASONAL ALLERGIC RHINITIS DUE TO POLLEN: Primary | ICD-10-CM

## 2021-04-23 DIAGNOSIS — E55.9 VITAMIN D DEFICIENCY: ICD-10-CM

## 2021-04-23 DIAGNOSIS — E66.01 CLASS 3 SEVERE OBESITY DUE TO EXCESS CALORIES WITH SERIOUS COMORBIDITY AND BODY MASS INDEX (BMI) OF 40.0 TO 44.9 IN ADULT (HCC): ICD-10-CM

## 2021-04-23 DIAGNOSIS — K21.00 GASTROESOPHAGEAL REFLUX DISEASE WITH ESOPHAGITIS, UNSPECIFIED WHETHER HEMORRHAGE: ICD-10-CM

## 2021-04-23 DIAGNOSIS — R73.9 HYPERGLYCEMIA: ICD-10-CM

## 2021-04-23 DIAGNOSIS — K76.0 FATTY LIVER: ICD-10-CM

## 2021-04-23 DIAGNOSIS — I10 ESSENTIAL HYPERTENSION: ICD-10-CM

## 2021-04-23 DIAGNOSIS — E78.2 MIXED HYPERLIPIDEMIA: ICD-10-CM

## 2021-04-23 DIAGNOSIS — E03.9 ACQUIRED HYPOTHYROIDISM: ICD-10-CM

## 2021-04-23 PROBLEM — K60.1 CHRONIC ANAL FISSURE: Status: ACTIVE | Noted: 2018-11-29

## 2021-04-23 PROBLEM — K60.1 CHRONIC ANAL FISSURE: Status: RESOLVED | Noted: 2018-11-29 | Resolved: 2021-04-23

## 2021-04-23 PROCEDURE — 99214 OFFICE O/P EST MOD 30 MIN: CPT | Performed by: FAMILY MEDICINE

## 2021-04-23 RX ORDER — SUCRALFATE 1 G/1
1 TABLET ORAL 4 TIMES DAILY
Qty: 120 TABLET | Refills: 5 | Status: SHIPPED | OUTPATIENT
Start: 2021-04-23 | End: 2021-11-16

## 2021-04-23 NOTE — PATIENT INSTRUCTIONS
Budesonide is Rhinocort  Problem List Items Addressed This Visit     Acute recurrent pansinusitis     Finish Bactrim  Follow-up as needed  Chronic nonseasonal allergic rhinitis due to pollen - Primary     Patient does appear to be getting a significant amount of discharge related to her sinuses and allergies  Unfortunately, she was not able to get the budesonide previously  I would ask that she talk to the pharmacy about this and find out exactly what happened, and I did remind her that it is available over-the-counter  Since she is already on an antihistamine and leukotriene modifier, the only other option is to add nasal steroids, or oral steroids  We could also try nasal antihistamine, but will need to follow-up in the future for that  Relevant Medications    budesonide (RINOCORT AQUA) 32 MCG/ACT nasal spray    Fatty liver     Noted previously was slightly elevated LFTs  Recommend check labs  GERD (gastroesophageal reflux disease)     Patient is continuing to have reflux problems, despite being on pantoprazole and famotidine  She can consider alkaline water, i e  Squeeze lemon into water as her beverage  Can also use Carafate prescription  Relevant Medications    sucralfate (CARAFATE) 1 g tablet    Hyperglycemia     Blood sugar elevated previously  Check labs  Relevant Orders    Comprehensive metabolic panel    Hyperlipidemia     Patient has had elevated cholesterol in the past   Not currently on statins  Would recommend check labs  Relevant Orders    Comprehensive metabolic panel    Lipid Panel with Direct LDL reflex    Hypertension     Blood pressure today was excellent  No change  Relevant Orders    CBC and differential    Comprehensive metabolic panel    TSH, 3rd generation    Hypothyroid     Patient did have hypothyroid previously, has been on Synthroid in the past, but not on it currently  Check labs           Relevant Orders    TSH, 3rd generation    Obesity     BMI is elevated  Limit carbs, increase exercise  Vitamin B12 deficiency     B12 deficiency noted before  I would recommend check CBC, and if that is abnormal consider B12 level  Vitamin D deficiency     Patient's vitamin-D level in the past was abnormal   Check vitamin-D  Relevant Orders    Vitamin D 25 hydroxy          COVID 19 Instructions    Mellisa White was advised to limit contact with others to essential tasks such as getting food, medications, and medical care  Proper handwashing reviewed, and Hand sanitzer when washing is not available  If the patient develops symptoms of COVID 19, the patient should call the office as soon as possible  For 2659-2623 Flu season, it is strongly recommended that Flu Vaccinations be obtained  Please try to download Google Duo  Once you do download this on your phone, you will be prompted to add your phone number to the account  After that, he should receive a text from [email or web address], and use that code to verify your phone number  After that, you should be able to use Google Duo to receive and make video calls  Please download Microsoft Teams to your phone or computer  We will be transitioning to this platform for Video Visits  Instructions for downloading this are available from the office  We are committed to getting you vaccinated as soon as possible and will be closely following CDC and [facility]  guidelines as they are released and revised  Please refer to our COVID-19 vaccine webpage for the most up to date information on the vaccine and our distribution efforts      Xander hernandez

## 2021-04-23 NOTE — ASSESSMENT & PLAN NOTE
B12 deficiency noted before  I would recommend check CBC, and if that is abnormal consider B12 level

## 2021-04-23 NOTE — ASSESSMENT & PLAN NOTE
Patient is continuing to have reflux problems, despite being on pantoprazole and famotidine  She can consider alkaline water, i e  Squeeze lemon into water as her beverage  Can also use Carafate prescription

## 2021-04-23 NOTE — PROGRESS NOTES
Assessment and Plan:    Problem List Items Addressed This Visit     Acute recurrent pansinusitis     Finish Bactrim  Follow-up as needed  Chronic nonseasonal allergic rhinitis due to pollen - Primary     Patient does appear to be getting a significant amount of discharge related to her sinuses and allergies  Unfortunately, she was not able to get the budesonide previously  I would ask that she talk to the pharmacy about this and find out exactly what happened, and I did remind her that it is available over-the-counter  Since she is already on an antihistamine and leukotriene modifier, the only other option is to add nasal steroids, or oral steroids  We could also try nasal antihistamine, but will need to follow-up in the future for that  Relevant Medications    budesonide (RINOCORT AQUA) 32 MCG/ACT nasal spray    Fatty liver     Noted previously was slightly elevated LFTs  Recommend check labs  GERD (gastroesophageal reflux disease)     Patient is continuing to have reflux problems, despite being on pantoprazole and famotidine  She can consider alkaline water, i e  Squeeze lemon into water as her beverage  Can also use Carafate prescription  Relevant Medications    sucralfate (CARAFATE) 1 g tablet    Hyperglycemia     Blood sugar elevated previously  Check labs  Relevant Orders    Comprehensive metabolic panel    Hyperlipidemia     Patient has had elevated cholesterol in the past   Not currently on statins  Would recommend check labs  Relevant Orders    Comprehensive metabolic panel    Lipid Panel with Direct LDL reflex    Hypertension     Blood pressure today was excellent  No change  Relevant Orders    CBC and differential    Comprehensive metabolic panel    TSH, 3rd generation    Hypothyroid     Patient did have hypothyroid previously, has been on Synthroid in the past, but not on it currently  Check labs           Relevant Orders    TSH, 3rd generation    Obesity     BMI is elevated  Limit carbs, increase exercise  Vitamin B12 deficiency     B12 deficiency noted before  I would recommend check CBC, and if that is abnormal consider B12 level  Vitamin D deficiency     Patient's vitamin-D level in the past was abnormal   Check vitamin-D  Relevant Orders    Vitamin D 25 hydroxy                 Diagnoses and all orders for this visit:    Chronic nonseasonal allergic rhinitis due to pollen  -     budesonide (RINOCORT AQUA) 32 MCG/ACT nasal spray; 2 sprays into each nostril daily    Mixed hyperlipidemia  -     Comprehensive metabolic panel; Future  -     Lipid Panel with Direct LDL reflex; Future    Essential hypertension  -     CBC and differential; Future  -     Comprehensive metabolic panel; Future  -     TSH, 3rd generation; Future    Hyperglycemia  -     Comprehensive metabolic panel; Future    Acquired hypothyroidism  -     TSH, 3rd generation; Future    Class 3 severe obesity due to excess calories with serious comorbidity and body mass index (BMI) of 40 0 to 44 9 in [facility])    Vitamin D deficiency  -     Vitamin D 25 hydroxy; Future    Acute recurrent pansinusitis    Fatty liver    Gastroesophageal reflux disease with esophagitis, unspecified whether hemorrhage  -     sucralfate (CARAFATE) 1 g tablet; Take 1 tablet (1 g total) by mouth 4 (four) times a day    Vitamin B12 deficiency              Subjective:      Patient ID: Jules Santiago is a 49 y o  female  CC:    Chief Complaint   Patient presents with    Follow-up     Routine Follow up and discuss BP   Hypertension    Cough     Pt states she is also having right ear pain and pain in the head right behind the ear  kw    Nasal Congestion       HPI:    Patient is here for multiple issues  Hypertension:  Currently on atenolol and chlorthalidone  Denies any current problems with it  She does check at home, with her watch    Reports that her blood pressures at home have been quite good  She just checked now, got 132/73  Cough:  Patient has a significant amount of productive cough in the morning and at night  She is currently on antibiotics, that seems to have calmed down  Unfortunately, prior to this she was having coughing to the point of emesis  At her last visit, the patient was given budesonide nasal spray, but the pharmacy never dispensed this to her  She is also using Claritin and Singulair daily  She did mention that she uses albuterol, but it seems to be worse with longer use of her mask, or with increased temperature and longer use of the mask  She has also noted right ear congestion  Comes and goes  Some pain in the ear canal   It is a bit deeper  Does not last long  Comes back, however  Patient has been having some increasing reflux symptoms as well  Currently on pantoprazole and famotidine  Unfortunately, she continues to have reflux  The following portions of the patient's history were reviewed and updated as appropriate: allergies, current medications, past family history, past medical history, past social history, past surgical history and problem list       Review of Systems   Constitutional: Negative  HENT: Negative  Eyes: Negative  Respiratory: Negative  Cardiovascular: Negative  Gastrointestinal: Negative  Endocrine: Negative  Genitourinary: Negative  Musculoskeletal: Negative  Skin: Negative  Allergic/Immunologic: Negative  Neurological: Negative  Hematological: Negative  Psychiatric/Behavioral: Negative  Data to review:       Objective:    Vitals:    04/23/21 0935   BP: 126/86   BP Location: Left arm   Patient Position: Sitting   Pulse: 80   Weight: 117 kg (257 lb 3 2 oz)   Height: 5' 5" (1 651 m)        Physical Exam  Vitals signs and nursing note reviewed  Constitutional:       Appearance: Normal appearance  HENT:      Head: Normocephalic     Cardiovascular:      Rate and Rhythm: Normal rate and regular rhythm  Pulses: Normal pulses  Carotid pulses are 2+ on the right side and 2+ on the left side  Heart sounds: Normal heart sounds  No murmur  No friction rub  No gallop  Pulmonary:      Effort: Pulmonary effort is normal  No respiratory distress  Breath sounds: Normal breath sounds  No stridor  No wheezing, rhonchi or rales  Chest:      Chest wall: No tenderness  Neurological:      Mental Status: She is alert  BMI Counseling: Body mass index is 42 8 kg/m²  The BMI is above normal  Nutrition recommendations include decreasing portion sizes, encouraging healthy choices of fruits and vegetables, decreasing fast food intake, consuming healthier snacks, limiting drinks that contain sugar, moderation in carbohydrate intake, increasing intake of lean protein, reducing intake of saturated and trans fat and reducing intake of cholesterol  Exercise recommendations include exercising 3-5 times per week  No pharmacotherapy was ordered

## 2021-04-23 NOTE — ASSESSMENT & PLAN NOTE
Patient has had elevated cholesterol in the past   Not currently on statins  Would recommend check labs

## 2021-04-23 NOTE — ASSESSMENT & PLAN NOTE
Patient did have hypothyroid previously, has been on Synthroid in the past, but not on it currently  Check labs

## 2021-04-28 DIAGNOSIS — J30.89 CHRONIC NONSEASONAL ALLERGIC RHINITIS DUE TO POLLEN: ICD-10-CM

## 2021-04-28 RX ORDER — OLOPATADINE HYDROCHLORIDE 2 MG/ML
SOLUTION/ DROPS OPHTHALMIC
Qty: 7.5 ML | Refills: 1 | Status: SHIPPED | OUTPATIENT
Start: 2021-04-28 | End: 2021-11-04

## 2021-07-01 DIAGNOSIS — J30.89 CHRONIC NONSEASONAL ALLERGIC RHINITIS DUE TO POLLEN: ICD-10-CM

## 2021-07-02 DIAGNOSIS — J30.89 CHRONIC NONSEASONAL ALLERGIC RHINITIS DUE TO POLLEN: ICD-10-CM

## 2021-07-02 RX ORDER — MONTELUKAST SODIUM 10 MG/1
TABLET ORAL
Qty: 90 TABLET | Refills: 3 | Status: SHIPPED | OUTPATIENT
Start: 2021-07-02 | End: 2022-07-06

## 2021-07-02 RX ORDER — ALBUTEROL SULFATE 90 UG/1
AEROSOL, METERED RESPIRATORY (INHALATION)
Qty: 18 G | Refills: 2 | Status: SHIPPED | OUTPATIENT
Start: 2021-07-02 | End: 2021-11-04

## 2021-07-10 DIAGNOSIS — E87.6 HYPOKALEMIA: ICD-10-CM

## 2021-07-10 DIAGNOSIS — F33.1 MODERATE EPISODE OF RECURRENT MAJOR DEPRESSIVE DISORDER (HCC): ICD-10-CM

## 2021-07-12 RX ORDER — POTASSIUM CHLORIDE 1500 MG/1
TABLET, EXTENDED RELEASE ORAL
Qty: 90 TABLET | Refills: 1 | Status: SHIPPED | OUTPATIENT
Start: 2021-07-12 | End: 2022-01-13

## 2021-07-12 RX ORDER — TRAZODONE HYDROCHLORIDE 50 MG/1
TABLET ORAL
Qty: 90 TABLET | Refills: 1 | Status: SHIPPED | OUTPATIENT
Start: 2021-07-12 | End: 2022-01-13

## 2021-07-30 DIAGNOSIS — F33.1 MODERATE EPISODE OF RECURRENT MAJOR DEPRESSIVE DISORDER (HCC): ICD-10-CM

## 2021-07-30 RX ORDER — ESCITALOPRAM OXALATE 20 MG/1
TABLET ORAL
Qty: 90 TABLET | Refills: 1 | Status: SHIPPED | OUTPATIENT
Start: 2021-07-30 | End: 2021-10-25 | Stop reason: SDUPTHER

## 2021-08-19 DIAGNOSIS — I10 ESSENTIAL HYPERTENSION: ICD-10-CM

## 2021-08-19 RX ORDER — ATENOLOL AND CHLORTHALIDONE TABLET 50; 25 MG/1; MG/1
TABLET ORAL
Qty: 90 TABLET | Refills: 1 | Status: SHIPPED | OUTPATIENT
Start: 2021-08-19 | End: 2022-02-16

## 2021-08-26 ENCOUNTER — APPOINTMENT (OUTPATIENT)
Dept: LAB | Facility: CLINIC | Age: 56
End: 2021-08-26
Payer: MEDICARE

## 2021-08-26 DIAGNOSIS — R73.9 HYPERGLYCEMIA: ICD-10-CM

## 2021-08-26 DIAGNOSIS — E55.9 VITAMIN D DEFICIENCY: ICD-10-CM

## 2021-08-26 DIAGNOSIS — I10 ESSENTIAL HYPERTENSION: ICD-10-CM

## 2021-08-26 DIAGNOSIS — E78.2 MIXED HYPERLIPIDEMIA: ICD-10-CM

## 2021-08-26 DIAGNOSIS — E03.9 ACQUIRED HYPOTHYROIDISM: ICD-10-CM

## 2021-08-26 LAB
25(OH)D3 SERPL-MCNC: 40.2 NG/ML (ref 30–100)
ALBUMIN SERPL BCP-MCNC: 3.2 G/DL (ref 3.5–5)
ALP SERPL-CCNC: 92 U/L (ref 46–116)
ALT SERPL W P-5'-P-CCNC: 138 U/L (ref 12–78)
ANION GAP SERPL CALCULATED.3IONS-SCNC: 7 MMOL/L (ref 4–13)
AST SERPL W P-5'-P-CCNC: 99 U/L (ref 5–45)
BASOPHILS # BLD AUTO: 0.1 THOUSANDS/ΜL (ref 0–0.1)
BASOPHILS NFR BLD AUTO: 1 % (ref 0–1)
BILIRUB SERPL-MCNC: 0.8 MG/DL (ref 0.2–1)
BUN SERPL-MCNC: 11 MG/DL (ref 5–25)
CALCIUM ALBUM COR SERPL-MCNC: 9.9 MG/DL (ref 8.3–10.1)
CALCIUM SERPL-MCNC: 9.3 MG/DL (ref 8.3–10.1)
CHLORIDE SERPL-SCNC: 98 MMOL/L (ref 100–108)
CHOLEST SERPL-MCNC: 178 MG/DL (ref 50–200)
CO2 SERPL-SCNC: 30 MMOL/L (ref 21–32)
CREAT SERPL-MCNC: 0.66 MG/DL (ref 0.6–1.3)
EOSINOPHIL # BLD AUTO: 0.49 THOUSAND/ΜL (ref 0–0.61)
EOSINOPHIL NFR BLD AUTO: 3 % (ref 0–6)
ERYTHROCYTE [DISTWIDTH] IN BLOOD BY AUTOMATED COUNT: 14.3 % (ref 11.6–15.1)
GFR SERPL CREATININE-BSD FRML MDRD: 100 ML/MIN/1.73SQ M
GLUCOSE P FAST SERPL-MCNC: 198 MG/DL (ref 65–99)
HCT VFR BLD AUTO: 44.9 % (ref 34.8–46.1)
HDLC SERPL-MCNC: 27 MG/DL
HGB BLD-MCNC: 14.5 G/DL (ref 11.5–15.4)
IMM GRANULOCYTES # BLD AUTO: 0.06 THOUSAND/UL (ref 0–0.2)
IMM GRANULOCYTES NFR BLD AUTO: 0 % (ref 0–2)
LDLC SERPL CALC-MCNC: 111 MG/DL (ref 0–100)
LYMPHOCYTES # BLD AUTO: 5.01 THOUSANDS/ΜL (ref 0.6–4.47)
LYMPHOCYTES NFR BLD AUTO: 35 % (ref 14–44)
MCH RBC QN AUTO: 28 PG (ref 26.8–34.3)
MCHC RBC AUTO-ENTMCNC: 32.3 G/DL (ref 31.4–37.4)
MCV RBC AUTO: 87 FL (ref 82–98)
MONOCYTES # BLD AUTO: 0.77 THOUSAND/ΜL (ref 0.17–1.22)
MONOCYTES NFR BLD AUTO: 5 % (ref 4–12)
NEUTROPHILS # BLD AUTO: 7.84 THOUSANDS/ΜL (ref 1.85–7.62)
NEUTS SEG NFR BLD AUTO: 56 % (ref 43–75)
NRBC BLD AUTO-RTO: 0 /100 WBCS
PLATELET # BLD AUTO: 366 THOUSANDS/UL (ref 149–390)
PMV BLD AUTO: 11.9 FL (ref 8.9–12.7)
POTASSIUM SERPL-SCNC: 3.3 MMOL/L (ref 3.5–5.3)
PROT SERPL-MCNC: 7.7 G/DL (ref 6.4–8.2)
RBC # BLD AUTO: 5.18 MILLION/UL (ref 3.81–5.12)
SODIUM SERPL-SCNC: 135 MMOL/L (ref 136–145)
TRIGL SERPL-MCNC: 201 MG/DL
TSH SERPL DL<=0.05 MIU/L-ACNC: 0.58 UIU/ML (ref 0.36–3.74)
WBC # BLD AUTO: 14.27 THOUSAND/UL (ref 4.31–10.16)

## 2021-08-26 PROCEDURE — 36415 COLL VENOUS BLD VENIPUNCTURE: CPT

## 2021-08-26 PROCEDURE — 85025 COMPLETE CBC W/AUTO DIFF WBC: CPT

## 2021-08-26 PROCEDURE — 80061 LIPID PANEL: CPT

## 2021-08-26 PROCEDURE — 80053 COMPREHEN METABOLIC PANEL: CPT

## 2021-08-26 PROCEDURE — 84443 ASSAY THYROID STIM HORMONE: CPT

## 2021-08-26 PROCEDURE — 82306 VITAMIN D 25 HYDROXY: CPT

## 2021-08-31 ENCOUNTER — RA CDI HCC (OUTPATIENT)
Dept: OTHER | Facility: HOSPITAL | Age: 56
End: 2021-08-31

## 2021-08-31 NOTE — PROGRESS NOTES
[facility] 77  coding opportunities          Number of diagnosis code(s) already on the problem list added to FYI flag: 3                  Number of suggestions NOT actually used: 3     Patients insurance company: Medicare     Visit status: Patient arrived for their scheduled appointment        [place] [id number]  coding opportunities     DX: M46 1 Sacroiliitis, not elsewhere classified  DX: M35 3  Polymyalgia rheumatica   DX: F33 1 Moderate episode of recurrent major depressive disorder           Number of diagnosis code(s) already on the problem list added to FYI flag: 3                     Patients insurance company: [employer]

## 2021-09-02 ENCOUNTER — OFFICE VISIT (OUTPATIENT)
Dept: FAMILY MEDICINE CLINIC | Facility: CLINIC | Age: 56
End: 2021-09-02
Payer: MEDICARE

## 2021-09-02 VITALS
TEMPERATURE: 99.1 F | BODY MASS INDEX: 41.48 KG/M2 | HEART RATE: 68 BPM | SYSTOLIC BLOOD PRESSURE: 126 MMHG | DIASTOLIC BLOOD PRESSURE: 80 MMHG | HEIGHT: 65 IN | WEIGHT: 249 LBS

## 2021-09-02 DIAGNOSIS — E78.2 MIXED HYPERLIPIDEMIA: ICD-10-CM

## 2021-09-02 DIAGNOSIS — Z12.39 ENCOUNTER FOR SCREENING FOR MALIGNANT NEOPLASM OF BREAST, UNSPECIFIED SCREENING MODALITY: ICD-10-CM

## 2021-09-02 DIAGNOSIS — L73.2 HYDRADENITIS: ICD-10-CM

## 2021-09-02 DIAGNOSIS — K76.0 FATTY LIVER: Primary | ICD-10-CM

## 2021-09-02 DIAGNOSIS — I10 ESSENTIAL HYPERTENSION: ICD-10-CM

## 2021-09-02 DIAGNOSIS — E03.9 ACQUIRED HYPOTHYROIDISM: ICD-10-CM

## 2021-09-02 DIAGNOSIS — J40 BRONCHITIS: ICD-10-CM

## 2021-09-02 DIAGNOSIS — Z12.11 COLON CANCER SCREENING: ICD-10-CM

## 2021-09-02 DIAGNOSIS — R73.9 HYPERGLYCEMIA: ICD-10-CM

## 2021-09-02 DIAGNOSIS — N90.7 VULVAR CYST: ICD-10-CM

## 2021-09-02 DIAGNOSIS — Z12.31 ENCOUNTER FOR SCREENING MAMMOGRAM FOR MALIGNANT NEOPLASM OF BREAST: ICD-10-CM

## 2021-09-02 PROBLEM — M70.60 TROCHANTERIC BURSITIS: Status: ACTIVE | Noted: 2021-09-02

## 2021-09-02 PROCEDURE — 99214 OFFICE O/P EST MOD 30 MIN: CPT | Performed by: FAMILY MEDICINE

## 2021-09-02 RX ORDER — SULFAMETHOXAZOLE AND TRIMETHOPRIM 800; 160 MG/1; MG/1
1 TABLET ORAL EVERY 12 HOURS SCHEDULED
Qty: 20 TABLET | Refills: 0 | Status: SHIPPED | OUTPATIENT
Start: 2021-09-02 | End: 2021-09-12

## 2021-09-02 NOTE — ASSESSMENT & PLAN NOTE
Patient is describing a vulvar cyst, which was listed in records from [facility]  I would recommend that she take antibiotics as discussed for hidradenitis and bronchitis, and then re-evaluate  If it continues to be an issue, consider gyn

## 2021-09-02 NOTE — ASSESSMENT & PLAN NOTE
Blood sugar is clearly significantly elevated  Greater than 200 random is diabetes  She is coming close to that with fasting  Recommend repeat basic metabolic profile nonfasting, and then re-evaluate

## 2021-09-02 NOTE — PROGRESS NOTES
Assessment and Plan:    Problem List Items Addressed This Visit     Fatty liver - Primary     In 2018, the patient did have an ultrasound showing fatty liver  At this point, the LFTs are clearly elevated, suggesting that this is related to fatty liver  Her blood sugar is also quite a bit elevated, which is likely contributing to the increase in LFTs  Hydradenitis     Patient has been having some increasing problems with hidradenitis  Should likely be on antibiotics for this for short-term  Consider follow-up with General surgery verses gyn  Bactrim should actually worked quite well for the skin structures  Relevant Medications    sulfamethoxazole-trimethoprim (Bactrim DS) 800-160 mg per tablet    Other Relevant Orders    CBC and differential    Hyperglycemia     Blood sugar is clearly significantly elevated  Greater than 200 random is diabetes  She is coming close to that with fasting  Recommend repeat basic metabolic profile nonfasting, and then re-evaluate  Relevant Orders    Basic metabolic panel    Hyperlipidemia     Cholesterol is not at goal   Given increased LFTs, would not recommend starting any medications  Her biggest issue is the triglycerides and the HDL  Hypertension     Blood pressure is reasonable  No treatment needed  Relevant Orders    CBC and differential    Basic metabolic panel    Hypothyroidism     TSH stable  Vulvar cyst     Patient is describing a vulvar cyst, which was listed in records from [facility]  I would recommend that she take antibiotics as discussed for hidradenitis and bronchitis, and then re-evaluate  If it continues to be an issue, consider gyn           Relevant Medications    sulfamethoxazole-trimethoprim (Bactrim DS) 800-160 mg per tablet    Other Relevant Orders    CBC and differential      Other Visit Diagnoses     Encounter for screening mammogram for malignant neoplasm of breast         Relevant Orders    Mammo screening bilateral w 3d & cad    Encounter for screening for malignant neoplasm of breast, unspecified screening modality        Relevant Orders    Mammo screening bilateral w 3d & cad    Cologuard    Colon cancer screening        Relevant Orders    Cologuard    Bronchitis        Increasing shortness of breath and bronchitis symptoms  Trial of Bactrim as she is allergic to Zithromax and penicillin  Albuterol 4 times a day  Relevant Medications    sulfamethoxazole-trimethoprim (Bactrim DS) 800-160 mg per tablet                 Diagnoses and all orders for this visit:    Fatty liver    Mixed hyperlipidemia    Encounter for screening mammogram for malignant neoplasm of breast   -     Mammo screening bilateral w 3d & cad; Future    Hydradenitis  -     CBC and differential; Future  -     sulfamethoxazole-trimethoprim (Bactrim DS) 800-160 mg per tablet; Take 1 tablet by mouth every 12 (twelve) hours for 10 days    Hyperglycemia  -     Basic metabolic panel; Future    Essential hypertension  -     CBC and differential; Future  -     Basic metabolic panel; Future    Acquired hypothyroidism    Vulvar cyst  -     CBC and differential; Future  -     sulfamethoxazole-trimethoprim (Bactrim DS) 800-160 mg per tablet; Take 1 tablet by mouth every 12 (twelve) hours for 10 days    Encounter for screening for malignant neoplasm of breast, unspecified screening modality  -     Mammo screening bilateral w 3d & cad; Future  -     Cologuard    Colon cancer screening  -     Cologuard    Bronchitis  Comments:  Increasing shortness of breath and bronchitis symptoms  Trial of Bactrim as she is allergic to Zithromax and penicillin  Albuterol 4 times a day  Orders:  -     sulfamethoxazole-trimethoprim (Bactrim DS) 800-160 mg per tablet; Take 1 tablet by mouth every 12 (twelve) hours for 10 days              Subjective:      Patient ID: Lidya Murillo is a 49 y o  female      CC:    Chief Complaint   Patient presents with   Mikala Beltran Hyperlipidemia     Review BW results   Hyperglycemia    Sinus Problem     Congestion, productive cough, ears feel full for the past few weeks  Sore throat  -  lsh        HPI:    Patient is here to follow-up on multiple issues  She feels she may have sinus infection bronchitis  She has had for a while now  Has been longer than 2 weeks  Has not tried medications specifically for it  Significant postnasal drip, sore throat  Some neck ache with that, as well as right ear pain  Some wheezing, occasional chest tightness  Significant cough, productive  Morning and night her worse  No nausea or vomiting, but loose stools are fairly common occurrence with her  Hyperlipidemia:  Not currently on medications  Hyperglycemia:  Reviewed labs  CBC reviewed  White count was slightly elevated  Hypertension: On tenoretic 50-25  GERD: On Protonix, carafate  Hydradenitis: She reports she has some irritation in axilla and in the groin as well  Has not ususally had in the groin before  The following portions of the patient's history were reviewed and updated as appropriate: allergies, current medications, past family history, past medical history, past social history, past surgical history and problem list       Review of Systems   Constitutional: Positive for fatigue  HENT: Positive for congestion and postnasal drip  Eyes: Negative  Respiratory: Positive for cough, chest tightness, shortness of breath and wheezing  Cardiovascular: Negative  Gastrointestinal: Negative  Endocrine: Negative  Genitourinary: Negative  Musculoskeletal: Negative  Skin: Positive for rash  Allergic/Immunologic: Negative  Neurological: Negative  Hematological: Negative  Psychiatric/Behavioral: Positive for decreased concentration and sleep disturbance  Negative for behavioral problems, hallucinations, self-injury and suicidal ideas  The patient is nervous/anxious   The patient is not hyperactive  Data to review:   Sodium 135, potassium 3 3, calcium 9 3  Blood sugar 198  Creatinine 0 66, GFR:  100  AST 99,   Total cholesterol 178, , HDL 27, triglycerides 201  TSH 0 58  White count 14 27, hemoglobin 14 5, hematocrit 44 9, platelets 747  Vitamin-D 40 2  Objective:    Vitals:    09/02/21 1300   BP: 126/80   BP Location: Left arm   Patient Position: Sitting   Cuff Size: Large   Pulse: 68   Temp: 99 1 °F (37 3 °C)   TempSrc: Oral   Weight: 113 kg (249 lb)   Height: 5' 5" (1 651 m)        Physical Exam  Vitals and nursing note reviewed  Constitutional:       Appearance: Normal appearance  HENT:      Head: Normocephalic  Cardiovascular:      Rate and Rhythm: Normal rate and regular rhythm  Pulses: Normal pulses  Carotid pulses are 2+ on the right side and 2+ on the left side  Heart sounds: Normal heart sounds  No murmur heard  No friction rub  No gallop  Pulmonary:      Effort: Pulmonary effort is normal  No respiratory distress  Breath sounds: Normal breath sounds  No stridor  No wheezing, rhonchi or rales  Comments: No egophony noted  Chest:      Chest wall: No tenderness  Neurological:      Mental Status: She is alert

## 2021-09-02 NOTE — PATIENT INSTRUCTIONS
Problem List Items Addressed This Visit     Fatty liver - Primary     In 2018, the patient did have an ultrasound showing fatty liver  At this point, the LFTs are clearly elevated, suggesting that this is related to fatty liver  Her blood sugar is also quite a bit elevated, which is likely contributing to the increase in LFTs  Hydradenitis     Patient has been having some increasing problems with hidradenitis  Should likely be on antibiotics for this for short-term  Consider follow-up with General surgery verses gyn  Bactrim should actually worked quite well for the skin structures  Relevant Medications    sulfamethoxazole-trimethoprim (Bactrim DS) 800-160 mg per tablet    Other Relevant Orders    CBC and differential    Hyperglycemia     Blood sugar is clearly significantly elevated  Greater than 200 random is diabetes  She is coming close to that with fasting  Recommend repeat basic metabolic profile nonfasting, and then re-evaluate  Relevant Orders    Basic metabolic panel    Hyperlipidemia     Cholesterol is not at goal   Given increased LFTs, would not recommend starting any medications  Her biggest issue is the triglycerides and the HDL  Hypertension     Blood pressure is reasonable  No treatment needed  Relevant Orders    CBC and differential    Basic metabolic panel    Hypothyroidism     TSH stable  Vulvar cyst     Patient is describing a vulvar cyst, which was listed in records from [facility]  I would recommend that she take antibiotics as discussed for hidradenitis and bronchitis, and then re-evaluate  If it continues to be an issue, consider gyn           Relevant Medications    sulfamethoxazole-trimethoprim (Bactrim DS) 800-160 mg per tablet    Other Relevant Orders    CBC and differential      Other Visit Diagnoses     Encounter for screening mammogram for malignant neoplasm of breast         Relevant Orders    Mammo screening bilateral w 3d & cad Encounter for screening for malignant neoplasm of breast, unspecified screening modality        Relevant Orders    Mammo screening bilateral w 3d & cad    Cologuard    Colon cancer screening        Relevant Orders    Cologuard    Bronchitis        Increasing shortness of breath and bronchitis symptoms  Trial of Bactrim as she is allergic to Zithromax and penicillin  Albuterol 4 times a day  Relevant Medications    sulfamethoxazole-trimethoprim (Bactrim DS) 800-160 mg per tablet          COVID 19 Instructions    Kasie Leyva was advised to limit contact with others to essential tasks such as getting food, medications, and medical care  Proper handwashing reviewed, and Hand sanitzer when washing is not available  If the patient develops symptoms of COVID 19, the patient should call the office as soon as possible  For [id number] Flu season, it is strongly recommended that Flu Vaccinations be obtained  Virtual Visits may be conducted in several ways: [place]: You should get a text message when the provider is ready to see you  Click on the link in the text message, and the call should start  You will need to type in your name, and allow camera and microphone access  This is HIPPA compliant, and secure  Please try to download [employer] Duo  Once you do download this on your phone, you will be prompted to add your phone number to the account  After that, he should receive a text from [employer], and use that code to verify your phone number  After that, you should be able to use Google Duo to receive and make video calls  Please download Microsoft Teams to your phone or computer  You would get an email with the meeting after scheduling with the office  You will Join the meeting, and wait there till the provider joins as well  Instructions for downloading this are available from the office  This is HIPPA compliant, and secure  Please get immunized to COVID 19      We are committed to getting you vaccinated as soon as possible and will be closely following CDC and [facility]  guidelines as they are released and revised  Please refer to our COVID-19 vaccine webpage for the most up to date information on the vaccine and our distribution efforts  This site will also have the most up to date recommendations for COVID booster vaccine  [employer] tn    OUR NEW LOCATION:  Our new location and phone are:    [address], [address] [place] [address], [address]  Fax: [phone]    Lab services and OB/GYN are at this location as well

## 2021-09-02 NOTE — ASSESSMENT & PLAN NOTE
In 2018, the patient did have an ultrasound showing fatty liver  At this point, the LFTs are clearly elevated, suggesting that this is related to fatty liver  Her blood sugar is also quite a bit elevated, which is likely contributing to the increase in LFTs

## 2021-09-02 NOTE — ASSESSMENT & PLAN NOTE
Patient has been having some increasing problems with hidradenitis  Should likely be on antibiotics for this for short-term  Consider follow-up with General surgery verses gyn  Bactrim should actually worked quite well for the skin structures

## 2021-09-23 ENCOUNTER — TELEMEDICINE (OUTPATIENT)
Dept: FAMILY MEDICINE CLINIC | Facility: CLINIC | Age: 56
End: 2021-09-23
Payer: MEDICARE

## 2021-09-23 DIAGNOSIS — Z00.00 MEDICARE ANNUAL WELLNESS VISIT, SUBSEQUENT: Primary | ICD-10-CM

## 2021-09-23 DIAGNOSIS — Z78.0 POSTMENOPAUSAL: ICD-10-CM

## 2021-09-23 DIAGNOSIS — J40 BRONCHITIS: ICD-10-CM

## 2021-09-23 PROBLEM — J01.41 ACUTE RECURRENT PANSINUSITIS: Status: RESOLVED | Noted: 2020-02-28 | Resolved: 2021-09-23

## 2021-09-23 PROCEDURE — G0438 PPPS, INITIAL VISIT: HCPCS | Performed by: FAMILY MEDICINE

## 2021-09-23 RX ORDER — LEVOFLOXACIN 500 MG/1
500 TABLET, FILM COATED ORAL EVERY 24 HOURS
Qty: 10 TABLET | Refills: 0 | Status: SHIPPED | OUTPATIENT
Start: 2021-09-23 | End: 2021-10-03

## 2021-09-23 NOTE — PROGRESS NOTES
Assessment and Plan:     Problem List Items Addressed This Visit        Respiratory    Bronchitis     Recurrent problem, will change to Levaquin, really should do covid test to be on safe side since not vaccinated, can either go to tent tonight or Sat AM depending on weather         Relevant Medications    levofloxacin (LEVAQUIN) 500 mg tablet    Other Relevant Orders    Novel Coronavirus (COVID-19), PCR [facility] Collected at [facility] or [facility]      Other Visit Diagnoses     Medicare annual wellness visit, subsequent    -  Primary    Postmenopausal        Relevant Orders    DXA bone density spine hip and pelvis           Preventive health issues were discussed with patient, and age appropriate screening tests were ordered as noted in patient's After Visit Summary  Personalized health advice and appropriate referrals for health education or preventive services given if needed, as noted in patient's After Visit Summary       History of Present Illness:     Patient presents for Medicare Annual Wellness visit    Patient Care Team:  Yessenia Mcdonough MD as PCP - General  MD Yessenia Oconnell MD Annell Evens, DO Lianne Norman, PA-C Winona Bode, DO Wannetta Fitz, PA-C Marquette Buba, PA-C     Problem List:     Patient Active Problem List   Diagnosis    Chronic nonseasonal allergic rhinitis due to pollen    Depression    Hyperlipidemia    Endometriosis    Fibromyalgia    Greater trochanteric bursitis of both hips    Fatigue    Hyperglycemia    Hydradenitis    Hypertension    Hypokalemia    Hypothyroidism    Lumbar radiculopathy    Lumbar spondylosis    MARIE (obstructive sleep apnea)    Sacroiliitis (HCC)    Vitamin B12 deficiency    Vitamin D deficiency    Bronchitis    Allergic rhinitis    Obesity    Right bundle branch block    Chest pain    Abdominal pain    GERD (gastroesophageal reflux disease)    Flushing    Anxiety    Fatty liver    Menopause syndrome    Leg pain    PMR (polymyalgia rheumatica) (HCC)    Esophageal dysphagia    Irritable bowel syndrome with both constipation and diarrhea    Osteoarthritis of both knees    Mass of arm, left    Acute medial meniscus tear    Vulvar cyst    Trochanteric bursitis      Past Medical and Surgical History:     Past Medical History:   Diagnosis Date    Ankle fracture     Anxiety     Asthma     Bleeding hemorrhoids 4/13/2017    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    Formatting of this note might be different from the original  Added automatically from request for surgery [id number]    Chronic venous embolism and thrombosis of deep vessels of lower extremity ([facility] [address] )     Costochondritis     RESOLVED: [id number]    Depression     Endometriosis     Fibromyalgia     GERD (gastroesophageal reflux disease)     Hematuria     LAST ASSESSED: [id number]    Hydradenitis     axillary area and under breast    Hypertension     LAST ASSESSED: [id number]    Irregular heart beat     Pulmonary embolism ([facility] [address] )     RESOLED: [id number] - secondary to a lupron injection for endometriosis    RBBB (right bundle branch block)     Sleep apnea     Cannot tolerate CPAP    Umbilical hernia     LAST ASSESSED: [id number]     Past Surgical History:   Procedure Laterality Date    HERNIA REPAIR      umbilical    HUMERUS FRACTURE SURGERY W/ IMPLANT Left     HYSTERECTOMY  2014    partial    PELVIC LAPAROSCOPY      x4 for endometriosis    LA ESOPHAGOGASTRODUODENOSCOPY TRANSORAL DIAGNOSTIC N/A 5/2/2019    Procedure: ESOPHAGOGASTRODUODENOSCOPY (EGD) with bx;  Surgeon: Kylah Aggarwal MD;  [facility];   Service: Gastroenterology      Family History:     Family History   Problem Relation Age of Onset    Hypertension Mother     Diabetes Father     Hypertension Father     Obesity Father     No Known Problems Maternal Grandmother     No Known Problems Maternal Grandfather     Breast cancer Paternal Grandmother 80    No Known Problems Paternal Grandfather     No Known Problems Maternal Aunt       Social History:     Social History     Socioeconomic History    Marital status: /Civil Union     Spouse name: None    Number of children: None    Years of education: None    Highest education level: None   Occupational History    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   Tobacco Use    Smoking status: Former Smoker     Packs/day: 0 50     Types: Cigarettes     Quit date: 8/10/2018     Years since quitting: 3 1    Smokeless tobacco: Never Used    Tobacco comment: smokes 1 cigarette occasionally   Vaping Use    Vaping Use: Never used   Substance and Sexual Activity    Alcohol use: No    Drug use: No    Sexual activity: None   Other Topics Concern    None   Social History Narrative    CONSUMES 2 CANS OF SODA PER DAY     Social Determinants of Health     Financial Resource Strain:     Difficulty of Paying Living Expenses:    Food Insecurity:     Worried About Running Out of Food in the Last Year:     Ran Out of Food in the Last Year:    Transportation Needs:     Lack of Transportation (Medical):      Lack of Transportation (Non-Medical):    Physical Activity:     Days of Exercise per Week:     Minutes of Exercise per Session:    Stress:     Feeling of Stress :    Social Connections:     Frequency of Communication with Friends and Family:     Frequency of Social Gatherings with Friends and Family:     Attends Yarsani Services:     Active Member of Clubs or Organizations:     Attends Club or Organization Meetings:     Marital Status:    Intimate Partner Violence:     Fear of Current or Ex-Partner:     Emotionally Abused:     Physically Abused:     Sexually Abused:       Medications and Allergies:     Current Outpatient Medications   Medication Sig Dispense Refill    albuterol (PROVENTIL HFA,VENTOLIN HFA) 90 mcg/act inhaler INHALE 2 PUFFS EVERY 6 HOURS AS NEEDED FOR WHEEZE 18 g 2    Aspirin 81 MG EC tablet Take by mouth      atenolol-chlorthalidone (TENORETIC) 50-25 mg per tablet TAKE 1 TABLET BY MOUTH EVERY DAY 90 tablet 1    budesonide (RINOCORT AQUA) 32 MCG/ACT nasal spray 2 sprays into each nostril daily 8 6 g 5    Cholecalciferol (VITAMIN D3) 1000 units CAPS Take 2,000 Units by mouth      clindamycin (CLEOCIN T) 1 % lotion APPLY ONCE DAILY TO AFFECTED AREAS UNDER ARMS  3    Cyanocobalamin (B-12) 1000 MCG CAPS Take by mouth      Elastic Bandages & Supports (Aircast Tennis Elbow Support) MISC by Does not apply route daily 1 each 0    escitalopram (LEXAPRO) 20 mg tablet TAKE 1 TABLET BY MOUTH EVERY DAY 90 tablet 1    famotidine (PEPCID) 40 MG tablet TAKE 1 TABLET (40 MG TOTAL) BY MOUTH DAILY FOR 90 DAYS 90 tablet 3    Klor-Con M20 20 MEQ tablet TAKE 1 TABLET BY MOUTH EVERY DAY 90 tablet 1    loperamide (IMODIUM A-D) 2 MG tablet Take 2 mg by mouth 4 (four) times a day as needed for diarrhea      loratadine (CLARITIN) 10 mg tablet Take 10 mg by mouth      montelukast (SINGULAIR) 10 mg tablet TAKE 1 TABLET BY MOUTH EVERY DAY 90 tablet 3    olopatadine HCl (PATADAY) 0 2 % opth drops INSTILL 1 DROP INTO BOTH EYES DAILY 7 5 mL 1    pantoprazole (PROTONIX) 40 mg tablet TAKE 1 TABLET BY MOUTH EVERY DAY 90 tablet 3    sucralfate (CARAFATE) 1 g tablet Take 1 tablet (1 g total) by mouth 4 (four) times a day 120 tablet 5    traZODone (DESYREL) 50 mg tablet TAKE 1 TABLET BY MOUTH DAILY AT BEDTIME 90 tablet 1    levofloxacin (LEVAQUIN) 500 mg tablet Take 1 tablet (500 mg total) by mouth every 24 hours for 10 days 10 tablet 0     No current facility-administered medications for this visit  Allergies   Allergen Reactions    Albuterol      Other reaction(s): felt weird    Azithromycin GI Intolerance    Duloxetine      Category: Adverse Reaction;  Annotation - [id number]: Sweating    Erythromycin Vomiting    Hydrocodone-Acetaminophen      Other reaction(s): sweating, feel faint, diarrhea    Hydrocodone-Acetaminophen     Ketorolac Diarrhea and Nausea Only     Category: Adverse Reaction; Annotation - [id number]: Symptoms were noted after injection into bursa with Toradol at orthopedics    Penicillin G     Penicillins Hives and Vomiting    Tramadol       Immunizations:     Immunization History   Administered Date(s) Administered    INFLUENZA 12/14/2005    Influenza Quadrivalent 3 years and older 01/25/2018    Influenza Quadrivalent Preservative Free 3 years and older IM 10/21/2015, 12/08/2016    Influenza, recombinant, quadrivalent,injectable, preservative free 09/26/2018, 11/07/2019, 10/28/2020    Influenza, seasonal, injectable 10/12/2004, 11/07/2013    Tuberculin Skin Test-PPD Intradermal 02/22/2010, 03/08/2010      Health Maintenance:         Topic Date Due    Colorectal Cancer Screening  11/08/2018    Breast Cancer Screening: Mammogram  02/27/2021    Cervical Cancer Screening  09/23/2022 (Originally 9/24/2016)    HIV Screening  09/24/2023 (Originally [birth year]1982)    Hepatitis C Screening  10/23/2023 (Originally 1965)         Topic Date Due    COVID-19 Vaccine (1) Never done    Influenza Vaccine (1) 09/01/2021      Medicare Health Risk Assessment:     There were no vitals taken for this visit  Arlyn Schirmer is here for her Initial Wellness visit  Health Risk Assessment:   Patient rates overall health as fair  Patient feels that their physical health rating is same  Patient is satisfied with their life  Eyesight was rated as same  Hearing was rated as same  Patient feels that their emotional and mental health rating is same  Patients states they are never, rarely angry  Patient states they are never, rarely unusually tired/fatigued  Pain experienced in the last 7 days has been some  Patient's pain rating has been 5/10  Patient states that she has experienced no weight loss or gain in last 6 months  Depression Screening:   PHQ-2 Score: 0  PHQ-9 Score: 6      Fall Risk Screening:    In the past year, patient has experienced: no history of falling in past year      Urinary Incontinence Screening:   Patient has leaked urine accidently in the last six months  occ with coughing and sneezing-rec Kegel's    Home Safety:  Patient does not have trouble with stairs inside or outside of their home  Patient has working smoke alarms and has working carbon monoxide detector  Home safety hazards include: none  Nutrition:   Current diet is Regular  Medications:   Patient is currently taking over-the-counter supplements  OTC medications include: see medication list  Patient is not able to manage medications  Activities of Daily Living (ADLs)/Instrumental Activities of Daily Living (IADLs):   Walk and transfer into and out of bed and chair?: Yes  Dress and groom yourself?: Yes    Bathe or shower yourself?: Yes    Feed yourself?  Yes  Do your laundry/housekeeping?: Yes  Manage your money, pay your bills and track your expenses?: Yes  Make your own meals?: Yes    Do your own shopping?: Yes    Previous Hospitalizations:   Any hospitalizations or ED visits within the last 12 months?: No      Advance Care Planning:   Living will: No    Durable POA for healthcare: No    Advanced directive: No      Comments:  would be poa    Cognitive Screening:   Provider or family/friend/caregiver concerned regarding cognition?: No    PREVENTIVE SCREENINGS      Cardiovascular Screening:    General: History Lipid Disorder and Screening Current      Diabetes Screening:     General: Screening Current      Colorectal Cancer Screening:     General: Risks and Benefits Discussed    Due for: Colonoscopy - Low Risk      Breast Cancer Screening:     General: Screening Current      Cervical Cancer Screening:    General: Risks and Benefits Discussed    Due for: Cervical Pap Smear      Osteoporosis Screening:    General: Risks and Benefits Discussed    Due for: DXA Axial      Abdominal Aortic Aneurysm (AAA) Screening:        General: Screening Not Indicated      Lung Cancer Screening:     General: Screening Not Indicated      Hepatitis C Screening:    General: Risks and Benefits Discussed and Patient Declines    Screening, Brief Intervention, and Referral to Treatment (SBIRT)    Screening  Typical number of drinks in a day: 0  Typical number of drinks in a week: 0  Interpretation: Low risk drinking behavior  Single Item Drug Screening:  How often have you used an illegal drug (including marijuana) or a prescription medication for non-medical reasons in the past year? never    Single Item Drug Screen Score: 0  Interpretation: Negative screen for possible drug use disorder      Pastor Jeaneth MD  Virtual AWV Consent    Verification of patient location:    Patient is located in the following state in which I hold an active license [place]    Reason for visit is awv/ respiratory sx    Encounter provider Pastor Jeaneth MD    Provider located at [address] [id number] [phone]      Recent Visits  No visits were found meeting these conditions  Showing recent visits within past 7 days and meeting all other requirements  Today's Visits  Date Type Provider Dept   09/23/21 Telemedicine Pastor Jeaneth MD [facility]   Showing today's visits and meeting all other requirements  Future Appointments  No visits were found meeting these conditions  Showing future appointments within next 150 days and meeting all other requirements       After connecting through [employer], the patient was identified by name and date of birth  [facility] was informed that this is a telemedicine visit and that the visit is being conducted through [address] [place] [address] Now and patient was informed that this is a secure, HIPAA-compliant platform  She agrees to proceed  My office door was closed  No one else was in the room  She acknowledged consent and understanding of privacy and security of the video platform   [facility] verbally agrees to participate in [employer]  Pt is aware that [employer] could be limited without vital signs or the ability to perform a full hands-on physical Enrigue Anes understands she or the provider may request at any time to terminate the video visit and request the patient to seek care or treatment in person  Patient is aware this is a billable service  COVID-19 Outpatient Progress Note    Assessment/Plan:    Problem List Items Addressed This Visit        Respiratory    Bronchitis     Recurrent problem, will change to Levaquin, really should do covid test to be on safe side since not vaccinated, can either go to tent [facility] or Sat AM depending on weather         Relevant Medications    levofloxacin (LEVAQUIN) 500 mg tablet    Other Relevant Orders    Novel Coronavirus (COVID-19), PCR [facility] Collected at   [facility] or [facility]      Other Visit Diagnoses     Medicare annual wellness visit, subsequent    -  Primary    Postmenopausal        Relevant Orders    DXA bone density spine hip and pelvis         Disposition:     I referred patient to one of our centralized sites for a COVID-19 swab  I have spent 10 minutes directly with the patient  Verification of patient location:    Patient is located in the following state in which I hold an active license [place]    Encounter provider Víctor Zavaleta MD    Provider located at [address]  [id number] [place] [id number] [phone]    Recent Visits  No visits were found meeting these conditions  Showing recent visits within past 7 days and meeting all other requirements  Today's Visits  Date Type Provider Dept   09/23/21 Telemedicine Víctor Zavaleta MD [facility]   Showing today's visits and meeting all other requirements  Future Appointments  No visits were found meeting these conditions    Showing future appointments within next 150 days and meeting all other requirements This virtual check-in was done via [employer] and patient was informed that this is a secure, HIPAA-compliant platform  She agrees to proceed  Patient agrees to participate in a virtual check in via telephone or video visit instead of presenting to the office to address urgent/immediate medical needs  Patient is aware this is a billable service  After connecting through [facility], the patient was identified by name and date of birth  Chau Nobles was informed that this was a telemedicine visit and that the exam was being conducted confidentially over secure lines  My office door was closed  No one else was in the room  Chau Nobles acknowledged consent and understanding of privacy and security of the telemedicine visit  I informed the patient that I have reviewed her record in Epic and presented the opportunity for her to ask any questions regarding the visit today  The patient agreed to participate  Subjective:   Chau Nobles is a 49 y o  female who is concerned about COVID-19  Patient's symptoms include fatigue, nasal congestion and cough  Patient denies anosmia, loss of taste, myalgias and headaches       Date of symptom onset: 9/2/2021  COVID-19 vaccination status: Not vaccinated    Exposure:   Contact with a person who is under investigation (PUI) for or who is positive for COVID-19 within the last 14 days?: No    Hospitalized recently for fever and/or lower respiratory symptoms?: No      Currently a healthcare worker that is involved in direct patient care?: No      Works in a special setting where the risk of COVID-19 transmission may be high? (this may include long-term care, correctional and snf facilities; homeless shelters; assisted-living facilities and group homes ): No      Resident in a special setting where the risk of COVID-19 transmission may be high? (this may include long-term care, correctional and snf facilities; homeless shelters; assisted-living facilities and group homes ): No      Seen 9/2/21 for bronchitis, was on Bactrim, seemed to get better, started  With coughing yellow mucus when done antibiotic, using inhaler qid and Mucinex    Lab Results   Component Value Date    SARSCOV2 Negative 04/16/2021     Past Medical History:   Diagnosis Date    Ankle fracture     Anxiety     Asthma     Bleeding hemorrhoids 4/13/2017    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    Formatting of this note might be different from the original  Added automatically from request for surgery [id number]    Chronic venous embolism and thrombosis of deep vessels of lower extremity ([facility] [address] )     Costochondritis     RESOLVED: [id number]    Depression     Endometriosis     Fibromyalgia     GERD (gastroesophageal reflux disease)     Hematuria     LAST ASSESSED: [id number]    Hydradenitis     axillary area and under breast    Hypertension     LAST ASSESSED: [id number]    Irregular heart beat     Pulmonary embolism ([facility] [address] )     RESOLED: [id number] - secondary to a lupron injection for endometriosis    RBBB (right bundle branch block)     Sleep apnea     Cannot tolerate CPAP    Umbilical hernia     LAST ASSESSED: [id number]     Past Surgical History:   Procedure Laterality Date    HERNIA REPAIR      umbilical    HUMERUS FRACTURE SURGERY W/ IMPLANT Left     HYSTERECTOMY  2014    partial    PELVIC LAPAROSCOPY      x4 for endometriosis    MS ESOPHAGOGASTRODUODENOSCOPY TRANSORAL DIAGNOSTIC N/A 5/2/2019    Procedure: ESOPHAGOGASTRODUODENOSCOPY (EGD) with bx;  Surgeon: Fidel Avila MD;  Location: [facility];   Service: Gastroenterology     Current Outpatient Medications   Medication Sig Dispense Refill    albuterol (PROVENTIL HFA,VENTOLIN HFA) 90 mcg/act inhaler INHALE 2 PUFFS EVERY 6 HOURS AS NEEDED FOR WHEEZE 18 g 2    Aspirin 81 MG EC tablet Take by mouth      atenolol-chlorthalidone (TENORETIC) 50-25 mg per tablet TAKE 1 TABLET BY MOUTH EVERY DAY 90 tablet 1    budesonide (RINOCORT AQUA) 32 MCG/ACT nasal spray 2 sprays into each nostril daily 8 6 g 5    Cholecalciferol (VITAMIN D3) 1000 units CAPS Take 2,000 Units by mouth      clindamycin (CLEOCIN T) 1 % lotion APPLY ONCE DAILY TO AFFECTED AREAS UNDER ARMS  3    Cyanocobalamin (B-12) 1000 MCG CAPS Take by mouth      Elastic Bandages & Supports (Aircast Tennis Elbow Support) MISC by Does not apply route daily 1 each 0    escitalopram (LEXAPRO) 20 mg tablet TAKE 1 TABLET BY MOUTH EVERY DAY 90 tablet 1    famotidine (PEPCID) 40 MG tablet TAKE 1 TABLET (40 MG TOTAL) BY MOUTH DAILY FOR 90 DAYS 90 tablet 3    Klor-Con M20 20 MEQ tablet TAKE 1 TABLET BY MOUTH EVERY DAY 90 tablet 1    loperamide (IMODIUM A-D) 2 MG tablet Take 2 mg by mouth 4 (four) times a day as needed for diarrhea      loratadine (CLARITIN) 10 mg tablet Take 10 mg by mouth      montelukast (SINGULAIR) 10 mg tablet TAKE 1 TABLET BY MOUTH EVERY DAY 90 tablet 3    olopatadine HCl (PATADAY) 0 2 % opth drops INSTILL 1 DROP INTO BOTH EYES DAILY 7 5 mL 1    pantoprazole (PROTONIX) 40 mg tablet TAKE 1 TABLET BY MOUTH EVERY DAY 90 tablet 3    sucralfate (CARAFATE) 1 g tablet Take 1 tablet (1 g total) by mouth 4 (four) times a day 120 tablet 5    traZODone (DESYREL) 50 mg tablet TAKE 1 TABLET BY MOUTH DAILY AT BEDTIME 90 tablet 1    levofloxacin (LEVAQUIN) 500 mg tablet Take 1 tablet (500 mg total) by mouth every 24 hours for 10 days 10 tablet 0     No current facility-administered medications for this visit  Allergies   Allergen Reactions    Albuterol      Other reaction(s): felt weird    Azithromycin GI Intolerance    Duloxetine      Category: Adverse Reaction; Annotation - [id number]: Sweating    Erythromycin Vomiting    Hydrocodone-Acetaminophen      Other reaction(s): sweating, feel faint, diarrhea    Hydrocodone-Acetaminophen     Ketorolac Diarrhea and Nausea Only     Category: Adverse Reaction;  Annotation - [id number]: Symptoms were noted after injection into bursa with Toradol at orthopedics    Penicillin G     Penicillins Hives and Vomiting    Tramadol        Review of Systems   Constitutional: Positive for fatigue  Negative for activity change and appetite change  HENT: Positive for congestion  Respiratory: Positive for cough and wheezing  Cardiovascular: Negative for chest pain  Gastrointestinal: Negative  Musculoskeletal: Negative for myalgias  Neurological: Negative for headaches  Objective: There were no vitals filed for this visit  Physical Exam  Constitutional:       Appearance: Normal appearance  She is obese  She is not ill-appearing  HENT:      Nose: No congestion or rhinorrhea  Eyes:      General:         Right eye: No discharge  Left eye: No discharge  Pulmonary:      Effort: Pulmonary effort is normal  No respiratory distress  Lymphadenopathy:      Cervical: No cervical adenopathy  Neurological:      Mental Status: She is alert  VIRTUAL VISIT DISCLAIMER    Sonia Alexandermisael verbally agrees to participate in [employer]  Pt is aware that [employer] could be limited without vital signs or the ability to perform a full hands-on physical Jenae Dress understands she or the provider may request at any time to terminate the video visit and request the patient to seek care or treatment in person

## 2021-09-23 NOTE — PATIENT INSTRUCTIONS
Await covid test-instructed to go to mobile van between 5 and 9 or if weather very inclement tonight go 1st thing Sat am  Medicare Preventive Visit Patient Instructions  Thank you for completing your Welcome to Medicare Visit or Medicare Annual Wellness Visit today  Your next wellness visit will be due in one year (9/24/2022)  The screening/preventive services that you may require over the next 5-10 years are detailed below  Some tests may not apply to you based off risk factors and/or age  Screening tests ordered at today's visit but not completed yet may show as past due  Also, please note that scanned in results may not display below  Preventive Screenings:  Service Recommendations Previous Testing/Comments   Colorectal Cancer Screening  * Colonoscopy    * Fecal Occult Blood Test (FOBT)/Fecal Immunochemical Test (FIT)  * Fecal DNA/Cologuard Test  * Flexible Sigmoidoscopy Age: 54-65 years old   Colonoscopy: every 10 years (may be performed more frequently if at higher risk)  OR  FOBT/FIT: every 1 year  OR  Cologuard: every 3 years  OR  Sigmoidoscopy: every 5 years  Screening may be recommended earlier than age 48 if at higher risk for colorectal cancer  Also, an individualized decision between you and your healthcare provider will decide whether screening between the ages of 74-80 would be appropriate  Colonoscopy: Not on file  FOBT/FIT: Not on file  Cologuard: Not on file  Sigmoidoscopy: Not on file          Breast Cancer Screening Age: 36 years old  Frequency: every 1-2 years  Not required if history of left and right mastectomy Mammogram: 02/27/2020    Screening Current   Cervical Cancer Screening Between the ages of 21-29, pap smear recommended once every 3 years  Between the ages of 33-67, can perform pap smear with HPV co-testing every 5 years     Recommendations may differ for women with a history of total hysterectomy, cervical cancer, or abnormal pap smears in past  Pap Smear: 09/24/2014 Hepatitis C Screening Once for adults born between 1945 and 1965  More frequently in patients at high risk for Hepatitis C Hep C Antibody: Not on file        Diabetes Screening 1-2 times per year if you're at risk for diabetes or have pre-diabetes Fasting glucose: 198 mg/dL   A1C: 6 4 %    Screening Current   Cholesterol Screening Once every 5 years if you don't have a lipid disorder  May order more often based on risk factors  Lipid panel: 08/26/2021    Screening Not Indicated  History Lipid Disorder     Other Preventive Screenings Covered by Medicare:  1  Abdominal Aortic Aneurysm (AAA) Screening: covered once if your at risk  You're considered to be at risk if you have a family history of AAA  2  Lung Cancer Screening: covers low dose CT scan once per year if you meet all of the following conditions: (1) Age 50-69; (2) No signs or symptoms of lung cancer; (3) Current smoker or have quit smoking within the last 15 years; (4) You have a tobacco smoking history of at least 30 pack years (packs per day multiplied by number of years you smoked); (5) You get a written order from a healthcare provider  3  Glaucoma Screening: covered annually if you're considered high risk: (1) You have diabetes OR (2) Family history of glaucoma OR (3)  aged 48 and older OR (3)  American aged 72 and older  3  Osteoporosis Screening: covered every 2 years if you meet one of the following conditions: (1) You're estrogen deficient and at risk for osteoporosis based off medical history and other findings; (2) Have a vertebral abnormality; (3) On glucocorticoid therapy for more than 3 months; (4) Have primary hyperparathyroidism; (5) On osteoporosis medications and need to assess response to drug therapy  · Last bone density test (DXA Scan): Not on file  5  HIV Screening: covered annually if you're between the age of 12-76   Also covered annually if you are younger than 13 and older than 72 with risk factors for HIV infection  For pregnant patients, it is covered up to 3 times per pregnancy  Immunizations:  Immunization Recommendations   Influenza Vaccine Annual influenza vaccination during flu season is recommended for all persons aged >= 6 months who do not have contraindications   Pneumococcal Vaccine (Prevnar and Pneumovax)  * Prevnar = PCV13  * Pneumovax = PPSV23   Adults 25-60 years old: 1-3 doses may be recommended based on certain risk factors  Adults 72 years old: Prevnar (PCV13) vaccine recommended followed by Pneumovax (PPSV23) vaccine  If already received PPSV23 since turning 65, then PCV13 recommended at least one year after PPSV23 dose  Hepatitis B Vaccine 3 dose series if at intermediate or high risk (ex: diabetes, end stage renal disease, liver disease)   Tetanus (Td) Vaccine - COST NOT COVERED BY MEDICARE PART B Following completion of primary series, a booster dose should be given every 10 years to maintain immunity against tetanus  Td may also be given as tetanus wound prophylaxis  Tdap Vaccine - COST NOT COVERED BY MEDICARE PART B Recommended at least once for all adults  For pregnant patients, recommended with each pregnancy  Shingles Vaccine (Shingrix) - COST NOT COVERED BY MEDICARE PART B  2 shot series recommended in those aged 48 and above     Health Maintenance Due:      Topic Date Due    Hepatitis C Screening  Never done    HIV Screening  Never done    Cervical Cancer Screening  09/24/2016    Colorectal Cancer Screening  11/08/2018    Breast Cancer Screening: Mammogram  02/27/2021     Immunizations Due:      Topic Date Due    COVID-19 Vaccine (1) Never done    DTaP,Tdap,and Td Vaccines (1 - Tdap) Never done    Influenza Vaccine (1) 09/01/2021     Advance Directives   What are advance directives? Advance directives are legal documents that state your wishes and plans for medical care  These plans are made ahead of time in case you lose your ability to make decisions for yourself  Advance directives can apply to any medical decision, such as the treatments you want, and if you want to donate organs  What are the types of advance directives? There are many types of advance directives, and each state has rules about how to use them  You may choose a combination of any of the following:  · Living will: This is a written record of the treatment you want  You can also choose which treatments you do not want, which to limit, and which to stop at a certain time  This includes surgery, medicine, IV fluid, and tube feedings  · Durable power of  for healthcare [facility]): This is a written record that states who you want to make healthcare choices for you when you are unable to make them for yourself  This person, called a proxy, is usually a family member or a friend  You may choose more than 1 proxy  · Do not resuscitate (DNR) order:  A DNR order is used in case your heart stops beating or you stop breathing  It is a request not to have certain forms of treatment, such as CPR  A DNR order may be included in other types of advance directives  · Medical directive: This covers the care that you want if you are in a coma, near death, or unable to make decisions for yourself  You can list the treatments you want for each condition  Treatment may include pain medicine, surgery, blood transfusions, dialysis, IV or tube feedings, and a ventilator (breathing machine)  · Values history: This document has questions about your views, beliefs, and how you feel and think about life  This information can help others choose the care that you would choose  Why are advance directives important? An advance directive helps you control your care  Although spoken wishes may be used, it is better to have your wishes written down  Spoken wishes can be misunderstood, or not followed  Treatments may be given even if you do not want them   An advance directive may make it easier for your family to make difficult choices about your care  Urinary Incontinence   Urinary incontinence (UI)  is when you lose control of your bladder  UI develops because your bladder cannot store or empty urine properly  The 3 most common types of UI are stress incontinence, urge incontinence, or both  Medicines:   · May be given to help strengthen your bladder control  Report any side effects of medication to your healthcare provider  Do pelvic muscle exercises often:  Your pelvic muscles help you stop urinating  Squeeze these muscles tight for 5 seconds, then relax for 5 seconds  Gradually work up to squeezing for 10 seconds  Do 3 sets of 15 repetitions a day, or as directed  This will help strengthen your pelvic muscles and improve bladder control  Train your bladder:  Go to the bathroom at set times, such as every 2 hours, even if you do not feel the urge to go  You can also try to hold your urine when you feel the urge to go  For example, hold your urine for 5 minutes when you feel the urge to go  As that becomes easier, hold your urine for 10 minutes  Self-care:   · Keep a UI record  Write down how often you leak urine and how much you leak  Make a note of what you were doing when you leaked urine  · Drink liquids as directed  You may need to limit the amount of liquid you drink to help control your urine leakage  Do not drink any liquid right before you go to bed  Limit or do not have drinks that contain caffeine or alcohol  · Prevent constipation  Eat a variety of high-fiber foods  Good examples are high-fiber cereals, beans, vegetables, and whole-grain breads  Walking is the best way to trigger your intestines to have a bowel movement  · Exercise regularly and maintain a healthy weight  Weight loss and exercise will decrease pressure on your bladder and help you control your leakage  · Use a catheter as directed  to help empty your bladder   A catheter is a tiny, plastic tube that is put into your bladder to drain your urine  · Go to behavior therapy as directed  Behavior therapy may be used to help you learn to control your urge to urinate  Weight Management   Why it is important to manage your weight:  Being overweight increases your risk of health conditions such as heart disease, high blood pressure, type 2 diabetes, and certain types of cancer  It can also increase your risk for osteoarthritis, sleep apnea, and other respiratory problems  Aim for a slow, steady weight loss  Even a small amount of weight loss can lower your risk of health problems  How to lose weight safely:  A safe and healthy way to lose weight is to eat fewer calories and get regular exercise  You can lose up about 1 pound a week by decreasing the number of calories you eat by 500 calories each day  Healthy meal plan for weight management:  A healthy meal plan includes a variety of foods, contains fewer calories, and helps you stay healthy  A healthy meal plan includes the following:  · Eat whole-grain foods more often  A healthy meal plan should contain fiber  Fiber is the part of grains, fruits, and vegetables that is not broken down by your body  Whole-grain foods are healthy and provide extra fiber in your diet  Some examples of whole-grain foods are whole-wheat breads and pastas, oatmeal, brown rice, and bulgur  · Eat a variety of vegetables every day  Include dark, leafy greens such as spinach, kale, jose greens, and mustard greens  Eat yellow and orange vegetables such as carrots, sweet potatoes, and winter squash  · Eat a variety of fruits every day  Choose fresh or canned fruit (canned in its own juice or light syrup) instead of juice  Fruit juice has very little or no fiber  · Eat low-fat dairy foods  Drink fat-free (skim) milk or 1% milk  Eat fat-free yogurt and low-fat cottage cheese  Try low-fat cheeses such as mozzarella and other reduced-fat cheeses  · Choose meat and other protein foods that are low in fat    Choose beans or other legumes such as split peas or lentils  Choose fish, skinless poultry (chicken or turkey), or lean cuts of red meat (beef or pork)  Before you cook meat or poultry, cut off any visible fat  · Use less fat and oil  Try baking foods instead of frying them  Add less fat, such as margarine, sour cream, regular salad dressing and mayonnaise to foods  Eat fewer high-fat foods  Some examples of high-fat foods include french fries, doughnuts, ice cream, and cakes  · Eat fewer sweets  Limit foods and drinks that are high in sugar  This includes candy, cookies, regular soda, and sweetened drinks  Exercise:  Exercise at least 30 minutes per day on most days of the week  Some examples of exercise include walking, biking, dancing, and swimming  You can also fit in more physical activity by taking the stairs instead of the elevator or parking farther away from stores  Ask your healthcare provider about the best exercise plan for you  © Copyright [employer] 2018 Information is for End User's use only and may not be sold, redistributed or otherwise used for commercial purposes   All illustrations and images included in CareNotes® are the copyrighted property of [employer]  or [address] [place] [address]

## 2021-09-23 NOTE — ASSESSMENT & PLAN NOTE
Recurrent problem, will change to Levaquin, really should do covid test to be on safe side since not vaccinated, can either go to tent tonight or Sat AM depending on weather

## 2021-09-25 PROCEDURE — U0005 INFEC AGEN DETEC AMPLI PROBE: HCPCS | Performed by: FAMILY MEDICINE

## 2021-09-25 PROCEDURE — U0003 INFECTIOUS AGENT DETECTION BY NUCLEIC ACID (DNA OR RNA); SEVERE ACUTE RESPIRATORY SYNDROME CORONAVIRUS 2 (SARS-COV-2) (CORONAVIRUS DISEASE [COVID-19]), AMPLIFIED PROBE TECHNIQUE, MAKING USE OF HIGH THROUGHPUT TECHNOLOGIES AS DESCRIBED BY CMS-2020-01-R: HCPCS | Performed by: FAMILY MEDICINE

## 2021-10-07 ENCOUNTER — APPOINTMENT (OUTPATIENT)
Dept: LAB | Age: 56
End: 2021-10-07
Payer: MEDICARE

## 2021-10-07 DIAGNOSIS — I10 ESSENTIAL HYPERTENSION: ICD-10-CM

## 2021-10-07 DIAGNOSIS — N90.7 VULVAR CYST: ICD-10-CM

## 2021-10-07 DIAGNOSIS — R73.9 HYPERGLYCEMIA: ICD-10-CM

## 2021-10-07 DIAGNOSIS — L73.2 HYDRADENITIS: ICD-10-CM

## 2021-10-07 LAB
ANION GAP SERPL CALCULATED.3IONS-SCNC: 5 MMOL/L (ref 4–13)
BASOPHILS # BLD AUTO: 0.11 THOUSANDS/ΜL (ref 0–0.1)
BASOPHILS NFR BLD AUTO: 1 % (ref 0–1)
BUN SERPL-MCNC: 12 MG/DL (ref 5–25)
CALCIUM SERPL-MCNC: 9.8 MG/DL (ref 8.3–10.1)
CHLORIDE SERPL-SCNC: 100 MMOL/L (ref 100–108)
CO2 SERPL-SCNC: 30 MMOL/L (ref 21–32)
CREAT SERPL-MCNC: 0.83 MG/DL (ref 0.6–1.3)
EOSINOPHIL # BLD AUTO: 0.42 THOUSAND/ΜL (ref 0–0.61)
EOSINOPHIL NFR BLD AUTO: 3 % (ref 0–6)
ERYTHROCYTE [DISTWIDTH] IN BLOOD BY AUTOMATED COUNT: 14.7 % (ref 11.6–15.1)
GFR SERPL CREATININE-BSD FRML MDRD: 80 ML/MIN/1.73SQ M
GLUCOSE SERPL-MCNC: 280 MG/DL (ref 65–140)
HCT VFR BLD AUTO: 46.4 % (ref 34.8–46.1)
HGB BLD-MCNC: 15 G/DL (ref 11.5–15.4)
IMM GRANULOCYTES # BLD AUTO: 0.06 THOUSAND/UL (ref 0–0.2)
IMM GRANULOCYTES NFR BLD AUTO: 1 % (ref 0–2)
LYMPHOCYTES # BLD AUTO: 4.46 THOUSANDS/ΜL (ref 0.6–4.47)
LYMPHOCYTES NFR BLD AUTO: 34 % (ref 14–44)
MCH RBC QN AUTO: 27.8 PG (ref 26.8–34.3)
MCHC RBC AUTO-ENTMCNC: 32.3 G/DL (ref 31.4–37.4)
MCV RBC AUTO: 86 FL (ref 82–98)
MONOCYTES # BLD AUTO: 0.78 THOUSAND/ΜL (ref 0.17–1.22)
MONOCYTES NFR BLD AUTO: 6 % (ref 4–12)
NEUTROPHILS # BLD AUTO: 7.14 THOUSANDS/ΜL (ref 1.85–7.62)
NEUTS SEG NFR BLD AUTO: 55 % (ref 43–75)
NRBC BLD AUTO-RTO: 0 /100 WBCS
PLATELET # BLD AUTO: 359 THOUSANDS/UL (ref 149–390)
PMV BLD AUTO: 12 FL (ref 8.9–12.7)
POTASSIUM SERPL-SCNC: 3.5 MMOL/L (ref 3.5–5.3)
RBC # BLD AUTO: 5.39 MILLION/UL (ref 3.81–5.12)
SODIUM SERPL-SCNC: 135 MMOL/L (ref 136–145)
WBC # BLD AUTO: 12.97 THOUSAND/UL (ref 4.31–10.16)

## 2021-10-07 PROCEDURE — 80048 BASIC METABOLIC PNL TOTAL CA: CPT

## 2021-10-07 PROCEDURE — 36415 COLL VENOUS BLD VENIPUNCTURE: CPT

## 2021-10-07 PROCEDURE — 85025 COMPLETE CBC W/AUTO DIFF WBC: CPT

## 2021-10-25 ENCOUNTER — OFFICE VISIT (OUTPATIENT)
Dept: FAMILY MEDICINE CLINIC | Facility: CLINIC | Age: 56
End: 2021-10-25
Payer: MEDICARE

## 2021-10-25 VITALS
SYSTOLIC BLOOD PRESSURE: 132 MMHG | DIASTOLIC BLOOD PRESSURE: 84 MMHG | WEIGHT: 246 LBS | HEART RATE: 74 BPM | HEIGHT: 65 IN | BODY MASS INDEX: 40.98 KG/M2 | TEMPERATURE: 98.6 F

## 2021-10-25 DIAGNOSIS — L72.3 SEBACEOUS CYST: ICD-10-CM

## 2021-10-25 DIAGNOSIS — K76.0 FATTY LIVER: ICD-10-CM

## 2021-10-25 DIAGNOSIS — E11.9 TYPE 2 DIABETES MELLITUS WITHOUT COMPLICATION, WITHOUT LONG-TERM CURRENT USE OF INSULIN (HCC): ICD-10-CM

## 2021-10-25 DIAGNOSIS — I10 PRIMARY HYPERTENSION: Primary | ICD-10-CM

## 2021-10-25 DIAGNOSIS — F33.1 MODERATE EPISODE OF RECURRENT MAJOR DEPRESSIVE DISORDER (HCC): ICD-10-CM

## 2021-10-25 DIAGNOSIS — E66.01 CLASS 3 SEVERE OBESITY DUE TO EXCESS CALORIES WITH SERIOUS COMORBIDITY AND BODY MASS INDEX (BMI) OF 40.0 TO 44.9 IN ADULT (HCC): ICD-10-CM

## 2021-10-25 DIAGNOSIS — R61 HYPERHIDROSIS: ICD-10-CM

## 2021-10-25 PROCEDURE — 99215 OFFICE O/P EST HI 40 MIN: CPT | Performed by: FAMILY MEDICINE

## 2021-10-25 RX ORDER — ESCITALOPRAM OXALATE 20 MG/1
10 TABLET ORAL DAILY
Qty: 90 TABLET | Refills: 1
Start: 2021-10-25 | End: 2022-01-19

## 2021-11-03 ENCOUNTER — APPOINTMENT (OUTPATIENT)
Dept: LAB | Facility: CLINIC | Age: 56
End: 2021-11-03
Payer: MEDICARE

## 2021-11-03 DIAGNOSIS — E11.9 TYPE 2 DIABETES MELLITUS WITHOUT COMPLICATION, WITHOUT LONG-TERM CURRENT USE OF INSULIN (HCC): ICD-10-CM

## 2021-11-03 DIAGNOSIS — R61 HYPERHIDROSIS: ICD-10-CM

## 2021-11-03 DIAGNOSIS — K76.0 FATTY LIVER: ICD-10-CM

## 2021-11-03 DIAGNOSIS — I10 PRIMARY HYPERTENSION: ICD-10-CM

## 2021-11-03 DIAGNOSIS — J30.89 CHRONIC NONSEASONAL ALLERGIC RHINITIS DUE TO POLLEN: ICD-10-CM

## 2021-11-03 LAB
ALBUMIN SERPL BCP-MCNC: 3.3 G/DL (ref 3.5–5)
ALP SERPL-CCNC: 101 U/L (ref 46–116)
ALT SERPL W P-5'-P-CCNC: 161 U/L (ref 12–78)
AST SERPL W P-5'-P-CCNC: 134 U/L (ref 5–45)
BILIRUB DIRECT SERPL-MCNC: 0.23 MG/DL (ref 0–0.2)
BILIRUB SERPL-MCNC: 0.73 MG/DL (ref 0.2–1)
CREAT UR-MCNC: 266 MG/DL
EST. AVERAGE GLUCOSE BLD GHB EST-MCNC: 255 MG/DL
HBA1C MFR BLD: 10.5 %
MICROALBUMIN UR-MCNC: 33.7 MG/L (ref 0–20)
MICROALBUMIN/CREAT 24H UR: 13 MG/G CREATININE (ref 0–30)
PROT SERPL-MCNC: 7.9 G/DL (ref 6.4–8.2)

## 2021-11-03 PROCEDURE — 83036 HEMOGLOBIN GLYCOSYLATED A1C: CPT

## 2021-11-03 PROCEDURE — 82384 ASSAY THREE CATECHOLAMINES: CPT

## 2021-11-03 PROCEDURE — 82570 ASSAY OF URINE CREATININE: CPT

## 2021-11-03 PROCEDURE — 80076 HEPATIC FUNCTION PANEL: CPT

## 2021-11-03 PROCEDURE — 82043 UR ALBUMIN QUANTITATIVE: CPT

## 2021-11-03 PROCEDURE — 36415 COLL VENOUS BLD VENIPUNCTURE: CPT

## 2021-11-04 DIAGNOSIS — J30.89 CHRONIC NONSEASONAL ALLERGIC RHINITIS DUE TO POLLEN: ICD-10-CM

## 2021-11-04 RX ORDER — OLOPATADINE HYDROCHLORIDE 2 MG/ML
SOLUTION/ DROPS OPHTHALMIC
Qty: 7.5 ML | Refills: 1 | Status: SHIPPED | OUTPATIENT
Start: 2021-11-04 | End: 2021-11-08

## 2021-11-04 RX ORDER — ALBUTEROL SULFATE 90 UG/1
AEROSOL, METERED RESPIRATORY (INHALATION)
Qty: 18 G | Refills: 2 | Status: SHIPPED | OUTPATIENT
Start: 2021-11-04 | End: 2022-02-21

## 2021-11-08 LAB
CREAT UR-MCNC: 231.2 MG/DL
DOPAMINE UR-MCNC: 545 UG/L
DOPAMINE/CREAT UR: 236 UG/G CREAT (ref 0–348)
EPINEPH UR-MCNC: 18 UG/L
EPINEPH/CREAT UR: 8 UG/G CREAT (ref 0–19)
NOREPINEPH UR-MCNC: 352 UG/L
NOREPINEPH/CREAT UR: 152 UG/G CREAT (ref 0–111)

## 2021-11-08 RX ORDER — OLOPATADINE HYDROCHLORIDE OPHTHALMIC 2 MG/ML
SOLUTION OPHTHALMIC
Qty: 7.5 ML | Refills: 1 | Status: SHIPPED | OUTPATIENT
Start: 2021-11-08

## 2021-11-09 ENCOUNTER — OFFICE VISIT (OUTPATIENT)
Dept: FAMILY MEDICINE CLINIC | Facility: CLINIC | Age: 56
End: 2021-11-09
Payer: MEDICARE

## 2021-11-09 VITALS
WEIGHT: 242.4 LBS | BODY MASS INDEX: 40.39 KG/M2 | SYSTOLIC BLOOD PRESSURE: 120 MMHG | HEIGHT: 65 IN | HEART RATE: 80 BPM | DIASTOLIC BLOOD PRESSURE: 76 MMHG

## 2021-11-09 DIAGNOSIS — Z12.11 SCREENING FOR COLON CANCER: ICD-10-CM

## 2021-11-09 DIAGNOSIS — Z12.31 ENCOUNTER FOR SCREENING MAMMOGRAM FOR BREAST CANCER: ICD-10-CM

## 2021-11-09 DIAGNOSIS — I10 PRIMARY HYPERTENSION: ICD-10-CM

## 2021-11-09 DIAGNOSIS — E11.65 TYPE 2 DIABETES MELLITUS WITH HYPERGLYCEMIA, WITHOUT LONG-TERM CURRENT USE OF INSULIN (HCC): Primary | ICD-10-CM

## 2021-11-09 DIAGNOSIS — R61 HYPERHIDROSIS: ICD-10-CM

## 2021-11-09 DIAGNOSIS — E78.2 MIXED HYPERLIPIDEMIA: ICD-10-CM

## 2021-11-09 DIAGNOSIS — K76.0 FATTY LIVER: ICD-10-CM

## 2021-11-09 PROCEDURE — 92250 FUNDUS PHOTOGRAPHY W/I&R: CPT | Performed by: FAMILY MEDICINE

## 2021-11-09 PROCEDURE — 99214 OFFICE O/P EST MOD 30 MIN: CPT | Performed by: FAMILY MEDICINE

## 2021-11-09 RX ORDER — BLOOD SUGAR DIAGNOSTIC
STRIP MISCELLANEOUS
Qty: 100 STRIP | Refills: 3 | Status: SHIPPED | OUTPATIENT
Start: 2021-11-09 | End: 2021-11-09

## 2021-11-09 RX ORDER — GLIMEPIRIDE 2 MG/1
2 TABLET ORAL
Qty: 30 TABLET | Refills: 5 | Status: SHIPPED | OUTPATIENT
Start: 2021-11-09 | End: 2022-04-28

## 2021-11-09 RX ORDER — LANCETS 33 GAUGE
EACH MISCELLANEOUS DAILY
Qty: 100 EACH | Refills: 3 | Status: SHIPPED | OUTPATIENT
Start: 2021-11-09 | End: 2021-11-09

## 2021-11-09 RX ORDER — BLOOD-GLUCOSE METER
1 KIT MISCELLANEOUS DAILY
Qty: 1 KIT | Refills: 0 | Status: SHIPPED | OUTPATIENT
Start: 2021-11-09 | End: 2021-12-28

## 2021-11-11 ENCOUNTER — TELEPHONE (OUTPATIENT)
Dept: NEPHROLOGY | Facility: CLINIC | Age: 56
End: 2021-11-11

## 2021-11-12 LAB
LEFT EYE DIABETIC RETINOPATHY: NORMAL
LEFT EYE MACULAR EDEMA: NORMAL
LEFT EYE OTHER RETINOPATHY: NORMAL
RIGHT EYE DIABETIC RETINOPATHY: NORMAL
RIGHT EYE IMAGE QUALITY: NORMAL
RIGHT EYE MACULAR EDEMA: NORMAL
RIGHT EYE OTHER RETINOPATHY: NORMAL
SEVERITY (EYE EXAM): NORMAL

## 2021-11-16 DIAGNOSIS — K21.00 GASTROESOPHAGEAL REFLUX DISEASE WITH ESOPHAGITIS, UNSPECIFIED WHETHER HEMORRHAGE: ICD-10-CM

## 2021-11-16 RX ORDER — SUCRALFATE 1 G/1
TABLET ORAL
Qty: 360 TABLET | Refills: 1 | Status: SHIPPED | OUTPATIENT
Start: 2021-11-16 | End: 2022-07-06

## 2021-11-30 ENCOUNTER — TELEPHONE (OUTPATIENT)
Dept: DIABETES SERVICES | Facility: CLINIC | Age: 56
End: 2021-11-30

## 2021-12-03 ENCOUNTER — TELEPHONE (OUTPATIENT)
Dept: DIABETES SERVICES | Facility: CLINIC | Age: 56
End: 2021-12-03

## 2021-12-09 ENCOUNTER — TELEPHONE (OUTPATIENT)
Dept: DIABETES SERVICES | Facility: CLINIC | Age: 56
End: 2021-12-09

## 2021-12-28 DIAGNOSIS — E11.65 TYPE 2 DIABETES MELLITUS WITH HYPERGLYCEMIA, WITHOUT LONG-TERM CURRENT USE OF INSULIN (HCC): ICD-10-CM

## 2022-01-03 RX ORDER — BLOOD-GLUCOSE METER
EACH MISCELLANEOUS
Qty: 1 KIT | Refills: 0 | Status: SHIPPED | OUTPATIENT
Start: 2022-01-03

## 2022-01-12 DIAGNOSIS — E87.6 HYPOKALEMIA: ICD-10-CM

## 2022-01-12 DIAGNOSIS — F33.1 MODERATE EPISODE OF RECURRENT MAJOR DEPRESSIVE DISORDER (HCC): ICD-10-CM

## 2022-01-13 RX ORDER — TRAZODONE HYDROCHLORIDE 50 MG/1
TABLET ORAL
Qty: 90 TABLET | Refills: 1 | Status: SHIPPED | OUTPATIENT
Start: 2022-01-13 | End: 2022-07-06

## 2022-01-13 RX ORDER — POTASSIUM CHLORIDE 1500 MG/1
TABLET, EXTENDED RELEASE ORAL
Qty: 90 TABLET | Refills: 1 | Status: SHIPPED | OUTPATIENT
Start: 2022-01-13 | End: 2022-07-06

## 2022-01-19 DIAGNOSIS — F33.1 MODERATE EPISODE OF RECURRENT MAJOR DEPRESSIVE DISORDER (HCC): ICD-10-CM

## 2022-01-19 RX ORDER — ESCITALOPRAM OXALATE 20 MG/1
TABLET ORAL
Qty: 90 TABLET | Refills: 1 | Status: SHIPPED | OUTPATIENT
Start: 2022-01-19 | End: 2022-07-29

## 2022-02-03 ENCOUNTER — HOSPITAL ENCOUNTER (OUTPATIENT)
Dept: ULTRASOUND IMAGING | Facility: HOSPITAL | Age: 57
Discharge: HOME | End: 2022-02-03
Payer: MEDICARE

## 2022-02-03 DIAGNOSIS — K76.0 FATTY LIVER: ICD-10-CM

## 2022-02-03 PROCEDURE — 76705 ECHO EXAM OF ABDOMEN: CPT

## 2022-02-15 ENCOUNTER — APPOINTMENT (OUTPATIENT)
Dept: LAB | Facility: CLINIC | Age: 57
End: 2022-02-15
Payer: MEDICARE

## 2022-02-15 DIAGNOSIS — E11.65 TYPE 2 DIABETES MELLITUS WITH HYPERGLYCEMIA, WITHOUT LONG-TERM CURRENT USE OF INSULIN (HCC): ICD-10-CM

## 2022-02-15 LAB
ALBUMIN SERPL BCP-MCNC: 3.4 G/DL (ref 3.5–5)
ALP SERPL-CCNC: 91 U/L (ref 46–116)
ALT SERPL W P-5'-P-CCNC: 53 U/L (ref 12–78)
ANION GAP SERPL CALCULATED.3IONS-SCNC: 6 MMOL/L (ref 4–13)
AST SERPL W P-5'-P-CCNC: 37 U/L (ref 5–45)
BILIRUB SERPL-MCNC: 0.67 MG/DL (ref 0.2–1)
BUN SERPL-MCNC: 13 MG/DL (ref 5–25)
CALCIUM ALBUM COR SERPL-MCNC: 10 MG/DL (ref 8.3–10.1)
CALCIUM SERPL-MCNC: 9.5 MG/DL (ref 8.3–10.1)
CHLORIDE SERPL-SCNC: 102 MMOL/L (ref 100–108)
CO2 SERPL-SCNC: 29 MMOL/L (ref 21–32)
CREAT SERPL-MCNC: 0.79 MG/DL (ref 0.6–1.3)
EST. AVERAGE GLUCOSE BLD GHB EST-MCNC: 148 MG/DL
GFR SERPL CREATININE-BSD FRML MDRD: 85 ML/MIN/1.73SQ M
GLUCOSE P FAST SERPL-MCNC: 173 MG/DL (ref 65–99)
HBA1C MFR BLD: 6.8 %
POTASSIUM SERPL-SCNC: 3.3 MMOL/L (ref 3.5–5.3)
PROT SERPL-MCNC: 7.9 G/DL (ref 6.4–8.2)
SODIUM SERPL-SCNC: 137 MMOL/L (ref 136–145)

## 2022-02-15 PROCEDURE — 36415 COLL VENOUS BLD VENIPUNCTURE: CPT

## 2022-02-15 PROCEDURE — 83036 HEMOGLOBIN GLYCOSYLATED A1C: CPT

## 2022-02-15 PROCEDURE — 80053 COMPREHEN METABOLIC PANEL: CPT

## 2022-02-16 DIAGNOSIS — R13.19 ESOPHAGEAL DYSPHAGIA: ICD-10-CM

## 2022-02-16 DIAGNOSIS — I10 ESSENTIAL HYPERTENSION: ICD-10-CM

## 2022-02-16 DIAGNOSIS — K21.00 GASTROESOPHAGEAL REFLUX DISEASE WITH ESOPHAGITIS, UNSPECIFIED WHETHER HEMORRHAGE: ICD-10-CM

## 2022-02-16 RX ORDER — FAMOTIDINE 40 MG/1
TABLET, FILM COATED ORAL
Qty: 90 TABLET | Refills: 3 | Status: SHIPPED | OUTPATIENT
Start: 2022-02-16

## 2022-02-16 RX ORDER — ATENOLOL AND CHLORTHALIDONE TABLET 50; 25 MG/1; MG/1
TABLET ORAL
Qty: 90 TABLET | Refills: 1 | Status: SHIPPED | OUTPATIENT
Start: 2022-02-16 | End: 2022-03-16

## 2022-02-17 ENCOUNTER — OFFICE VISIT (OUTPATIENT)
Dept: FAMILY MEDICINE CLINIC | Facility: CLINIC | Age: 57
End: 2022-02-17
Payer: MEDICARE

## 2022-02-17 VITALS
HEIGHT: 65 IN | TEMPERATURE: 97.4 F | DIASTOLIC BLOOD PRESSURE: 76 MMHG | BODY MASS INDEX: 39.99 KG/M2 | SYSTOLIC BLOOD PRESSURE: 124 MMHG | WEIGHT: 240 LBS | HEART RATE: 78 BPM

## 2022-02-17 DIAGNOSIS — I10 PRIMARY HYPERTENSION: ICD-10-CM

## 2022-02-17 DIAGNOSIS — K76.0 FATTY LIVER: ICD-10-CM

## 2022-02-17 DIAGNOSIS — K21.00 GASTROESOPHAGEAL REFLUX DISEASE WITH ESOPHAGITIS, UNSPECIFIED WHETHER HEMORRHAGE: ICD-10-CM

## 2022-02-17 DIAGNOSIS — E11.9 TYPE 2 DIABETES MELLITUS WITHOUT COMPLICATION, WITHOUT LONG-TERM CURRENT USE OF INSULIN (HCC): ICD-10-CM

## 2022-02-17 DIAGNOSIS — E03.9 ACQUIRED HYPOTHYROIDISM: ICD-10-CM

## 2022-02-17 DIAGNOSIS — L73.9 FOLLICULITIS: ICD-10-CM

## 2022-02-17 DIAGNOSIS — M35.3 PMR (POLYMYALGIA RHEUMATICA) (HCC): ICD-10-CM

## 2022-02-17 DIAGNOSIS — E78.2 MIXED HYPERLIPIDEMIA: ICD-10-CM

## 2022-02-17 DIAGNOSIS — K80.20 CALCULUS OF GALLBLADDER WITHOUT CHOLECYSTITIS WITHOUT OBSTRUCTION: ICD-10-CM

## 2022-02-17 DIAGNOSIS — E66.01 CLASS 2 SEVERE OBESITY DUE TO EXCESS CALORIES WITH SERIOUS COMORBIDITY AND BODY MASS INDEX (BMI) OF 39.0 TO 39.9 IN ADULT (HCC): Primary | ICD-10-CM

## 2022-02-17 DIAGNOSIS — E87.6 HYPOKALEMIA: ICD-10-CM

## 2022-02-17 PROCEDURE — 99214 OFFICE O/P EST MOD 30 MIN: CPT | Performed by: FAMILY MEDICINE

## 2022-02-17 NOTE — ASSESSMENT & PLAN NOTE
Ultrasound consistent fatty liver  No treatment needed at the moment other than weight loss and limiting carbs

## 2022-02-17 NOTE — ASSESSMENT & PLAN NOTE
Lab Results   Component Value Date    HGBA1C 6 8 (H) 02/15/2022   Patient's A1c is much better at this point  Continue with glimepiride  No changes  With regard to carbohydrates, she should certainly limit those  This includes pasta, rice, potatoes, bread, starches, sugars

## 2022-02-17 NOTE — ASSESSMENT & PLAN NOTE
BMI is slightly lower than what it was before  Her weight is going down slightly  Encouraged her to continue with watching carbohydrates, as well as increasing exercise when tolerable

## 2022-02-17 NOTE — PROGRESS NOTES
Assessment and Plan:    Problem List Items Addressed This Visit     Fatty liver     Ultrasound consistent fatty liver  No treatment needed at the moment other than weight loss and limiting carbs  Relevant Orders    Comprehensive metabolic panel    Gall stone     Patient did have a gallstone noted on ultrasound in February of 2022  It was not obstructing, was not causing cholecystitis  At this point, she could follow with General surgery  Would wait until her other medical concerns are stabilized  Relevant Orders    Comprehensive metabolic panel    GERD (gastroesophageal reflux disease)     Continue Pepcid, Protonix, Carafate  Follow in the future  Hyperlipidemia     Noted before  Not currently on statins  Will recheck labs in the future  Relevant Orders    Comprehensive metabolic panel    Lipid Panel with Direct LDL reflex    Hypertension     Blood pressure today was excellent with tenoretic  Relevant Orders    Comprehensive metabolic panel    Hypokalemia     Potassium was slightly low at 3 3, but not severe  She is on supplementation  Continue  Recheck at next blood work  Relevant Orders    Comprehensive metabolic panel    Hypothyroidism     Check with next blood work         Obesity - Primary     BMI is slightly lower than what it was before  Her weight is going down slightly  Encouraged her to continue with watching carbohydrates, as well as increasing exercise when tolerable  PMR (polymyalgia rheumatica) (HCC)     No changes  Continues with aches all the time  Type 2 diabetes mellitus (HCC)       Lab Results   Component Value Date    HGBA1C 6 8 (H) 02/15/2022   Patient's A1c is much better at this point  Continue with glimepiride  No changes  With regard to carbohydrates, she should certainly limit those  This includes pasta, rice, potatoes, bread, starches, sugars           Relevant Orders    Comprehensive metabolic panel Hemoglobin A1C      Other Visit Diagnoses     Folliculitis        Selsun blue shampoo, or head and shoulders  These have antifungal components that will help prevent and treat folliculitis  Diagnoses and all orders for this visit:    Class 2 severe obesity due to excess calories with serious comorbidity and body mass index (BMI) of 39 0 to 39 9 in adult [facility])    Type 2 diabetes mellitus without complication, without long-term current use of insulin (HCC)  -     Comprehensive metabolic panel; Future  -     Hemoglobin A1C; Future    Calculus of gallbladder without cholecystitis without obstruction  -     Comprehensive metabolic panel; Future    Fatty liver  -     Comprehensive metabolic panel; Future    Gastroesophageal reflux disease with esophagitis, unspecified whether hemorrhage    Mixed hyperlipidemia  -     Comprehensive metabolic panel; Future  -     Lipid Panel with Direct LDL reflex; Future    Primary hypertension  -     Comprehensive metabolic panel; Future    Hypokalemia  -     Comprehensive metabolic panel; Future    Acquired hypothyroidism    Folliculitis  Comments:  Selsun blue shampoo, or head and shoulders  These have antifungal components that will help prevent and treat folliculitis  PMR (polymyalgia rheumatica) ([facility])              Subjective:      Patient ID: Mily Kumari is a 49 y o  female  CC:    Chief Complaint   Patient presents with    Follow-up     4 month f/u review recent labs and abd US     Diabetes       HPI:    She is her to follow up on multiple issues  Her mother recently   She is dealing with this  Cholesterol: Not on medications  Hypertension: On tenoretic  Hypothyroid: Not on meds  No recent check  GERD: On Pepcid, and protonix  Also has sucralfate  PMR: No concerns for now  Has hd some aches  DM2: Labs reviewed  She is seeing dietician  Some blisters on the scalp  Has been for a while   If scratches, irritated and inflamed  Gallbladder US: She wondered about stones  Some changes in vision before  Last was in Jan 2022  Sparkle in eye on right  The following portions of the patient's history were reviewed and updated as appropriate: allergies, current medications, past family history, past medical history, past social history, past surgical history and problem list       Review of Systems   Constitutional: Negative  HENT: Negative  Eyes: Negative  Respiratory: Negative  Cardiovascular: Negative  Gastrointestinal: Negative  Endocrine: Negative  Genitourinary: Negative  Musculoskeletal: Positive for arthralgias  Skin: Negative  Allergic/Immunologic: Negative  Neurological: Negative  Hematological: Negative  Psychiatric/Behavioral: Negative  Data to review:   Na 137, K 3 3, Ca 9 5  Sugar 173  AST: 37, ALT: 53   Creat 0 79, GFR 85  A1C 6 8  Objective:    Vitals:    02/17/22 1123   BP: 124/76   BP Location: Left arm   Patient Position: Sitting   Cuff Size: Adult   Pulse: 78   Temp: (!) 97 4 °F (36 3 °C)   TempSrc: Temporal   Weight: 109 kg (240 lb)   Height: 5' 5" (1 651 m)        Physical Exam  Vitals and nursing note reviewed  Constitutional:       Appearance: Normal appearance  HENT:      Head: Normocephalic  Cardiovascular:      Rate and Rhythm: Normal rate and regular rhythm  Pulses: Normal pulses  Carotid pulses are 2+ on the right side and 2+ on the left side  Heart sounds: Normal heart sounds  No murmur heard  No friction rub  No gallop  Pulmonary:      Effort: Pulmonary effort is normal  No respiratory distress  Breath sounds: Normal breath sounds  No stridor  No wheezing, rhonchi or rales  Chest:      Chest wall: No tenderness  Neurological:      Mental Status: She is alert  BMI Counseling: Body mass index is 39 94 kg/m²   The BMI is above normal  Nutrition recommendations include decreasing portion sizes, encouraging healthy choices of fruits and vegetables, decreasing fast food intake, consuming healthier snacks, limiting drinks that contain sugar, moderation in carbohydrate intake, increasing intake of lean protein, reducing intake of saturated and trans fat and reducing intake of cholesterol  Exercise recommendations include exercising 3-5 times per week  No pharmacotherapy was ordered  Rationale for BMI follow-up plan is due to patient being overweight or obese  Tobacco Cessation Counseling: Tobacco cessation counseling was provided  The patient is sincerely urged to quit consumption of tobacco  She is not ready to quit tobacco  Medication options and side effects of medication discussed  Patient refused medication

## 2022-02-17 NOTE — ASSESSMENT & PLAN NOTE
Potassium was slightly low at 3 3, but not severe  She is on supplementation  Continue  Recheck at next blood work

## 2022-02-17 NOTE — PATIENT INSTRUCTIONS
Problem List Items Addressed This Visit     Fatty liver     Ultrasound consistent fatty liver  No treatment needed at the moment other than weight loss and limiting carbs  Relevant Orders    Comprehensive metabolic panel    Gall stone     Patient did have a gallstone noted on ultrasound in February of 2022  It was not obstructing, was not causing cholecystitis  At this point, she could follow with General surgery  Would wait until her other medical concerns are stabilized  Relevant Orders    Comprehensive metabolic panel    GERD (gastroesophageal reflux disease)     Continue Pepcid, Protonix, Carafate  Follow in the future  Hyperlipidemia     Noted before  Not currently on statins  Will recheck labs in the future  Relevant Orders    Comprehensive metabolic panel    Lipid Panel with Direct LDL reflex    Hypertension     Blood pressure today was excellent with tenoretic  Relevant Orders    Comprehensive metabolic panel    Hypokalemia     Potassium was slightly low at 3 3, but not severe  She is on supplementation  Continue  Recheck at next blood work  Relevant Orders    Comprehensive metabolic panel    Hypothyroidism     Check with next blood work         Obesity - Primary     BMI is slightly lower than what it was before  Her weight is going down slightly  Encouraged her to continue with watching carbohydrates, as well as increasing exercise when tolerable  PMR (polymyalgia rheumatica) (HCC)     No changes  Continues with aches all the time  Type 2 diabetes mellitus (HCC)       Lab Results   Component Value Date    HGBA1C 6 8 (H) 02/15/2022   Patient's A1c is much better at this point  Continue with glimepiride  No changes  With regard to carbohydrates, she should certainly limit those  This includes pasta, rice, potatoes, bread, starches, sugars           Relevant Orders    Comprehensive metabolic panel    Hemoglobin A1C      Other Visit Diagnoses     Folliculitis        Selsun blue shampoo, or head and shoulders  These have antifungal components that will help prevent and treat folliculitis  COVID 19 Instructions    Neli Harris was advised to limit contact with others to essential tasks such as getting food, medications, and medical care  Proper handwashing reviewed, and Hand sanitzer when washing is not available  If the patient develops symptoms of COVID 19, the patient should call the office as soon as possible  For 0110-3006 Flu season, it is strongly recommended that Flu Vaccinations be obtained  Virtual Visits:  [place]io: This works on smart phones (any phone with Internet browsing capability)  You should get a text message when the provider is ready to see you  Click on the link in the text message, and the call should start  You will need to type in your name, and allow camera and microphone access  This is HIPPA compliant, and secure  If you have not already done so, get immunized to COVID 19  We are committed to getting you vaccinated as soon as possible and will be closely following CDC and [facility]  guidelines as they are released and revised  Please refer to our COVID-19 vaccine webpage for the most up to date information on the vaccine and our distribution efforts  This site will also have the most up to date recommendations for COVID booster vaccine  Xander hernandez    Call [phone] ([phone]), option 7    OUR NEW LOCATION:    [address], [address] [place], [place] [address] [place], [place], [address]  Fax: [phone]    Lab services and OB/GYN are at this location as well

## 2022-02-17 NOTE — ASSESSMENT & PLAN NOTE
Patient did have a gallstone noted on ultrasound in February of 2022  It was not obstructing, was not causing cholecystitis  At this point, she could follow with General surgery  Would wait until her other medical concerns are stabilized

## 2022-02-20 DIAGNOSIS — J30.89 CHRONIC NONSEASONAL ALLERGIC RHINITIS DUE TO POLLEN: ICD-10-CM

## 2022-02-21 ENCOUNTER — OFFICE VISIT (OUTPATIENT)
Dept: DIABETES SERVICES | Facility: CLINIC | Age: 57
End: 2022-02-21
Payer: MEDICARE

## 2022-02-21 DIAGNOSIS — E11.65 TYPE 2 DIABETES MELLITUS WITH HYPERGLYCEMIA, WITHOUT LONG-TERM CURRENT USE OF INSULIN (HCC): Primary | ICD-10-CM

## 2022-02-21 PROCEDURE — G0108 DIAB MANAGE TRN  PER INDIV: HCPCS | Performed by: DIETITIAN, REGISTERED

## 2022-02-21 RX ORDER — ALBUTEROL SULFATE 90 UG/1
AEROSOL, METERED RESPIRATORY (INHALATION)
Qty: 18 G | Refills: 2 | Status: SHIPPED | OUTPATIENT
Start: 2022-02-21

## 2022-02-21 NOTE — PROGRESS NOTES
Type 2 Diabetes Class Assessment    HPI: Met with Jas Renee for DSME Initial visit  Chau Odom has Type 2 Diabetes  Chau Odom will take Living Well with Diabetes virtual classes in March from 6:00-8:00PM     Diabetes Assessment  Visit Type: Initial visit  Present at Session: patient   Medical Diagnosis/ICD 10: E11 67  Special Learning Needs: No  Barriers to Learning: no barriers    How do you learn best? Instruction  What are you most interested in learning about regards to your diabetes? Diet   How do you feel about making lifestyle changes at this time? Not good with changes, but it's time to do something   How would you rate your current knowledge of diabetes? fair -good  How confident are you that will be able to take better control of your diabetes?: very good    How long have you had diabetes? New diagnosis (2021)  Have you had diabetes education in the past?: No  Do you have any family members with diabetes?: Yes - father and maternal uncle  Do you monitor your blood sugar? yes  Type of blood sugar monitor: One Touch Verio Reflect  How old is your meter?: new  How often do you test your blood sugars?:tests once daily and staggers the testing times  Do you keep a written record of your blood sugars? No   Blood sugar log with patient today and reviewed by educator?: No   Blood Sugar ranges:    Fastin-173   Before meals: 145-173   2 hours after meals: 188  Any financial concerns pertaining to your diabetes supplies, medication or care?: No  Have you ever experienced hypoglycemia?:  No  Have you ever been hospitalized or gone to the ER due to your blood sugars?: No  How do you treat low blood sugars?: hard candy or a regular soda  How do you treat high blood sugars? Avoids sweets  Do you wear a Diabetes I D ?: no  Where do you dispose of your sharps (needles,lancetes)? : in a small pill container then in the trash    Lab Results   Component Value Date    HGBA1C 6 8 (H) 02/15/2022    HGBA1C 10 5 (H) 2021 HGBA1C 6 4 (H) 09/13/2018       No results found for: CHOL  Lab Results   Component Value Date    HDL 27 (L) 08/26/2021    HDL 34 (L) 09/13/2018     Lab Results   Component Value Date    LDLCALC 111 (H) 08/26/2021    LDLCALC 111 (H) 09/13/2018     Lab Results   Component Value Date    TRIG 201 (H) 08/26/2021    TRIG 156 (H) 09/13/2018     No results found for: CHOLHDL  No results found for: Vicci Moment    There is no height or weight on file to calculate BMI      Ht Readings from Last 1 Encounters:   02/17/22 5' 5" (1 651 m)     Wt Readings from Last 1 Encounters:   02/17/22 109 kg (240 lb)     Weight Change: Yes 15-20 pound weight loss (no sure of the time frame)    Diet Assessment    Do you follow any special diet presently?: Yes - avoiding sweets  Who cooks at home?:  spouse  Who does the grocery shopping?: spouse   How frequently do you eat out?: once a week    Activity Assessment    Exercise: nothing beyond daily activities     Lifestyle/Social Assessment    Racial/ethnic group:                                       Primary Language: English  Marital Status:   Education Level: High School Graduate   Work status: Disabled  Type of job and hours: n/a  Who lives in your household?: spouse  Who is you primary support person(s): spouse   Describe your quality of life currently?: good  Any concerns for your safety?: No  Any Confucianist or cultural practices that may affect your diabetes care: No  Do you have a decrease or loss of hearing?: No  Do you have a decrease or loss of vision?: No  When was the last time you had an ophthalmology exam?: 11/9/2021  When was the last time you had dental exam?: goes every 6 months  Do you check your feet for cracks, sores, debris?: Not daily  When was the last time you had podiatry or foot exam?: 11/9/2021  Last flu shot?: not this year  Pneumonia shot?: No    The patient's history was reviewed and updated as appropriate: allergies, current medications  Intervention    Diabetes Overview :   Disha Arreola was instructed on basic concepts of diabetes, including identifying role of diabetes self management, basic pathophysiology and types of diabetes, A1c and blood sugar targets  Disha Arreola has good understanding of material covered  Taking Medications: Instructed patient on action, side effects, efficacy, prescribed dosage and appropriate timing and frequency of administration of her diabetes medication  Disha Arreola has good understanding of material covered  Monitoring Blood Sugars  Instructions for Meter Teaching- Patient verbally instructed in the following:  Site selection and skin preparation, Loading strips and lancet device, meter activation, obtaining blood sample, test strip and lancet disposal and recording log book entries  Patient has good understanding of material covered  Comments: Disha Arreola is using a One Touch Verio meter, Patient did not have her meter with her at the visit  Testing frequency: Encouraged testing once a day and staggering the testing times  Goal Blood Sugars:   Premeal , even better <110  2hr after a meal <180, even better <140  A1C <7%, even better <6 5%  Hypoglycemia: Instructed patient on definition/risk of hypoglycemia, treatment, causes/symptoms, when to notify provider of lows, prevention of hypoglycemia and exercise precautions  Comments: Belinda Dennis understanding of hypoglycemia concepts      Physical Activity: Discussed benefits of physical activity to optimize blood glucose control, encouraged activity at patient is physically able   Always consult a physician prior to starting an exercise program   Comments: Belinda Dennis understanding of hypoglycemia concepts        Diabetes Education Record  Disha Arreola received the following handouts: Class schedule and class assessment packet      Patient response to instruction    Comprehensiongood  Motivationgood  Expected Compliancegood  Response to Teachback: 100%, demonstrated understanding    Start- Stop: 12:55-1:40  Total Minutes: 45 Minutes  Group or Individual Instruction: DSMT-I  Other: Ballard Bence, MD    Thank you for referring your patient to [facility], it was a pleasure working with them today  Please feel free to call with any questions or concerns      Rony Koenig  [address] [place] [phone]

## 2022-02-21 NOTE — TELEPHONE ENCOUNTER
Requested Prescriptions     Pending Prescriptions Disp Refills    albuterol (PROVENTIL HFA,VENTOLIN HFA) 90 mcg/act inhaler [Pharmacy Med Name: ALBUTEROL HFA (VENTOLIN) INH]  2     Sig: INHALE 2 PUFFS EVERY 6 HOURS AS NEEDED FOR WHEEZE       LOV 2/17/22, F/U 5/24/22, Labs pending

## 2022-02-23 ENCOUNTER — OFFICE VISIT (OUTPATIENT)
Dept: DIABETES SERVICES | Facility: CLINIC | Age: 57
End: 2022-02-23
Payer: MEDICARE

## 2022-02-23 VITALS — BODY MASS INDEX: 39.54 KG/M2 | WEIGHT: 237.6 LBS

## 2022-02-23 DIAGNOSIS — E11.65 TYPE 2 DIABETES MELLITUS WITH HYPERGLYCEMIA, WITHOUT LONG-TERM CURRENT USE OF INSULIN (HCC): Primary | ICD-10-CM

## 2022-02-23 PROCEDURE — 97802 MEDICAL NUTRITION INDIV IN: CPT | Performed by: DIETITIAN, REGISTERED

## 2022-02-23 NOTE — PATIENT INSTRUCTIONS
1  Consume 3 meals a day about 4-5 hours apart (NO SKIPPING)  2  45 grams of carbs per meal and no more than 15 grams per evening snack  3  Consume diet beverage with meals and water in between meals

## 2022-02-23 NOTE — PROGRESS NOTES
Medical Nutrition Therapy        Assessment    Visit Type: Initial visit  Chief complaint/Medical Diagnosis/reason for visit E11 67 (T2DM with hyperglycemia, without long term insulin)    HPI Matthew Sanchez was seen for her initial MNT visit  Matthew Sanchez has initiated dietary changes which include cutting back/out sweets (cookies and ice cream)  Patient admits to not following an eating schedule and states that she usually consumes one meal a day  Problems identified in food recall include meal skipping  Provided patient with carbohydrate guidelines for a 1500 calorie meal plan to assist with consistency, balance and portion control  Encouraged the consumption of regular meals at regular times 4-5 hours apart (no meal skipping)  Advised patient to keep carbohydrate intake to 45 grams per meal and 15 grams per HS snack to assist with glycemic control  Portion booklet and food labels were used to teach basic carbohydrate counting  Suggested Matthew Sanchez speak to her MD regarding a safe level of activity given her medical history of back, knee and neck problems  Patient agreed to keep daily food logs  Follow-up TBD  Matthew Sanchez will attend Living Well with Diabetes classes virtually in March  RD will remain available for further dietary questions/concerns       Ht Readings from Last 1 Encounters:   02/17/22 5' 5" (1 651 m)     Wt Readings from Last 3 Encounters:   02/23/22 108 kg (237 lb 9 6 oz)   02/17/22 109 kg (240 lb)   11/09/21 110 kg (242 lb 6 4 oz)     Weight Change: Yes 15 pound planned weight loss since November    Barriers to Learning: no barriers    Do you follow any special diet presently?: Yes - monitoring carbohydrate intake  Who shops: spouse  Who cooks: spouse    Food Log: Completed via the method of food recall    Breakfast:SKIPS daily; coffee with SF flavored creamer or diet Mountain Dew  Morning Snack:10:00- 2:00 PM 3 times a week: Lances PB crackers 6 pack or [employer] bar  Lunch:SKIPS daily  Afternoon Snack: none; diet soda  Dinner:8:00-10:00PM 6 oz meat, chicken or fish, 1 cup rice/noodle/potato, green beans or broccoli or carrots or 1/2 cup corn or peas OR 2 cups pasta with meat sauce, diet soda  Evening Snack:none  Beverages: coffee and diet soda  Eating out/Take out:once a week  Exercise nothing beyond daily activities (knee, back and neck problems)    Calorie needs 1500 kcals/day Carbs: 45g/meal, 15g/HS snack        Nutrition Diagnosis:  Food and nutrition related knowledge deficit  related to Lack of prior exposure to accurate nutrition related information as evidenced by No prior knowledge of need for food and nutrition related recommendations    Intervention: label reading, behavior modification strategies, carbohydrate counting, meal timing, meal planning, monitoring portion control and food diary     Treatment Goals: Patient will consume 3 meals a day, Patient will count carbohydrates and Consume diet beverages with meals  Monitoring and evaluation:    Term code indicator  FH 1 3 2 Food Intake Criteria: Consume 3 meals a day about 4-5 hours apart (NO SKIPPING)  Term code indicator  FH 1 6 3 Carbohydrate Intake Criteria: 45 grams of carbs per meal and no more than 15 grams per evening snack  Term code indicator  FH 1 3 1 Fluid/Beverage Intake Criteria: Consume diet beverage with meals and water in between meals  Materials Provided: Portion booklet and food logs    Patients Response to Instruction:  Comprehensiongood  Motivationgood  Expected Compliancegood    Start- Stop: 1:00-1:45  Total Minutes: 45 Minutes  Group or Individual Instruction: STEWART-I  Other: Omer Willard MD    Thank you for coming to the [facility] for education today  Please feel free to call with any questions or concerns      Brian Double  [address] [facility] [phone]

## 2022-02-28 ENCOUNTER — HOSPITAL ENCOUNTER (OUTPATIENT)
Dept: MAMMOGRAPHY | Facility: MEDICAL CENTER | Age: 57
Discharge: HOME | End: 2022-02-28
Payer: MEDICARE

## 2022-02-28 ENCOUNTER — HOSPITAL ENCOUNTER (OUTPATIENT)
Dept: BONE DENSITY | Facility: MEDICAL CENTER | Age: 57
Discharge: HOME | End: 2022-02-28
Payer: MEDICARE

## 2022-02-28 VITALS — BODY MASS INDEX: 38.49 KG/M2 | HEIGHT: 65 IN | WEIGHT: 231 LBS

## 2022-02-28 DIAGNOSIS — Z78.0 POSTMENOPAUSAL: ICD-10-CM

## 2022-02-28 DIAGNOSIS — Z12.31 ENCOUNTER FOR SCREENING MAMMOGRAM FOR BREAST CANCER: ICD-10-CM

## 2022-02-28 PROCEDURE — 77067 SCR MAMMO BI INCL CAD: CPT

## 2022-02-28 PROCEDURE — 77063 BREAST TOMOSYNTHESIS BI: CPT

## 2022-02-28 PROCEDURE — 77080 DXA BONE DENSITY AXIAL: CPT

## 2022-03-10 DIAGNOSIS — E11.65 TYPE 2 DIABETES MELLITUS WITH HYPERGLYCEMIA, WITHOUT LONG-TERM CURRENT USE OF INSULIN (HCC): ICD-10-CM

## 2022-03-10 RX ORDER — BLOOD SUGAR DIAGNOSTIC
1 STRIP MISCELLANEOUS DAILY
Qty: 100 STRIP | Refills: 0 | Status: SHIPPED | OUTPATIENT
Start: 2022-03-10 | End: 2022-03-11

## 2022-03-10 RX ORDER — LANCETS 33 GAUGE
EACH MISCELLANEOUS DAILY
Qty: 100 EACH | Refills: 0 | Status: SHIPPED | OUTPATIENT
Start: 2022-03-10 | End: 2022-03-11

## 2022-03-11 RX ORDER — BLOOD SUGAR DIAGNOSTIC
1 STRIP MISCELLANEOUS DAILY
Qty: 100 STRIP | Refills: 0 | Status: SHIPPED | OUTPATIENT
Start: 2022-03-11 | End: 2022-04-25 | Stop reason: SDUPTHER

## 2022-03-11 RX ORDER — LANCETS 33 GAUGE
EACH MISCELLANEOUS
Qty: 100 EACH | Refills: 0 | Status: SHIPPED | OUTPATIENT
Start: 2022-03-11 | End: 2022-05-25

## 2022-03-16 ENCOUNTER — CONSULT (OUTPATIENT)
Dept: NEPHROLOGY | Facility: CLINIC | Age: 57
End: 2022-03-16
Payer: MEDICARE

## 2022-03-16 VITALS
WEIGHT: 231.6 LBS | SYSTOLIC BLOOD PRESSURE: 122 MMHG | DIASTOLIC BLOOD PRESSURE: 78 MMHG | BODY MASS INDEX: 38.59 KG/M2 | HEIGHT: 65 IN

## 2022-03-16 DIAGNOSIS — E87.6 CHRONIC HYPOKALEMIA: Primary | ICD-10-CM

## 2022-03-16 DIAGNOSIS — I10 HYPERTENSION, UNSPECIFIED TYPE: ICD-10-CM

## 2022-03-16 DIAGNOSIS — R80.9 PROTEINURIA, UNSPECIFIED TYPE: ICD-10-CM

## 2022-03-16 DIAGNOSIS — R61 HYPERHIDROSIS: ICD-10-CM

## 2022-03-16 DIAGNOSIS — D35.02 ADENOMA OF LEFT ADRENAL GLAND: ICD-10-CM

## 2022-03-16 DIAGNOSIS — Z72.0 TOBACCO ABUSE: ICD-10-CM

## 2022-03-16 LAB
SL AMB  POCT GLUCOSE, UA: ABNORMAL
SL AMB LEUKOCYTE ESTERASE,UA: ABNORMAL
SL AMB POCT BILIRUBIN,UA: ABNORMAL
SL AMB POCT BLOOD,UA: ABNORMAL
SL AMB POCT CLARITY,UA: CLEAR
SL AMB POCT COLOR,UA: YELLOW
SL AMB POCT KETONES,UA: ABNORMAL
SL AMB POCT NITRITE,UA: ABNORMAL
SL AMB POCT PH,UA: 7
SL AMB POCT SPECIFIC GRAVITY,UA: 1.01
SL AMB POCT URINE PROTEIN: 0.15
SL AMB POCT UROBILINOGEN: ABNORMAL

## 2022-03-16 PROCEDURE — 99204 OFFICE O/P NEW MOD 45 MIN: CPT | Performed by: INTERNAL MEDICINE

## 2022-03-16 PROCEDURE — 81002 URINALYSIS NONAUTO W/O SCOPE: CPT | Performed by: INTERNAL MEDICINE

## 2022-03-16 RX ORDER — LOSARTAN POTASSIUM 50 MG/1
50 TABLET ORAL DAILY
Qty: 90 TABLET | Refills: 3 | Status: SHIPPED | OUTPATIENT
Start: 2022-03-16

## 2022-03-16 NOTE — PROGRESS NOTES
NEPHROLOGY OUTPATIENT CONSULTATION   Darren Mello 49 y o  female MRN: [id number]  Date: 3/16/2022  Reason for consultation:   Chief Complaint   Patient presents with    Consult     Referred for Primary hypertension  ASSESSMENT AND PLAN:  Hypokalemia  -on kcl 20 meq daily but last potassium level was 3 3  -hypokalemia could be due to use of chlorthalidone she has been taking for many years  Would also consider possibility of primary aldosteronism with previous CT chest from 2018 suggestive of left adrenal gland nodule 1 4 cm in size  Will check renin aldosterone level, will check 24 hour urine potassium   -in the setting of persistent hypokalemia, discussed with patient about stopping further chlorthalidone  She does have some proteinuria,  so stopping atenolol chlorthalidone and starting on losartan 50 mg daily  -denies consumption of black licorice    Hypertension, unspecified:  Most likely primary hypertension as it has been well controlled but will do further workup as mentioned below  -Current medication: Atenolol-chlorthalidone     -Current blood pressure:  Stable and is at goal  -Plan:    · In the setting of persistent hypokalemia and UA with proteinuria, will change atenolol chlorthalidone to losartan 50 mg daily  Stressed on home monitoring of blood pressure and buying a blood pressure cuff and maintaining a log of blood pressure  · If lower extremity edema develops due to stopping chlorthalidone, may consider potassium-sparing diuretics or spironolactone in future depending on the results of renin aldosterone level  · No checking renal duplex as blood pressure is currently controlled but may consider in future if needed  -Recommend 2 g sodium diet  -Recommend daily exercise and weight loss  -Discussed home monitoring of BP and maintaining a log of blood pressure   -Recommend goal BP less than 130/80       Left adrenal adenoma  -CT chest PE protocol in October 2018 suggestive of benign left adrenal gland nodule consistent with adenoma measuring 1 4 cm in size  -hormonal workup, a m  cortisol in March 2020 was 11 6 which is acceptable  -random epinephrine and norepinephrine level in urine was normal range  Dopamine at normal range   -Serum norepinephrine level slightly elevated at 152 serum   -will recheck 24 hour catecholamines, cortisol, norepinephrine  Will also check serum renin aldosterone level  Patient denies having a 24 hour urine levels checked in the past   -checking CT abdomen with and without contrast to monitor adrenal adenoma   -also discussed that if hormone workup comes back positive would consider referral to Endocrine   -patient does complain of excessive sweating and headache  Not sure if this is menopausal     Proteinuria, unspecified  -likely due to diabetic nephropathy as well as hypertensive nephrosclerosis  -office UA was positive for trace protein but urine microscopy was negative for any RBCs or WBCs  -as mentioned above starting on ARB, stopping atenolol chlorthalidone  -also stressed on goal A1c less than 7, A1c improving to 6 8 from previous level of 10 5 which would help   -continue to monitor    DM-2  A1c improving to 6 8   -continue management per PCP    Vitamin D deficiency on oral vitamin-D per PCP, continue to monitor per PCP    Tobacco abuse  -stressed on quiting smoking  Patient currently under stress and not ready to quit  Diagnoses and all orders for this visit:    Chronic hypokalemia  -     Potassium, urine, 24 hour; Future  -     Basic metabolic panel; Future  -     Basic metabolic panel; Future  -     Magnesium; Future  -     Protein / creatinine ratio, urine; Future  -     CT abdomen w wo contrast; Future    Hypertension, unspecified type  -     Ambulatory referral to Nephrology  -     POCT urine dip  -     Aldosterone; Future  -     Renin Direct Assay;  Future  -     Metanephrine, Fractionated Plasma Free; Future  -     Basic metabolic panel; Future  -     Metanephrines Fractionated, urine, 24 hour; Future  -     Catecholamines, fractionated, urine, 24 hour; Future  -     Potassium, urine, 24 hour; Future  -     Creatinine, urine, 24 hour; Future  -     Protein, urine, 24 hour; Future  -     Cortisol, Free, Urine, 24 Hour; Future  -     losartan (COZAAR) 50 mg tablet; Take 1 tablet (50 mg total) by mouth daily  -     Basic metabolic panel; Future  -     Basic metabolic panel; Future  -     Protein / creatinine ratio, urine; Future  -     CT abdomen w wo contrast; Future    Proteinuria, unspecified type    Adenoma of left adrenal gland  -     Aldosterone; Future  -     Renin Direct Assay; Future  -     Metanephrine, Fractionated Plasma Free; Future  -     Basic metabolic panel; Future  -     Metanephrines Fractionated, urine, 24 hour; Future  -     Catecholamines, fractionated, urine, 24 hour; Future  -     Cortisol, Free, Urine, 24 Hour; Future  -     CT abdomen w wo contrast; Future    Hyperhidrosis  -     Ambulatory referral to Nephrology  -     POCT urine dip    Tobacco abuse           HISTORY OF PRESENT ILLNESS:  Dhiraj Dickerson is a 49y o  year old female with medical issues of hypertension x 30 yrs , diabetes mellitus type 2 x 1 yrs, hypokalemia many years,  fatty liver, hyperlipidemia, GERD, gallstone, fibromyalagia who presents for initial consultation for hypokalemia  Patient has hypokalemia dating back to 2014 when the potassium was 3 3  Potassium level improving 2019 and 2020 but since 2021 has been around 3 3-3 5    C/o excessive sweating /hyperhidrosis at night plus headaches  As per PCP note norepinephrine level was elevated  Last blood work from 02/15/2022 potassium was 3 3    Possible mother had hypokalemia        REVIEW OF SYSTEMS:    Review of Systems   Constitutional: Negative for activity change, appetite change, chills, diaphoresis, fatigue and fever  HENT: Negative for congestion, facial swelling and nosebleeds      Eyes: Negative for pain and visual disturbance  Respiratory: Negative for cough, chest tightness and shortness of breath  Cardiovascular: Negative for chest pain and palpitations  Gastrointestinal: Negative for abdominal distention, abdominal pain, diarrhea, nausea and vomiting  Genitourinary: Negative for difficulty urinating, dysuria, flank pain, frequency and hematuria  Musculoskeletal: Negative for arthralgias, back pain and joint swelling  Skin: Negative for rash  Neurological: Negative for dizziness, seizures, syncope, weakness and headaches  Psychiatric/Behavioral: Negative for agitation and confusion  The patient is not nervous/anxious  More than 10 point review of systems were obtained and discussed in length with the patient       PAST MEDICAL HISTORY:  Past Medical History:   Diagnosis Date    Ankle fracture     Anxiety     Asthma     Bleeding hemorrhoids 4/13/2017    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    Formatting of this note might be different from the original  Added automatically from request for surgery [id number]    Chronic venous embolism and thrombosis of deep vessels of lower extremity ([facility] [address] )     Costochondritis     RESOLVED: [id number]    Depression     Diabetes mellitus ([facility] [address] ) 11/2021    Endometriosis     Fibromyalgia     GERD (gastroesophageal reflux disease)     Hematuria     LAST ASSESSED: [id number]    Hydradenitis     axillary area and under breast    Hypertension     LAST ASSESSED: [id number]    Irregular heart beat     Pulmonary embolism ([facility] [address] )     RESOLED: [id number] - secondary to a lupron injection for endometriosis    RBBB (right bundle branch block)     Sleep apnea     Cannot tolerate CPAP    Umbilical hernia     LAST ASSESSED: [id number]       PAST SURGICAL HISTORY:  Past Surgical History:   Procedure Laterality Date    HERNIA REPAIR      umbilical    HUMERUS FRACTURE SURGERY W/ IMPLANT Left     HYSTERECTOMY  2014    partial    PELVIC LAPAROSCOPY      x4 for endometriosis    MS ESOPHAGOGASTRODUODENOSCOPY TRANSORAL DIAGNOSTIC N/A 5/2/2019    Procedure: ESOPHAGOGASTRODUODENOSCOPY (EGD) with bx;  Surgeon: Cody Vogel MD;  Location: [facility]; Service: Gastroenterology       ALLERGIES:  Allergies   Allergen Reactions    Albuterol      Other reaction(s): felt weird    Azithromycin GI Intolerance    Duloxetine      Category: Adverse Reaction; Annotation - [id number]: Sweating    Erythromycin Vomiting    Hydrocodone-Acetaminophen      Other reaction(s): sweating, feel faint, diarrhea    Hydrocodone-Acetaminophen     Ketorolac Diarrhea and Nausea Only     Category: Adverse Reaction;  Annotation - [id number]: Symptoms were noted after injection into bursa with Toradol at orthopedics    Penicillin G     Penicillins Hives and Vomiting    Tramadol        SOCIAL HISTORY:  Social History     Substance and Sexual Activity   Alcohol Use No     Social History     Substance and Sexual Activity   Drug Use No     Social History     Tobacco Use   Smoking Status Current Some Day Smoker    Packs/day: 0 50    Types: Cigarettes    Last attempt to quit: 8/10/2018    Years since quitting: 3 6   Smokeless Tobacco Never Used   Tobacco Comment    smokes 1 cigarette occasionally       FAMILY HISTORY:  Family History   Problem Relation Age of Onset    Hypertension Mother     Diabetes Father     Hypertension Father     Obesity Father     No Known Problems Maternal Grandmother     No Known Problems Maternal Grandfather     Breast cancer Paternal Grandmother 80    No Known Problems Paternal Grandfather     No Known Problems Maternal Aunt        MEDICATIONS:    Current Outpatient Medications:     albuterol (PROVENTIL HFA,VENTOLIN HFA) 90 mcg/act inhaler, INHALE 2 PUFFS EVERY 6 HOURS AS NEEDED FOR WHEEZE, Disp: 18 g, Rfl: 2    Aspirin 81 MG EC tablet, Take by mouth, Disp: , Rfl:     atenolol-chlorthalidone (TENORETIC) 50-25 mg per tablet, TAKE 1 TABLET BY MOUTH EVERY DAY, Disp: 90 tablet, Rfl: 1    Blood Glucose Monitoring Suppl (OneTouch Verio) w/Device KIT, USE DAILY AS DIRECTED, Disp: 1 kit, Rfl: 0    Cholecalciferol (VITAMIN D3) 1000 units CAPS, Take 2,000 Units by mouth, Disp: , Rfl:     clindamycin (CLEOCIN T) 1 % lotion, APPLY ONCE DAILY TO AFFECTED AREAS UNDER ARMS , Disp: , Rfl: 3    CVS Olopatadine HCl 0 2 % opth drops, INSTILL 1 DROP INTO BOTH EYES DAILY, Disp: 7 5 mL, Rfl: 1    Cyanocobalamin (B-12) 1000 MCG CAPS, Take by mouth, Disp: , Rfl:     escitalopram (LEXAPRO) 20 mg tablet, TAKE 1 TABLET BY MOUTH EVERY DAY, Disp: 90 tablet, Rfl: 1    famotidine (PEPCID) 40 MG tablet, TAKE 1 TABLET (40 MG TOTAL) BY MOUTH DAILY FOR 90 DAYS, Disp: 90 tablet, Rfl: 3    glimepiride (AMARYL) 2 mg tablet, Take 1 tablet (2 mg total) by mouth daily with breakfast, Disp: 30 tablet, Rfl: 5    Klor-Con M20 20 MEQ tablet, TAKE 1 TABLET BY MOUTH EVERY DAY, Disp: 90 tablet, Rfl: 1    loperamide (IMODIUM A-D) 2 MG tablet, Take 2 mg by mouth 4 (four) times a day as needed for diarrhea, Disp: , Rfl:     loratadine (CLARITIN) 10 mg tablet, Take 10 mg by mouth, Disp: , Rfl:     montelukast (SINGULAIR) 10 mg tablet, TAKE 1 TABLET BY MOUTH EVERY DAY, Disp: 90 tablet, Rfl: 3    OneTouch Delica Lancets 99O MISC, USE DAILY AS DIRECTED, Disp: 100 each, Rfl: 0    OneTouch Verio test strip, USE 1 EACH DAILY, Disp: 100 strip, Rfl: 0    pantoprazole (PROTONIX) 40 mg tablet, TAKE 1 TABLET BY MOUTH EVERY DAY, Disp: 90 tablet, Rfl: 3    sucralfate (CARAFATE) 1 g tablet, TAKE 1 TABLET BY MOUTH FOUR TIMES A DAY, Disp: 360 tablet, Rfl: 1    traZODone (DESYREL) 50 mg tablet, TAKE 1 TABLET BY MOUTH DAILY AT BEDTIME, Disp: 90 tablet, Rfl: 1      PHYSICAL EXAM:  Vitals:    03/16/22 1428   BP: 122/78   BP Location: Left arm   Patient Position: Sitting   Cuff Size: Large   Weight: 105 kg (231 lb 9 6 oz)   Height: 5' 5" (1 651 m)     Body mass index is 38 54 kg/m²    Wt Readings from Last 3 Encounters: 03/16/22 105 kg (231 lb 9 6 oz)   02/28/22 105 kg (231 lb)   02/23/22 108 kg (237 lb 9 6 oz)     Physical Exam  Constitutional:       General: She is not in acute distress  Appearance: Normal appearance  She is well-developed  HENT:      Head: Normocephalic and atraumatic  Nose: Nose normal       Mouth/Throat:      Mouth: Mucous membranes are moist    Eyes:      General: No scleral icterus  Conjunctiva/sclera: Conjunctivae normal       Pupils: Pupils are equal, round, and reactive to light  Neck:      Thyroid: No thyromegaly  Vascular: No JVD  Cardiovascular:      Rate and Rhythm: Normal rate and regular rhythm  Heart sounds: Normal heart sounds  No murmur heard  No friction rub  Pulmonary:      Effort: Pulmonary effort is normal  No respiratory distress  Breath sounds: Normal breath sounds  No wheezing or rales  Abdominal:      General: Bowel sounds are normal  There is no distension  Palpations: Abdomen is soft  Tenderness: There is no abdominal tenderness  Musculoskeletal:         General: No deformity  Cervical back: Neck supple  Right lower leg: No edema  Left lower leg: No edema  Skin:     General: Skin is warm and dry  Findings: No rash  Neurological:      Mental Status: She is alert and oriented to person, place, and time  Psychiatric:         Mood and Affect: Mood normal          Behavior: Behavior normal          Thought Content:  Thought content normal            Lab Results:   Results for orders placed or performed in visit on 03/16/22   POCT urine dip   Result Value Ref Range    LEUKOCYTE ESTERASE,UA -     NITRITE,UA -     SL AMB POCT UROBILINOGEN -     POCT URINE PROTEIN 0 15      PH,UA 7 0     BLOOD,UA -     SPECIFIC GRAVITY,UA 1 010     KETONES,UA -     BILIRUBIN,UA -     GLUCOSE, UA -      COLOR,UA yellow     CLARITY,UA clear              Invalid input(s): ALBUMIN    Patient Instructions     Blood pressure is stable but due to finding of persistent hypokalemia, stopping atenolol chlorthalidone and started on losartan 50 mg daily, recommend home monitoring of blood pressure    -Recommend 2 g sodium diet  -Recommend daily exercise and weight loss  -Discussed home monitoring of BP and maintaining a log of blood pressure   -Recommend goal BP less than 130/80  Please inform me if SBP below 110 or above 140's persistently  Renal function is stable    Continue current dose of oral potassium chloride    Ordered for urine test as well as blood work to be done this week  Will repeat blood work in 2 weeks to monitor potassium level  Will adjust the dose of potassium chloride in future depending on the potassium level  Continue high potassium containing food along with potassium chloride tablet    Also ordered for CT abdomen with and without contrast   Recommend holding losartan on the day of CT with contrast    Follow-up in 3 months with repeat blood work and urine test      Chronic Hypertension   AMBULATORY CARE:   Hypertension  is high blood pressure  Your blood pressure is the force of your blood moving against the walls of your arteries  Hypertension causes your blood pressure to get so high that your heart has to work much harder than normal  This can damage your heart  Even if you have hypertension for years, lifestyle changes, medicines, or both can help bring your blood pressure to normal   Call your local emergency number (911 in the [address] [place] [address],[address]) or have someone call if:   · You have chest pain  · You have any of the following signs of a heart attack:      ? Squeezing, pressure, or pain in your chest    ? You may  also have any of the following:     § Discomfort or pain in your back, neck, jaw, stomach, or arm    § Shortness of breath    § Nausea or vomiting    § Lightheadedness or a sudden cold sweat    · You become confused or have difficulty speaking  · You suddenly feel lightheaded or have trouble breathing      Seek care immediately if:   · You have a severe headache or vision loss  · You have weakness in an arm or leg  Call your doctor or cardiologist if:   · You feel faint, dizzy, confused, or drowsy  · You have been taking your blood pressure medicine but your pressure is higher than your provider says it should be  · You have questions or concerns about your condition or care  Treatment for chronic hypertension  may include medicine to lower your blood pressure and cholesterol levels  A low cholesterol level helps prevent heart disease and makes it easier to control your blood pressure  Heart disease can make your blood pressure harder to control  You may also need to make lifestyle changes  What you need to know about the stages of hypertension:       · Normal blood pressure is 119/79 or lower   Your healthcare provider may only check your blood pressure each year if it stays at a normal level  · Elevated blood pressure is 120/79 to 129/79   This is sometimes called prehypertension  Your healthcare provider may suggest lifestyle changes to help lower your blood pressure to a normal level  He or she may then check it again in 3 to 6 months  · Stage 1 hypertension is 130/80  to 139/89   Your provider may recommend lifestyle changes, medication, and checks every 3 to 6 months until your blood pressure is controlled  · Stage 2 hypertension is 140/90 or higher   Your provider will recommend lifestyle changes and have you take 2 kinds of hypertension medicines  You will also need to have your blood pressure checked monthly until it is controlled  Manage chronic hypertension:   · Check your blood pressure at home  Avoid smoking, caffeine, and exercise at least 30 minutes before checking your blood pressure  Sit and rest for 5 minutes before you take your blood pressure  Extend your arm and support it on a flat surface  Your arm should be at the same level as your heart   Follow the directions that came with your blood pressure monitor  Check your blood pressure 2 times, 1 minute apart, before you take your medicine in the morning  Also check your blood pressure before your evening meal  Keep a record of your readings and bring it to your follow-up visits  Ask your healthcare provider what your blood pressure should be  · Manage any other health conditions you have  Health conditions such as diabetes can increase your risk for hypertension  Follow your healthcare provider's instructions and take all your medicines as directed  Talk to your healthcare provider about any new health conditions you have recently developed  · Ask about all medicines  Certain medicines can increase your blood pressure  Examples include oral birth control pills, decongestants, herbal supplements, and NSAIDs, such as ibuprofen  Your healthcare provider can tell you which medicines are safe for you to take  This includes prescription and over-the-counter medicines  Lifestyle changes you can make to lower your blood pressure: Your provider may want you to make more lifestyle changes if you are having trouble controlling your blood pressure  This may feel difficult over time, especially if you think you are making good changes but your pressure is still high  It might help to focus on one new change at a time  For example, try to add 1 more day of exercise, or exercise for an extra 10 minutes on 2 days  Small changes can make a big difference  Your healthcare provider can also refer you to specialists such as a dietitian who can help you make small changes  Your family members may be included in helping you learn to create lifestyle changes, such as the following:     · Limit sodium (salt) as directed  Too much sodium can affect your fluid balance  Check labels to find low-sodium or no-salt-added foods  Some low-sodium foods use potassium salts for flavor  Too much potassium can also cause health problems   Your healthcare provider will tell you how much sodium and potassium are safe for you to have in a day  He or she may recommend that you limit sodium to 2,300 mg a day  · Follow the meal plan recommended by your healthcare provider  A dietitian or your provider can give you more information on low-sodium plans or the DASH (Dietary Approaches to Stop Hypertension) eating plan  The DASH plan is low in sodium, processed sugar, unhealthy fats, and total fat  It is high in potassium, calcium, and fiber  These can be found in vegetables, fruit, and whole-grain foods  · Be physically active throughout the day  Physical activity, such as exercise, can help control your blood pressure and your weight  Be physically active for at least 30 minutes per day, on most days of the week  Include aerobic activity, such as walking or riding a bicycle  Also include strength training at least 2 times each week  Your healthcare providers can help you create a physical activity plan  · Decrease stress  This may help lower your blood pressure  Learn ways to relax, such as deep breathing or listening to music  · Limit alcohol as directed  Alcohol can increase your blood pressure  A drink of alcohol is 12 ounces of beer, 5 ounces of wine, or 1½ ounces of liquor  · Do not smoke  Nicotine and other chemicals in cigarettes and cigars can increase your blood pressure and also cause lung damage  Ask your healthcare provider for information if you currently smoke and need help to quit  E-cigarettes or smokeless tobacco still contain nicotine  Talk to your healthcare provider before you use these products  Follow up with your doctor or cardiologist as directed: You will need to return to have your blood pressure checked and to have other lab tests done  Write down your questions so you remember to ask them during your visits    © Copyright [employer] 2022 Information is for End User's use only and may not be sold, redistributed or otherwise used for commercial purposes  All illustrations and images included in CareNotes® are the copyrighted property of [employer]  or [employer]  The above information is an  only  It is not intended as medical advice for individual conditions or treatments  Talk to your doctor, nurse or pharmacist before following any medical regimen to see if it is safe and effective for you  Portions of the record may have been created with voice recognition software  Occasional wrong word or "sound a like" substitutions may have occurred due to the inherent limitations of voice recognition software  Read the chart carefully and recognize, using context, where substitutions have occurred

## 2022-03-16 NOTE — PATIENT INSTRUCTIONS
Blood pressure is stable but due to finding of persistent hypokalemia, stopping atenolol chlorthalidone and started on losartan 50 mg daily, recommend home monitoring of blood pressure    -Recommend 2 g sodium diet  -Recommend daily exercise and weight loss  -Discussed home monitoring of BP and maintaining a log of blood pressure   -Recommend goal BP less than 130/80  Please inform me if SBP below 110 or above 140's persistently  Renal function is stable    Continue current dose of oral potassium chloride    Ordered for urine test as well as blood work to be done this week  Will repeat blood work in 2 weeks to monitor potassium level  Will adjust the dose of potassium chloride in future depending on the potassium level  Continue high potassium containing food along with potassium chloride tablet    Also ordered for CT abdomen with and without contrast   Recommend holding losartan on the day of CT with contrast    Follow-up in 3 months with repeat blood work and urine test      Chronic Hypertension   AMBULATORY CARE:   Hypertension  is high blood pressure  Your blood pressure is the force of your blood moving against the walls of your arteries  Hypertension causes your blood pressure to get so high that your heart has to work much harder than normal  This can damage your heart  Even if you have hypertension for years, lifestyle changes, medicines, or both can help bring your blood pressure to normal   Call your local emergency number (911 in the [address] [place] [address],[address]) or have someone call if:   · You have chest pain  · You have any of the following signs of a heart attack:      ? Squeezing, pressure, or pain in your chest    ? You may  also have any of the following:     § Discomfort or pain in your back, neck, jaw, stomach, or arm    § Shortness of breath    § Nausea or vomiting    § Lightheadedness or a sudden cold sweat    · You become confused or have difficulty speaking      · You suddenly feel lightheaded or have trouble breathing  Seek care immediately if:   · You have a severe headache or vision loss  · You have weakness in an arm or leg  Call your doctor or cardiologist if:   · You feel faint, dizzy, confused, or drowsy  · You have been taking your blood pressure medicine but your pressure is higher than your provider says it should be  · You have questions or concerns about your condition or care  Treatment for chronic hypertension  may include medicine to lower your blood pressure and cholesterol levels  A low cholesterol level helps prevent heart disease and makes it easier to control your blood pressure  Heart disease can make your blood pressure harder to control  You may also need to make lifestyle changes  What you need to know about the stages of hypertension:       · Normal blood pressure is 119/79 or lower   Your healthcare provider may only check your blood pressure each year if it stays at a normal level  · Elevated blood pressure is 120/79 to 129/79   This is sometimes called prehypertension  Your healthcare provider may suggest lifestyle changes to help lower your blood pressure to a normal level  He or she may then check it again in 3 to 6 months  · Stage 1 hypertension is 130/80  to 139/89   Your provider may recommend lifestyle changes, medication, and checks every 3 to 6 months until your blood pressure is controlled  · Stage 2 hypertension is 140/90 or higher   Your provider will recommend lifestyle changes and have you take 2 kinds of hypertension medicines  You will also need to have your blood pressure checked monthly until it is controlled  Manage chronic hypertension:   · Check your blood pressure at home  Avoid smoking, caffeine, and exercise at least 30 minutes before checking your blood pressure  Sit and rest for 5 minutes before you take your blood pressure  Extend your arm and support it on a flat surface  Your arm should be at the same level as your heart  Follow the directions that came with your blood pressure monitor  Check your blood pressure 2 times, 1 minute apart, before you take your medicine in the morning  Also check your blood pressure before your evening meal  Keep a record of your readings and bring it to your follow-up visits  Ask your healthcare provider what your blood pressure should be  · Manage any other health conditions you have  Health conditions such as diabetes can increase your risk for hypertension  Follow your healthcare provider's instructions and take all your medicines as directed  Talk to your healthcare provider about any new health conditions you have recently developed  · Ask about all medicines  Certain medicines can increase your blood pressure  Examples include oral birth control pills, decongestants, herbal supplements, and NSAIDs, such as ibuprofen  Your healthcare provider can tell you which medicines are safe for you to take  This includes prescription and over-the-counter medicines  Lifestyle changes you can make to lower your blood pressure: Your provider may want you to make more lifestyle changes if you are having trouble controlling your blood pressure  This may feel difficult over time, especially if you think you are making good changes but your pressure is still high  It might help to focus on one new change at a time  For example, try to add 1 more day of exercise, or exercise for an extra 10 minutes on 2 days  Small changes can make a big difference  Your healthcare provider can also refer you to specialists such as a dietitian who can help you make small changes  Your family members may be included in helping you learn to create lifestyle changes, such as the following:     · Limit sodium (salt) as directed  Too much sodium can affect your fluid balance  Check labels to find low-sodium or no-salt-added foods  Some low-sodium foods use potassium salts for flavor   Too much potassium can also cause health problems  Your healthcare provider will tell you how much sodium and potassium are safe for you to have in a day  He or she may recommend that you limit sodium to 2,300 mg a day  · Follow the meal plan recommended by your healthcare provider  A dietitian or your provider can give you more information on low-sodium plans or the DASH (Dietary Approaches to Stop Hypertension) eating plan  The DASH plan is low in sodium, processed sugar, unhealthy fats, and total fat  It is high in potassium, calcium, and fiber  These can be found in vegetables, fruit, and whole-grain foods  · Be physically active throughout the day  Physical activity, such as exercise, can help control your blood pressure and your weight  Be physically active for at least 30 minutes per day, on most days of the week  Include aerobic activity, such as walking or riding a bicycle  Also include strength training at least 2 times each week  Your healthcare providers can help you create a physical activity plan  · Decrease stress  This may help lower your blood pressure  Learn ways to relax, such as deep breathing or listening to music  · Limit alcohol as directed  Alcohol can increase your blood pressure  A drink of alcohol is 12 ounces of beer, 5 ounces of wine, or 1½ ounces of liquor  · Do not smoke  Nicotine and other chemicals in cigarettes and cigars can increase your blood pressure and also cause lung damage  Ask your healthcare provider for information if you currently smoke and need help to quit  E-cigarettes or smokeless tobacco still contain nicotine  Talk to your healthcare provider before you use these products  Follow up with your doctor or cardiologist as directed: You will need to return to have your blood pressure checked and to have other lab tests done  Write down your questions so you remember to ask them during your visits    © Copyright [employer] 2022 Information is for End User's use only and may not be sold, redistributed or otherwise used for commercial purposes  All illustrations and images included in CareNotes® are the copyrighted property of [employer]  or [employer]  The above information is an  only  It is not intended as medical advice for individual conditions or treatments  Talk to your doctor, nurse or pharmacist before following any medical regimen to see if it is safe and effective for you

## 2022-03-17 ENCOUNTER — LAB (OUTPATIENT)
Dept: LAB | Facility: CLINIC | Age: 57
End: 2022-03-17
Payer: MEDICARE

## 2022-03-17 DIAGNOSIS — I10 HYPERTENSION, UNSPECIFIED TYPE: ICD-10-CM

## 2022-03-17 DIAGNOSIS — D35.02 ADENOMA OF LEFT ADRENAL GLAND: ICD-10-CM

## 2022-03-17 LAB
ANION GAP SERPL CALCULATED.3IONS-SCNC: 6 MMOL/L (ref 4–13)
BUN SERPL-MCNC: 11 MG/DL (ref 5–25)
CALCIUM SERPL-MCNC: 9.9 MG/DL (ref 8.3–10.1)
CHLORIDE SERPL-SCNC: 104 MMOL/L (ref 100–108)
CO2 SERPL-SCNC: 28 MMOL/L (ref 21–32)
CREAT SERPL-MCNC: 0.82 MG/DL (ref 0.6–1.3)
GFR SERPL CREATININE-BSD FRML MDRD: 81 ML/MIN/1.73SQ M
GLUCOSE P FAST SERPL-MCNC: 153 MG/DL (ref 65–99)
POTASSIUM SERPL-SCNC: 3.5 MMOL/L (ref 3.5–5.3)
SODIUM SERPL-SCNC: 138 MMOL/L (ref 136–145)

## 2022-03-17 PROCEDURE — 82088 ASSAY OF ALDOSTERONE: CPT

## 2022-03-17 PROCEDURE — 80048 BASIC METABOLIC PNL TOTAL CA: CPT

## 2022-03-17 PROCEDURE — 36415 COLL VENOUS BLD VENIPUNCTURE: CPT

## 2022-03-17 PROCEDURE — 84244 ASSAY OF RENIN: CPT

## 2022-03-17 PROCEDURE — 83835 ASSAY OF METANEPHRINES: CPT

## 2022-03-18 ENCOUNTER — APPOINTMENT (OUTPATIENT)
Dept: LAB | Facility: CLINIC | Age: 57
End: 2022-03-18
Payer: MEDICARE

## 2022-03-18 DIAGNOSIS — E87.6 CHRONIC HYPOKALEMIA: ICD-10-CM

## 2022-03-18 DIAGNOSIS — D35.02 ADENOMA OF LEFT ADRENAL GLAND: ICD-10-CM

## 2022-03-18 DIAGNOSIS — I10 HYPERTENSION, UNSPECIFIED TYPE: ICD-10-CM

## 2022-03-18 LAB
CREAT 24H UR-MRATE: 1.4 G/24HR (ref 0.6–1.8)
POTASSIUM 24H UR-SCNC: 48.26 MMOL/24 HRS (ref 25–150)
PROT 24H UR-MCNC: 114 MG/24 HRS (ref 40–150)
SPECIMEN VOL UR: 1900 ML

## 2022-03-18 PROCEDURE — 83835 ASSAY OF METANEPHRINES: CPT

## 2022-03-18 PROCEDURE — 82570 ASSAY OF URINE CREATININE: CPT

## 2022-03-18 PROCEDURE — 84133 ASSAY OF URINE POTASSIUM: CPT

## 2022-03-18 PROCEDURE — 82384 ASSAY THREE CATECHOLAMINES: CPT

## 2022-03-18 PROCEDURE — 84156 ASSAY OF PROTEIN URINE: CPT

## 2022-03-18 PROCEDURE — 82530 CORTISOL FREE: CPT

## 2022-03-21 NOTE — RESULT ENCOUNTER NOTE
Renal function stable and potassium at normal range, waiting for results of renin aldosterone and metanephrines

## 2022-03-23 LAB
METANEPH 24H UR-MRATE: 95 UG/24 HR (ref 36–209)
METANEPHS 24H UR-MCNC: 50 UG/L
NORMETANEPHRINE 24H UR-MCNC: 248 UG/L
NORMETANEPHRINE 24H UR-MRATE: 471 UG/24 HR (ref 131–612)

## 2022-03-24 ENCOUNTER — TELEPHONE (OUTPATIENT)
Dept: OTHER | Facility: HOSPITAL | Age: 57
End: 2022-03-24

## 2022-03-24 LAB
CORTIS F 24H UR-MRATE: 10 UG/24 HR (ref 6–42)
CORTIS F UR-MCNC: 5 UG/L
DOPAMINE 24H UR-MRATE: 293 UG/24 HR (ref 0–510)
DOPAMINE UR-MCNC: 154 UG/L
EPINEPH 24H UR-MRATE: 4 UG/24 HR (ref 0–20)
EPINEPH UR-MCNC: 2 UG/L
NOREPINEPH 24H UR-MRATE: 76 UG/24 HR (ref 0–135)
NOREPINEPH UR-MCNC: 40 UG/L

## 2022-03-24 NOTE — TELEPHONE ENCOUNTER
Discussed with patient    BP okay when checked with her apple watch  Cr 0 82  K 3 5    24 hrs urine studies for catecholamines was normal     renin aldosterone level, serum metanephrines currently pending    Discussed with patient, blood pressure acceptable, continue same treatment  Will check repeat BMP in 3 weeks instead of 2 weeks

## 2022-03-25 LAB — ALDOST SERPL-MCNC: 8.2 NG/DL (ref 0–30)

## 2022-03-28 ENCOUNTER — TELEPHONE (OUTPATIENT)
Dept: FAMILY MEDICINE CLINIC | Facility: CLINIC | Age: 57
End: 2022-03-28

## 2022-03-28 LAB
METANEPH FREE SERPL-MCNC: 13.2 PG/ML (ref 0–88)
NORMETANEPHRINE SERPL-MCNC: 107.9 PG/ML (ref 0–244)

## 2022-03-28 NOTE — TELEPHONE ENCOUNTER
Pt is calling because on 3/16 her urologist started her on Losartan  She started taking it and has gained 5 pounds without changing her diet, her fingers are swollen, her BP is low, her BS is low, & she is having knee & back pain  Pt wants to discuss this with Dr Katherin Dill has no available appointments today & pt only wants to see Dr Katherin Dill  Please call pt at [phone]

## 2022-03-28 NOTE — TELEPHONE ENCOUNTER
I would recommend that she talk with Nephrology that were the ones that ordered the losartan  This would be 1 of the side effects that they can look at for her, as the medication previously would have gotten rid of some fluid, but may have been causing some of the other changes, and there may be different choices that Nephrology can make with her

## 2022-03-29 ENCOUNTER — HOSPITAL ENCOUNTER (OUTPATIENT)
Dept: CT IMAGING | Facility: HOSPITAL | Age: 57
Discharge: HOME | End: 2022-03-29
Attending: INTERNAL MEDICINE
Payer: MEDICARE

## 2022-03-29 DIAGNOSIS — D35.02 ADENOMA OF LEFT ADRENAL GLAND: ICD-10-CM

## 2022-03-29 DIAGNOSIS — E87.6 CHRONIC HYPOKALEMIA: ICD-10-CM

## 2022-03-29 DIAGNOSIS — I10 HYPERTENSION, UNSPECIFIED TYPE: ICD-10-CM

## 2022-03-29 PROCEDURE — G1004 CDSM NDSC: HCPCS

## 2022-03-29 PROCEDURE — 74170 CT ABD WO CNTRST FLWD CNTRST: CPT

## 2022-03-29 RX ADMIN — IOHEXOL 100 ML: 350 INJECTION, SOLUTION INTRAVENOUS at 20:38

## 2022-04-04 ENCOUNTER — TELEPHONE (OUTPATIENT)
Dept: OTHER | Facility: HOSPITAL | Age: 57
End: 2022-04-04

## 2022-04-04 DIAGNOSIS — D35.00 ADRENAL ADENOMA: ICD-10-CM

## 2022-04-04 DIAGNOSIS — R25.2 MUSCLE CRAMPS: Primary | ICD-10-CM

## 2022-04-04 DIAGNOSIS — K21.9 GASTROESOPHAGEAL REFLUX DISEASE: ICD-10-CM

## 2022-04-04 DIAGNOSIS — E87.6 HYPOKALEMIA: ICD-10-CM

## 2022-04-04 NOTE — TELEPHONE ENCOUNTER
Discussed with patient finding about normal aldosterone level but renin level currently under process, also CT abdomen result was discussed finding of 1 5 cm left adrenal adenoma as well as 2 3 cm left adrenal nodule  Discussed about endocrine referral, and refer to Endocrine    Adenoma likely nonfunctional but will appreciate input from endocrinology      C/o muscle cramps   blood pressure  Well controlled, usually systolic 055  Plan  BMP   Mag level

## 2022-04-04 NOTE — TELEPHONE ENCOUNTER
Requested Prescriptions     Pending Prescriptions Disp Refills    pantoprazole (PROTONIX) 40 mg tablet [Pharmacy Med Name: PANTOPRAZOLE SOD DR 40 MG TAB] 90 tablet 3     Sig: TAKE 1 TABLET BY MOUTH EVERY DAY     LOV 2/17/22, F/U 5/24/22, labs completed

## 2022-04-05 ENCOUNTER — OFFICE VISIT (OUTPATIENT)
Dept: OBGYN CLINIC | Facility: MEDICAL CENTER | Age: 57
End: 2022-04-05
Payer: MEDICARE

## 2022-04-05 VITALS
DIASTOLIC BLOOD PRESSURE: 84 MMHG | WEIGHT: 237 LBS | SYSTOLIC BLOOD PRESSURE: 134 MMHG | BODY MASS INDEX: 39.49 KG/M2 | HEART RATE: 84 BPM | HEIGHT: 65 IN

## 2022-04-05 DIAGNOSIS — M17.0 PRIMARY OSTEOARTHRITIS OF BOTH KNEES: Primary | ICD-10-CM

## 2022-04-05 PROCEDURE — 20610 DRAIN/INJ JOINT/BURSA W/O US: CPT | Performed by: ORTHOPAEDIC SURGERY

## 2022-04-05 PROCEDURE — 99213 OFFICE O/P EST LOW 20 MIN: CPT | Performed by: ORTHOPAEDIC SURGERY

## 2022-04-05 RX ORDER — BUPIVACAINE HYDROCHLORIDE 2.5 MG/ML
1 INJECTION, SOLUTION INFILTRATION; PERINEURAL
Status: COMPLETED | OUTPATIENT
Start: 2022-04-05 | End: 2022-04-05

## 2022-04-05 RX ORDER — METHYLPREDNISOLONE ACETATE 40 MG/ML
1 INJECTION, SUSPENSION INTRA-ARTICULAR; INTRALESIONAL; INTRAMUSCULAR; SOFT TISSUE
Status: COMPLETED | OUTPATIENT
Start: 2022-04-05 | End: 2022-04-05

## 2022-04-05 RX ORDER — PANTOPRAZOLE SODIUM 40 MG/1
TABLET, DELAYED RELEASE ORAL
Qty: 90 TABLET | Refills: 3 | Status: SHIPPED | OUTPATIENT
Start: 2022-04-05

## 2022-04-05 RX ADMIN — METHYLPREDNISOLONE ACETATE 1 ML: 40 INJECTION, SUSPENSION INTRA-ARTICULAR; INTRALESIONAL; INTRAMUSCULAR; SOFT TISSUE at 11:14

## 2022-04-05 RX ADMIN — BUPIVACAINE HYDROCHLORIDE 1 ML: 2.5 INJECTION, SOLUTION INFILTRATION; PERINEURAL at 11:14

## 2022-04-05 NOTE — TELEPHONE ENCOUNTER
Patient is scheduled at [facility] for diabetes and endocrinology on 05/24  Thank you

## 2022-04-05 NOTE — PROGRESS NOTES
Orthopaedic Surgery - Office Note  Ashley Andrade (73 y o  female)   : 1965   MRN: [id number]  Encounter Date: 2022    Chief Complaint   Patient presents with    Left Knee - Follow-up    Right Knee - Follow-up       Assessment / Plan  Bilateral knee mild osteoarthritis   Left knee, medial meniscus posterior root tear    · CSI of bilateral knee joint was performed   · Cold compress can be used today- activities as tolerated  Return if symptoms worsen or fail to improve  History of Present Illness  Ashley Andrade is a 49 y o  female who presents today for bilateral knee pain  Patient received bilateral steroid injections on 2021  Patient saw great success and received 8 months of relief from past injections  Both knees have been bothering her for the last 2 months  Left knee is worse than the right  Pain on left knee is located lateral, right knee anterior  Patient describes her pain as achy  Patient states she has trouble going up and down stairs, getting in and out of the car, as well as long distance walking  Patient uses ice and OTC medications for pain  Patient has been working on weight loss  Patient would like repeat CSI today  Review of Systems  Pertinent items are noted in HPI  All other systems were reviewed and are negative  Physical Exam  /84   Pulse 84   Ht 5' 5" (1 651 m)   Wt 108 kg (237 lb)   BMI 39 44 kg/m²   Cons: Appears well  No apparent distress  Psych: Alert  Oriented x3  Mood and affect normal   Eyes: PERRLA, EOMI  Resp: Normal effort  No audible wheezing or stridor  CV: Palpable pulse  No discernable arrhythmia  No LE edema  Lymph:  No palpable cervical, axillary, or inguinal lymphadenopathy  Skin: Warm  No palpable masses  No visible lesions  Neuro: Normal muscle tone  Normal and symmetric DTR's  Bilateral Knee Exam  Alignment:  Normal knee alignment  Inspection:  No swelling  Palpation:  lateral joint line tenderness    ROM:  Normal knee ROM   Strength:  5/5 quadriceps and hamstrings  Stability:  No objective knee instability  Stable Varus / Valgus stress, Lachman, and Posterior drawer  Tests:  No pertinent positive or negative tests  Patella:  Not tested  Neurovascular:  Sensation intact in DP/SP/Villavicencio/Sa/T nerve distributions  2+ DP & PT pulses  Gait:  Normal     Studies Reviewed  No studies to review    Large joint arthrocentesis: bilateral knee  Universal Protocol:  Consent: Verbal consent obtained  Risks and benefits: risks, benefits and alternatives were discussed  Consent given by: patient  Patient understanding: patient states understanding of the procedure being performed  Site marked: the operative site was marked  Patient identity confirmed: verbally with patient    Supporting Documentation  Indications: pain   Procedure Details  Location: knee - bilateral knee  Preparation: Patient was prepped and draped in the usual sterile fashion  Needle size: 22 G  Ultrasound guidance: no  Approach: lateral    Medications (Right): 1 mL bupivacaine 0 25 %; 1 mL methylPREDNISolone acetate 40 mg/mLMedications (Left): 1 mL bupivacaine 0 25 %; 1 mL methylPREDNISolone acetate 40 mg/mL   Patient tolerance: patient tolerated the procedure well with no immediate complications  Dressing:  Sterile dressing applied             Medical, Surgical, Family, and Social History  The patient's medical history, family history, and social history, were reviewed and updated as appropriate      Past Medical History:   Diagnosis Date    Ankle fracture     Anxiety     Asthma     Bleeding hemorrhoids 4/13/2017    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    Formatting of this note might be different from the original  Added automatically from request for surgery [id number]    [facility] venous [facility] and [facility] of deep vessels [facility]     Costochondritis     RESOLVED: [id number]    Depression     Diabetes mellitus ([facility] ) 11/2021    Endometriosis     Fibromyalgia     GERD (gastroesophageal reflux disease)     Hematuria     LAST ASSESSED: [id number]    Hydradenitis     axillary area and under breast    Hypertension     LAST ASSESSED: [id number]    Irregular heart beat     Pulmonary embolism ([address] )     RESOLED: [id number] - secondary to a lupron injection for endometriosis    RBBB (right bundle branch block)     Sleep apnea     Cannot tolerate CPAP    Umbilical hernia     LAST ASSESSED: [id number]       Past Surgical History:   Procedure Laterality Date    HERNIA REPAIR      umbilical    HUMERUS FRACTURE SURGERY W/ IMPLANT Left     HYSTERECTOMY  2014    partial    PELVIC LAPAROSCOPY      x4 for endometriosis    MT ESOPHAGOGASTRODUODENOSCOPY TRANSORAL DIAGNOSTIC N/A 5/2/2019    Procedure: ESOPHAGOGASTRODUODENOSCOPY (EGD) with bx;  Surgeon: Kwasi Joshi MD;  Location: [facility]; Service: Gastroenterology       Family History   Problem Relation Age of Onset    Hypertension Mother     Diabetes Father     Hypertension Father     Obesity Father     No Known Problems Maternal Grandmother     No Known Problems Maternal Grandfather     Breast cancer Paternal Grandmother 80    No Known Problems Paternal Grandfather     No Known Problems Maternal Aunt        Social History     Occupational History    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   Tobacco Use    Smoking status: Current Some Day Smoker     Packs/day: 0 50     Types: Cigarettes     Last attempt to quit: 8/10/2018     Years since quitting: 3 6    Smokeless tobacco: Never Used    Tobacco comment: smokes 1 cigarette occasionally   Vaping Use    Vaping Use: Never used   Substance and Sexual Activity    Alcohol use: No    Drug use: No    Sexual activity: Not on file       Allergies   Allergen Reactions    Albuterol      Other reaction(s): felt weird    Azithromycin GI Intolerance    Duloxetine      Category: Adverse Reaction;  Annotation - [id number]: Sweating    Erythromycin Vomiting    Hydrocodone-Acetaminophen      Other reaction(s): sweating, feel faint, diarrhea    Hydrocodone-Acetaminophen     Ketorolac Diarrhea and Nausea Only     Category: Adverse Reaction;  Annotation - [id number]: Symptoms were noted after injection into bursa with Toradol at orthopedics    Penicillin G     Penicillins Hives and Vomiting    Tramadol          Current Outpatient Medications:     albuterol (PROVENTIL HFA,VENTOLIN HFA) 90 mcg/act inhaler, INHALE 2 PUFFS EVERY 6 HOURS AS NEEDED FOR WHEEZE, Disp: 18 g, Rfl: 2    Aspirin 81 MG EC tablet, Take by mouth, Disp: , Rfl:     Blood Glucose Monitoring Suppl (OneTouch Verio) w/Device KIT, USE DAILY AS DIRECTED, Disp: 1 kit, Rfl: 0    Cholecalciferol (VITAMIN D3) 1000 units CAPS, Take 2,000 Units by mouth, Disp: , Rfl:     clindamycin (CLEOCIN T) 1 % lotion, APPLY ONCE DAILY TO AFFECTED AREAS UNDER ARMS , Disp: , Rfl: 3    [employer] Olopatadine HCl 0 2 % opth drops, INSTILL 1 DROP INTO BOTH EYES DAILY, Disp: 7 5 mL, Rfl: 1    Cyanocobalamin (B-12) 1000 MCG CAPS, Take by mouth, Disp: , Rfl:     escitalopram (LEXAPRO) 20 mg tablet, TAKE 1 TABLET BY MOUTH EVERY DAY, Disp: 90 tablet, Rfl: 1    famotidine (PEPCID) 40 MG tablet, TAKE 1 TABLET (40 MG TOTAL) BY MOUTH DAILY FOR 90 DAYS, Disp: 90 tablet, Rfl: 3    glimepiride (AMARYL) 2 mg tablet, Take 1 tablet (2 mg total) by mouth daily with breakfast, Disp: 30 tablet, Rfl: 5    Klor-Con M20 20 MEQ tablet, TAKE 1 TABLET BY MOUTH EVERY DAY, Disp: 90 tablet, Rfl: 1    loperamide (IMODIUM A-D) 2 MG tablet, Take 2 mg by mouth 4 (four) times a day as needed for diarrhea, Disp: , Rfl:     loratadine (CLARITIN) 10 mg tablet, Take 10 mg by mouth, Disp: , Rfl:     losartan (COZAAR) 50 mg tablet, Take 1 tablet (50 mg total) by mouth daily, Disp: 90 tablet, Rfl: 3    montelukast (SINGULAIR) 10 mg tablet, TAKE 1 TABLET BY MOUTH EVERY DAY, Disp: 90 tablet, Rfl: 3    OneTouch Delica Lancets 03Q MISC, USE DAILY AS DIRECTED, Disp: 100 each, Rfl: 0    OneTouch Verio test strip, USE 1 EACH DAILY, Disp: 100 strip, Rfl: 0    pantoprazole (PROTONIX) 40 mg tablet, TAKE 1 TABLET BY MOUTH EVERY DAY, Disp: 90 tablet, Rfl: 3    sucralfate (CARAFATE) 1 g tablet, TAKE 1 TABLET BY MOUTH FOUR TIMES A DAY, Disp: 360 tablet, Rfl: 1    traZODone (DESYREL) 50 mg tablet, TAKE 1 TABLET BY MOUTH DAILY AT BEDTIME, Disp: 90 tablet, Rfl: 1      Blanquita Howell    Scribe Attestation    I,:  Dia March am acting as a scribe while in the presence of the attending physician :       I,:  Bettie Garay MD personally performed the services described in this documentation    as scribed in my presence :

## 2022-04-15 ENCOUNTER — LAB (OUTPATIENT)
Dept: LAB | Facility: CLINIC | Age: 57
End: 2022-04-15
Payer: MEDICARE

## 2022-04-15 DIAGNOSIS — R25.2 MUSCLE CRAMPS: ICD-10-CM

## 2022-04-15 DIAGNOSIS — E87.6 CHRONIC HYPOKALEMIA: ICD-10-CM

## 2022-04-15 DIAGNOSIS — E87.6 HYPOKALEMIA: ICD-10-CM

## 2022-04-15 DIAGNOSIS — I10 HYPERTENSION, UNSPECIFIED TYPE: ICD-10-CM

## 2022-04-15 LAB
ANION GAP SERPL CALCULATED.3IONS-SCNC: 5 MMOL/L (ref 4–13)
BUN SERPL-MCNC: 14 MG/DL (ref 5–25)
CALCIUM SERPL-MCNC: 9.8 MG/DL (ref 8.3–10.1)
CHLORIDE SERPL-SCNC: 106 MMOL/L (ref 100–108)
CO2 SERPL-SCNC: 27 MMOL/L (ref 21–32)
CREAT SERPL-MCNC: 0.78 MG/DL (ref 0.6–1.3)
GFR SERPL CREATININE-BSD FRML MDRD: 86 ML/MIN/1.73SQ M
GLUCOSE P FAST SERPL-MCNC: 116 MG/DL (ref 65–99)
MAGNESIUM SERPL-MCNC: 2.3 MG/DL (ref 1.6–2.6)
POTASSIUM SERPL-SCNC: 4.4 MMOL/L (ref 3.5–5.3)
SODIUM SERPL-SCNC: 138 MMOL/L (ref 136–145)

## 2022-04-15 PROCEDURE — 36415 COLL VENOUS BLD VENIPUNCTURE: CPT

## 2022-04-15 PROCEDURE — 80048 BASIC METABOLIC PNL TOTAL CA: CPT

## 2022-04-15 PROCEDURE — 83735 ASSAY OF MAGNESIUM: CPT

## 2022-04-18 ENCOUNTER — TELEPHONE (OUTPATIENT)
Dept: NEPHROLOGY | Facility: CLINIC | Age: 57
End: 2022-04-18

## 2022-04-18 NOTE — RESULT ENCOUNTER NOTE
Please inform patient that potassium is at normal range at 4 4, magnesium is at normal range  Calcium at normal range

## 2022-04-18 NOTE — TELEPHONE ENCOUNTER
Message left on patient's VM that K is normal at 4 4  Mag and calcium also normal per Dr Rodríguez Enter       ----- Message from Jc Dalal MD sent at 4/18/2022  1:54 PM EDT -----  Please inform patient that potassium is at normal range at 4 4, magnesium is at normal range  Calcium at normal range

## 2022-04-21 LAB — RENIN PLAS-CCNC: 2.91 NG/ML/HR (ref 0.17–5.38)

## 2022-04-25 DIAGNOSIS — E11.9 TYPE 2 DIABETES MELLITUS WITHOUT COMPLICATION, WITHOUT LONG-TERM CURRENT USE OF INSULIN (HCC): Primary | ICD-10-CM

## 2022-04-25 DIAGNOSIS — E11.65 TYPE 2 DIABETES MELLITUS WITH HYPERGLYCEMIA, WITHOUT LONG-TERM CURRENT USE OF INSULIN (HCC): ICD-10-CM

## 2022-04-25 RX ORDER — BLOOD SUGAR DIAGNOSTIC
STRIP MISCELLANEOUS
Qty: 400 STRIP | Refills: 2 | Status: SHIPPED | OUTPATIENT
Start: 2022-04-25 | End: 2022-04-28

## 2022-04-25 NOTE — ASSESSMENT & PLAN NOTE
Lab Results   Component Value Date    HGBA1C 6 8 (H) 02/15/2022   Patient reports she has had several episodes of hypoglycemia, as well as some hyperglycemia concerns  She appears to be quite brittle at this point  Based on that, I would recommend that she test more often  Will send new prescription to the pharmacy  This may require prior authorization

## 2022-04-28 DIAGNOSIS — E11.65 TYPE 2 DIABETES MELLITUS WITH HYPERGLYCEMIA, WITHOUT LONG-TERM CURRENT USE OF INSULIN (HCC): ICD-10-CM

## 2022-04-28 RX ORDER — GLIMEPIRIDE 2 MG/1
TABLET ORAL
Qty: 90 TABLET | Refills: 1 | Status: SHIPPED | OUTPATIENT
Start: 2022-04-28

## 2022-05-05 ENCOUNTER — RA CDI HCC (OUTPATIENT)
Dept: OTHER | Facility: HOSPITAL | Age: 57
End: 2022-05-05

## 2022-05-05 NOTE — PROGRESS NOTES
[facility]  coding opportunities          Chart Reviewed number of suggestions sent to Provider: 1     Patients Insurance     Medicare Insurance: [employer]

## 2022-05-06 ENCOUNTER — TELEPHONE (OUTPATIENT)
Dept: NEPHROLOGY | Facility: CLINIC | Age: 57
End: 2022-05-06

## 2022-05-06 NOTE — TELEPHONE ENCOUNTER
Left message to schedule follow up appointment with Sravani Lundberg in [place]  This is the first attempt

## 2022-05-24 ENCOUNTER — CONSULT (OUTPATIENT)
Dept: ENDOCRINOLOGY | Facility: CLINIC | Age: 57
End: 2022-05-24
Payer: MEDICARE

## 2022-05-24 VITALS
BODY MASS INDEX: 38.15 KG/M2 | DIASTOLIC BLOOD PRESSURE: 72 MMHG | HEIGHT: 65 IN | SYSTOLIC BLOOD PRESSURE: 118 MMHG | HEART RATE: 76 BPM | WEIGHT: 229 LBS

## 2022-05-24 DIAGNOSIS — G47.33 OSA (OBSTRUCTIVE SLEEP APNEA): ICD-10-CM

## 2022-05-24 DIAGNOSIS — D35.02 ADENOMA OF LEFT ADRENAL GLAND: ICD-10-CM

## 2022-05-24 DIAGNOSIS — E11.9 TYPE 2 DIABETES MELLITUS WITHOUT COMPLICATION, WITHOUT LONG-TERM CURRENT USE OF INSULIN (HCC): ICD-10-CM

## 2022-05-24 DIAGNOSIS — K76.0 FATTY LIVER: ICD-10-CM

## 2022-05-24 DIAGNOSIS — D35.00 ADRENAL ADENOMA: Primary | ICD-10-CM

## 2022-05-24 DIAGNOSIS — E03.9 ACQUIRED HYPOTHYROIDISM: ICD-10-CM

## 2022-05-24 PROCEDURE — 99204 OFFICE O/P NEW MOD 45 MIN: CPT | Performed by: INTERNAL MEDICINE

## 2022-05-24 RX ORDER — DEXAMETHASONE 1 MG
TABLET ORAL
Qty: 1 TABLET | Refills: 0 | Status: SHIPPED | OUTPATIENT
Start: 2022-05-24

## 2022-05-24 NOTE — PROGRESS NOTES
New consult Note      CC: diabetes, adrenal mass    History of Present Illness:   56 yr female with type 2 diabetes for 3 yrs, NAFLD, MARIE, HTN, HLD, Anxiety/depression, cholelithiasis, DJD spine, RBBB, morbid obesity and 2 3cm Lt adrenal adenoma  She was diagnosed with type 2 diabetes on routine testing by PCP  She was started on glimepiride  She never tried metformin or other agents  She reports hx of weight gain, hyperglycemia and occasional symptomatic hypoglycemia while fasting  CT abdomen: 2 2 cm left adrenal nodule within the medial limb demonstrates absolute washout of 82% compatible with adenoma  Incidentally found as a 1 4cm lesion in 2018  Home blood glucose monitoring: does not check  Before breakfast:   Before lunch:   Before dinner:   Bedtime:   Hypoglycemia:occasionally      Current meds:  Glimepiride 2mg qdaily    Opthamology: no  Podiatry: no  vaccination:   Dental:  Pancreatitis: no    Ace/ARB: losartan  Statin:  Thyroid issues: no    Patient Active Problem List   Diagnosis    Chronic nonseasonal allergic rhinitis due to pollen    Depression    Hyperlipidemia    Endometriosis    Fibromyalgia    Greater trochanteric bursitis of both hips    Fatigue    Type 2 diabetes mellitus ([facility] [address] )    Hydradenitis    Hypertension    Chronic hypokalemia    Hypothyroidism    Lumbar radiculopathy    Lumbar spondylosis    MARIE (obstructive sleep apnea)    Sacroiliitis (HCC)    Vitamin B12 deficiency    Vitamin D deficiency    Bronchitis    Allergic rhinitis    Obesity    Right bundle branch block    Chest pain    Abdominal pain    GERD (gastroesophageal reflux disease)    Flushing    Anxiety    Fatty liver    Menopause syndrome    Leg pain    PMR (polymyalgia rheumatica) (HCC)    Esophageal dysphagia    Irritable bowel syndrome with both constipation and diarrhea    Osteoarthritis of both knees    Mass of arm, left    Acute medial meniscus tear    Vulvar cyst    Trochanteric bursitis    Hyperhidrosis    Sebaceous cyst    Gall stone    Adenoma of left adrenal gland    Proteinuria     Past Medical History:   Diagnosis Date    Ankle fracture     Anxiety     Asthma     Bleeding hemorrhoids 4/13/2017    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    Formatting of this note might be different from the original  Added automatically from request for surgery [id number]    Chronic venous embolism and thrombosis of deep vessels of lower extremity ([facility] [address] )     Costochondritis     RESOLVED: [id number]    Depression     Diabetes mellitus ([facility] [address] ) 11/2021    Endometriosis     Fibromyalgia     GERD (gastroesophageal reflux disease)     Hematuria     LAST ASSESSED: [id number]    Hydradenitis     axillary area and under breast    Hypertension     LAST ASSESSED: [id number]    Irregular heart beat     Pulmonary embolism ([facility] [address] )     RESOLED: [id number] - secondary to a lupron injection for endometriosis    RBBB (right bundle branch block)     Sleep apnea     Cannot tolerate CPAP    Umbilical hernia     LAST ASSESSED: [id number]      Past Surgical History:   Procedure Laterality Date    HERNIA REPAIR      umbilical    HUMERUS FRACTURE SURGERY W/ IMPLANT Left     HYSTERECTOMY  2014    partial    PELVIC LAPAROSCOPY      x4 for endometriosis    MA ESOPHAGOGASTRODUODENOSCOPY TRANSORAL DIAGNOSTIC N/A 5/2/2019    Procedure: ESOPHAGOGASTRODUODENOSCOPY (EGD) with bx;  Surgeon: Bryon Negron MD;  Location: [facility];   Service: Gastroenterology      Family History   Problem Relation Age of Onset    Hypertension Mother     Diabetes Father     Hypertension Father     Obesity Father     No Known Problems Maternal Grandmother     No Known Problems Maternal Grandfather     Breast cancer Paternal Grandmother 80    No Known Problems Paternal Grandfather     No Known Problems Maternal Aunt      Social History     Tobacco Use    Smoking status: Former Smoker     Packs/day: 0 50     Types: Cigarettes, Cigarettes     Quit date: 8/10/2018     Years since quitting: 3 7    Smokeless tobacco: Never Used    Tobacco comment: smokes 1 cigarette occasionally   Substance Use Topics    Alcohol use: No     Allergies   Allergen Reactions    Albuterol      Other reaction(s): felt weird    Azithromycin GI Intolerance    Duloxetine      Category: Adverse Reaction; Annotation - [id number]: Sweating    Erythromycin Vomiting    Hydrocodone-Acetaminophen      Other reaction(s): sweating, feel faint, diarrhea    Hydrocodone-Acetaminophen     Ketorolac Diarrhea and Nausea Only     Category: Adverse Reaction; Annotation - [id number]: Symptoms were noted after injection into bursa with Toradol at orthopedics    Penicillin G     Penicillins Hives and Vomiting    Tramadol          Current Outpatient Medications:     albuterol (PROVENTIL HFA,VENTOLIN HFA) 90 mcg/act inhaler, INHALE 2 PUFFS EVERY 6 HOURS AS NEEDED FOR WHEEZE, Disp: 18 g, Rfl: 2    Aspirin 81 MG EC tablet, Take by mouth, Disp: , Rfl:     Blood Glucose Monitoring Suppl (OneTouch Verio) w/Device KIT, USE DAILY AS DIRECTED, Disp: 1 kit, Rfl: 0    Cholecalciferol (VITAMIN D3) 1000 units CAPS, Take 2,000 Units by mouth, Disp: , Rfl:     clindamycin (CLEOCIN T) 1 % lotion, APPLY ONCE DAILY TO AFFECTED AREAS UNDER ARMS , Disp: , Rfl: 3    CVS Olopatadine HCl 0 2 % opth drops, INSTILL 1 DROP INTO BOTH EYES DAILY, Disp: 7 5 mL, Rfl: 1    Cyanocobalamin (B-12) 1000 MCG CAPS, Take by mouth, Disp: , Rfl:     escitalopram (LEXAPRO) 20 mg tablet, TAKE 1 TABLET BY MOUTH EVERY DAY, Disp: 90 tablet, Rfl: 1    famotidine (PEPCID) 40 MG tablet, TAKE 1 TABLET (40 MG TOTAL) BY MOUTH DAILY FOR 90 DAYS, Disp: 90 tablet, Rfl: 3    glimepiride (AMARYL) 2 mg tablet, TAKE 1 TABLET BY MOUTH DAILY WITH BREAKFAST, Disp: 90 tablet, Rfl: 1    glucose blood (OneTouch Verio) test strip, Test daily, or as directed, up to 3 times a day    Diagnosis brittle diabetic, Disp: 400 strip, Rfl: 2    Klor-Con M20 20 MEQ tablet, TAKE 1 TABLET BY MOUTH EVERY DAY, Disp: 90 tablet, Rfl: 1    loperamide (IMODIUM A-D) 2 MG tablet, Take 2 mg by mouth 4 (four) times a day as needed for diarrhea, Disp: , Rfl:     loratadine (CLARITIN) 10 mg tablet, Take 10 mg by mouth, Disp: , Rfl:     losartan (COZAAR) 50 mg tablet, Take 1 tablet (50 mg total) by mouth daily, Disp: 90 tablet, Rfl: 3    montelukast (SINGULAIR) 10 mg tablet, TAKE 1 TABLET BY MOUTH EVERY DAY, Disp: 90 tablet, Rfl: 3    OneTouch Delica Lancets 11L MISC, USE DAILY AS DIRECTED, Disp: 100 each, Rfl: 0    pantoprazole (PROTONIX) 40 mg tablet, TAKE 1 TABLET BY MOUTH EVERY DAY, Disp: 90 tablet, Rfl: 3    sucralfate (CARAFATE) 1 g tablet, TAKE 1 TABLET BY MOUTH FOUR TIMES A DAY, Disp: 360 tablet, Rfl: 1    traZODone (DESYREL) 50 mg tablet, TAKE 1 TABLET BY MOUTH DAILY AT BEDTIME, Disp: 90 tablet, Rfl: 1    Review of Systems   Constitutional: Positive for fatigue  HENT: Negative  Eyes: Negative  Respiratory: Negative  Cardiovascular: Negative  Gastrointestinal: Negative  Endocrine: Negative  Musculoskeletal: Negative  Skin: Negative  Allergic/Immunologic: Negative  Neurological: Negative  Hematological: Negative  Psychiatric/Behavioral: Negative  Physical Exam:  Body mass index is 38 11 kg/m²  /72 (BP Location: Left arm, Patient Position: Sitting, Cuff Size: Large)   Pulse 76   Ht 5' 5" (1 651 m)   Wt 104 kg (229 lb)   BMI 38 11 kg/m²    Vitals:    05/24/22 1045   Weight: 104 kg (229 lb)        Physical Exam  Constitutional:       Appearance: She is well-developed  HENT:      Head: Normocephalic  Eyes:      Pupils: Pupils are equal, round, and reactive to light  Neck:      Thyroid: No thyromegaly  Cardiovascular:      Rate and Rhythm: Normal rate  Heart sounds: Normal heart sounds  Pulmonary:      Effort: Pulmonary effort is normal       Breath sounds: Normal breath sounds  Abdominal:      General: Bowel sounds are normal       Palpations: Abdomen is soft  Musculoskeletal:         General: No deformity  Cervical back: Normal range of motion  Skin:     Capillary Refill: Capillary refill takes less than 2 seconds  Coloration: Skin is not pale  Findings: No rash  Neurological:      Mental Status: She is alert and oriented to person, place, and time  Labs:   Lab Results   Component Value Date    HGBA1C 6 8 (H) 02/15/2022       Lab Results   Component Value Date    [id number] 0 581 08/26/2021       Lab Results   Component Value Date    CREATININE 0 78 04/15/2022    CREATININE 0 82 03/17/2022    CREATININE 0 79 02/15/2022    BUN 14 04/15/2022     06/16/2014    K 4 4 04/15/2022     04/15/2022    CO2 27 04/15/2022     eGFR   Date Value Ref Range Status   04/15/2022 86 ml/min/1 73sq m Final       Lab Results   Component Value Date    ALT 53 02/15/2022    AST 37 02/15/2022    ALKPHOS 91 02/15/2022    BILITOT 0 79 05/30/2014       Lab Results   Component Value Date    CHOLESTEROL 178 08/26/2021    CHOLESTEROL 176 09/13/2018     Lab Results   Component Value Date    HDL 27 (L) 08/26/2021    HDL 34 (L) 09/13/2018     Lab Results   Component Value Date    TRIG 201 (H) 08/26/2021    TRIG 156 (H) 09/13/2018     No results found for: NONHDLC      Impression:  1  Adrenal adenoma    2  Fatty liver    3  Type 2 diabetes mellitus without complication, without long-term current use of insulin ([facility] [address] )    4  Acquired hypothyroidism    5  Adenoma of left adrenal gland    6  MARIE (obstructive sleep apnea)         Plan:    Diagnoses and all orders for this visit:    Adrenal adenoma  She has a 2 3cm Lt sided lesion that seems like an adenoma  24hr urine catecholamines and metanephrines were negative  Renin was not suppressed  She does not have hirsutism  Will complete work up with listed labs   If this is a non functional adenoma, will repeat labs and imaging next year     Follow up in 3 months to review labs  -     Ambulatory Referral to Endocrinology  -     DHEA-sulfate; Future  -     Cortisol Level, AM Specimen; Future  -     dexamethasone (DECADRON) 1 mg tablet; Take 1 tab at 11pm and get cortisol levels checked between 7:00 a m  and 9:00 a m  on the next morning   -     17-Hydroxyprogesterone; Future    Fatty liver    Type 2 diabetes mellitus without complication, without long-term current use of insulin ([facility] [address] )  She is controlled with A1c 6 5% but reports some symptoms of fasting hypoglycemia possibly related to glimepiride  Today we discussed all aspects of diabetes including pathophysiology, risk factors, complications, SAGM, CGM, diet, lifestyle modifications, medical fitness training, diabetes education, goals of therapy, follow up needs and medications including insulin, metformin, Jardiance and GLP1 agonists  Advised to maintain goal blood sugars 70-180mg/dL  Written information was provided about GLP1 agonists that are likely the best therapeutic agents for her  Follow up in 3 months  MARIE (obstructive sleep apnea)  Uses CPAP        I have spent 60 minutes with patient today in which greater than 50% of this time was spent in counseling/coordination of care  Discussed with the patient and all questioned fully answered  She will call me if any problems arise  Educated/ Counseled patient on diagnostic test results, prognosis, risk vs benefit of treatment options, importance of treatment compliance, healthy life and lifestyle choices        [address]

## 2022-05-25 DIAGNOSIS — E11.65 TYPE 2 DIABETES MELLITUS WITH HYPERGLYCEMIA, WITHOUT LONG-TERM CURRENT USE OF INSULIN (HCC): ICD-10-CM

## 2022-05-25 RX ORDER — LANCETS 33 GAUGE
EACH MISCELLANEOUS
Qty: 100 EACH | Refills: 0 | Status: SHIPPED | OUTPATIENT
Start: 2022-05-25

## 2022-05-27 ENCOUNTER — TELEPHONE (OUTPATIENT)
Dept: ENDOCRINOLOGY | Facility: CLINIC | Age: 57
End: 2022-05-27

## 2022-05-27 NOTE — TELEPHONE ENCOUNTER
Marty Hicks (Enrique: [facility]) - [id number]    Need help? Call us at [phone]    Status   Sent to [employer]  Next Steps   The plan will fax you a determination, typically within 1 to 5 business days  How do I follow up? Drug    Dexamethasone 1MG tablets   Form    [facility] Oral Prior Authorization Form          [phone]  phone      [phone]  fax       Original Claim Info    09,MM,65       Claim reject by Non-Formulary  [facility]

## 2022-06-28 DIAGNOSIS — E11.65 TYPE 2 DIABETES MELLITUS WITH HYPERGLYCEMIA, WITHOUT LONG-TERM CURRENT USE OF INSULIN (HCC): ICD-10-CM

## 2022-06-28 RX ORDER — BLOOD SUGAR DIAGNOSTIC
STRIP MISCELLANEOUS
Qty: 100 STRIP | Refills: 1 | Status: SHIPPED | OUTPATIENT
Start: 2022-06-28

## 2022-06-28 NOTE — TELEPHONE ENCOUNTER
----- Message from Keshia Rivera sent at 6/27/2022  5:10 PM EDT -----  Regarding: Test strips Prescription  Insurance will not cover more than 1 per day  Need new Prescription

## 2022-07-08 ENCOUNTER — OFFICE VISIT (OUTPATIENT)
Dept: FAMILY MEDICINE CLINIC | Facility: CLINIC | Age: 57
End: 2022-07-08
Payer: MEDICARE

## 2022-07-08 VITALS
TEMPERATURE: 98.2 F | BODY MASS INDEX: 37.15 KG/M2 | HEIGHT: 65 IN | SYSTOLIC BLOOD PRESSURE: 124 MMHG | WEIGHT: 223 LBS | HEART RATE: 104 BPM | DIASTOLIC BLOOD PRESSURE: 78 MMHG | OXYGEN SATURATION: 96 %

## 2022-07-08 DIAGNOSIS — E11.9 TYPE 2 DIABETES MELLITUS WITHOUT COMPLICATION, WITHOUT LONG-TERM CURRENT USE OF INSULIN (HCC): ICD-10-CM

## 2022-07-08 DIAGNOSIS — L23.7 ALLERGIC CONTACT DERMATITIS DUE TO PLANTS, EXCEPT FOOD: Primary | ICD-10-CM

## 2022-07-08 DIAGNOSIS — I10 PRIMARY HYPERTENSION: ICD-10-CM

## 2022-07-08 PROCEDURE — 99213 OFFICE O/P EST LOW 20 MIN: CPT | Performed by: FAMILY MEDICINE

## 2022-07-08 RX ORDER — METHYLPREDNISOLONE 4 MG/1
TABLET ORAL
Qty: 21 EACH | Refills: 0 | Status: SHIPPED | OUTPATIENT
Start: 2022-07-08 | End: 2022-09-21

## 2022-07-08 NOTE — PATIENT INSTRUCTIONS
Problem List Items Addressed This Visit       Hypertension     Blood pressure doing quite well today  No treatment changes  Type 2 diabetes mellitus (HCC)       Lab Results   Component Value Date    HGBA1C 6 8 (H) 02/15/2022   Patient is due for labs  She understands  She will be following up in the near future as she has other blood work to do, for which she will combine the blood work if she can  Other Visit Diagnoses       Allergic contact dermatitis due to plants, except food    -  Primary    Patient has multiple areas with irritation  Medrol Dosepak as she has already 1-1/2 weeks into this  Relevant Medications    methylPREDNISolone 4 MG tablet therapy pack            COVID 19 Instructions    Claiborne Doing was advised to limit contact with others to essential tasks such as getting food, medications, and medical care  Proper handwashing reviewed, and Hand sanitzer when washing is not available  If the patient develops symptoms of COVID 19, the patient should call the office as soon as possible  For [id number] Flu season, it is strongly recommended that Flu Vaccinations be obtained  Virtual Visits:  Chio: This works on smart phones (any phone with Internet browsing capability)  You should get a text message when the provider is ready to see you  Click on the link in the text message, and the call should start  You will need to type in your name, and allow camera and microphone access  This is HIPPA compliant, and secure  If you have not already done so, get immunized to COVID 19  We are committed to getting you vaccinated as soon as possible and will be closely following CDC and [facility]  guidelines as they are released and revised  Please refer to our COVID-19 vaccine webpage for the most up to date information on the vaccine and our distribution efforts      This site will also have the most up to date recommendations for COVID booster kamron Terrell tn    Call [phone]), option 7    OUR NEW LOCATION:    [address], [facility] [address], [place], [address]  Fax: [phone]    Lab services and OB/GYN are at this location as well

## 2022-07-08 NOTE — PROGRESS NOTES
Assessment and Plan:    Problem List Items Addressed This Visit     Hypertension     Blood pressure doing quite well today  No treatment changes  Type 2 diabetes mellitus (HCC)       Lab Results   Component Value Date    HGBA1C 6 8 (H) 02/15/2022   Patient is due for labs  She understands  She will be following up in the near future as she has other blood work to do, for which she will combine the blood work if she can  Other Visit Diagnoses     Allergic contact dermatitis due to plants, except food    -  Primary    Patient has multiple areas with irritation  Medrol Dosepak as she has already 1-1/2 weeks into this  Relevant Medications    methylPREDNISolone 4 MG tablet therapy pack                 Diagnoses and all orders for this visit:    Allergic contact dermatitis due to plants, except food  Comments:  Patient has multiple areas with irritation  Medrol Dosepak as she has already 1-1/2 weeks into this  Orders:  -     methylPREDNISolone 4 MG tablet therapy pack; Use as directed on package    Primary hypertension    Type 2 diabetes mellitus without complication, without long-term current use of insulin (HCC)            Subjective:      Patient ID: Ronald Gallegos is a 56 y o  female  CC:    Chief Complaint   Patient presents with    Rash     Patient present today for a rash all over her body for the past week and a half  Per patient she believes it might be some type of poison ivy  HPI:    Patient has irritation on the arms  She was recently removing plants from under her deck, and developed this rash  Comes in multiple different areas at this point  Redness, itching, irritation  Started with some on the right side of her face  She has tried hydrocortisone, calamine, Benadryl orally  No real help            The following portions of the patient's history were reviewed and updated as appropriate: allergies, current medications, past family history, past medical history, past social history, past surgical history and problem list       Review of Systems   Constitutional: Negative  HENT: Negative  Skin: Positive for rash  Data to review:       Objective:    Vitals:    07/08/22 1256   BP: 124/78   BP Location: Right arm   Patient Position: Sitting   Cuff Size: Large   Pulse: 104   Temp: 98 2 °F (36 8 °C)   TempSrc: Tympanic   SpO2: 96%   Weight: 101 kg (223 lb)   Height: 5' 5" (1 651 m)        Physical Exam  Vitals and nursing note reviewed  Constitutional:       Appearance: Normal appearance  HENT:      Head: Normocephalic  Cardiovascular:      Rate and Rhythm: Normal rate and regular rhythm  Pulses: Normal pulses  Carotid pulses are 2+ on the right side and 2+ on the left side  Heart sounds: Normal heart sounds  No murmur heard  No friction rub  No gallop  Pulmonary:      Effort: Pulmonary effort is normal  No respiratory distress  Breath sounds: Normal breath sounds  No stridor  No wheezing, rhonchi or rales  Chest:      Chest wall: No tenderness  Skin:         Neurological:      Mental Status: She is alert

## 2022-07-08 NOTE — ASSESSMENT & PLAN NOTE
Lab Results   Component Value Date    HGBA1C 6 8 (H) 02/15/2022   Patient is due for labs  She understands  She will be following up in the near future as she has other blood work to do, for which she will combine the blood work if she can

## 2022-07-19 NOTE — PROGRESS NOTES
Assessment/Plan:    Given hx of IBS and increased gas with pelvic cramping, discussed that sx were most likely colonic in origin  Pain resolved on its own  Patient advised to call office if pain reoccurs  Colonoscopy referral given  Diagnoses and all orders for this visit:    Pelvic cramping    Screening mammogram for breast cancer    Colon cancer screening  -     Ambulatory Referral to Gastroenterology; Future        Subjective:      Patient ID: Radha Diaz is a 56 y o  female  New patient presents with pelvic cramping  Cramping resolved on its own and is not present today  Pain began about a month ago, was on and off for about 1 1/2 weeks, B/L  Cramping was associated with increased gas  She has a hx of IBS, C=D  Denies change in bowel or bladder, vaginal discharge, odor  She is rarely sexually active  She is s/p hysterectomy in her 45s for endometriosis  She believes her ovaries are intact  She has never had colonoscopy  She is UTD with mammography  The following portions of the patient's history were reviewed and updated as appropriate: allergies, current medications, past family history, past medical history, past social history, past surgical history and problem list     Review of Systems   Constitutional: Negative  Respiratory: Negative  Cardiovascular: Negative  Gastrointestinal: Negative  Endocrine: Negative  Genitourinary: Positive for pelvic pain  Negative for dysuria, frequency, urgency, vaginal bleeding, vaginal discharge and vaginal pain  Musculoskeletal: Negative  Skin: Negative  Neurological: Negative  Psychiatric/Behavioral: Negative  Objective:      /84   Pulse 89   Ht 5' 5" (1 651 m)   Wt 103 kg (226 lb 3 2 oz)   BMI 37 64 kg/m²          Physical Exam  Vitals and nursing note reviewed  Exam conducted with a chaperone present  Constitutional:       Appearance: Normal appearance  HENT:      Head: Normocephalic and atraumatic  Pulmonary:      Effort: Pulmonary effort is normal    Abdominal:      Hernia: There is no hernia in the left inguinal area or right inguinal area  Genitourinary:     Exam position: Supine  Labia:         Right: No rash, tenderness, lesion or injury  Left: No rash, tenderness, lesion or injury  Urethra: No urethral pain, urethral swelling or urethral lesion  Vagina: No signs of injury and foreign body  No vaginal discharge, erythema, tenderness, bleeding, lesions or prolapsed vaginal walls  Adnexa:         Right: No mass, tenderness or fullness  Left: No mass, tenderness or fullness  Comments: Changes consistent with hidradenitis suppurative noted  Cervix, uterus absent, no masses or tenderness on BME  Musculoskeletal:         General: Normal range of motion  Cervical back: Normal range of motion  Lymphadenopathy:      Lower Body: No right inguinal adenopathy  No left inguinal adenopathy  Skin:     General: Skin is warm and dry  Neurological:      Mental Status: She is alert and oriented to person, place, and time  Psychiatric:         Mood and Affect: Mood normal          Behavior: Behavior normal          Thought Content:  Thought content normal          Judgment: Judgment normal

## 2022-07-20 ENCOUNTER — OFFICE VISIT (OUTPATIENT)
Dept: OBGYN CLINIC | Facility: CLINIC | Age: 57
End: 2022-07-20
Payer: MEDICARE

## 2022-07-20 VITALS
SYSTOLIC BLOOD PRESSURE: 130 MMHG | WEIGHT: 226.2 LBS | HEART RATE: 89 BPM | DIASTOLIC BLOOD PRESSURE: 84 MMHG | HEIGHT: 65 IN | BODY MASS INDEX: 37.69 KG/M2

## 2022-07-20 DIAGNOSIS — Z12.31 SCREENING MAMMOGRAM FOR BREAST CANCER: ICD-10-CM

## 2022-07-20 DIAGNOSIS — Z12.11 COLON CANCER SCREENING: ICD-10-CM

## 2022-07-20 DIAGNOSIS — R10.2 PELVIC CRAMPING: Primary | ICD-10-CM

## 2022-07-20 PROCEDURE — 99202 OFFICE O/P NEW SF 15 MIN: CPT | Performed by: OBSTETRICS & GYNECOLOGY

## 2022-07-29 DIAGNOSIS — F33.1 MODERATE EPISODE OF RECURRENT MAJOR DEPRESSIVE DISORDER (HCC): ICD-10-CM

## 2022-07-29 RX ORDER — ESCITALOPRAM OXALATE 20 MG/1
TABLET ORAL
Qty: 90 TABLET | Refills: 1 | Status: SHIPPED | OUTPATIENT
Start: 2022-07-29

## 2022-09-21 ENCOUNTER — OFFICE VISIT (OUTPATIENT)
Dept: FAMILY MEDICINE CLINIC | Facility: CLINIC | Age: 57
End: 2022-09-21
Payer: MEDICARE

## 2022-09-21 VITALS
HEART RATE: 70 BPM | WEIGHT: 225 LBS | HEIGHT: 65 IN | SYSTOLIC BLOOD PRESSURE: 138 MMHG | DIASTOLIC BLOOD PRESSURE: 68 MMHG | OXYGEN SATURATION: 98 % | BODY MASS INDEX: 37.49 KG/M2

## 2022-09-21 DIAGNOSIS — J40 BRONCHITIS: ICD-10-CM

## 2022-09-21 DIAGNOSIS — E11.9 TYPE 2 DIABETES MELLITUS WITHOUT COMPLICATION, WITHOUT LONG-TERM CURRENT USE OF INSULIN (HCC): ICD-10-CM

## 2022-09-21 DIAGNOSIS — L73.2 HYDRADENITIS: ICD-10-CM

## 2022-09-21 DIAGNOSIS — J01.40 ACUTE NON-RECURRENT PANSINUSITIS: Primary | ICD-10-CM

## 2022-09-21 DIAGNOSIS — I10 PRIMARY HYPERTENSION: ICD-10-CM

## 2022-09-21 PROCEDURE — 99214 OFFICE O/P EST MOD 30 MIN: CPT | Performed by: FAMILY MEDICINE

## 2022-09-21 RX ORDER — SULFAMETHOXAZOLE AND TRIMETHOPRIM 800; 160 MG/1; MG/1
1 TABLET ORAL EVERY 12 HOURS SCHEDULED
Qty: 20 TABLET | Refills: 0 | Status: SHIPPED | OUTPATIENT
Start: 2022-09-21 | End: 2022-10-01

## 2022-09-21 RX ORDER — SULFAMETHOXAZOLE AND TRIMETHOPRIM 800; 160 MG/1; MG/1
1 TABLET ORAL EVERY 12 HOURS SCHEDULED
Qty: 20 TABLET | Refills: 0 | Status: SHIPPED | OUTPATIENT
Start: 2022-09-21 | End: 2022-09-21 | Stop reason: SDUPTHER

## 2022-09-21 NOTE — ASSESSMENT & PLAN NOTE
Stable at the moment  She reports her home blood pressures are better  Will continue to follow  Continue on Cozaar

## 2022-09-21 NOTE — ASSESSMENT & PLAN NOTE
Patient does have some irritation on the left side of the chest wall, just below the bra line  Would recommend Bactrim DS  This would cover the sinus infection, lung infection, as well as skin  Continue topical clindamycin

## 2022-09-21 NOTE — ASSESSMENT & PLAN NOTE
Lab Results   Component Value Date    HGBA1C 6 8 (H) 02/15/2022   Some hypoglycemic events  Reviewed about having carbohydrates on hand in case she does have more problems with it  Consider switching the Amaryl to half tablet  Due for A1c  She does have blood work ordered previously  Is planning on going very shortly  Because of this, elected not to do fingerstick A1c today

## 2022-10-05 ENCOUNTER — NURSE TRIAGE (OUTPATIENT)
Dept: OTHER | Facility: OTHER | Age: 57
End: 2022-10-05

## 2022-10-05 NOTE — TELEPHONE ENCOUNTER
Patient reports she completed her ABT on 9/2/22 and is having ear clogging/pain, coughing, and mucus  She would like a call back to advise  Reason for Disposition   Caller has NON-URGENT medicine question about med that PCP or specialist prescribed and triager unable to answer question    Answer Assessment - Initial Assessment Questions  1  NAME of MEDICATION: "What medicine are you calling about?"      Bactrim DS  2  QUESTION: "What is your question?" (e g , medication refill, side effect)      Did not work  3  PRESCRIBING HCP: "Who prescribed it?" Reason: if prescribed by specialist, call should be referred to that group  PCP   4  SYMPTOMS: "Do you have any symptoms?"      L ear clogged, R ear pain, coughing, mucus   5  SEVERITY: If symptoms are present, ask "Are they mild, moderate or severe?"      Moderate   6   PREGNANCY:  "Is there any chance that you are pregnant?" "When was your last menstrual period?"      N/A    Protocols used: MEDICATION QUESTION CALL-ADULT-[place]

## 2022-10-05 NOTE — TELEPHONE ENCOUNTER
Regarding: Earache   ----- Message from Dannielle Lynn RN sent at 10/5/2022  9:51 AM EDT -----  "I was seen 2 weeks ago for bronchitis  I was on an antibiotic  I finished the antibiotic on Sunday   My left ear is clogged and there is some pain in my right ear now "

## 2022-10-06 ENCOUNTER — OFFICE VISIT (OUTPATIENT)
Dept: FAMILY MEDICINE CLINIC | Facility: CLINIC | Age: 57
End: 2022-10-06
Payer: MEDICARE

## 2022-10-06 VITALS
HEIGHT: 65 IN | SYSTOLIC BLOOD PRESSURE: 128 MMHG | HEART RATE: 90 BPM | OXYGEN SATURATION: 95 % | BODY MASS INDEX: 36.99 KG/M2 | DIASTOLIC BLOOD PRESSURE: 72 MMHG | WEIGHT: 222 LBS | TEMPERATURE: 98.3 F

## 2022-10-06 DIAGNOSIS — E11.9 TYPE 2 DIABETES MELLITUS WITHOUT COMPLICATION, WITHOUT LONG-TERM CURRENT USE OF INSULIN (HCC): ICD-10-CM

## 2022-10-06 DIAGNOSIS — J40 BRONCHITIS: ICD-10-CM

## 2022-10-06 DIAGNOSIS — H65.03 NON-RECURRENT ACUTE SEROUS OTITIS MEDIA OF BOTH EARS: Primary | ICD-10-CM

## 2022-10-06 DIAGNOSIS — Z12.11 COLON CANCER SCREENING: ICD-10-CM

## 2022-10-06 LAB — SL AMB POCT HEMOGLOBIN AIC: 5.6 (ref ?–6.5)

## 2022-10-06 PROCEDURE — 99213 OFFICE O/P EST LOW 20 MIN: CPT | Performed by: FAMILY MEDICINE

## 2022-10-06 PROCEDURE — 83036 HEMOGLOBIN GLYCOSYLATED A1C: CPT | Performed by: FAMILY MEDICINE

## 2022-10-06 PROCEDURE — G0439 PPPS, SUBSEQ VISIT: HCPCS | Performed by: FAMILY MEDICINE

## 2022-10-06 RX ORDER — GLIMEPIRIDE 2 MG/1
1 TABLET ORAL
Qty: 90 TABLET | Refills: 1
Start: 2022-10-06

## 2022-10-06 RX ORDER — DOXYCYCLINE HYCLATE 100 MG/1
100 CAPSULE ORAL EVERY 12 HOURS SCHEDULED
Qty: 20 CAPSULE | Refills: 0 | Status: SHIPPED | OUTPATIENT
Start: 2022-10-06 | End: 2022-10-16

## 2022-10-06 NOTE — ASSESSMENT & PLAN NOTE
Lab Results   Component Value Date    HGBA1C 5 6 10/06/2022   Patient's A1c at 5 6 is fantastic  Given the extremely low A1c, would recommend that she decrease the glimepiride to half a tablet daily, 1 mg  Consider discontinue altogether  She has noticed low sugars

## 2022-10-06 NOTE — PATIENT INSTRUCTIONS
1  Non-recurrent acute serous otitis media of both ears  Comments:  Clear evidence of otitis media  Doxycycline secondary to allergies  Orders:  -     doxycycline hyclate (VIBRAMYCIN) 100 mg capsule; Take 1 capsule (100 mg total) by mouth every 12 (twelve) hours for 10 days    2  Bronchitis  Comments:  Patient does have what appears to be bronchitis with the continued cough  due to allergies, trial doxycycline  Orders:  -     doxycycline hyclate (VIBRAMYCIN) 100 mg capsule; Take 1 capsule (100 mg total) by mouth every 12 (twelve) hours for 10 days    3  Type 2 diabetes mellitus without complication, without long-term current use of insulin [facility])  Assessment & Plan:    Lab Results   Component Value Date    HGBA1C 5 6 10/06/2022   Patient's A1c at 5 6 is fantastic  Given the extremely low A1c, would recommend that she decrease the glimepiride to half a tablet daily, 1 mg  Consider discontinue altogether  She has noticed low sugars  Orders:  -     POCT hemoglobin A1c  -     glimepiride (AMARYL) 2 mg tablet; Take 0 5 tablets (1 mg total) by mouth daily with breakfast    4  Colon cancer screening  Comments:  Patient is due for colonoscopy, had never having had 1 before  Orders:  -     Ambulatory referral for colonoscopy; Future      COVID 19 Instructions    Andrea Law was advised to limit contact with others to essential tasks such as getting food, medications, and medical care  Proper handwashing reviewed, and Hand sanitzer when washing is not available  If the patient develops symptoms of COVID 19, the patient should call the office as soon as possible  For [id number] Flu season, it is strongly recommended that Flu Vaccinations be obtained  Virtual Visits:  Chio: This works on smart phones (any phone with Internet browsing capability)  You should get a text message when the provider is ready to see you  Click on the link in the text message, and the call should start    You will need Chief Complaint  Dr. Hannah Vilchis notified that this pt did not come to her scheduled [facility] appt. today & pt was scheduled to have a NST/AFV & CFU performed in our [facility] office today & Dr. Vilchis states she will follow up with this pt to determine when this pt needs to have this  testing performed. [facility]  Team also called this pt & pt states she will call our office back.      Signatures   Electronically signed by : Gail Tabares R.N.; May  5 2017  7:42PM CST     to type in your name, and allow camera and microphone access  This is HIPPA compliant, and secure  If you have not already done so, get immunized to COVID 19  We are committed to getting you vaccinated as soon as possible and will be closely following CDC and [facility]  guidelines as they are released and revised  Please refer to our COVID-19 vaccine webpage for the most up to date information on the vaccine and our distribution efforts  This site will also have the most up to date recommendations for COVID booster vaccine  Xander hernandez    Call [phone]), option [phone]    OUR NEW LOCATION:    [address], [address] [place], [place] [address] , [place], [address]  Fax: [phone]    Lab services and OB/GYN are at this location as well

## 2022-10-06 NOTE — PROGRESS NOTES
Assessment and Plan:     Problem List Items Addressed This Visit     Type 2 diabetes mellitus ([address] )       Lab Results   Component Value Date    HGBA1C 5 6 10/06/2022   Patient's A1c at 5 6 is fantastic  Given the extremely low A1c, would recommend that she decrease the glimepiride to half a tablet daily, 1 mg  Consider discontinue altogether  She has noticed low sugars  Relevant Medications    glimepiride (AMARYL) 2 mg tablet    Other Relevant Orders    POCT hemoglobin A1c (Completed)      Other Visit Diagnoses     Non-recurrent acute serous otitis media of both ears    -  Primary    Clear evidence of otitis media  Doxycycline secondary to allergies  Relevant Medications    doxycycline hyclate (VIBRAMYCIN) 100 mg capsule    Bronchitis        Patient does have what appears to be bronchitis with the continued cough  due to allergies, trial doxycycline  MDI as well  Relevant Medications    doxycycline hyclate (VIBRAMYCIN) 100 mg capsule    Colon cancer screening        Patient is due for colonoscopy, had never having had 1 before  Relevant Orders    Ambulatory referral for colonoscopy           Preventive health issues were discussed with patient, and age appropriate screening tests were ordered as noted in patient's After Visit Summary  Personalized health advice and appropriate referrals for health education or preventive services given if needed, as noted in patient's After Visit Summary  History of Present Illness:     Chief Complaint   Patient presents with    Ear Fullness     Patient present today for clogged ear left side and right ear pain for the past 2-3 days  Pt also still has a cough which got a bit better with the antibiotics but is not gone  Pt also stated she had a low grade fever about 4 days ago   Medicare Wellness Visit     Pt due       Patient presents for a Medicare Wellness Visit    Patient is having significant earache bilaterally    The left seems to be worse than right, however  Has been for several days now  Patient also has some fever with that  She has significant coughing lately  She she was recently treated for bronchitis the cough really has not improved  Bronchitis treatment was Bactrim secondary to the other infection that she was having at the time           Patient Care Team:  Saad Mcdowell MD as PCP - General  Ann Pebbles Schmeltzle, DO Luvenia Amass, PA-C Myrl Searing, DO Jere Lesches, MD (Endocrinology)     Review of Systems:     Review of Systems     Problem List:     Patient Active Problem List   Diagnosis    Chronic nonseasonal allergic rhinitis due to pollen    Depression    Hyperlipidemia    Endometriosis    Fibromyalgia    Greater trochanteric bursitis of both hips    Fatigue    Type 2 diabetes mellitus ([address] )    Hydradenitis    Hypertension    Chronic hypokalemia    Lumbar radiculopathy    Lumbar spondylosis    MARIE (obstructive sleep apnea)    Sacroiliitis (HCC)    Vitamin B12 deficiency    Vitamin D deficiency    Allergic rhinitis    Obesity    Right bundle branch block    Chest pain    Abdominal pain    GERD (gastroesophageal reflux disease)    Flushing    Anxiety    Fatty liver    Menopause syndrome    Leg pain    PMR (polymyalgia rheumatica) (HCC)    Esophageal dysphagia    Irritable bowel syndrome with both constipation and diarrhea    Osteoarthritis of both knees    Mass of arm, left    Acute medial meniscus tear    Vulvar cyst    Trochanteric bursitis    Hyperhidrosis    Sebaceous cyst    Gall stone    Adenoma of left adrenal gland    Proteinuria      Past Medical and Surgical History:     Past Medical History:   Diagnosis Date    Ankle fracture     Anxiety     Asthma     Bleeding hemorrhoids 4/13/2017    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    Formatting of this note might be different from the original  Added automatically from request for surgery [id number]  Chronic venous embolism and thrombosis of deep vessels of lower extremity (HCC)     Costochondritis     RESOLVED: [id number]    Depression     Diabetes mellitus ([facility] ) 2021    Endometriosis     Fibromyalgia     GERD (gastroesophageal reflux disease)     Hematuria     LAST ASSESSED: [id number]    Hydradenitis     axillary area and under breast    Hypertension     LAST ASSESSED: [id number]    Irregular heart beat     Pulmonary embolism ([facility] )     RESOLED: [id number] - secondary to a lupron injection for endometriosis    RBBB (right bundle branch block)     Sleep apnea     Cannot tolerate CPAP    Umbilical hernia     LAST ASSESSED:      Past Surgical History:   Procedure Laterality Date    HERNIA REPAIR      umbilical    HUMERUS FRACTURE SURGERY W/ IMPLANT Left     LAPAROSCOPIC TOTAL HYSTERECTOMY      PELVIC LAPAROSCOPY      x4 for endometriosis    VT ESOPHAGOGASTRODUODENOSCOPY TRANSORAL DIAGNOSTIC N/A 2019    Procedure: ESOPHAGOGASTRODUODENOSCOPY (EGD) with bx;  Surgeon: Jermaine Martinez MD;  Location: [facility];   Service: Gastroenterology      Family History:     Family History   Problem Relation Age of Onset   Samantha Mare Hypertension Mother     Diabetes Father     Hypertension Father     Obesity Father     No Known Problems Maternal Grandmother     No Known Problems Maternal Grandfather     Breast cancer Paternal Grandmother 80    No Known Problems Paternal Grandfather     No Known Problems Maternal Aunt       Social History:     Social History     Socioeconomic History    Marital status: /Civil Union     Spouse name: None    Number of children: None    Years of education: None    Highest education level: None   Occupational History    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   Tobacco Use    Smoking status: Current Every Day Smoker     Packs/day: 0 50     Types: Cigarettes     Last attempt to quit: 8/10/2018     Years since quittin 1    Smokeless tobacco: Never Used   Samantha Mare Tobacco comment: smokes 1 cigarette occasionally   Vaping Use    Vaping Use: Never used   Substance and Sexual Activity    Alcohol use: No    Drug use: No    Sexual activity: Yes     Comment: seldom /hysterectomy   Other Topics Concern    None   Social History Narrative    CONSUMES 2 CANS OF SODA PER DAY     Social Determinants of Health     Financial Resource Strain: Unknown    Difficulty of Paying Living Expenses: Patient refused   Food Insecurity: Not on file   Transportation Needs: No Transportation Needs    Lack of Transportation (Medical): No    Lack of Transportation (Non-Medical): No   Physical Activity: Not on file   Stress: Not on file   Social Connections: Not on file   Intimate Partner Violence: Not on file   Housing Stability: Not on file      Medications and Allergies:     Current Outpatient Medications   Medication Sig Dispense Refill    Aspirin 81 MG EC tablet Take by mouth      Cholecalciferol (VITAMIN D3) 1000 units CAPS Take 2,000 Units by mouth      clindamycin (CLEOCIN T) 1 % lotion APPLY ONCE DAILY TO AFFECTED AREAS UNDER ARMS    3    [employer]S Olopatadine HCl 0 2 % opth drops INSTILL 1 DROP INTO BOTH EYES DAILY 7 5 mL 1    Cyanocobalamin (B-12) 1000 MCG CAPS Take by mouth      doxycycline hyclate (VIBRAMYCIN) 100 mg capsule Take 1 capsule (100 mg total) by mouth every 12 (twelve) hours for 10 days 20 capsule 0    escitalopram (LEXAPRO) 20 mg tablet TAKE 1 TABLET BY MOUTH EVERY DAY 90 tablet 1    famotidine (PEPCID) 40 MG tablet TAKE 1 TABLET (40 MG TOTAL) BY MOUTH DAILY FOR 90 DAYS 90 tablet 3    glimepiride (AMARYL) 2 mg tablet Take 0 5 tablets (1 mg total) by mouth daily with breakfast 90 tablet 1    Klor-Con M20 20 MEQ tablet TAKE 1 TABLET BY MOUTH EVERY DAY 90 tablet 1    loperamide (IMODIUM A-D) 2 MG tablet Take 2 mg by mouth 4 (four) times a day as needed for diarrhea      loratadine (CLARITIN) 10 mg tablet Take 10 mg by mouth      losartan (COZAAR) 50 mg tablet Take 1 tablet (50 mg total) by mouth daily 90 tablet 3    montelukast (SINGULAIR) 10 mg tablet TAKE 1 TABLET BY MOUTH EVERY DAY 90 tablet 3    pantoprazole (PROTONIX) 40 mg tablet TAKE 1 TABLET BY MOUTH EVERY DAY 90 tablet 3    sucralfate (CARAFATE) 1 g tablet TAKE 1 TABLET BY MOUTH FOUR TIMES A  tablet 1    traZODone (DESYREL) 50 mg tablet TAKE 1 TABLET BY MOUTH EVERYDAY AT BEDTIME 90 tablet 1    albuterol (PROVENTIL HFA,VENTOLIN HFA) 90 mcg/act inhaler INHALE 2 PUFFS EVERY 6 HOURS AS NEEDED FOR WHEEZE (Patient not taking: No sig reported) 18 g 2    Blood Glucose Monitoring Suppl (OneTouch Verio) w/Device KIT USE DAILY AS DIRECTED 1 kit 0    glucose blood (OneTouch Verio) test strip Testing once daily 100 strip 1    OneTouch Delica Lancets 04O MISC USE DAILY AS DIRECTED 100 each 0     No current facility-administered medications for this visit  Allergies   Allergen Reactions    Albuterol      Other reaction(s): felt weird    Azithromycin GI Intolerance    Duloxetine      Category: Adverse Reaction; Annotation - [id number]: Sweating    Erythromycin Vomiting    Hydrocodone-Acetaminophen      Other reaction(s): sweating, feel faint, diarrhea    Hydrocodone-Acetaminophen     Ketorolac Diarrhea and Nausea Only     Category: Adverse Reaction;  Annotation - [id number]: Symptoms were noted after injection into bursa with Toradol at orthopedics    Penicillin G     Penicillins Hives and Vomiting    Tramadol       Immunizations:     Immunization History   Administered Date(s) Administered    INFLUENZA 12/14/2005    Influenza Quadrivalent 3 years and older 01/25/2018    Influenza Quadrivalent Preservative Free 3 years and older IM 10/21/2015, 12/08/2016    Influenza, recombinant, quadrivalent,injectable, preservative free 09/26/2018, 11/07/2019, 10/28/2020    Influenza, seasonal, injectable 10/12/2004, 11/07/2013    Tuberculin Skin Test-PPD Intradermal 02/22/2010, 03/08/2010      Health Maintenance: Topic Date Due    Cervical Cancer Screening  09/24/2016    Colorectal Cancer Screening  11/08/2018    HIV Screening  09/24/2023 (Originally 4/19/1980)    Hepatitis C Screening  10/23/2023 (Originally 1965)    Breast Cancer Screening: Mammogram  02/28/2023         Topic Date Due    COVID-19 Vaccine (1) Never done    Pneumococcal Vaccine: Pediatrics (0 to 5 Years) and At-Risk Patients (6 to 59 Years) (1 - PCV) Never done    Influenza Vaccine (1) 09/01/2022      Medicare Screening Tests and Risk Assessments:     Keo Hoskins is here for her Subsequent Wellness visit  Health Risk Assessment:   Patient rates overall health as fair  Patient feels that their physical health rating is same  Patient is satisfied with their life  Eyesight was rated as same  Hearing was rated as same  Patient feels that their emotional and mental health rating is slightly worse  Patients states they are never, rarely angry  Patient states they are sometimes unusually tired/fatigued  Patient states that she has experienced no weight loss or gain in last 6 months  Fall Risk Screening: In the past year, patient has experienced: no history of falling in past year      Urinary Incontinence Screening:   Patient has not leaked urine accidently in the last six months  Home Safety:  Patient has trouble with stairs inside or outside of their home  Patient has working smoke alarms and has working carbon monoxide detector  Home safety hazards include: none  Nutrition:   Current diet is Diabetic and Low Carb  Medications:   Patient is currently taking over-the-counter supplements  OTC medications include: see medication list  Patient is able to manage medications  Activities of Daily Living (ADLs)/Instrumental Activities of Daily Living (IADLs):   Walk and transfer into and out of bed and chair?: Yes  Dress and groom yourself?: Yes    Bathe or shower yourself?: Yes    Feed yourself?  Yes  Do your laundry/housekeeping?: Yes  Manage your money, pay your bills and track your expenses?: Yes  Make your own meals?: Yes    Do your own shopping?: Yes    Previous Hospitalizations:   Any hospitalizations or ED visits within the last 12 months?: No      Advance Care Planning:   Living will: No    Durable POA for healthcare: No    Advanced directive: No    Advanced directive counseling given: Yes    Five wishes given: Yes      Cognitive Screening:   Provider or family/friend/caregiver concerned regarding cognition?: No    PREVENTIVE SCREENINGS      Cardiovascular Screening:    General: Screening Not Indicated, History Lipid Disorder and Screening Current      Diabetes Screening:     General: Screening Not Indicated, History Diabetes and Screening Current      Colorectal Cancer Screening:     General: Risks and Benefits Discussed    Due for: Colonoscopy - Low Risk      Breast Cancer Screening:     General: Screening Current      Cervical Cancer Screening:    General: Screening Not Indicated      Osteoporosis Screening:    General: Screening Current      Abdominal Aortic Aneurysm (AAA) Screening:        General: Screening Not Indicated      Lung Cancer Screening:     General: Screening Not Indicated      Hepatitis C Screening:    General: Screening Current      Preventive Screening Comments: Patient did have laparoscopic total hysterectomy previously  Her most recent note from OBGYN shows no cervix  Screening, Brief Intervention, and Referral to Treatment (SBIRT)    Screening  Typical number of drinks in a day: 0  Typical number of drinks in a week: 0  Interpretation: Low risk drinking behavior      Single Item Drug Screening:  How often have you used an illegal drug (including marijuana) or a prescription medication for non-medical reasons in the past year? never    Single Item Drug Screen Score: 0  Interpretation: Negative screen for possible drug use disorder    No exam data present     Physical Exam:     /72 (BP Location: Left arm, Patient Position: Sitting, Cuff Size: Large)   Pulse 90   Temp 98 3 °F (36 8 °C) (Tympanic)   Ht 5' 5" (1 651 m)   Wt 101 kg (222 lb)   SpO2 95%   BMI 36 94 kg/m²     Physical Exam  Vitals and nursing note reviewed  Constitutional:       Appearance: Normal appearance  HENT:      Head: Normocephalic  Right Ear: Tympanic membrane is erythematous and bulging  Left Ear: Tympanic membrane is erythematous and bulging  Cardiovascular:      Rate and Rhythm: Normal rate and regular rhythm  Pulses: Normal pulses  Carotid pulses are 2+ on the right side and 2+ on the left side  Heart sounds: Normal heart sounds  No murmur heard  No friction rub  No gallop  Pulmonary:      Effort: Pulmonary effort is normal  No respiratory distress  Breath sounds: Normal breath sounds  No stridor  No wheezing, rhonchi or rales  Chest:      Chest wall: No tenderness  Neurological:      Mental Status: She is alert            Charles Peralta MD

## 2022-10-21 ENCOUNTER — OFFICE VISIT (OUTPATIENT)
Dept: FAMILY MEDICINE CLINIC | Facility: CLINIC | Age: 57
End: 2022-10-21
Payer: MEDICARE

## 2022-10-21 ENCOUNTER — TELEPHONE (OUTPATIENT)
Dept: FAMILY MEDICINE CLINIC | Facility: CLINIC | Age: 57
End: 2022-10-21

## 2022-10-21 VITALS
SYSTOLIC BLOOD PRESSURE: 122 MMHG | BODY MASS INDEX: 37.01 KG/M2 | TEMPERATURE: 98.8 F | HEIGHT: 65 IN | HEART RATE: 94 BPM | OXYGEN SATURATION: 95 % | WEIGHT: 222.13 LBS | DIASTOLIC BLOOD PRESSURE: 82 MMHG

## 2022-10-21 DIAGNOSIS — Z12.11 SCREEN FOR COLON CANCER: ICD-10-CM

## 2022-10-21 DIAGNOSIS — J30.9 ALLERGIC RHINITIS, UNSPECIFIED SEASONALITY, UNSPECIFIED TRIGGER: ICD-10-CM

## 2022-10-21 DIAGNOSIS — R06.09 DOE (DYSPNEA ON EXERTION): ICD-10-CM

## 2022-10-21 DIAGNOSIS — Z20.822 SUSPECTED COVID-19 VIRUS INFECTION: ICD-10-CM

## 2022-10-21 DIAGNOSIS — J32.4 CHRONIC PANSINUSITIS: Primary | ICD-10-CM

## 2022-10-21 LAB
SARS-COV-2 AG UPPER RESP QL IA: NEGATIVE
VALID CONTROL: NORMAL

## 2022-10-21 PROCEDURE — 87811 SARS-COV-2 COVID19 W/OPTIC: CPT | Performed by: NURSE PRACTITIONER

## 2022-10-21 PROCEDURE — 99214 OFFICE O/P EST MOD 30 MIN: CPT | Performed by: NURSE PRACTITIONER

## 2022-10-21 RX ORDER — AZELASTINE 1 MG/ML
1 SPRAY, METERED NASAL 2 TIMES DAILY
Qty: 30 ML | Refills: 2 | Status: SHIPPED | OUTPATIENT
Start: 2022-10-21

## 2022-10-21 RX ORDER — FLUTICASONE PROPIONATE 50 MCG
1 SPRAY, SUSPENSION (ML) NASAL DAILY
Qty: 18.2 ML | Refills: 3 | Status: SHIPPED | OUTPATIENT
Start: 2022-10-21

## 2022-10-21 NOTE — TELEPHONE ENCOUNTER
Patient was seen on 9/21 and was given Bactrim and then on 10/6 she was put on Doxycycline she just finished it this past Sunday  Her cough and congestion never stopped and the fever  Broke for 2 days and not back  Does she need another appointment or should something else be called in  Please call her at [phone] [facility] on [address] is her pharmacy

## 2022-10-21 NOTE — ASSESSMENT & PLAN NOTE
Due to continued sinusitis symptoms a repeat CT of the sinuses was ordered to be completed  Patient was prescribed Astelin and Flonase to be used as directed along with Singulair and Claritin  ENT referral was also placed for further management  I would like to hold off on antibiotics at this point as she has already completed 2 different courses

## 2022-10-21 NOTE — PROGRESS NOTES
Name: Adal Fuentes      : 1965      MRN: [id number]  Encounter Provider: Jacklin Gosselin, CRNP  Encounter Date: 10/21/2022   Encounter department: [place] [address]  Chronic pansinusitis  Assessment & Plan:  Due to continued sinusitis symptoms a repeat CT of the sinuses was ordered to be completed  Patient was prescribed Astelin and Flonase to be used as directed along with Singulair and Claritin  ENT referral was also placed for further management  I would like to hold off on antibiotics at this point as she has already completed 2 different courses  Orders:  -     Ambulatory Referral to Otolaryngology; Future  -     CT sinus wo contrast; Future; Expected date: 10/21/2022  -     azelastine (ASTELIN) 0 1 % nasal spray; 1 spray into each nostril 2 (two) times a day Use in each nostril as directed  -     fluticasone (FLONASE) 50 mcg/act nasal spray; 1 spray into each nostril daily    2  Suspected COVID-19 virus infection  -     POCT Rapid Covid Ag    3  Screen for colon cancer  -     Ambulatory referral for colonoscopy; Future    4  Allergic rhinitis, unspecified seasonality, unspecified trigger  Assessment & Plan:  Patient was advised to continue Singulair and Claritin as directed  Patient was also started on Flonase daily and Astelin b i d  to help with symptoms  Orders:  -     azelastine (ASTELIN) 0 1 % nasal spray; 1 spray into each nostril 2 (two) times a day Use in each nostril as directed  -     fluticasone (FLONASE) 50 mcg/act nasal spray; 1 spray into each nostril daily    5  GARCIA (dyspnea on exertion)  Assessment & Plan:  Due to continued dyspnea on exertion a chest x-ray was ordered to assess for any acute pulmonary abnormalities  Patient was advised to continue to use her albuterol inhaler as needed      Orders:  -     XR chest pa & lateral; Future; Expected date: 10/21/2022           Subjective      URI symptoms:  Patient was originally seen in the office on 09/21/2022 and diagnosed with pansinusitis and bronchitis and patient was treated with a 10 day course of Bactrim at that time  Patient was then seen in the office on 10/06/2022 for continued bronchitis symptoms as well as bilateral otitis media so the patient was prescribed a 10 day course of doxycycline at that time  The patient is currently taking Claritin daily and Singulair HS  Patient has not been tested for COVID since her symptoms started  The patient is currently reporting symptoms of low grade fever with a temp max of 100 7, productive cough, GARCIA, sinus pressure and congestion, nasal congestion, rhinorrhea, PND, and sore throat  Patient's oxygen saturation was noted to be 95% in the office today  The patient reports that she has been using her albuterol inhaler as needed and this has resolved her shortness of breath  Of note, the patient did have a CT of her sinuses completed in 2020 which was normal at that time  Rapid COVID test performed in the office today was negative  Review of Systems   Constitutional: Positive for fever (intermittent-low grade)  Negative for chills  HENT: Positive for congestion, postnasal drip, rhinorrhea, sinus pressure, sinus pain and sore throat  Negative for ear pain  Eyes: Negative for pain and visual disturbance  Respiratory: Positive for cough (productive ), chest tightness and shortness of breath (GARCIA)  Negative for wheezing  Cardiovascular: Negative for chest pain and palpitations  Gastrointestinal: Negative for abdominal pain, constipation, diarrhea, nausea and vomiting  Endocrine: Negative for cold intolerance and heat intolerance  Genitourinary: Negative for decreased urine volume, dysuria and hematuria  Musculoskeletal: Negative for arthralgias, back pain and myalgias  Skin: Negative for color change and rash  Allergic/Immunologic: Positive for environmental allergies     Neurological: Negative for dizziness, seizures, syncope, weakness, light-headedness, numbness and headaches  Hematological: Negative for adenopathy  Psychiatric/Behavioral: Negative for confusion  The patient is not nervous/anxious  All other systems reviewed and are negative  Current Outpatient Medications on File Prior to Visit   Medication Sig   • albuterol (PROVENTIL HFA,VENTOLIN HFA) 90 mcg/act inhaler INHALE 2 PUFFS EVERY 6 HOURS AS NEEDED FOR WHEEZE   • Aspirin 81 MG EC tablet Take by mouth   • Blood Glucose Monitoring Suppl (OneTouch Verio) w/Device KIT USE DAILY AS DIRECTED   • Cholecalciferol (VITAMIN D3) 1000 units CAPS Take 2,000 Units by mouth   • clindamycin (CLEOCIN T) 1 % lotion APPLY ONCE DAILY TO AFFECTED AREAS UNDER ARMS     • CVS Olopatadine HCl 0 2 % opth drops INSTILL 1 DROP INTO BOTH EYES DAILY   • Cyanocobalamin (B-12) 1000 MCG CAPS Take by mouth   • escitalopram (LEXAPRO) 20 mg tablet TAKE 1 TABLET BY MOUTH EVERY DAY   • famotidine (PEPCID) 40 MG tablet TAKE 1 TABLET (40 MG TOTAL) BY MOUTH DAILY FOR 90 DAYS   • glimepiride (AMARYL) 2 mg tablet Take 0 5 tablets (1 mg total) by mouth daily with breakfast   • glucose blood (OneTouch Verio) test strip Testing once daily   • Klor-Con M20 20 MEQ tablet TAKE 1 TABLET BY MOUTH EVERY DAY   • loperamide (IMODIUM A-D) 2 MG tablet Take 2 mg by mouth 4 (four) times a day as needed for diarrhea   • loratadine (CLARITIN) 10 mg tablet Take 10 mg by mouth   • losartan (COZAAR) 50 mg tablet Take 1 tablet (50 mg total) by mouth daily   • montelukast (SINGULAIR) 10 mg tablet TAKE 1 TABLET BY MOUTH EVERY DAY   • OneTouch Delica Lancets 41T MISC USE DAILY AS DIRECTED   • pantoprazole (PROTONIX) 40 mg tablet TAKE 1 TABLET BY MOUTH EVERY DAY   • sucralfate (CARAFATE) 1 g tablet TAKE 1 TABLET BY MOUTH FOUR TIMES A DAY   • traZODone (DESYREL) 50 mg tablet TAKE 1 TABLET BY MOUTH EVERYDAY AT BEDTIME       Objective     /82 (BP Location: Right arm, Patient Position: Sitting, Cuff Size: Large)   Pulse 94 Temp 98 8 °F (37 1 °C) (Temporal)   Ht 5' 5" (1 651 m)   Wt 101 kg (222 lb 2 oz)   SpO2 95%   BMI 36 96 kg/m²     Physical Exam  Vitals and nursing note reviewed  Constitutional:       General: She is not in acute distress  Appearance: Normal appearance  She is not ill-appearing  HENT:      Head: Normocephalic  Right Ear: Hearing and tympanic membrane normal       Left Ear: Hearing and tympanic membrane normal       Ears:      Comments: Bilateral TMs were noted to be normal      Mouth/Throat:      Pharynx: Posterior oropharyngeal erythema present  No pharyngeal swelling, oropharyngeal exudate or uvula swelling  Tonsils: No tonsillar exudate or tonsillar abscesses  Eyes:      Conjunctiva/sclera: Conjunctivae normal    Cardiovascular:      Rate and Rhythm: Normal rate and regular rhythm  Pulses: Normal pulses  Carotid pulses are 2+ on the right side and 2+ on the left side  Radial pulses are 2+ on the right side and 2+ on the left side  Posterior tibial pulses are 2+ on the right side and 2+ on the left side  Heart sounds: Normal heart sounds  No murmur heard  Pulmonary:      Effort: Pulmonary effort is normal  No respiratory distress  Breath sounds: Normal breath sounds  No decreased breath sounds, wheezing, rhonchi or rales  Abdominal:      General: Abdomen is flat  Bowel sounds are normal  There is no distension  Palpations: Abdomen is soft  Tenderness: There is no abdominal tenderness  There is no guarding  Musculoskeletal:         General: Normal range of motion  Cervical back: Normal range of motion  Right lower leg: No edema  Left lower leg: No edema  Skin:     General: Skin is warm and dry  Capillary Refill: Capillary refill takes less than 2 seconds  Neurological:      General: No focal deficit present  Mental Status: She is alert and oriented to person, place, and time     Psychiatric:         Mood and Affect: Mood normal          Behavior: Behavior normal          Thought Content:  Thought content normal          Judgment: Judgment normal        ALEIDA Montes

## 2022-10-21 NOTE — ASSESSMENT & PLAN NOTE
Patient was advised to continue Singulair and Claritin as directed  Patient was also started on Flonase daily and Astelin b i d  to help with symptoms

## 2022-10-21 NOTE — ASSESSMENT & PLAN NOTE
Due to continued dyspnea on exertion a chest x-ray was ordered to assess for any acute pulmonary abnormalities  Patient was advised to continue to use her albuterol inhaler as needed

## 2022-11-01 ENCOUNTER — HOSPITAL ENCOUNTER (OUTPATIENT)
Dept: RADIOLOGY | Facility: HOSPITAL | Age: 57
Discharge: HOME | End: 2022-11-01

## 2022-11-01 ENCOUNTER — HOSPITAL ENCOUNTER (OUTPATIENT)
Dept: CT IMAGING | Facility: HOSPITAL | Age: 57
Discharge: HOME | End: 2022-11-01

## 2022-11-01 DIAGNOSIS — R06.09 DOE (DYSPNEA ON EXERTION): ICD-10-CM

## 2022-11-01 DIAGNOSIS — J32.4 CHRONIC PANSINUSITIS: ICD-10-CM

## 2022-11-21 ENCOUNTER — OFFICE VISIT (OUTPATIENT)
Dept: OBGYN CLINIC | Facility: MEDICAL CENTER | Age: 57
End: 2022-11-21

## 2022-11-21 VITALS
BODY MASS INDEX: 36.82 KG/M2 | HEIGHT: 65 IN | WEIGHT: 221 LBS | DIASTOLIC BLOOD PRESSURE: 86 MMHG | HEART RATE: 68 BPM | SYSTOLIC BLOOD PRESSURE: 141 MMHG

## 2022-11-21 DIAGNOSIS — M17.0 PRIMARY OSTEOARTHRITIS OF BOTH KNEES: Primary | ICD-10-CM

## 2022-11-21 RX ORDER — METHYLPREDNISOLONE ACETATE 40 MG/ML
1 INJECTION, SUSPENSION INTRA-ARTICULAR; INTRALESIONAL; INTRAMUSCULAR; SOFT TISSUE
Status: COMPLETED | OUTPATIENT
Start: 2022-11-21 | End: 2022-11-21

## 2022-11-21 RX ORDER — BUPIVACAINE HYDROCHLORIDE 2.5 MG/ML
4 INJECTION, SOLUTION INFILTRATION; PERINEURAL
Status: COMPLETED | OUTPATIENT
Start: 2022-11-21 | End: 2022-11-21

## 2022-11-21 RX ADMIN — METHYLPREDNISOLONE ACETATE 1 ML: 40 INJECTION, SUSPENSION INTRA-ARTICULAR; INTRALESIONAL; INTRAMUSCULAR; SOFT TISSUE at 12:44

## 2022-11-21 RX ADMIN — BUPIVACAINE HYDROCHLORIDE 4 ML: 2.5 INJECTION, SOLUTION INFILTRATION; PERINEURAL at 12:44

## 2022-11-21 NOTE — PROGRESS NOTES
Orthopaedic Surgery - Office Note  Yzamin Romero (38 y o  female)   : 1965   MRN: [id number]  Encounter Date: 2022    Chief Complaint   Patient presents with   • Left Knee - Follow-up   • Right Knee - Follow-up       Assessment / Plan  Bilateral knee mild osteoarthritis     · CSI of bilateral knee joint was performed   · We did discuss arthritis treatment going forward for both knees  This includes conservative treatment such as anti-inflammatories and modalities such as ice or heat  This also included steroid injections every 3 months versus viscosupplementation  She will contact us in the future she would like to proceed with viscosupplementation  · Continue with ice and NSAIDs/analgesics as needed  · Continue with activity as tolerated  Return in about 3 months (around 2023) for follow up with Fabiola Calabrese  History of Present Illness  Yazmin Romero is a 56 y o  female who presents today for bilateral knee pain  Patient received bilateral steroid injections on 2022 with Dr Ariana Owens  Patient saw great success and received 8 months of relief from past injections but slightly less than previously  Both knees have been bothering her for the last 2 months  Left knee is worse than the right  Pain on left knee is mostly posterior but both knees bother her on the medial joint line  Patient describes her pain as achy  Patient states she has trouble going up and down stairs, getting in and out of the car, as well as long distance walking  Patient uses ice and OTC medications for pain  Patient has been working on weight loss  Patient would like repeat CSI today  Review of Systems  Pertinent items are noted in HPI  All other systems were reviewed and are negative  Physical Exam  /86   Pulse 68   Ht 5' 5" (1 651 m)   Wt 100 kg (221 lb)   BMI 36 78 kg/m²   Cons: Appears well  No apparent distress  Psych: Alert  Oriented x3    Mood and affect normal   Eyes: PERRLA, EOMI  Resp: Normal effort  No audible wheezing or stridor  CV: Palpable pulse  No discernable arrhythmia  No LE edema  Lymph:  No palpable cervical, axillary, or inguinal lymphadenopathy  Skin: Warm  No palpable masses  No visible lesions  Neuro: Normal muscle tone  Normal and symmetric DTR's  Bilateral Knee Exam  Alignment:  Normal knee alignment  Inspection:  No swelling  Palpation:  lateral joint line tenderness  ROM:  Normal knee ROM  Strength:  5/5 quadriceps and hamstrings  Stability:  No objective knee instability  Stable Varus / Valgus stress, Lachman, and Posterior drawer  Tests:  No pertinent positive or negative tests  Patella:  Not tested  Neurovascular:  Sensation intact in DP/SP/Villavicencio/Sa/T nerve distributions  2+ DP & PT pulses  Gait:  Normal     Studies Reviewed  No studies to review    Large joint arthrocentesis: bilateral knee  Universal Protocol:  Consent: Verbal consent obtained  Consent given by: patient    Supporting Documentation  Indications: pain   Procedure Details  Location: knee - bilateral knee  Needle size: 22 G  Approach: anterolateral    Medications (Right): 4 mL bupivacaine 0 25 %; 1 mL methylPREDNISolone acetate 40 mg/mLMedications (Left): 4 mL bupivacaine 0 25 %; 1 mL methylPREDNISolone acetate 40 mg/mL   Patient tolerance: patient tolerated the procedure well with no immediate complications  Dressing:  Sterile dressing applied             Medical, Surgical, Family, and Social History  The patient's medical history, family history, and social history, were reviewed and updated as appropriate      Past Medical History:   Diagnosis Date   • Ankle fracture    • Anxiety    • Asthma    • Bleeding hemorrhoids 4/13/2017   • 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    Formatting of this note might be different from the original  Added automatically from request for surgery [id number]   • Chronic venous embolism and thrombosis of deep vessels of lower extremity ([facility] [address] )    • Costochondritis     RESOLVED: [id number]   • Depression    • Diabetes mellitus ([facility] ) 2021   • Endometriosis    • Fibromyalgia    • GERD (gastroesophageal reflux disease)    • Hematuria     LAST ASSESSED:    • Hydradenitis     axillary area and under breast   • Hypertension     LAST ASSESSED: 2017   • Irregular heart beat    • Pulmonary embolism ([facility] )     RESOLED:  - secondary to a lupron injection for endometriosis   • RBBB (right bundle branch block)    • Sleep apnea     Cannot tolerate CPAP   • Umbilical hernia     LAST ASSESSED:        Past Surgical History:   Procedure Laterality Date   • HERNIA REPAIR      umbilical   • HUMERUS FRACTURE SURGERY W/ IMPLANT Left    • LAPAROSCOPIC TOTAL HYSTERECTOMY     • PELVIC LAPAROSCOPY      x4 for endometriosis   • KS ESOPHAGOGASTRODUODENOSCOPY TRANSORAL DIAGNOSTIC N/A 2019    Procedure: ESOPHAGOGASTRODUODENOSCOPY (EGD) with bx;  Surgeon: Kwasi Joshi MD;  Location: [facility];   Service: Gastroenterology       Family History   Problem Relation Age of Onset   • Hypertension Mother    • Diabetes Father    • Hypertension Father    • Obesity Father    • No Known Problems Maternal Grandmother    • No Known Problems Maternal Grandfather    • Breast cancer Paternal Grandmother 80   • No Known Problems Paternal Grandfather    • No Known Problems Maternal Aunt        Social History     Occupational History   • 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   Tobacco Use   • Smoking status: Every Day     Packs/day: 0 50     Types: Cigarettes     Last attempt to quit: 8/10/2018     Years since quittin 2   • Smokeless tobacco: Never   • Tobacco comments:     smokes 1 cigarette occasionally   Vaping Use   • Vaping Use: Never used   Substance and Sexual Activity   • Alcohol use: No   • Drug use: No   • Sexual activity: Yes     Comment: seldom /hysterectomy       Allergies   Allergen Reactions   • Albuterol      Other reaction(s): felt weird   • Azithromycin GI Intolerance   • Duloxetine Category: Adverse Reaction; Annotation - [id number]: Sweating   • Erythromycin Vomiting   • Hydrocodone-Acetaminophen      Other reaction(s): sweating, feel faint, diarrhea   • Hydrocodone-Acetaminophen    • Ketorolac Diarrhea and Nausea Only     Category: Adverse Reaction;  Annotation - [id number]: Symptoms were noted after injection into bursa with Toradol at orthopedics   • Penicillin G    • Penicillins Hives and Vomiting   • Tramadol          Current Outpatient Medications:   •  albuterol (PROVENTIL HFA,VENTOLIN HFA) 90 mcg/act inhaler, INHALE 2 PUFFS EVERY 6 HOURS AS NEEDED FOR WHEEZE, Disp: 18 g, Rfl: 2  •  Aspirin 81 MG EC tablet, Take by mouth, Disp: , Rfl:   •  azelastine (ASTELIN) 0 1 % nasal spray, 1 spray into each nostril 2 (two) times a day Use in each nostril as directed, Disp: 30 mL, Rfl: 2  •  Blood Glucose Monitoring Suppl (OneTouch Verio) w/Device KIT, USE DAILY AS DIRECTED, Disp: 1 kit, Rfl: 0  •  Cholecalciferol (VITAMIN D3) 1000 units CAPS, Take 2,000 Units by mouth, Disp: , Rfl:   •  clindamycin (CLEOCIN T) 1 % lotion, APPLY ONCE DAILY TO AFFECTED AREAS UNDER ARMS , Disp: , Rfl: 3  •  CVS Olopatadine HCl 0 2 % opth drops, INSTILL 1 DROP INTO BOTH EYES DAILY, Disp: 7 5 mL, Rfl: 1  •  Cyanocobalamin (B-12) 1000 MCG CAPS, Take by mouth, Disp: , Rfl:   •  escitalopram (LEXAPRO) 20 mg tablet, TAKE 1 TABLET BY MOUTH EVERY DAY, Disp: 90 tablet, Rfl: 1  •  famotidine (PEPCID) 40 MG tablet, TAKE 1 TABLET (40 MG TOTAL) BY MOUTH DAILY FOR 90 DAYS, Disp: 90 tablet, Rfl: 3  •  fluticasone (FLONASE) 50 mcg/act nasal spray, 1 spray into each nostril daily, Disp: 18 2 mL, Rfl: 3  •  glimepiride (AMARYL) 2 mg tablet, TAKE 1 TABLET BY MOUTH EVERY DAY WITH BREAKFAST (Patient taking differently: 1 mg), Disp: 90 tablet, Rfl: 1  •  glucose blood (OneTouch Verio) test strip, Testing once daily, Disp: 100 strip, Rfl: 1  •  Klor-Con M20 20 MEQ tablet, TAKE 1 TABLET BY MOUTH EVERY DAY, Disp: 90 tablet, Rfl: 1  • loperamide (IMODIUM A-D) 2 MG tablet, Take 2 mg by mouth 4 (four) times a day as needed for diarrhea, Disp: , Rfl:   •  loratadine (CLARITIN) 10 mg tablet, Take 10 mg by mouth, Disp: , Rfl:   •  losartan (COZAAR) 50 mg tablet, Take 1 tablet (50 mg total) by mouth daily, Disp: 90 tablet, Rfl: 3  •  montelukast (SINGULAIR) 10 mg tablet, TAKE 1 TABLET BY MOUTH EVERY DAY, Disp: 90 tablet, Rfl: 3  •  OneTouch Delica Lancets 64A MISC, USE DAILY AS DIRECTED, Disp: 100 each, Rfl: 0  •  pantoprazole (PROTONIX) 40 mg tablet, TAKE 1 TABLET BY MOUTH EVERY DAY, Disp: 90 tablet, Rfl: 3  •  sucralfate (CARAFATE) 1 g tablet, TAKE 1 TABLET BY MOUTH FOUR TIMES A DAY, Disp: 360 tablet, Rfl: 1  •  traZODone (DESYREL) 50 mg tablet, TAKE 1 TABLET BY MOUTH EVERYDAY AT BEDTIME, Disp: 90 tablet, Rfl: 1      Rosalie Guallpa PA-C    Scribe Attestation    I,:   am acting as a scribe while in the presence of the attending physician :       I,:   personally performed the services described in this documentation    as scribed in my presence :

## 2022-12-12 DIAGNOSIS — J30.9 ALLERGIC RHINITIS, UNSPECIFIED SEASONALITY, UNSPECIFIED TRIGGER: ICD-10-CM

## 2022-12-12 DIAGNOSIS — J32.4 CHRONIC PANSINUSITIS: ICD-10-CM

## 2022-12-12 RX ORDER — FLUTICASONE PROPIONATE 50 MCG
SPRAY, SUSPENSION (ML) NASAL
Qty: 24 ML | Refills: 3 | Status: SHIPPED | OUTPATIENT
Start: 2022-12-12

## 2022-12-26 DIAGNOSIS — K21.00 GASTROESOPHAGEAL REFLUX DISEASE WITH ESOPHAGITIS, UNSPECIFIED WHETHER HEMORRHAGE: ICD-10-CM

## 2022-12-26 DIAGNOSIS — E87.6 HYPOKALEMIA: ICD-10-CM

## 2022-12-26 DIAGNOSIS — F33.1 MODERATE EPISODE OF RECURRENT MAJOR DEPRESSIVE DISORDER (HCC): ICD-10-CM

## 2022-12-28 RX ORDER — SUCRALFATE 1 G/1
TABLET ORAL
Qty: 360 TABLET | Refills: 1 | Status: SHIPPED | OUTPATIENT
Start: 2022-12-28

## 2022-12-28 RX ORDER — TRAZODONE HYDROCHLORIDE 50 MG/1
TABLET ORAL
Qty: 90 TABLET | Refills: 1 | Status: SHIPPED | OUTPATIENT
Start: 2022-12-28

## 2022-12-28 RX ORDER — POTASSIUM CHLORIDE 1500 MG/1
TABLET, EXTENDED RELEASE ORAL
Qty: 90 TABLET | Refills: 1 | Status: SHIPPED | OUTPATIENT
Start: 2022-12-28

## 2023-01-24 DIAGNOSIS — K21.00 GASTROESOPHAGEAL REFLUX DISEASE WITH ESOPHAGITIS, UNSPECIFIED WHETHER HEMORRHAGE: ICD-10-CM

## 2023-01-24 DIAGNOSIS — F33.1 MODERATE EPISODE OF RECURRENT MAJOR DEPRESSIVE DISORDER (HCC): ICD-10-CM

## 2023-01-24 DIAGNOSIS — R13.19 ESOPHAGEAL DYSPHAGIA: ICD-10-CM

## 2023-01-24 RX ORDER — FAMOTIDINE 40 MG/1
TABLET, FILM COATED ORAL
Qty: 90 TABLET | Refills: 3 | Status: SHIPPED | OUTPATIENT
Start: 2023-01-24

## 2023-01-25 RX ORDER — ESCITALOPRAM OXALATE 20 MG/1
TABLET ORAL
Qty: 90 TABLET | Refills: 1 | Status: SHIPPED | OUTPATIENT
Start: 2023-01-25

## 2023-02-11 DIAGNOSIS — J32.4 CHRONIC PANSINUSITIS: ICD-10-CM

## 2023-02-11 DIAGNOSIS — J30.9 ALLERGIC RHINITIS, UNSPECIFIED SEASONALITY, UNSPECIFIED TRIGGER: ICD-10-CM

## 2023-02-13 RX ORDER — FLUTICASONE PROPIONATE 50 MCG
SPRAY, SUSPENSION (ML) NASAL
Qty: 24 ML | Refills: 1 | Status: SHIPPED | OUTPATIENT
Start: 2023-02-13

## 2023-02-17 ENCOUNTER — OFFICE VISIT (OUTPATIENT)
Dept: OBGYN CLINIC | Facility: MEDICAL CENTER | Age: 58
End: 2023-02-17

## 2023-02-17 VITALS
WEIGHT: 221 LBS | SYSTOLIC BLOOD PRESSURE: 141 MMHG | BODY MASS INDEX: 36.82 KG/M2 | HEART RATE: 64 BPM | DIASTOLIC BLOOD PRESSURE: 90 MMHG | HEIGHT: 65 IN

## 2023-02-17 DIAGNOSIS — M17.0 PRIMARY OSTEOARTHRITIS OF BOTH KNEES: Primary | ICD-10-CM

## 2023-02-17 RX ORDER — METHYLPREDNISOLONE ACETATE 40 MG/ML
1 INJECTION, SUSPENSION INTRA-ARTICULAR; INTRALESIONAL; INTRAMUSCULAR; SOFT TISSUE
Status: COMPLETED | OUTPATIENT
Start: 2023-02-17 | End: 2023-02-17

## 2023-02-17 RX ORDER — BUPIVACAINE HYDROCHLORIDE 2.5 MG/ML
4 INJECTION, SOLUTION INFILTRATION; PERINEURAL
Status: COMPLETED | OUTPATIENT
Start: 2023-02-17 | End: 2023-02-17

## 2023-02-17 RX ADMIN — BUPIVACAINE HYDROCHLORIDE 4 ML: 2.5 INJECTION, SOLUTION INFILTRATION; PERINEURAL at 12:43

## 2023-02-17 RX ADMIN — METHYLPREDNISOLONE ACETATE 1 ML: 40 INJECTION, SUSPENSION INTRA-ARTICULAR; INTRALESIONAL; INTRAMUSCULAR; SOFT TISSUE at 12:43

## 2023-02-17 NOTE — PROGRESS NOTES
Orthopaedic Surgery - Office Note  Rimma Brizuela (28 y o  female)   : 1965   MRN: [id number]  Encounter Date: 2023    Chief Complaint   Patient presents with   • Right Knee - Follow-up   • Left Knee - Follow-up       Assessment / Plan  Bilateral knee mild osteoarthritis     · CSI of right knee joint was performed as was a symptomatic 1 at this time  We did discuss that we could perform an injection of her left knee when it became symptomatic she should just let us know when that is and we will schedule her  · We did discuss arthritis treatment going forward for both knees  This includes conservative treatment such as anti-inflammatories and modalities such as ice or heat  This also included steroid injections every 3 months versus viscosupplementation  She will contact us in the future she would like to proceed with viscosupplementation  · Continue with ice and NSAIDs/analgesics as needed  · Continue with activity as tolerated  Return if symptoms worsen or fail to improve  History of Present Illness  Rimma Brizuela is a 56 y o  female who presents today for bilateral knee pain secondary to arthritis  At this time her right knee is symptomatic but the left is not bothering her much  Patient received bilateral steroid injections on 2022  patient has been doing well with steroid injections with pretty lasting relief with each of the shots  Her right knee was doing very well up to about 2 weeks ago when she did not have any specific incident but she did start having increasing discomfort on the medial joint line  She would like to repeat a steroid injection at this time in the right knee  Patient describes her pain as achy  Patient states she has trouble going up and down stairs, getting in and out of the car, as well as long distance walking  Patient uses ice and OTC medications for pain  Patient has been working on weight loss    Patient is still considering viscosupplementation as an option going forward but is still holding off at this time  Review of Systems  Pertinent items are noted in HPI  All other systems were reviewed and are negative  Physical Exam  /90   Pulse 64   Ht 5' 5" (1 651 m)   Wt 100 kg (221 lb)   BMI 36 78 kg/m²   Cons: Appears well  No apparent distress  Psych: Alert  Oriented x3  Mood and affect normal   Eyes: PERRLA, EOMI  Resp: Normal effort  No audible wheezing or stridor  CV: Palpable pulse  No discernable arrhythmia  No LE edema  Lymph:  No palpable cervical, axillary, or inguinal lymphadenopathy  Skin: Warm  No palpable masses  No visible lesions  Neuro: Normal muscle tone  Normal and symmetric DTR's  Bilateral Knee Exam  Alignment:  Normal knee alignment  Inspection:  No swelling  Palpation:  lateral joint line tenderness  ROM:  Normal knee ROM  Strength:  5/5 quadriceps and hamstrings  Stability:  No objective knee instability  Stable Varus / Valgus stress, Lachman, and Posterior drawer  Tests:  No pertinent positive or negative tests  Patella:  Not tested  Neurovascular:  Sensation intact in DP/SP/Villavicencio/Sa/T nerve distributions  2+ DP & PT pulses  Gait:  Normal     Studies Reviewed  No studies to review    Large joint arthrocentesis: R knee  Universal Protocol:  Consent: Verbal consent obtained  Consent given by: patient  Patient identity confirmed: verbally with patient    Supporting Documentation  Indications: pain   Procedure Details  Location: knee - R knee  Needle size: 22 G  Approach: anterolateral  Medications administered: 4 mL bupivacaine 0 25 %; 1 mL methylPREDNISolone acetate 40 mg/mL    Patient tolerance: patient tolerated the procedure well with no immediate complications  Dressing:  Sterile dressing applied             Medical, Surgical, Family, and Social History  The patient's medical history, family history, and social history, were reviewed and updated as appropriate      Past Medical History:   Diagnosis Date   • Ankle fracture    • Anxiety    • Asthma    • Bleeding hemorrhoids 2017   • Chronic anal fissure 2018    Formatting of this note might be different from the original  Added automatically from request for surgery [id number]   • Chronic venous embolism and thrombosis of deep vessels of lower extremity ([facility] )    • Costochondritis     RESOLVED: [id number]   • Depression    • Diabetes mellitus ([facility] ) 2021   • Endometriosis    • Fibromyalgia    • GERD (gastroesophageal reflux disease)    • Hematuria     LAST ASSESSED: [id number]   • Hydradenitis     axillary area and under breast   • Hypertension     LAST ASSESSED: [id number]   • Irregular heart beat    • Pulmonary embolism ([facility] )     RESOLED: [id number] - secondary to a lupron injection for endometriosis   • RBBB (right bundle branch block)    • Sleep apnea     Cannot tolerate CPAP   • Umbilical hernia     LAST ASSESSED: [id number]       Past Surgical History:   Procedure Laterality Date   • HERNIA REPAIR      umbilical   • HUMERUS FRACTURE SURGERY W/ IMPLANT Left    • LAPAROSCOPIC TOTAL HYSTERECTOMY     • PELVIC LAPAROSCOPY      x4 for endometriosis   • UT ESOPHAGOGASTRODUODENOSCOPY TRANSORAL DIAGNOSTIC N/A 2019    Procedure: ESOPHAGOGASTRODUODENOSCOPY (EGD) with bx;  Surgeon: Erica Carter MD;  Location: [facility];   Service: Gastroenterology       Family History   Problem Relation Age of Onset   • Hypertension Mother    • Diabetes Father    • Hypertension Father    • Obesity Father    • No Known Problems Maternal Grandmother    • No Known Problems Maternal Grandfather    • Breast cancer Paternal Grandmother 80   • No Known Problems Paternal Grandfather    • No Known Problems Maternal Aunt        Social History     Occupational History   • 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   Tobacco Use   • Smoking status: Every Day     Packs/day: 0 50     Types: Cigarettes     Last attempt to quit: 8/10/2018     Years since quittin 5   • Smokeless tobacco: Never   • Tobacco comments:     smokes 1 cigarette occasionally   Vaping Use   • Vaping Use: Never used   Substance and Sexual Activity   • Alcohol use: No   • Drug use: No   • Sexual activity: Yes     Comment: seldom /hysterectomy       Allergies   Allergen Reactions   • Albuterol      Other reaction(s): felt weird   • Azithromycin GI Intolerance   • Duloxetine      Category: Adverse Reaction; Annotation - [id number]: Sweating   • Erythromycin Vomiting   • Hydrocodone-Acetaminophen      Other reaction(s): sweating, feel faint, diarrhea   • Hydrocodone-Acetaminophen    • Ketorolac Diarrhea and Nausea Only     Category: Adverse Reaction;  Annotation - [id number]: Symptoms were noted after injection into bursa with Toradol at orthopedics   • Penicillin G    • Penicillins Hives and Vomiting   • Tramadol          Current Outpatient Medications:   •  albuterol (PROVENTIL HFA,VENTOLIN HFA) 90 mcg/act inhaler, INHALE 2 PUFFS EVERY 6 HOURS AS NEEDED FOR WHEEZE, Disp: 18 g, Rfl: 2  •  Aspirin 81 MG EC tablet, Take by mouth, Disp: , Rfl:   •  azelastine (ASTELIN) 0 1 % nasal spray, 1 spray into each nostril 2 (two) times a day Use in each nostril as directed, Disp: 30 mL, Rfl: 2  •  Blood Glucose Monitoring Suppl (OneTouch Verio) w/Device KIT, USE DAILY AS DIRECTED, Disp: 1 kit, Rfl: 0  •  Cholecalciferol (VITAMIN D3) 1000 units CAPS, Take 2,000 Units by mouth, Disp: , Rfl:   •  clindamycin (CLEOCIN T) 1 % lotion, APPLY ONCE DAILY TO AFFECTED AREAS UNDER ARMS , Disp: , Rfl: 3  •  CVS Olopatadine HCl 0 2 % opth drops, INSTILL 1 DROP INTO BOTH EYES DAILY, Disp: 7 5 mL, Rfl: 1  •  Cyanocobalamin (B-12) 1000 MCG CAPS, Take by mouth, Disp: , Rfl:   •  escitalopram (LEXAPRO) 20 mg tablet, TAKE 1 TABLET BY MOUTH EVERY DAY, Disp: 90 tablet, Rfl: 1  •  famotidine (PEPCID) 40 MG tablet, TAKE 1 TABLET BY MOUTH EVERY DAY, Disp: 90 tablet, Rfl: 3  •  fluticasone (FLONASE) 50 mcg/act nasal spray, SPRAY 1 SPRAY INTO EACH NOSTRIL EVERY DAY, Disp: 24 mL, Rfl: 1  •  glimepiride (AMARYL) 2 mg tablet, TAKE 1 TABLET BY MOUTH EVERY DAY WITH BREAKFAST (Patient taking differently: 1 mg), Disp: 90 tablet, Rfl: 1  •  glucose blood (OneTouch Verio) test strip, Testing once daily, Disp: 100 strip, Rfl: 1  •  Klor-Con M20 20 MEQ tablet, TAKE 1 TABLET BY MOUTH EVERY DAY, Disp: 90 tablet, Rfl: 1  •  loperamide (IMODIUM A-D) 2 MG tablet, Take 2 mg by mouth 4 (four) times a day as needed for diarrhea, Disp: , Rfl:   •  loratadine (CLARITIN) 10 mg tablet, Take 10 mg by mouth, Disp: , Rfl:   •  losartan (COZAAR) 50 mg tablet, Take 1 tablet (50 mg total) by mouth daily, Disp: 90 tablet, Rfl: 3  •  montelukast (SINGULAIR) 10 mg tablet, TAKE 1 TABLET BY MOUTH EVERY DAY, Disp: 90 tablet, Rfl: 3  •  OneTouch Delica Lancets 66X MISC, USE DAILY AS DIRECTED, Disp: 100 each, Rfl: 0  •  pantoprazole (PROTONIX) 40 mg tablet, TAKE 1 TABLET BY MOUTH EVERY DAY, Disp: 90 tablet, Rfl: 3  •  sucralfate (CARAFATE) 1 g tablet, TAKE 1 TABLET BY MOUTH FOUR TIMES A DAY, Disp: 360 tablet, Rfl: 1  •  traZODone (DESYREL) 50 mg tablet, TAKE 1 TABLET BY MOUTH EVERYDAY AT BEDTIME, Disp: 90 tablet, Rfl: 1      Benny Kidd PA-C    Scribe Attestation    I,:   am acting as a scribe while in the presence of the attending physician :       I,:   personally performed the services described in this documentation    as scribed in my presence :

## 2023-02-21 ENCOUNTER — TELEPHONE (OUTPATIENT)
Dept: NEPHROLOGY | Facility: CLINIC | Age: 58
End: 2023-02-21

## 2023-02-21 DIAGNOSIS — I10 HYPERTENSION, UNSPECIFIED TYPE: ICD-10-CM

## 2023-02-21 RX ORDER — LOSARTAN POTASSIUM 50 MG/1
TABLET ORAL
Qty: 90 TABLET | Refills: 3 | Status: SHIPPED | OUTPATIENT
Start: 2023-02-21

## 2023-02-21 NOTE — TELEPHONE ENCOUNTER
----- Message from Gena Demarco MD sent at 2/21/2023  2:43 PM EST -----  Please arrange for follow-up with an AP, patient was last seen 11 months ago    I just refilled her medication

## 2023-03-02 ENCOUNTER — LAB (OUTPATIENT)
Dept: LAB | Facility: CLINIC | Age: 58
End: 2023-03-02

## 2023-03-02 DIAGNOSIS — D35.00 ADRENAL ADENOMA: ICD-10-CM

## 2023-03-02 DIAGNOSIS — I10 ESSENTIAL HYPERTENSION, MALIGNANT: ICD-10-CM

## 2023-03-02 DIAGNOSIS — E87.6 HYPOKALEMIA: ICD-10-CM

## 2023-03-02 LAB
ANION GAP SERPL CALCULATED.3IONS-SCNC: 4 MMOL/L (ref 4–13)
BUN SERPL-MCNC: 11 MG/DL (ref 5–25)
CALCIUM SERPL-MCNC: 10.2 MG/DL (ref 8.3–10.1)
CHLORIDE SERPL-SCNC: 108 MMOL/L (ref 96–108)
CO2 SERPL-SCNC: 26 MMOL/L (ref 21–32)
CREAT SERPL-MCNC: 0.67 MG/DL (ref 0.6–1.3)
CREAT UR-MCNC: 78.7 MG/DL
GFR SERPL CREATININE-BSD FRML MDRD: 99 ML/MIN/1.73SQ M
GLUCOSE P FAST SERPL-MCNC: 119 MG/DL (ref 65–99)
MAGNESIUM SERPL-MCNC: 2.2 MG/DL (ref 1.6–2.6)
POTASSIUM SERPL-SCNC: 4.4 MMOL/L (ref 3.5–5.3)
PROT UR-MCNC: <6 MG/DL
SODIUM SERPL-SCNC: 138 MMOL/L (ref 135–147)

## 2023-03-03 ENCOUNTER — TELEPHONE (OUTPATIENT)
Dept: NEPHROLOGY | Facility: CLINIC | Age: 58
End: 2023-03-03

## 2023-03-03 DIAGNOSIS — R80.9 PROTEINURIA, UNSPECIFIED TYPE: Primary | ICD-10-CM

## 2023-03-03 DIAGNOSIS — E11.9 TYPE 2 DIABETES MELLITUS WITHOUT COMPLICATION, WITHOUT LONG-TERM CURRENT USE OF INSULIN (HCC): ICD-10-CM

## 2023-03-03 DIAGNOSIS — I10 PRIMARY HYPERTENSION: ICD-10-CM

## 2023-03-03 LAB
CORTIS AM PEAK SERPL-MCNC: 1.2 UG/DL (ref 4.2–22.4)
DHEA-S SERPL-MCNC: 86.2 UG/DL (ref 29.4–220.5)

## 2023-03-03 NOTE — TELEPHONE ENCOUNTER
----- Message from Bobby Reilly PA-C sent at 3/3/2023  7:37 AM EST -----  Labs reviewed and stable with the exception of slightly elevated calcium level  Renal function stable, at baseline  Potassium level stable and remain improved at 4 4  Patient has not been seen in the office in a year  Please call patient and let her know her labs are stable but calcium level slightly elevated  Encourage adequate hydration and confirm that patient is not taking HCTZ  Recommend avoiding calcium or vitamin D supplements  Repeat BMP and ionized calcium in 7-10 days  Please schedule patient for office appointment for evaluation

## 2023-03-03 NOTE — TELEPHONE ENCOUNTER
Left voicemail to request a call back to go over the message above and schedule follow up appointment  Patient may prefer [facility]

## 2023-03-03 NOTE — TELEPHONE ENCOUNTER
Patient returned call and advised of the following: Labs reviewed and stable with the exception of slightly elevated calcium level  Renal function stable, at baseline  Potassium level stable and remain improved at 4 4  Patient has not been seen in the office in a year  Please call patient and let her know her labs are stable but calcium level slightly elevated  Encourage adequate hydration and confirm that patient is not taking HCTZ  Recommend avoiding calcium or vitamin D supplements  Repeat BMP and ionized calcium in 7-10 days      Patient scheduled for follow up brandi/Magi 3/23

## 2023-03-03 NOTE — RESULT ENCOUNTER NOTE
Labs reviewed and stable with the exception of slightly elevated calcium level  Renal function stable, at baseline  Potassium level stable and remain improved at 4 4  Patient has not been seen in the office in a year  Please call patient and let her know her labs are stable but calcium level slightly elevated  Encourage adequate hydration and confirm that patient is not taking HCTZ  Recommend avoiding calcium or vitamin D supplements  Repeat BMP and ionized calcium in 7-10 days  Please schedule patient for office appointment for evaluation

## 2023-03-10 ENCOUNTER — APPOINTMENT (OUTPATIENT)
Dept: LAB | Facility: CLINIC | Age: 58
End: 2023-03-10

## 2023-03-10 DIAGNOSIS — E11.9 TYPE 2 DIABETES MELLITUS WITHOUT COMPLICATION, WITHOUT LONG-TERM CURRENT USE OF INSULIN (HCC): ICD-10-CM

## 2023-03-10 DIAGNOSIS — I10 PRIMARY HYPERTENSION: ICD-10-CM

## 2023-03-10 DIAGNOSIS — R80.9 PROTEINURIA, UNSPECIFIED TYPE: ICD-10-CM

## 2023-03-10 LAB
ANION GAP SERPL CALCULATED.3IONS-SCNC: 5 MMOL/L (ref 4–13)
BUN SERPL-MCNC: 12 MG/DL (ref 5–25)
CA-I BLD-SCNC: 1.23 MMOL/L (ref 1.12–1.32)
CALCIUM SERPL-MCNC: 9.9 MG/DL (ref 8.3–10.1)
CHLORIDE SERPL-SCNC: 111 MMOL/L (ref 96–108)
CO2 SERPL-SCNC: 25 MMOL/L (ref 21–32)
CREAT SERPL-MCNC: 0.72 MG/DL (ref 0.6–1.3)
GFR SERPL CREATININE-BSD FRML MDRD: 94 ML/MIN/1.73SQ M
GLUCOSE P FAST SERPL-MCNC: 104 MG/DL (ref 65–99)
POTASSIUM SERPL-SCNC: 4.5 MMOL/L (ref 3.5–5.3)
SODIUM SERPL-SCNC: 141 MMOL/L (ref 135–147)

## 2023-03-13 ENCOUNTER — TELEPHONE (OUTPATIENT)
Dept: NEPHROLOGY | Facility: CLINIC | Age: 58
End: 2023-03-13

## 2023-03-13 NOTE — TELEPHONE ENCOUNTER
----- Message from Caroline Shea PA-C sent at 3/13/2023  3:11 PM EDT -----  Labs reviewed  Renal function stable and calcium improved  Please call patient and let her know her labs are stable and improved    Results to be reviewed at scheduled follow-up appointment with Jacob velazquez on 3/23/2023

## 2023-03-13 NOTE — RESULT ENCOUNTER NOTE
Labs reviewed  Renal function stable and calcium improved  Please call patient and let her know her labs are stable and improved    Results to be reviewed at scheduled follow-up appointment with Guy Reddy on 3/23/2023

## 2023-03-14 DIAGNOSIS — K21.9 GASTROESOPHAGEAL REFLUX DISEASE: ICD-10-CM

## 2023-03-14 RX ORDER — PANTOPRAZOLE SODIUM 40 MG/1
TABLET, DELAYED RELEASE ORAL
Qty: 90 TABLET | Refills: 3 | Status: SHIPPED | OUTPATIENT
Start: 2023-03-14

## 2023-03-16 LAB — 17OHP SERPL-MCNC: <10 NG/DL

## 2023-03-21 ENCOUNTER — TELEPHONE (OUTPATIENT)
Dept: ENDOCRINOLOGY | Facility: CLINIC | Age: 58
End: 2023-03-21

## 2023-03-23 ENCOUNTER — OFFICE VISIT (OUTPATIENT)
Dept: NEPHROLOGY | Facility: CLINIC | Age: 58
End: 2023-03-23

## 2023-03-23 VITALS
DIASTOLIC BLOOD PRESSURE: 78 MMHG | SYSTOLIC BLOOD PRESSURE: 140 MMHG | WEIGHT: 214 LBS | HEIGHT: 65 IN | BODY MASS INDEX: 35.65 KG/M2

## 2023-03-23 DIAGNOSIS — E66.01 CLASS 2 SEVERE OBESITY DUE TO EXCESS CALORIES WITH SERIOUS COMORBIDITY AND BODY MASS INDEX (BMI) OF 39.0 TO 39.9 IN ADULT (HCC): ICD-10-CM

## 2023-03-23 DIAGNOSIS — E11.65 TYPE 2 DIABETES MELLITUS WITH HYPERGLYCEMIA, WITHOUT LONG-TERM CURRENT USE OF INSULIN (HCC): ICD-10-CM

## 2023-03-23 DIAGNOSIS — R80.9 PROTEINURIA, UNSPECIFIED TYPE: ICD-10-CM

## 2023-03-23 DIAGNOSIS — E55.9 VITAMIN D DEFICIENCY: ICD-10-CM

## 2023-03-23 DIAGNOSIS — D35.02 ADENOMA OF LEFT ADRENAL GLAND: ICD-10-CM

## 2023-03-23 DIAGNOSIS — I10 HYPERTENSION, UNSPECIFIED TYPE: ICD-10-CM

## 2023-03-23 DIAGNOSIS — I10 PRIMARY HYPERTENSION: ICD-10-CM

## 2023-03-23 DIAGNOSIS — E11.9 TYPE 2 DIABETES MELLITUS WITHOUT COMPLICATION, WITHOUT LONG-TERM CURRENT USE OF INSULIN (HCC): Primary | ICD-10-CM

## 2023-03-23 RX ORDER — LOSARTAN POTASSIUM 50 MG/1
50 TABLET ORAL 2 TIMES DAILY
Qty: 120 TABLET | Refills: 3 | Status: SHIPPED | OUTPATIENT
Start: 2023-03-23

## 2023-03-23 NOTE — PROGRESS NOTES
OFFICE FOLLOW UP - Nephrology   Mily Kumari 56 y o  female MRN: [id number]       ASSESSMENT and PLAN:  Keo Hoskins was seen today for follow-up and hypertension  Diagnoses and all orders for this visit:    Type 2 diabetes mellitus without complication, without long-term current use of insulin ([facility])    Adenoma of left adrenal gland  -     Cortisol Level, AM Specimen; Future    Primary hypertension  -     losartan (COZAAR) 50 mg tablet; Take 1 tablet (50 mg total) by mouth 2 (two) times a day    Proteinuria, unspecified type  -     Basic metabolic panel; Future  -     Urinalysis with microscopic; Future  -     Protein / creatinine ratio, urine; Future    Vitamin D deficiency    Type 2 diabetes mellitus with hyperglycemia, without long-term current use of insulin ([facility])    Class 2 severe obesity due to excess calories with serious comorbidity and body mass index (BMI) of 39 0 to 39 9 in [facility])    Hypertension, unspecified type  -     Basic metabolic panel; Future        Renal function: Follows with Dr Ruano [facility]   Baseline creatinine 0 6-0 8   -most recent labs showed creatinine of 0 72   -Kidneys unremarkable on most recent renal imaging   -check labs including BMP, UA, UPCR prior to next appointment  Hypokalemia: Suspected due to previous chlorthalidone use, has been off of chlorthalidone since March 2022  Stable on repeat labs  -will stop potassium supplement and repeat lab in a couple of weeks  If stable, does not need potassium supplements any longer  Proteinuria: Suspect due to diabetic nephropathy and hypertensive nephrosclerosis  -Most recent urine protein to creatinine ratio with minimal proteinuria, unable to calculate   -Continue on losartan  Hypertension: Blood pressure currently 140/78 mmHg  Goal blood pressure less than 130/80   -Current medications: Losartan 50 mg daily  -medication changes: Losartan 50 mg po BID  -recommend low salt diet    -please check blood pressure daily and keep record  Will obtain Bp cuff and call or message office in 1-2 weeks with readings  Left adrenal adenoma: Previous CT scan in 2018 showed benign left adrenal gland nodule consistent with adenoma   -Urine for cortisol level normal, 24-hour urine potassium, creatinine, protein within normal range  Catecholamines within normal range, metanephrines within normal range  Renin and aldosterone level normal   -Repeat a m  cortisol level low  Previously normal  Would repeat  To schedule with Endocrinology  -CT of abdomen: 2 2 cm left adrenal nodule compatible with adenoma  Diabetes: Following with PCP  Most recent A1c 5 6  Diabetes well controlled  On glimepiride  Reports occasion drop in sugars a couple times per month  Vitamin D deficiency: no longer on vitamin D  Last level at range  Patient will follow up in 1 year with Dr Lucas Kelly     Age related screening: following with PCP  Has not completed  Your primary caregiver may do yearly screening for colorectal cancer  It is recommended in all men and women over 48years old  You may have screening earlier if you have colon disease or a family history of colorectal cancer  HPI: Chang Curry is a 56 y o  female with history of hypertension x30 years, diabetes, hypokalemia, fatty liver, hyperlipidemia, fibromyalgia, who is here for follow-up for hypertension and hypokalemia  Patient with hypokalemia as far back as 2014 suspected due to prior chlorthalidone use  Chlorthalidone has been discontinued since last year and most recent potassium level remains stable  Reports feeling "okay"  She reports her BP readings are higher in the office then they were before last March on her previous medication  She is not checking her Bp at home  She does not have a BP cuff  She is taking Aleve a couple times per week for back pain and fibromyalgia  She is receiving knee injections  She has a normal appetite  She walks her dog outside on occasion   She drinks a lot of water  She started allergy injections 4 months ago  She denies welling in her legs  ROS:   A complete review of systems was done  Pertinent positives and negatives as noted in the HPI, otherwise the review of systems is negative      Allergies: Albuterol, Azithromycin, Duloxetine, Erythromycin, Hydrocodone-acetaminophen, Hydrocodone-acetaminophen, Ketorolac, Penicillin g, Penicillins, and Tramadol    Medications:   Current Outpatient Medications:   •  albuterol (PROVENTIL HFA,VENTOLIN HFA) 90 mcg/act inhaler, INHALE 2 PUFFS EVERY 6 HOURS AS NEEDED FOR WHEEZE, Disp: 18 g, Rfl: 2  •  Aspirin 81 MG EC tablet, Take by mouth, Disp: , Rfl:   •  azelastine (ASTELIN) 0 1 % nasal spray, 1 spray into each nostril 2 (two) times a day Use in each nostril as directed, Disp: 30 mL, Rfl: 2  •  Blood Glucose Monitoring Suppl (OneTouch Verio) w/Device KIT, USE DAILY AS DIRECTED, Disp: 1 kit, Rfl: 0  •  Cholecalciferol (VITAMIN D3) 1000 units CAPS, Take 2,000 Units by mouth, Disp: , Rfl:   •  clindamycin (CLEOCIN T) 1 % lotion, APPLY ONCE DAILY TO AFFECTED AREAS UNDER ARMS , Disp: , Rfl: 3  •  CVS Olopatadine HCl 0 2 % opth drops, INSTILL 1 DROP INTO BOTH EYES DAILY, Disp: 7 5 mL, Rfl: 1  •  Cyanocobalamin (B-12) 1000 MCG CAPS, Take by mouth, Disp: , Rfl:   •  escitalopram (LEXAPRO) 20 mg tablet, TAKE 1 TABLET BY MOUTH EVERY DAY, Disp: 90 tablet, Rfl: 1  •  famotidine (PEPCID) 40 MG tablet, TAKE 1 TABLET BY MOUTH EVERY DAY, Disp: 90 tablet, Rfl: 3  •  fluticasone (FLONASE) 50 mcg/act nasal spray, SPRAY 1 SPRAY INTO EACH NOSTRIL EVERY DAY, Disp: 24 mL, Rfl: 1  •  glimepiride (AMARYL) 2 mg tablet, TAKE 1 TABLET BY MOUTH EVERY DAY WITH BREAKFAST (Patient taking differently: 1 mg), Disp: 90 tablet, Rfl: 1  •  glucose blood (OneTouch Verio) test strip, Testing once daily, Disp: 100 strip, Rfl: 1  •  loperamide (IMODIUM A-D) 2 MG tablet, Take 2 mg by mouth 4 (four) times a day as needed for diarrhea, Disp: , Rfl:   •  loratadine (CLARITIN) 10 mg tablet, Take 10 mg by mouth, Disp: , Rfl:   •  losartan (COZAAR) 50 mg tablet, Take 1 tablet (50 mg total) by mouth 2 (two) times a day, Disp: 120 tablet, Rfl: 3  •  montelukast (SINGULAIR) 10 mg tablet, TAKE 1 TABLET BY MOUTH EVERY DAY, Disp: 90 tablet, Rfl: 3  •  OneTouch Delica Lancets 53P MISC, USE DAILY AS DIRECTED, Disp: 100 each, Rfl: 0  •  pantoprazole (PROTONIX) 40 mg tablet, TAKE 1 TABLET BY MOUTH EVERY DAY, Disp: 90 tablet, Rfl: 3  •  sucralfate (CARAFATE) 1 g tablet, TAKE 1 TABLET BY MOUTH FOUR TIMES A DAY, Disp: 360 tablet, Rfl: 1  •  traZODone (DESYREL) 50 mg tablet, TAKE 1 TABLET BY MOUTH EVERYDAY AT BEDTIME, Disp: 90 tablet, Rfl: 1    Past Medical History:   Diagnosis Date   • Ankle fracture    • Anxiety    • Asthma    • Bleeding hemorrhoids 4/13/2017   • 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    Formatting of this note might be different from the original  Added automatically from request for surgery [id number]   • Chronic venous embolism and thrombosis of deep vessels of lower extremity ([facility] )    • Costochondritis     RESOLVED: [id number]   • Depression    • Diabetes mellitus ([facility] ) 11/2021   • Endometriosis    • Fibromyalgia    • GERD (gastroesophageal reflux disease)    • Hematuria     LAST ASSESSED: [id number]   • Hydradenitis     axillary area and under breast   • Hypertension     LAST ASSESSED: [id number]   • Irregular heart beat    • Pulmonary embolism ([facility] )     RESOLED: [id number] - secondary to a lupron injection for endometriosis   • RBBB (right bundle branch block)    • Sleep apnea     Cannot tolerate CPAP   • Umbilical hernia     LAST ASSESSED: [id number]     Past Surgical History:   Procedure Laterality Date   • HERNIA REPAIR      umbilical   • HUMERUS FRACTURE SURGERY W/ IMPLANT Left    • LAPAROSCOPIC TOTAL HYSTERECTOMY  2014   • PELVIC LAPAROSCOPY      x4 for endometriosis   • [place] ESOPHAGOGASTRODUODENOSCOPY TRANSORAL DIAGNOSTIC N/A 05/02/2019    Procedure: ESOPHAGOGASTRODUODENOSCOPY (EGD) with bx;  Surgeon: Kiersten Howard MD;  Location: [facility]; Service: Gastroenterology     Family History   Problem Relation Age of Onset   • Hypertension Mother    • Diabetes Father    • Hypertension Father    • Obesity Father    • No Known Problems Maternal Grandmother    • No Known Problems Maternal Grandfather    • Breast cancer Paternal Grandmother 80   • No Known Problems Paternal Grandfather    • No Known Problems Maternal Aunt       reports that she has been smoking cigarettes  She has been smoking an average of  5 packs per day  She has never used smokeless tobacco  She reports that she does not drink alcohol and does not use drugs  Physical Exam:   Vitals:    03/23/23 1327   BP: 140/78   BP Location: Left arm   Patient Position: Sitting   Cuff Size: Large   Weight: 97 1 kg (214 lb)   Height: 5' 5" (1 651 m)     Body mass index is 35 61 kg/m²  General: no acute distress   Eyes: conjunctivae pink, anicteric sclerae  ENT: mucous membranes moist  Neck: supple, no JVD  Chest: clear to auscultation bilaterally with no wheezes, rale or rhochi  CVS: regular rate and rhythm   Abdomen: soft, non-tender, non-distended  Extremities: no lower extremity edema   Skin: no rash  Neuro: awake and alert       Lab Results:  Results for orders placed or performed in visit on 70/14/58   Basic metabolic panel   Result Value Ref Range    Sodium 141 135 - 147 mmol/L    Potassium 4 5 3 5 - 5 3 mmol/L    Chloride 111 (H) 96 - 108 mmol/L    CO2 25 21 - 32 mmol/L    ANION GAP 5 4 - 13 mmol/L    BUN 12 5 - 25 mg/dL    Creatinine 0 72 0 60 - 1 30 mg/dL    Glucose, Fasting 104 (H) 65 - 99 mg/dL    Calcium 9 9 8 3 - 10 1 mg/dL    eGFR 94 ml/min/1 73sq m   Calcium, ionized   Result Value Ref Range    Calcium, Ionized 1 23 1 12 - 1 32 mmol/L             Invalid input(s): ALBUMIN      Portions of the record may have been created with voice recognition software   Occasional wrong word or "sound a like" substitutions may have occurred due to the inherent limitations of voice recognition software  Read the chart carefully and recognize, using context, where substitutions have occurred  If you have any questions, please contact the dictating provider

## 2023-03-23 NOTE — PATIENT INSTRUCTIONS
Your kidney function remains very good  Your most recent potassium level was normal   We discussed today that you will stop your potassium supplement and we will repeat a blood test in a couple of weeks  If your potassium level stays stable without supplementation, we will continue to maintain you off of them  The protein in your urine has improved  Please continue to take your losartan for your blood pressure  We have increased this today to 50 mg twice a day  Please continue to follow a low-salt diet  Please obtain a blood pressure cuff  Check your blood pressure daily while sitting with your feet flat on the floor and your back against a chair with your arm heart level  He should be sitting for 10 minutes prior to checking your blood pressure  Keep a record of this, you may message Dr James Barbour on [employer] or call the office with your readings in 1 to 2 weeks  We can adjust your blood pressure medications further over the phone  We have ordered a repeat a m  cortisol level  Please call to schedule with endocrinology as discussed  We will see you back in our office in 1 year  Will recheck blood test 1 week prior to your next appointment

## 2023-03-31 ENCOUNTER — TELEPHONE (OUTPATIENT)
Dept: NEPHROLOGY | Facility: CLINIC | Age: 58
End: 2023-03-31

## 2023-03-31 NOTE — TELEPHONE ENCOUNTER
Received phone call from patient stating that we ordered cortisol levels for her labwork  States in the past she had to take a pill prior to having this test - does not remember what medication this was but is wondering if there's any special instructions prior to getting the labs drawn

## 2023-04-03 ENCOUNTER — RA CDI HCC (OUTPATIENT)
Dept: OTHER | Facility: HOSPITAL | Age: 58
End: 2023-04-03

## 2023-04-03 NOTE — PROGRESS NOTES
[address]  coding opportunities       Chart reviewed, no opportunity found: CHART REVIEWED, NO OPPORTUNITY FOUND        Patients Insurance     Medicare Insurance: Medicare

## 2023-04-03 NOTE — TELEPHONE ENCOUNTER
It appears patient had dexamethasone suppression test in early March which was done by endocrine and at that time it was checked after patient took the medicine dexamethasone   -please inform patient that no need to check a m  cortisol level which was ordered by Beth Huddleston on March 23  Patient can just follow-up with endocrinology for evaluation of left adrenal adenoma

## 2023-04-03 NOTE — TELEPHONE ENCOUNTER
Aubrey Mims in regards to [facility] message; It appears patient had dexamethasone suppression test in early March which was done by endocrine and at that time it was checked after patient took the medicine dexamethasone   -please inform patient that no need to check a m  cortisol level which was ordered by Vanessa Bull on March 23  Patient can just follow-up with endocrinology for evaluation of left adrenal adenoma  If she had any question to call our office

## 2023-04-11 ENCOUNTER — OFFICE VISIT (OUTPATIENT)
Dept: FAMILY MEDICINE CLINIC | Facility: CLINIC | Age: 58
End: 2023-04-11

## 2023-04-11 VITALS
BODY MASS INDEX: 34.66 KG/M2 | HEART RATE: 64 BPM | OXYGEN SATURATION: 95 % | WEIGHT: 208 LBS | HEIGHT: 65 IN | DIASTOLIC BLOOD PRESSURE: 68 MMHG | SYSTOLIC BLOOD PRESSURE: 118 MMHG

## 2023-04-11 DIAGNOSIS — J30.1 SEASONAL ALLERGIC RHINITIS DUE TO POLLEN: ICD-10-CM

## 2023-04-11 DIAGNOSIS — M35.3 PMR (POLYMYALGIA RHEUMATICA) (HCC): ICD-10-CM

## 2023-04-11 DIAGNOSIS — E78.2 MIXED HYPERLIPIDEMIA: ICD-10-CM

## 2023-04-11 DIAGNOSIS — K21.00 GASTROESOPHAGEAL REFLUX DISEASE WITH ESOPHAGITIS, UNSPECIFIED WHETHER HEMORRHAGE: ICD-10-CM

## 2023-04-11 DIAGNOSIS — I10 PRIMARY HYPERTENSION: ICD-10-CM

## 2023-04-11 DIAGNOSIS — E11.9 TYPE 2 DIABETES MELLITUS WITHOUT COMPLICATION, WITHOUT LONG-TERM CURRENT USE OF INSULIN (HCC): ICD-10-CM

## 2023-04-11 DIAGNOSIS — E66.09 CLASS 1 OBESITY DUE TO EXCESS CALORIES WITH SERIOUS COMORBIDITY AND BODY MASS INDEX (BMI) OF 34.0 TO 34.9 IN ADULT: ICD-10-CM

## 2023-04-11 DIAGNOSIS — Z12.11 SCREENING FOR COLON CANCER: Primary | ICD-10-CM

## 2023-04-11 LAB — SL AMB POCT HEMOGLOBIN AIC: 5.4 (ref ?–6.5)

## 2023-04-11 RX ORDER — POTASSIUM CHLORIDE 1500 MG/1
TABLET, EXTENDED RELEASE ORAL
COMMUNITY
Start: 2023-03-23

## 2023-04-11 NOTE — PROGRESS NOTES
Name: Benito Lange      : 1965      MRN: [id number]  Encounter Provider: Radha Castillo MD  Encounter Date: 2023   Encounter department: [place] [address]     1  Screening for colon cancer  -     Ambulatory referral for colonoscopy; Future    2  Type 2 diabetes mellitus without complication, without long-term current use of insulin [facility])  Assessment & Plan:    Lab Results   Component Value Date    HGBA1C 5 4 2023   Given her low A1c and her low sugar reactions, would recommend discontinue glimepiride at this point  Check in 3 months  Continue to watch sugars fasting 2-3 times per week, and if she has low sugar symptoms  Orders:  -     Microalbumin / creatinine urine ratio  -     POCT hemoglobin A1c  -     Hemoglobin A1C; Future; Expected date: 2023  -     Comprehensive metabolic panel; Future; Expected date: 2023  -     POCT diabetic eye exam  -     IRIS Diabetic eye exam    3  Seasonal allergic rhinitis due to pollen  Assessment & Plan:  Patient is currently going to allergy and immunology  Has been getting desensitization shots  The seem to been helping out some of her adenopathy and issues  Unfortunately, her allergist office insurance will be switching, and she is no longer able to see the same office, i e  they do not take her insurance  4  Primary hypertension  Assessment & Plan:  Blood pressure today on current dosage of losartan was fantastic  Continue to follow  Orders:  -     Comprehensive metabolic panel; Future; Expected date: 2023    5  Mixed hyperlipidemia  Assessment & Plan:  Patient due in about 3 months for labs  Follow-up after that  Orders:  -     Comprehensive metabolic panel; Future; Expected date: 2023  -     Lipid Panel with Direct LDL reflex; Future; Expected date: 2023    6   Class 1 obesity due to excess calories with serious comorbidity and body mass index (BMI) of 34 0 to 34 9 in adult  Assessment & Plan:  BMI continues to be somewhat elevated  It is slightly lower than previous  Recheck in 6 months  Limit carbs, increase exercise, which she understands  7  PMR (polymyalgia rheumatica) ([facility])  Assessment & Plan:  Stable  Has some days that are worse  She is able to rest for a couple days, and then feels better  Not working right now with this  8  Gastroesophageal reflux disease with esophagitis, unspecified whether hemorrhage  Assessment & Plan:  Doing much better as far as reflux  Continue on current treatment  Subjective      Chief Complaint   Patient presents with   • Follow-up     Patient present today for her 6 month follow up  Pt has eye exam coming on 4/29/2023, [facility]       Patient is here to follow-up on several issues  With regard to diabetes, she is currently on Amaryl 2 mg, using a half a tablet  She has had several low sugars, including 1 that did not seem to come on the normal way from her other low sugars  A1c was reviewed  She does have a fingerstick machine at home  Allergies: Patient is following with allergy and immunology  Unfortunately, due to insurance, she will no longer be able to see that same office  She is getting injections at this point from that office  Hypertension: Currently on losartan 50, 2 pills a day  Was increased by renal recently  Hyperlipidemia: Not currently on medications for this  Reviewed getting her labs done at some point  GERD: Currently on Protonix and Carafate  No specific problems  Patient has had some foot numbness, more specifically her left first toe  She does have it at the medial aspect of the toe  Review of Systems   Constitutional: Negative  HENT: Negative  Eyes: Negative  Respiratory: Negative  Cardiovascular: Negative  Gastrointestinal: Negative  Endocrine: Negative  Genitourinary: Negative  Musculoskeletal: Negative      Skin: Negative  Allergic/Immunologic: Negative  Neurological: Negative  Hematological: Negative  Psychiatric/Behavioral: Negative  Current Outpatient Medications on File Prior to Visit   Medication Sig   • albuterol (PROVENTIL HFA,VENTOLIN HFA) 90 mcg/act inhaler INHALE 2 PUFFS EVERY 6 HOURS AS NEEDED FOR WHEEZE   • Aspirin 81 MG EC tablet Take by mouth   • azelastine (ASTELIN) 0 1 % nasal spray 1 spray into each nostril 2 (two) times a day Use in each nostril as directed   • clindamycin (CLEOCIN T) 1 % lotion APPLY ONCE DAILY TO AFFECTED AREAS UNDER ARMS     • CVS Olopatadine HCl 0 2 % opth drops INSTILL 1 DROP INTO BOTH EYES DAILY   • Cyanocobalamin (B-12) 1000 MCG CAPS Take by mouth   • escitalopram (LEXAPRO) 20 mg tablet TAKE 1 TABLET BY MOUTH EVERY DAY   • famotidine (PEPCID) 40 MG tablet TAKE 1 TABLET BY MOUTH EVERY DAY   • fluticasone (FLONASE) 50 mcg/act nasal spray SPRAY 1 SPRAY INTO EACH NOSTRIL EVERY DAY   • loperamide (IMODIUM A-D) 2 MG tablet Take 2 mg by mouth 4 (four) times a day as needed for diarrhea   • loratadine (CLARITIN) 10 mg tablet Take 10 mg by mouth   • losartan (COZAAR) 50 mg tablet Take 1 tablet (50 mg total) by mouth 2 (two) times a day   • montelukast (SINGULAIR) 10 mg tablet TAKE 1 TABLET BY MOUTH EVERY DAY   • pantoprazole (PROTONIX) 40 mg tablet TAKE 1 TABLET BY MOUTH EVERY DAY   • sucralfate (CARAFATE) 1 g tablet TAKE 1 TABLET BY MOUTH FOUR TIMES A DAY   • traZODone (DESYREL) 50 mg tablet TAKE 1 TABLET BY MOUTH EVERYDAY AT BEDTIME   • [DISCONTINUED] glimepiride (AMARYL) 2 mg tablet TAKE 1 TABLET BY MOUTH EVERY DAY WITH BREAKFAST (Patient taking differently: 1 mg)   • Blood Glucose Monitoring Suppl (OneTouch Verio) w/Device KIT USE DAILY AS DIRECTED   • Cholecalciferol (VITAMIN D3) 1000 units CAPS Take 2,000 Units by mouth (Patient not taking: Reported on 4/11/2023)   • glucose blood (OneTouch Verio) test strip Testing once daily   • Klor-Con M20 20 MEQ tablet  (Patient "not taking: Reported on 4/11/2023)   • OneTouch Delica Lancets 66W MISC USE DAILY AS DIRECTED       Objective     /68 (BP Location: Right arm, Patient Position: Sitting, Cuff Size: Large)   Pulse 64   Ht 5' 5\" (1 651 m)   Wt 94 3 kg (208 lb)   SpO2 95%   BMI 34 61 kg/m²     Physical Exam  Vitals and nursing note reviewed  Constitutional:       Appearance: She is well-developed  HENT:      Head: Normocephalic and atraumatic  Cardiovascular:      Rate and Rhythm: Normal rate and regular rhythm  Pulses: no weak pulses          Carotid pulses are 2+ on the right side and 2+ on the left side  Dorsalis pedis pulses are 2+ on the right side and 2+ on the left side  Posterior tibial pulses are 2+ on the right side and 2+ on the left side  Heart sounds: Normal heart sounds  Pulmonary:      Effort: Pulmonary effort is normal       Breath sounds: Normal breath sounds  No wheezing or rales  Chest:      Chest wall: No tenderness  Feet:      Right foot:      Skin integrity: No ulcer, skin breakdown, erythema, warmth, callus or dry skin  Left foot:      Skin integrity: Callus ( First toe) present  No ulcer, skin breakdown, erythema, warmth or dry skin  Patient's shoes and socks removed  Right Foot/Ankle   Right Foot Inspection  Skin Exam: skin normal and skin intact  No dry skin, no warmth, no callus, no erythema, no maceration, no abnormal color, no pre-ulcer, no ulcer and no callus  Toe Exam: ROM and strength within normal limits  Sensory   Vibration: intact  Proprioception: intact  Monofilament testing: intact    Vascular  Capillary refills: < 3 seconds  The right DP pulse is 2+  The right PT pulse is 2+  Left Foot/Ankle  Left Foot Inspection  Skin Exam: skin normal, skin intact and callus ( First toe)  No dry skin, no warmth, no erythema, no maceration, normal color, no pre-ulcer and no ulcer       Toe Exam: ROM and strength within normal limits and left toe " deformity ( Bunion at first toe)  Sensory   Vibration: intact  Proprioception: intact  Monofilament testing: intact    Vascular  Capillary refills: < 3 seconds  The left DP pulse is 2+  The left PT pulse is 2+       Assign Risk Category  No deformity present  No loss of protective sensation  No weak pulses  Risk: 0    Catia Mae MD

## 2023-04-11 NOTE — PATIENT INSTRUCTIONS
1  Screening for colon cancer  -     Ambulatory referral for colonoscopy; Future    2  Type 2 diabetes mellitus without complication, without long-term current use of insulin [facility])  Assessment & Plan:    Lab Results   Component Value Date    HGBA1C 5 4 04/11/2023   Given her low A1c and her low sugar reactions, would recommend discontinue glimepiride at this point  Check in 3 months  Continue to watch sugars fasting 2-3 times per week, and if she has low sugar symptoms  Orders:  -     Microalbumin / creatinine urine ratio  -     POCT hemoglobin A1c  -     Hemoglobin A1C; Future; Expected date: 07/11/2023  -     Comprehensive metabolic panel; Future; Expected date: 07/11/2023  -     POCT diabetic eye exam    3  Seasonal allergic rhinitis due to pollen  Assessment & Plan:  Patient is currently going to allergy and immunology  Has been getting desensitization shots  The seem to been helping out some of her adenopathy and issues  Unfortunately, her allergist office insurance will be switching, and she is no longer able to see the same office, i e  they do not take her insurance  4  Primary hypertension  Assessment & Plan:  Blood pressure today on current dosage of losartan was fantastic  Continue to follow  Orders:  -     Comprehensive metabolic panel; Future; Expected date: 07/11/2023    5  Mixed hyperlipidemia  Assessment & Plan:  Patient due in about 3 months for labs  Follow-up after that  Orders:  -     Comprehensive metabolic panel; Future; Expected date: 07/11/2023  -     Lipid Panel with Direct LDL reflex; Future; Expected date: 07/11/2023    6  Class 1 obesity due to excess calories with serious comorbidity and body mass index (BMI) of 34 0 to 34 9 in adult  Assessment & Plan:  BMI continues to be somewhat elevated  It is slightly lower than previous  Recheck in 6 months  Limit carbs, increase exercise, which she understands        7  PMR (polymyalgia rheumatica) ([facility])  Assessment & Plan:  Stable  Has some days that are worse  She is able to rest for a couple days, and then feels better  Not working right now with this  8  Gastroesophageal reflux disease with esophagitis, unspecified whether hemorrhage  Assessment & Plan:  Doing much better as far as reflux  Continue on current treatment  COVID 19 Instructions    Jeanmarie Ansari was advised to limit contact with others to essential tasks such as getting food, medications, and medical care  Proper handwashing reviewed, and Hand sanitzer when washing is not available  If the patient develops symptoms of COVID 19, the patient should call the office as soon as possible  For 3912-5575 Flu season, it is strongly recommended that Flu Vaccinations be obtained  Virtual Visits:  Chio: This works on smart phones (any phone with Internet browsing capability)  You should get a text message when the provider is ready to see you  Click on the link in the text message, and the call should start  You will need to type in your name, and allow camera and microphone access  This is HIPPA compliant, and secure  If you have not already done so, get immunized to COVID 19  We are committed to getting you vaccinated as soon as possible and will be closely following CDC and [facility]  guidelines as they are released and revised  Please refer to our COVID-19 vaccine webpage for the most up to date information on the vaccine and our distribution efforts  This site will also have the most up to date recommendations for COVID booster vaccine  Xander hernandez    Call [phone] ([phone]), option 7    OUR NEW LOCATION:    [address], [address] [place] [address], [address]  Fax: [phone]    Lab services and OB/GYN are at this location as well

## 2023-04-11 NOTE — ASSESSMENT & PLAN NOTE
BMI continues to be somewhat elevated  It is slightly lower than previous  Recheck in 6 months  Limit carbs, increase exercise, which she understands

## 2023-04-11 NOTE — ASSESSMENT & PLAN NOTE
Lab Results   Component Value Date    HGBA1C 5 4 04/11/2023   Given her low A1c and her low sugar reactions, would recommend discontinue glimepiride at this point  Check in 3 months  Continue to watch sugars fasting 2-3 times per week, and if she has low sugar symptoms

## 2023-04-11 NOTE — ASSESSMENT & PLAN NOTE
Patient is currently going to allergy and immunology  Has been getting desensitization shots  The seem to been helping out some of her adenopathy and issues  Unfortunately, her allergist office insurance will be switching, and she is no longer able to see the same office, i e  they do not take her insurance

## 2023-04-11 NOTE — ASSESSMENT & PLAN NOTE
Carolynn  Has some days that are worse  She is able to rest for a couple days, and then feels better  Not working right now with this

## 2023-04-12 LAB
CREAT UR-MCNC: 45.4 MG/DL
LEFT EYE DIABETIC RETINOPATHY: NORMAL
LEFT EYE IMAGE QUALITY: NORMAL
LEFT EYE MACULAR EDEMA: NORMAL
LEFT EYE OTHER RETINOPATHY: NORMAL
MICROALBUMIN UR-MCNC: <5 MG/L (ref 0–20)
MICROALBUMIN/CREAT 24H UR: <11 MG/G CREATININE (ref 0–30)
RIGHT EYE DIABETIC RETINOPATHY: NORMAL
RIGHT EYE IMAGE QUALITY: NORMAL
RIGHT EYE MACULAR EDEMA: NORMAL
RIGHT EYE OTHER RETINOPATHY: NORMAL
SEVERITY (EYE EXAM): NORMAL

## 2023-04-13 NOTE — RESULT ENCOUNTER NOTE
The iris eye exam was normal, there was no evidence of diabetic retinopathy  Please follow up in 1 year

## 2023-04-25 ENCOUNTER — OFFICE VISIT (OUTPATIENT)
Dept: FAMILY MEDICINE CLINIC | Facility: CLINIC | Age: 58
End: 2023-04-25

## 2023-04-25 VITALS
WEIGHT: 208 LBS | BODY MASS INDEX: 34.66 KG/M2 | DIASTOLIC BLOOD PRESSURE: 88 MMHG | HEIGHT: 65 IN | HEART RATE: 61 BPM | SYSTOLIC BLOOD PRESSURE: 120 MMHG | TEMPERATURE: 98 F | RESPIRATION RATE: 16 BRPM

## 2023-04-25 DIAGNOSIS — M54.16 LUMBAR RADICULOPATHY: ICD-10-CM

## 2023-04-25 DIAGNOSIS — I10 PRIMARY HYPERTENSION: Primary | ICD-10-CM

## 2023-04-25 RX ORDER — CYCLOBENZAPRINE HCL 10 MG
10 TABLET ORAL 3 TIMES DAILY PRN
Qty: 30 TABLET | Refills: 0 | Status: SHIPPED | OUTPATIENT
Start: 2023-04-25

## 2023-04-25 RX ORDER — MELOXICAM 15 MG/1
15 TABLET ORAL DAILY
Qty: 30 TABLET | Refills: 0 | Status: SHIPPED | OUTPATIENT
Start: 2023-04-25

## 2023-04-25 NOTE — ASSESSMENT & PLAN NOTE
Patient has had lumbar radiculopathy in the past, but today it seems to be more problematic  It is mostly on the left that there is radicular symptoms  There is paraspinal muscle tenderness as well, suggesting that this is not just disc, but more likely muscle  Heat, ice, gentle range of motion, exercises  As far as medication, can trial meloxicam   Could also try muscle relaxers

## 2023-04-25 NOTE — PROGRESS NOTES
Name: Dorthey Soulier      : 1965      MRN: [id number]  Encounter Provider: Sawyer Pizano MD  Encounter Date: 2023   Encounter department: [place] [address]  Primary hypertension  Assessment & Plan:  Blood pressure today was good  Continue current treatment  2  Lumbar radiculopathy  Assessment & Plan:  Patient has had lumbar radiculopathy in the past, but today it seems to be more problematic  It is mostly on the left that there is radicular symptoms  There is paraspinal muscle tenderness as well, suggesting that this is not just disc, but more likely muscle  Heat, ice, gentle range of motion, exercises  As far as medication, can trial meloxicam   Could also try muscle relaxers  Orders:  -     meloxicam (Mobic) 15 mg tablet; Take 1 tablet (15 mg total) by mouth daily  -     cyclobenzaprine (FLEXERIL) 10 mg tablet; Take 1 tablet (10 mg total) by mouth 3 (three) times a day as needed for muscle spasms           Subjective      Chief Complaint   Patient presents with   • Back Pain     Patient threw out back on Thursday afternoon  Please see chief complaint  Patient was using a vacuum  doing housework  At one particular point, she noticed that her back had started to hurt  It was an acute change, spasms, irritation, pain  Since then, approximately 5 days ago, she has not used the vacuum, and has been trying to rest   Since , she was significantly resting her back, essentially not getting up at all  Unfortunately, did not seem to make any improvement  Using Aleve, Celebrex, heating pad  No change with this  Low back, middle, with radiation to hips and left leg  Review of Systems   Constitutional: Negative for chills and fever  HENT: Negative for ear pain and sore throat  Eyes: Negative for pain and visual disturbance  Respiratory: Negative for cough and shortness of breath      Cardiovascular: Negative for chest pain and palpitations  Gastrointestinal: Negative for abdominal pain and vomiting  Genitourinary: Negative for dysuria and hematuria  Musculoskeletal: Positive for back pain (and spasms)  Negative for arthralgias  Skin: Negative for color change and rash  Neurological: Negative for seizures and syncope  All other systems reviewed and are negative  Current Outpatient Medications on File Prior to Visit   Medication Sig   • albuterol (PROVENTIL HFA,VENTOLIN HFA) 90 mcg/act inhaler INHALE 2 PUFFS EVERY 6 HOURS AS NEEDED FOR WHEEZE   • Aspirin 81 MG EC tablet Take by mouth   • azelastine (ASTELIN) 0 1 % nasal spray 1 spray into each nostril 2 (two) times a day Use in each nostril as directed   • Blood Glucose Monitoring Suppl (OneTouch Verio) w/Device KIT USE DAILY AS DIRECTED   • clindamycin (CLEOCIN T) 1 % lotion APPLY ONCE DAILY TO AFFECTED AREAS UNDER ARMS     • CVS Olopatadine HCl 0 2 % opth drops INSTILL 1 DROP INTO BOTH EYES DAILY   • Cyanocobalamin (B-12) 1000 MCG CAPS Take by mouth   • escitalopram (LEXAPRO) 20 mg tablet TAKE 1 TABLET BY MOUTH EVERY DAY   • famotidine (PEPCID) 40 MG tablet TAKE 1 TABLET BY MOUTH EVERY DAY   • fluticasone (FLONASE) 50 mcg/act nasal spray SPRAY 1 SPRAY INTO EACH NOSTRIL EVERY DAY   • glucose blood (OneTouch Verio) test strip Testing once daily   • loperamide (IMODIUM A-D) 2 MG tablet Take 2 mg by mouth 4 (four) times a day as needed for diarrhea   • loratadine (CLARITIN) 10 mg tablet Take 10 mg by mouth   • losartan (COZAAR) 50 mg tablet Take 1 tablet (50 mg total) by mouth 2 (two) times a day   • montelukast (SINGULAIR) 10 mg tablet TAKE 1 TABLET BY MOUTH EVERY DAY   • OneTouch Delica Lancets 70K MISC USE DAILY AS DIRECTED   • pantoprazole (PROTONIX) 40 mg tablet TAKE 1 TABLET BY MOUTH EVERY DAY   • sucralfate (CARAFATE) 1 g tablet TAKE 1 TABLET BY MOUTH FOUR TIMES A DAY   • traZODone (DESYREL) 50 mg tablet TAKE 1 TABLET BY MOUTH EVERYDAY AT BEDTIME   • "Cholecalciferol (VITAMIN D3) 1000 units CAPS Take 2,000 Units by mouth (Patient not taking: Reported on 4/11/2023)   • Klor-Con M20 20 MEQ tablet  (Patient not taking: Reported on 4/11/2023)       Objective     /88 (BP Location: Right arm, Patient Position: Sitting, Cuff Size: Adult)   Pulse 61   Temp 98 °F (36 7 °C) (Temporal)   Resp 16   Ht 5' 5\" (1 651 m)   Wt 94 3 kg (208 lb)   BMI 34 61 kg/m²     Physical Exam  Vitals and nursing note reviewed  Constitutional:       Appearance: Normal appearance  She is obese  Musculoskeletal:        Back:       Comments: Difficulty with extending the legs, i e  discomfort  Neurological:      Mental Status: She is alert         Rich Alvarez MD  "

## 2023-04-25 NOTE — PATIENT INSTRUCTIONS
1  Primary hypertension  Assessment & Plan:  Blood pressure today was good  Continue current treatment  2  Lumbar radiculopathy  Assessment & Plan:  Patient has had lumbar radiculopathy in the past, but today it seems to be more problematic  It is mostly on the left that there is radicular symptoms  There is paraspinal muscle tenderness as well, suggesting that this is not just disc, but more likely muscle  Heat, ice, gentle range of motion, exercises  As far as medication, can trial meloxicam   Could also try muscle relaxers  Orders:  -     meloxicam (Mobic) 15 mg tablet; Take 1 tablet (15 mg total) by mouth daily  -     cyclobenzaprine (FLEXERIL) 10 mg tablet; Take 1 tablet (10 mg total) by mouth 3 (three) times a day as needed for muscle spasms        COVID 19 Instructions    Dov Hyman was advised to limit contact with others to essential tasks such as getting food, medications, and medical care  Proper handwashing reviewed, and Hand sanitzer when washing is not available  If the patient develops symptoms of COVID 19, the patient should call the office as soon as possible  For 2706-1354 Flu season, it is strongly recommended that Flu Vaccinations be obtained  Virtual Visits:  Chio: This works on smart phones (any phone with Internet browsing capability)  You should get a text message when the provider is ready to see you  Click on the link in the text message, and the call should start  You will need to type in your name, and allow camera and microphone access  This is HIPPA compliant, and secure  If you have not already done so, get immunized to COVID 19  We are committed to getting you vaccinated as soon as possible and will be closely following CDC and [facility]  guidelines as they are released and revised  Please refer to our COVID-19 vaccine webpage for the most up to date information on the vaccine and our distribution efforts      This site will also have the most up to date recommendations for COVID booster vaccine  Xander hernandez    Call [phone] ([phone]), option 7    OUR NEW LOCATION:    [address], [facility] [address], [facility], [address]  Fax: [phone]    Lab services and OB/GYN are at this location as well

## 2023-05-05 ENCOUNTER — OFFICE VISIT (OUTPATIENT)
Dept: ENDOCRINOLOGY | Facility: CLINIC | Age: 58
End: 2023-05-05

## 2023-05-05 VITALS
OXYGEN SATURATION: 97 % | HEART RATE: 97 BPM | RESPIRATION RATE: 18 BRPM | BODY MASS INDEX: 34.49 KG/M2 | WEIGHT: 207 LBS | DIASTOLIC BLOOD PRESSURE: 84 MMHG | HEIGHT: 65 IN | TEMPERATURE: 97.1 F | SYSTOLIC BLOOD PRESSURE: 132 MMHG

## 2023-05-05 DIAGNOSIS — K76.0 FATTY LIVER: ICD-10-CM

## 2023-05-05 DIAGNOSIS — D35.02 ADENOMA OF LEFT ADRENAL GLAND: Primary | ICD-10-CM

## 2023-05-05 DIAGNOSIS — E11.9 TYPE 2 DIABETES MELLITUS WITHOUT COMPLICATION, WITHOUT LONG-TERM CURRENT USE OF INSULIN (HCC): ICD-10-CM

## 2023-05-05 DIAGNOSIS — E55.9 VITAMIN D DEFICIENCY: ICD-10-CM

## 2023-05-05 DIAGNOSIS — E66.09 CLASS 1 OBESITY DUE TO EXCESS CALORIES WITH SERIOUS COMORBIDITY AND BODY MASS INDEX (BMI) OF 34.0 TO 34.9 IN ADULT: ICD-10-CM

## 2023-05-05 DIAGNOSIS — G47.33 OSA (OBSTRUCTIVE SLEEP APNEA): ICD-10-CM

## 2023-05-05 RX ORDER — DEXAMETHASONE 1 MG
TABLET ORAL
Qty: 1 TABLET | Refills: 0 | Status: SHIPPED | OUTPATIENT
Start: 2024-05-05

## 2023-05-05 NOTE — PROGRESS NOTES
Follow-up Patient Progress Note      CC: diabetes, adrenal mass     History of Present Illness:   56 yr female with type 2 diabetes for 3 yrs, NAFLD, MARIE, HTN, HLD, Anxiety/depression, cholelithiasis, DJD spine, RBBB, morbid obesity and 2 3cm Lt adrenal adenoma  Last visit was 5/24/22      Since last visit, she lost 22 lbs  She was reently taken off glimepiride  She was diagnosed with type 2 diabetes on routine testing by PCP  She was started on glimepiride  She never tried metformin or other agents      She reports hx of weight gain, hyperglycemia and occasional symptomatic hypoglycemia while fasting      CT abdomen: 2 3 cm left adrenal nodule within the medial limb demonstrates absolute washout of 82% compatible with adenoma   Incidentally found as a 1 4cm lesion in 2018      Current meds:  none     Opthamology: no  Podiatry: no  vaccination:   Dental:  Pancreatitis: no     Ace/ARB: losartan  Statin:  Thyroid issues: no       Patient Active Problem List   Diagnosis    Chronic nonseasonal allergic rhinitis due to pollen    Depression    Hyperlipidemia    Endometriosis    Fibromyalgia    Greater trochanteric bursitis of both hips    Fatigue    Type 2 diabetes mellitus ([address] )    Hydradenitis    Hypertension    Chronic hypokalemia    Lumbar radiculopathy    Lumbar spondylosis    MARIE (obstructive sleep apnea)    Sacroiliitis (HCC)    Vitamin B12 deficiency    Vitamin D deficiency    Allergic rhinitis    Obesity    Right bundle branch block    Chest pain    Abdominal pain    GERD (gastroesophageal reflux disease)    Flushing    Anxiety    Fatty liver    Menopause syndrome    Leg pain    PMR (polymyalgia rheumatica) (HCC)    Esophageal dysphagia    Irritable bowel syndrome with both constipation and diarrhea    Osteoarthritis of both knees    Mass of arm, left    Acute medial meniscus tear    Vulvar cyst    Trochanteric bursitis    Hyperhidrosis    Sebaceous cyst    Gall stone  Adenoma of left adrenal gland    Proteinuria    Chronic pansinusitis    GARCIA (dyspnea on exertion)    Tobacco user     Past Medical History:   Diagnosis Date    Ankle fracture     Anxiety     Asthma     Bleeding hemorrhoids 4/13/2017    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    Formatting of this note might be different from the original  Added automatically from request for surgery [id number]    Chronic venous embolism and thrombosis of deep vessels of lower extremity ([facility] [address] )     Costochondritis     RESOLVED: [id number]    Depression     Diabetes mellitus ([facility] [address] ) 11/2021    Endometriosis     Fibromyalgia     GERD (gastroesophageal reflux disease)     Hematuria     LAST ASSESSED: [id number]    Hydradenitis     axillary area and under breast    Hypertension     LAST ASSESSED: [id number]    Irregular heart beat     Pulmonary embolism ([facility] [address] )     RESOLED: [id number] - secondary to a lupron injection for endometriosis    RBBB (right bundle branch block)     Sleep apnea     Cannot tolerate CPAP    Umbilical hernia     LAST ASSESSED: [id number]      Past Surgical History:   Procedure Laterality Date    HERNIA REPAIR      umbilical    HUMERUS FRACTURE SURGERY W/ IMPLANT Left     LAPAROSCOPIC TOTAL HYSTERECTOMY  2014    PELVIC LAPAROSCOPY      x4 for endometriosis    MO ESOPHAGOGASTRODUODENOSCOPY TRANSORAL DIAGNOSTIC N/A 05/02/2019    Procedure: ESOPHAGOGASTRODUODENOSCOPY (EGD) with bx;  Surgeon: Dani Vazquez MD;  Location: [facility];   Service: Gastroenterology      Family History   Problem Relation Age of Onset    Hypertension Mother     Diabetes Father     Hypertension Father     Obesity Father     No Known Problems Maternal Grandmother     No Known Problems Maternal Grandfather     Breast cancer Paternal Grandmother 80    No Known Problems Paternal Grandfather     No Known Problems Maternal Aunt      Social History     Tobacco Use    Smoking status: Every Day     Packs/day: 0 50     Types: Cigarettes Last attempt to quit: 8/10/2018     Years since quittin 7    Smokeless tobacco: Never    Tobacco comments:     smokes 1 cigarette occasionally   Substance Use Topics    Alcohol use: No     Allergies   Allergen Reactions    Albuterol      Other reaction(s): felt weird    Azithromycin GI Intolerance    Duloxetine      Category: Adverse Reaction; Annotation - [id number]: Sweating    Erythromycin Vomiting    Hydrocodone-Acetaminophen      Other reaction(s): sweating, feel faint, diarrhea    Hydrocodone-Acetaminophen     Ketorolac Diarrhea and Nausea Only     Category: Adverse Reaction;  Annotation - [id number]: Symptoms were noted after injection into bursa with Toradol at orthopedics    Penicillin G     Penicillins Hives and Vomiting    Tramadol          Current Outpatient Medications:     albuterol (PROVENTIL HFA,VENTOLIN HFA) 90 mcg/act inhaler, INHALE 2 PUFFS EVERY 6 HOURS AS NEEDED FOR WHEEZE, Disp: 18 g, Rfl: 2    Aspirin 81 MG EC tablet, Take by mouth, Disp: , Rfl:     azelastine (ASTELIN) 0 1 % nasal spray, 1 spray into each nostril 2 (two) times a day Use in each nostril as directed, Disp: 30 mL, Rfl: 2    Blood Glucose Monitoring Suppl (OneTouch Verio) w/Device KIT, USE DAILY AS DIRECTED, Disp: 1 kit, Rfl: 0    clindamycin (CLEOCIN T) 1 % lotion, APPLY ONCE DAILY TO AFFECTED AREAS UNDER ARMS , Disp: , Rfl: 3    CVS Olopatadine HCl 0 2 % opth drops, INSTILL 1 DROP INTO BOTH EYES DAILY, Disp: 7 5 mL, Rfl: 1    cyclobenzaprine (FLEXERIL) 10 mg tablet, Take 1 tablet (10 mg total) by mouth 3 (three) times a day as needed for muscle spasms, Disp: 30 tablet, Rfl: 0    escitalopram (LEXAPRO) 20 mg tablet, TAKE 1 TABLET BY MOUTH EVERY DAY, Disp: 90 tablet, Rfl: 1    famotidine (PEPCID) 40 MG tablet, TAKE 1 TABLET BY MOUTH EVERY DAY, Disp: 90 tablet, Rfl: 3    fluticasone (FLONASE) 50 mcg/act nasal spray, SPRAY 1 SPRAY INTO EACH NOSTRIL EVERY DAY, Disp: 24 mL, Rfl: 1    glucose blood "(OneTouch Verio) test strip, Testing once daily, Disp: 100 strip, Rfl: 1    loperamide (IMODIUM A-D) 2 MG tablet, Take 2 mg by mouth 4 (four) times a day as needed for diarrhea, Disp: , Rfl:     loratadine (CLARITIN) 10 mg tablet, Take 10 mg by mouth, Disp: , Rfl:     losartan (COZAAR) 50 mg tablet, Take 1 tablet (50 mg total) by mouth 2 (two) times a day, Disp: 120 tablet, Rfl: 3    meloxicam (Mobic) 15 mg tablet, Take 1 tablet (15 mg total) by mouth daily, Disp: 30 tablet, Rfl: 0    montelukast (SINGULAIR) 10 mg tablet, TAKE 1 TABLET BY MOUTH EVERY DAY, Disp: 90 tablet, Rfl: 3    OneTouch Delica Lancets 14O MISC, USE DAILY AS DIRECTED, Disp: 100 each, Rfl: 0    pantoprazole (PROTONIX) 40 mg tablet, TAKE 1 TABLET BY MOUTH EVERY DAY, Disp: 90 tablet, Rfl: 3    sucralfate (CARAFATE) 1 g tablet, TAKE 1 TABLET BY MOUTH FOUR TIMES A DAY, Disp: 360 tablet, Rfl: 1    traZODone (DESYREL) 50 mg tablet, TAKE 1 TABLET BY MOUTH EVERYDAY AT BEDTIME, Disp: 90 tablet, Rfl: 1    Cholecalciferol (VITAMIN D3) 1000 units CAPS, Take 2,000 Units by mouth (Patient not taking: Reported on 4/11/2023), Disp: , Rfl:     Cyanocobalamin (B-12) 1000 MCG CAPS, Take by mouth (Patient not taking: Reported on 5/5/2023), Disp: , Rfl:     Klor-Con M20 20 MEQ tablet, , Disp: , Rfl:     Review of Systems   HENT: Negative  Eyes: Negative  Respiratory: Negative  Cardiovascular: Negative  Gastrointestinal: Negative  Endocrine: Negative  Musculoskeletal: Negative  Skin: Negative  Allergic/Immunologic: Negative  Neurological: Negative  Hematological: Negative  Psychiatric/Behavioral: Negative  Physical Exam:  Body mass index is 34 45 kg/m²    /84 (BP Location: Left arm, Patient Position: Sitting, Cuff Size: Standard)   Pulse 97   Temp (!) 97 1 °F (36 2 °C) (Tympanic)   Resp 18   Ht 5' 5\" (1 651 m)   Wt 93 9 kg (207 lb)   SpO2 97%   BMI 34 45 kg/m²    Vitals:    05/05/23 1050   Weight: 93 9 kg " (207 lb)        Physical Exam  Constitutional:       General: She is not in acute distress  Appearance: She is well-developed  She is not ill-appearing  HENT:      Head: Normocephalic and atraumatic  Nose: Nose normal       Mouth/Throat:      Pharynx: Oropharynx is clear  Eyes:      Extraocular Movements: Extraocular movements intact  Conjunctiva/sclera: Conjunctivae normal    Neck:      Thyroid: No thyromegaly  Cardiovascular:      Rate and Rhythm: Normal rate  Pulmonary:      Effort: Pulmonary effort is normal    Musculoskeletal:         General: No deformity  Cervical back: Normal range of motion  Skin:     Capillary Refill: Capillary refill takes less than 2 seconds  Coloration: Skin is not pale  Findings: No rash  Neurological:      Mental Status: She is alert and oriented to person, place, and time  Psychiatric:         Behavior: Behavior normal          Labs:   Lab Results   Component Value Date    HGBA1C 5 4 04/11/2023       Lab Results   Component Value Date    [id number] 0 581 08/26/2021       Lab Results   Component Value Date    CREATININE 0 72 03/10/2023    CREATININE 0 67 03/02/2023    CREATININE 0 78 04/15/2022    BUN 12 03/10/2023     06/16/2014    K 4 5 03/10/2023     (H) 03/10/2023    CO2 25 03/10/2023     eGFR   Date Value Ref Range Status   03/10/2023 94 ml/min/1 73sq m Final       Lab Results   Component Value Date    ALT 53 02/15/2022    AST 37 02/15/2022    ALKPHOS 91 02/15/2022    BILITOT 0 79 05/30/2014       Lab Results   Component Value Date    CHOLESTEROL 178 08/26/2021    CHOLESTEROL 176 09/13/2018     Lab Results   Component Value Date    HDL 27 (L) 08/26/2021    HDL 34 (L) 09/13/2018     Lab Results   Component Value Date    TRIG 201 (H) 08/26/2021    TRIG 156 (H) 09/13/2018     No results found for: NONHDLC      Impression:  1  Adenoma of left adrenal gland    2  Fatty liver    3   Type 2 diabetes mellitus without complication, without long-term current use of insulin ([address] )    4  MARIE (obstructive sleep apnea)    5  Vitamin D deficiency    6  Class 1 obesity due to excess calories with serious comorbidity and body mass index (BMI) of 34 0 to 34 9 in adult         Plan:    Zach Joseph was seen today for follow-up  Diagnoses and all orders for this visit:    Adenoma of left adrenal gland  She has a benign left adrenal adenoma 2 3 cm with 82% washout  Last functional testing 3/2023 was unremarkable  Given benign imaging, she will not need further testing for pheochromocytoma  Typically adrenal adenomas are extremely slow-growing and therefore it is reasonable to repeat imaging in 2 to 3 years  Recent guidelines do not necessarily recommend further imaging  Would recommend functional testing for hypercortisolism in 1 year or if there is any symptoms suggestive of hypercortisolism  May follow with primary care physician  -     Cortisol Level, AM Specimen; Future  -     dexamethasone (DECADRON) 1 mg tablet; Take 1 tab at 11pm and get cortisol levels checked between 7:00 a m  and 9:00 a m  on the next morning  Do not start before May 5, 2024  Fatty liver  Advised to continue weight loss methods and consider adding muscle building exercises  She may benefit from either a GLP-1 agonist or vitamin E 800 IU  Type 2 diabetes mellitus without complication, without long-term current use of insulin ([address] )  She is well controlled with most recent A1c of 5 3%  This is known diabetes range  This she has achieved with weight loss  Glimepiride was stopped since then therefore, we may see an increase in the A1c on next testing  Would recommend metformin 500 to 2000 mg/day as tolerated if A1c was greater than 6%  MARIE (obstructive sleep apnea)  Uses CPAP    Vitamin D deficiency  Vit D3 2000IU daily  Class 1 obesity due to excess calories with serious comorbidity and body mass index (BMI) of 34 0 to 34 9 in adult   Today we discussed all aspects of obesity and metabolism including pathophysiology, risk factors, complications, goal of sustaining weight loss long term, usual propensity to regain weight with short term approaches, follow up needs and medications - efficacy and limitations  Discussed role of endocrinopathies, inflammatory conditions, sleep disorders, mental health disorders, lifestyle issues and polypharmacy and weight gain  Briefly discussed bariatric surgery  Diet: carb controlled diet <1500cal/day/ VLCD, 64oz fluids/day   lifestyle modifications: intermittent fasting and 10,000 steps/day  medical fitness training: muscle building  education: nutrition input    Goal weight in the next 6 to 12 months is 170 lbs  -     Ambulatory Referral to Medical Fitness Exercise Specialist; Future        I have spent 45minutes with patient today in which greater than 50% of this time was spent in counseling/coordination of care  Today we discussed each of her metabolic and endocrine issues as listed above and proposed management plans for the next few years  She can now follow-up with her primary care physician and follow-up with endocrinology only as needed  Discussed with the patient and all questioned fully answered  She will call me if any problems arise  Educated/ Counseled patient on diagnostic test results, prognosis, risk vs benefit of treatment options, importance of treatment compliance, healthy life and lifestyle choices        [address]

## 2023-05-21 DIAGNOSIS — M54.16 LUMBAR RADICULOPATHY: ICD-10-CM

## 2023-05-22 RX ORDER — MELOXICAM 15 MG/1
15 TABLET ORAL DAILY
Qty: 30 TABLET | Refills: 0 | Status: SHIPPED | OUTPATIENT
Start: 2023-05-22

## 2023-05-23 DIAGNOSIS — J30.9 ALLERGIC RHINITIS, UNSPECIFIED SEASONALITY, UNSPECIFIED TRIGGER: ICD-10-CM

## 2023-05-23 DIAGNOSIS — J32.4 CHRONIC PANSINUSITIS: ICD-10-CM

## 2023-05-23 RX ORDER — FLUTICASONE PROPIONATE 50 MCG
SPRAY, SUSPENSION (ML) NASAL
Qty: 24 ML | Refills: 2 | Status: SHIPPED | OUTPATIENT
Start: 2023-05-23

## 2023-05-24 DIAGNOSIS — I10 PRIMARY HYPERTENSION: ICD-10-CM

## 2023-05-24 RX ORDER — LOSARTAN POTASSIUM 50 MG/1
50 TABLET ORAL 2 TIMES DAILY
Qty: 120 TABLET | Refills: 3 | Status: SHIPPED | OUTPATIENT
Start: 2023-05-24

## 2023-06-08 DIAGNOSIS — J30.89 CHRONIC NONSEASONAL ALLERGIC RHINITIS DUE TO POLLEN: ICD-10-CM

## 2023-06-08 RX ORDER — MONTELUKAST SODIUM 10 MG/1
TABLET ORAL
Qty: 90 TABLET | Refills: 3 | Status: SHIPPED | OUTPATIENT
Start: 2023-06-08

## 2023-06-10 DIAGNOSIS — E11.65 TYPE 2 DIABETES MELLITUS WITH HYPERGLYCEMIA, WITHOUT LONG-TERM CURRENT USE OF INSULIN (HCC): ICD-10-CM

## 2023-06-12 RX ORDER — BLOOD SUGAR DIAGNOSTIC
STRIP MISCELLANEOUS
Qty: 100 STRIP | Refills: 1 | Status: SHIPPED | OUTPATIENT
Start: 2023-06-12

## 2023-06-18 DIAGNOSIS — K21.00 GASTROESOPHAGEAL REFLUX DISEASE WITH ESOPHAGITIS, UNSPECIFIED WHETHER HEMORRHAGE: ICD-10-CM

## 2023-06-18 DIAGNOSIS — F33.1 MODERATE EPISODE OF RECURRENT MAJOR DEPRESSIVE DISORDER (HCC): ICD-10-CM

## 2023-06-19 RX ORDER — SUCRALFATE 1 G/1
TABLET ORAL
Qty: 360 TABLET | Refills: 1 | Status: SHIPPED | OUTPATIENT
Start: 2023-06-19

## 2023-06-19 RX ORDER — TRAZODONE HYDROCHLORIDE 50 MG/1
TABLET ORAL
Qty: 90 TABLET | Refills: 1 | Status: SHIPPED | OUTPATIENT
Start: 2023-06-19

## 2023-06-26 ENCOUNTER — OFFICE VISIT (OUTPATIENT)
Dept: FAMILY MEDICINE CLINIC | Facility: CLINIC | Age: 58
End: 2023-06-26
Payer: COMMERCIAL

## 2023-06-26 VITALS
OXYGEN SATURATION: 92 % | SYSTOLIC BLOOD PRESSURE: 128 MMHG | BODY MASS INDEX: 33.49 KG/M2 | DIASTOLIC BLOOD PRESSURE: 78 MMHG | WEIGHT: 201 LBS | HEIGHT: 65 IN | HEART RATE: 75 BPM | RESPIRATION RATE: 16 BRPM

## 2023-06-26 DIAGNOSIS — Z12.31 BREAST CANCER SCREENING BY MAMMOGRAM: ICD-10-CM

## 2023-06-26 DIAGNOSIS — Z12.11 SCREENING FOR COLON CANCER: ICD-10-CM

## 2023-06-26 DIAGNOSIS — Z72.0 TOBACCO USER: ICD-10-CM

## 2023-06-26 DIAGNOSIS — F33.42 RECURRENT MAJOR DEPRESSIVE DISORDER, IN FULL REMISSION (HCC): ICD-10-CM

## 2023-06-26 DIAGNOSIS — E55.9 VITAMIN D DEFICIENCY: ICD-10-CM

## 2023-06-26 DIAGNOSIS — R21 RASH: ICD-10-CM

## 2023-06-26 DIAGNOSIS — E53.8 VITAMIN B12 DEFICIENCY: ICD-10-CM

## 2023-06-26 DIAGNOSIS — J32.4 CHRONIC PANSINUSITIS: Primary | ICD-10-CM

## 2023-06-26 DIAGNOSIS — E11.9 TYPE 2 DIABETES MELLITUS WITHOUT COMPLICATION, WITHOUT LONG-TERM CURRENT USE OF INSULIN (HCC): ICD-10-CM

## 2023-06-26 PROBLEM — R23.2 FLUSHING: Status: RESOLVED | Noted: 2018-10-02 | Resolved: 2023-06-26

## 2023-06-26 PROBLEM — L72.3 SEBACEOUS CYST: Status: RESOLVED | Noted: 2021-10-25 | Resolved: 2023-06-26

## 2023-06-26 PROCEDURE — 99214 OFFICE O/P EST MOD 30 MIN: CPT | Performed by: FAMILY MEDICINE

## 2023-06-26 RX ORDER — EPINEPHRINE 0.3 MG/.3ML
INJECTION SUBCUTANEOUS
COMMUNITY
Start: 2023-05-26

## 2023-06-26 RX ORDER — PREDNISONE 20 MG/1
20 TABLET ORAL 2 TIMES DAILY WITH MEALS
Qty: 10 TABLET | Refills: 0 | Status: SHIPPED | OUTPATIENT
Start: 2023-06-26 | End: 2023-07-01

## 2023-06-26 RX ORDER — PIMECROLIMUS 10 MG/G
CREAM TOPICAL 2 TIMES DAILY PRN
Qty: 30 G | Refills: 2 | Status: SHIPPED | OUTPATIENT
Start: 2023-06-26

## 2023-06-26 RX ORDER — DOXYCYCLINE 100 MG/1
TABLET ORAL
COMMUNITY
Start: 2023-06-05 | End: 2023-06-26 | Stop reason: SDUPTHER

## 2023-06-26 RX ORDER — DOXYCYCLINE 100 MG/1
100 TABLET ORAL 2 TIMES DAILY
Qty: 28 TABLET | Refills: 0 | Status: SHIPPED | OUTPATIENT
Start: 2023-06-26 | End: 2023-07-10

## 2023-06-26 NOTE — PROGRESS NOTES
Name: Tito Mendez      : 1965      MRN: [id number]  Encounter Provider: Hunter Haji MD  Encounter Date: 2023   Encounter department: [facility] PRIMARY [facility]    Assessment & Plan     1  Chronic pansinusitis  Assessment & Plan:  2 week course of antibiotics with  Prednisone    Orders:  -     doxycycline (ADOXA) 100 MG tablet; Take 1 tablet (100 mg total) by mouth 2 (two) times a day for 14 days  -     predniSONE 20 mg tablet; Take 1 tablet (20 mg total) by mouth 2 (two) times a day with meals for 5 days    2  Breast cancer screening by mammogram  -     Mammo screening bilateral w 3d & cad; Future; Expected date: 2023    3  BMI 33 0-33 9,adult    4  Screening for colon cancer  -     Ambulatory Referral to Gastroenterology; Future    5  Recurrent major depressive disorder, in full remission ([facility] [address] )  Assessment & Plan:  Stable on meds      6  Tobacco user  Assessment & Plan:  Discussed pros  And  Cons  Of CT  Lung, pt agreeable to proceed    Orders:  -     CT lung screening program; Future; Expected date: 2023    7  Type 2 diabetes mellitus without complication, without long-term current use of insulin [facility])  Assessment & Plan:    Lab Results   Component Value Date    HGBA1C 5 4 2023   bs stable      8  Rash  Assessment & Plan:  May  Be  Sun sensitivity rash from doxy, will rx Elidel and  Cautioned  To wear  Sun screen    Orders:  -     pimecrolimus (ELIDEL) 1 % cream; Apply topically 2 (two) times a day as needed (rash)    9  Vitamin B12 deficiency  -     Vitamin B12; Future    10  Vitamin D deficiency  -     Vitamin D 25 hydroxy;  Future         Subjective      Here  For  Cough , was on antibiotics from allergy NP, thinks  She  Needed  Longer  Course, sx  Started after  Went  On every other  Week  Allergy shots, is  Also allergic  To cats  And  Has  4  Cats, does  Continue  To smoke , diabetes  Has  Been well controlled    Review of Systems   Constitutional: Negative for activity change, appetite change and fatigue  HENT: Positive for congestion, sinus pressure and sinus pain  Negative for rhinorrhea and sore throat  Respiratory: Positive for cough  Negative for shortness of breath  Cardiovascular: Negative for chest pain  Skin: Positive for rash  Neurological: Negative for dizziness and headaches  Hematological: Negative for adenopathy  Current Outpatient Medications on File Prior to Visit   Medication Sig   • albuterol (PROVENTIL HFA,VENTOLIN HFA) 90 mcg/act inhaler INHALE 2 PUFFS EVERY 6 HOURS AS NEEDED FOR WHEEZE   • Aspirin 81 MG EC tablet Take by mouth   • azelastine (ASTELIN) 0 1 % nasal spray 1 spray into each nostril 2 (two) times a day Use in each nostril as directed   • Blood Glucose Monitoring Suppl (OneTouch Verio) w/Device KIT USE DAILY AS DIRECTED   • Cholecalciferol (VITAMIN D3) 1000 units CAPS Take 2,000 Units by mouth   • clindamycin (CLEOCIN T) 1 % lotion APPLY ONCE DAILY TO AFFECTED AREAS UNDER ARMS  • CVS Olopatadine HCl 0 2 % opth drops INSTILL 1 DROP INTO BOTH EYES DAILY   • Cyanocobalamin (B-12) 1000 MCG CAPS Take by mouth   • [START ON 5/5/2024] dexamethasone (DECADRON) 1 mg tablet Take 1 tab at 11pm and get cortisol levels checked between 7:00 a m  and 9:00 a m  on the next morning  Do not start before May 5, 2024     • escitalopram (LEXAPRO) 20 mg tablet TAKE 1 TABLET BY MOUTH EVERY DAY   • famotidine (PEPCID) 40 MG tablet TAKE 1 TABLET BY MOUTH EVERY DAY   • fluticasone (FLONASE) 50 mcg/act nasal spray SPRAY 1 SPRAY INTO EACH NOSTRIL EVERY DAY   • glucose blood (OneTouch Verio) test strip USE TO TEST ONCE DAILY   • loperamide (IMODIUM A-D) 2 MG tablet Take 2 mg by mouth 4 (four) times a day as needed for diarrhea   • loratadine (CLARITIN) 10 mg tablet Take 10 mg by mouth   • losartan (COZAAR) 50 mg tablet Take 1 tablet (50 mg total) by mouth 2 (two) times a day   • montelukast (SINGULAIR) 10 mg tablet TAKE 1 TABLET BY MOUTH EVERY DAY   • "OneTouch Delica Lancets 33U [facility] USE DAILY AS DIRECTED   • pantoprazole (PROTONIX) 40 mg tablet TAKE 1 TABLET BY MOUTH EVERY DAY   • sucralfate (CARAFATE) 1 g tablet TAKE 1 TABLET BY MOUTH FOUR TIMES A DAY   • traZODone (DESYREL) 50 mg tablet TAKE 1 TABLET BY MOUTH EVERYDAY AT BEDTIME   • [DISCONTINUED] meloxicam (MOBIC) 15 mg tablet TAKE 1 TABLET (15 MG TOTAL) BY MOUTH DAILY  • EPINEPHrine (EPIPEN) 0 3 mg/0 3 mL SOAJ AS NEEDED FOR ALLERGIC REACTION  BRING TO ALLERGY SHOTS (Patient not taking: Reported on 6/26/2023)   • Klor-Con M20 20 MEQ tablet  (Patient not taking: Reported on 4/11/2023)   • [DISCONTINUED] cyclobenzaprine (FLEXERIL) 10 mg tablet Take 1 tablet (10 mg total) by mouth 3 (three) times a day as needed for muscle spasms   • [DISCONTINUED] doxycycline (ADOXA) 100 MG tablet TAKE 1 TABLET IN THE MORNING AND AT NIGHT (Patient not taking: Reported on 6/26/2023)       Objective     /78 (BP Location: Right arm, Patient Position: Sitting, Cuff Size: Large)   Pulse 75   Resp 16   Ht 5' 5\" (1 651 m)   Wt 91 2 kg (201 lb)   SpO2 92%   BMI 33 45 kg/m²     Physical Exam  Vitals reviewed  Constitutional:       Appearance: Normal appearance  She is obese  HENT:      Right Ear: Tympanic membrane normal       Left Ear: Tympanic membrane normal       Nose: Congestion present  No rhinorrhea  Mouth/Throat:      Pharynx: No oropharyngeal exudate or posterior oropharyngeal erythema  Cardiovascular:      Rate and Rhythm: Normal rate and regular rhythm  Pulses: Normal pulses  Heart sounds: Normal heart sounds  Pulmonary:      Effort: Pulmonary effort is normal       Breath sounds: Wheezing present  Lymphadenopathy:      Cervical: No cervical adenopathy  Skin:     Findings: Rash present  Comments: Lower arms  To elbow   Neurological:      Mental Status: She is alert         Zo Marino MD  "

## 2023-06-26 NOTE — PROGRESS NOTES
BMI Counseling: Body mass index is 33 45 kg/m²  The BMI is above normal  Nutrition recommendations include reducing portion sizes, decreasing overall calorie intake, 3-5 servings of fruits/vegetables daily, reducing fast food intake and consuming healthier snacks  Exercise recommendations include exercising 3-5 times per week

## 2023-06-26 NOTE — PATIENT INSTRUCTIONS
See how pt  does on meds, wear  sun screen, check  B12  and  D  with next  lab  Obesity   AMBULATORY CARE:   Obesity  means your body mass index (BMI) is greater than 30  Your healthcare provider will use your age, height, and weight to measure your BMI  The risks of obesity include  many health problems, including injuries or physical disability  Diabetes (high blood sugar level)    High blood pressure or high cholesterol    Heart disease    Stroke    Gallbladder or liver disease    Cancer of the colon, breast, prostate, liver, or kidney    Sleep apnea    Arthritis or gout    Screening  is done to check for health conditions before you have signs or symptoms  If you are 28to 79years old, your blood sugar level may be checked every 3 years for signs of prediabetes or diabetes  Your healthcare provider will check your blood pressure at each visit  High blood pressure can lead to a stroke or other problems  Your provider may check for signs of heart disease, cancer, or other health problems  Seek care immediately if:   You have a severe headache, confusion, or difficulty speaking  You have weakness on one side of your body  You have chest pain, sweating, or shortness of breath  Call your doctor if:   You have symptoms of gallbladder or liver disease, such as pain in your upper abdomen  You have knee or hip pain and discomfort while walking  You have symptoms of diabetes, such as intense hunger and thirst, and frequent urination  You have symptoms of sleep apnea, such as snoring or daytime sleepiness  You have questions or concerns about your condition or care  Treatment for obesity  focuses on helping you lose weight to improve your health  Even a small decrease in BMI can reduce the risk for many health problems  Your healthcare provider will help you set a weight-loss goal   Lifestyle changes  are the first step in treating obesity   These include making healthy food choices and getting regular physical activity  Your healthcare provider may suggest a weight-loss program that involves coaching, education, and therapy  Medicine  may help you lose weight when it is used with a healthy foods and physical activity  Surgery  can help you lose weight if you have obesity along with other health problems  Several types of weight-loss surgery are available  Ask your healthcare provider for more information  Tips for safe weight loss:   Set small, realistic goals  An example of a small goal is to walk for 20 minutes 5 days a week  Anther goal is to lose 5% of your body weight  Ask for support  Tell friends, family members, and coworkers about your goals  Ask someone to lose weight with you  You may also want to join a weight-loss support group  Identify foods or triggers that may cause you to overeat  Remove tempting high-calorie foods from your home and workplace  Place a bowl of fresh fruit on your kitchen counter  If stress causes you to eat, find other ways to cope with stress  A counselor or therapist may be able to help you  Track your daily calories and activity  Write down what you eat and drink  Also write down how many minutes of physical activity you do each day  Track your weekly weight  Weigh yourself in the morning, before you eat or drink anything but after you use the bathroom  Use the same scale, in the same place, and in similar clothing each time  Only weigh yourself 1 to 2 times each week, or as directed  You may become discouraged if you weigh yourself every day  Eating changes: You will need to eat 500 to 1,000 fewer calories each day than you currently eat to lose 1 to 2 pounds a week  The following changes will help you cut calories:  Eat smaller portions  Use small plates, no larger than 9 inches in diameter  Fill your plate half full of fruits and vegetables  Measure your food using measuring cups until you know what a serving size looks like  Eat 3 meals and 1 or 2 snacks each day  Plan your meals in advance  [place] and eat at home most of the time  Eat slowly  Do not skip meals  Skipping meals can lead to overeating later in the day  This can make it harder for you to lose weight  Talk with a dietitian to help you make a meal plan and schedule that is right for you  Eat fruits and vegetables at every meal   They are low in calories and high in fiber, which makes you feel full  Do not add butter, margarine, or cream sauce to vegetables  Use herbs to season steamed vegetables  Eat less fat and fewer fried foods  Eat more baked or grilled chicken and fish  These protein sources are lower in calories and fat than red meat  Limit fast food  Dress your salads with olive oil and vinegar instead of bottled dressing  Limit the amount of sugar you eat  Do not drink sugary beverages  Limit alcohol  Activity changes:  Physical activity is good for your body in many ways  It helps you burn calories and build strong muscles  It decreases stress and depression, and improves your mood  It can also help you sleep better  Talk to your healthcare provider before you begin an exercise program   Exercise for at least 30 minutes 5 days a week  Start slowly  Set aside time each day for physical activity that you enjoy and that is convenient for you  It is best to do both weight training and an activity that increases your heart rate, such as walking, bicycling, or swimming  Find ways to be more active  Do yard work and housecleaning  Walk up the stairs instead of using elevators  Spend your leisure time going to events that require walking, such as outdoor festivals or fairs  This extra physical activity can help you lose weight and keep it off  Follow up with your doctor as directed: You may need to meet with a dietitian  Write down your questions so you remember to ask them during your visits    © Copyright [employer] 2022 Information is for End User's use only and may not be sold, redistributed or otherwise used for commercial purposes  The above information is an  only  It is not intended as medical advice for individual conditions or treatments  Talk to your doctor, nurse or pharmacist before following any medical regimen to see if it is safe and effective for you  Weight Management   AMBULATORY CARE:   Why it is important to manage your weight:  Being overweight increases your risk of health conditions such as heart disease, high blood pressure, type 2 diabetes, and certain types of cancer  It can also increase your risk for osteoarthritis, sleep apnea, and other respiratory problems  Aim for a slow, steady weight loss  Even a small amount of weight loss can lower your risk of health problems  Risks of being overweight:  Extra weight can cause many health problems, including the following:  Diabetes (high blood sugar level)    High blood pressure or high cholesterol    Heart disease    Stroke    Gallbladder or liver disease    Cancer of the colon, breast, prostate, liver, or kidney    Sleep apnea    Arthritis or gout    Screening  is done to check for health conditions before you have signs or symptoms  If you are 28to 79years old, your blood sugar level may be checked every 3 years for signs of prediabetes or diabetes  Your healthcare provider will check your blood pressure at each visit  High blood pressure can lead to a stroke or other problems  Your provider may check for signs of heart disease, cancer, or other health problems  How to lose weight safely:  A safe and healthy way to lose weight is to eat fewer calories and get regular exercise  You can lose up about 1 pound a week by decreasing the number of calories you eat by 500 calories each day  You can decrease calories by eating smaller portion sizes or by cutting out high-calorie foods  Read labels to find out how many calories are in the foods you eat  You can also burn calories with exercise such as walking, swimming, or biking  You will be more likely to keep weight off if you make these changes part of your lifestyle  Exercise at least 30 minutes per day on most days of the week  You can also fit in more physical activity by taking the stairs instead of the elevator or parking farther away from stores  Ask your healthcare provider about the best exercise plan for you  Healthy meal plan for weight management:  A healthy meal plan includes a variety of foods, contains fewer calories, and helps you stay healthy  A healthy meal plan includes the following:     Eat whole-grain foods more often  A healthy meal plan should contain fiber  Fiber is the part of grains, fruits, and vegetables that is not broken down by your body  Whole-grain foods are healthy and provide extra fiber in your diet  Some examples of whole-grain foods are whole-wheat breads and pastas, oatmeal, brown rice, and bulgur  Eat a variety of vegetables every day  Include dark, leafy greens such as spinach, kale, jose greens, and mustard greens  Eat yellow and orange vegetables such as carrots, sweet potatoes, and winter squash  Eat a variety of fruits every day  Choose fresh or canned fruit (canned in its own juice or light syrup) instead of juice  Fruit juice has very little or no fiber  Eat low-fat dairy foods  Drink fat-free (skim) milk or 1% milk  Eat fat-free yogurt and low-fat cottage cheese  Try low-fat cheeses such as mozzarella and other reduced-fat cheeses  Choose meat and other protein foods that are low in fat  Choose beans or other legumes such as split peas or lentils  Choose fish, skinless poultry (chicken or turkey), or lean cuts of red meat (beef or pork)  Before you cook meat or poultry, cut off any visible fat  Use less fat and oil  Try baking foods instead of frying them   Add less fat, such as margarine, sour cream, regular salad dressing and mayonnaise to foods  Eat fewer high-fat foods  Some examples of high-fat foods include french fries, doughnuts, ice cream, and cakes  Eat fewer sweets  Limit foods and drinks that are high in sugar  This includes candy, cookies, regular soda, and sweetened drinks  Ways to decrease calories:   Eat smaller portions  Use a small plate with smaller servings  Do not eat second helpings  When you eat at a restaurant, ask for a box and place half of your meal in the box before you eat  Share an entrée with someone else  Replace high-calorie snacks with healthy, low-calorie snacks  Choose fresh fruit, vegetables, fat-free rice cakes, or air-popped popcorn instead of potato chips, nuts, or chocolate  Choose water or calorie-free drinks instead of soda or sweetened drinks  Do not shop for groceries when you are hungry  You may be more likely to make unhealthy food choices  Take a grocery list of healthy foods and shop after you have eaten  Eat regular meals  Do not skip meals  Skipping meals can lead to overeating later in the day  This can make it harder for you to lose weight  Eat a healthy snack in place of a meal if you do not have time to eat a regular meal  Talk with a dietitian to help you create a meal plan and schedule that is right for you  Other things to consider as you try to lose weight:   Be aware of situations that may give you the urge to overeat, such as eating while watching television  Find ways to avoid these situations  For example, read a book, go for a walk, or do crafts  Meet with a weight loss support group or friends who are also trying to lose weight  This may help you stay motivated to continue working on your weight loss goals  © Copyright Verna Crespo 2022 Information is for End User's use only and may not be sold, redistributed or otherwise used for commercial purposes  The above information is an  only   It is not intended as medical advice for individual conditions or treatments  Talk to your doctor, nurse or pharmacist before following any medical regimen to see if it is safe and effective for you

## 2023-07-22 DIAGNOSIS — J32.4 CHRONIC PANSINUSITIS: ICD-10-CM

## 2023-07-22 DIAGNOSIS — J30.9 ALLERGIC RHINITIS, UNSPECIFIED SEASONALITY, UNSPECIFIED TRIGGER: ICD-10-CM

## 2023-07-24 DIAGNOSIS — R21 RASH: ICD-10-CM

## 2023-07-24 RX ORDER — PIMECROLIMUS 10 MG/G
CREAM TOPICAL
Qty: 30 G | Refills: 2 | Status: SHIPPED | OUTPATIENT
Start: 2023-07-24

## 2023-07-24 RX ORDER — FLUTICASONE PROPIONATE 50 MCG
SPRAY, SUSPENSION (ML) NASAL
Qty: 24 ML | Refills: 2 | Status: SHIPPED | OUTPATIENT
Start: 2023-07-24

## 2023-07-31 ENCOUNTER — RA CDI HCC (OUTPATIENT)
Dept: OTHER | Facility: HOSPITAL | Age: 58
End: 2023-07-31

## 2023-07-31 NOTE — PROGRESS NOTES
[address] [facility] coding opportunities       Chart reviewed, no opportunity found: [address]        Patients Insurance     Medicare Insurance: [employer]

## 2023-08-03 ENCOUNTER — APPOINTMENT (OUTPATIENT)
Dept: LAB | Facility: CLINIC | Age: 58
End: 2023-08-03
Payer: COMMERCIAL

## 2023-08-03 DIAGNOSIS — E11.9 TYPE 2 DIABETES MELLITUS WITHOUT COMPLICATION, WITHOUT LONG-TERM CURRENT USE OF INSULIN (HCC): ICD-10-CM

## 2023-08-03 DIAGNOSIS — E53.8 VITAMIN B12 DEFICIENCY: ICD-10-CM

## 2023-08-03 DIAGNOSIS — E55.9 VITAMIN D DEFICIENCY: ICD-10-CM

## 2023-08-03 DIAGNOSIS — I10 PRIMARY HYPERTENSION: ICD-10-CM

## 2023-08-03 DIAGNOSIS — E78.2 MIXED HYPERLIPIDEMIA: ICD-10-CM

## 2023-08-03 LAB
25(OH)D3 SERPL-MCNC: 31.5 NG/ML (ref 30–100)
ALBUMIN SERPL BCP-MCNC: 3.3 G/DL (ref 3.5–5)
ALP SERPL-CCNC: 73 U/L (ref 46–116)
ALT SERPL W P-5'-P-CCNC: 23 U/L (ref 12–78)
ANION GAP SERPL CALCULATED.3IONS-SCNC: 5 MMOL/L
AST SERPL W P-5'-P-CCNC: 17 U/L (ref 5–45)
BILIRUB SERPL-MCNC: 0.61 MG/DL (ref 0.2–1)
BUN SERPL-MCNC: 8 MG/DL (ref 5–25)
CALCIUM ALBUM COR SERPL-MCNC: 9.8 MG/DL (ref 8.3–10.1)
CALCIUM SERPL-MCNC: 9.2 MG/DL (ref 8.3–10.1)
CHLORIDE SERPL-SCNC: 108 MMOL/L (ref 96–108)
CHOLEST SERPL-MCNC: 199 MG/DL
CO2 SERPL-SCNC: 26 MMOL/L (ref 21–32)
CREAT SERPL-MCNC: 0.68 MG/DL (ref 0.6–1.3)
EST. AVERAGE GLUCOSE BLD GHB EST-MCNC: 137 MG/DL
GFR SERPL CREATININE-BSD FRML MDRD: 98 ML/MIN/1.73SQ M
GLUCOSE P FAST SERPL-MCNC: 126 MG/DL (ref 65–99)
HBA1C MFR BLD: 6.4 %
HDLC SERPL-MCNC: 60 MG/DL
LDLC SERPL CALC-MCNC: 107 MG/DL (ref 0–100)
POTASSIUM SERPL-SCNC: 4.1 MMOL/L (ref 3.5–5.3)
PROT SERPL-MCNC: 7 G/DL (ref 6.4–8.4)
SODIUM SERPL-SCNC: 139 MMOL/L (ref 135–147)
TRIGL SERPL-MCNC: 161 MG/DL
VIT B12 SERPL-MCNC: 1378 PG/ML (ref 180–914)

## 2023-08-03 PROCEDURE — 36415 COLL VENOUS BLD VENIPUNCTURE: CPT

## 2023-08-03 PROCEDURE — 80061 LIPID PANEL: CPT

## 2023-08-03 PROCEDURE — 83036 HEMOGLOBIN GLYCOSYLATED A1C: CPT

## 2023-08-03 PROCEDURE — 82607 VITAMIN B-12: CPT

## 2023-08-03 PROCEDURE — 80053 COMPREHEN METABOLIC PANEL: CPT

## 2023-08-03 PROCEDURE — 82306 VITAMIN D 25 HYDROXY: CPT

## 2023-08-07 ENCOUNTER — OFFICE VISIT (OUTPATIENT)
Dept: FAMILY MEDICINE CLINIC | Facility: CLINIC | Age: 58
End: 2023-08-07
Payer: COMMERCIAL

## 2023-08-07 VITALS
WEIGHT: 206.13 LBS | TEMPERATURE: 96.7 F | SYSTOLIC BLOOD PRESSURE: 128 MMHG | OXYGEN SATURATION: 98 % | BODY MASS INDEX: 34.3 KG/M2 | DIASTOLIC BLOOD PRESSURE: 70 MMHG | HEART RATE: 60 BPM

## 2023-08-07 DIAGNOSIS — E53.8 VITAMIN B12 DEFICIENCY: ICD-10-CM

## 2023-08-07 DIAGNOSIS — L72.3 SEBACEOUS CYST: ICD-10-CM

## 2023-08-07 DIAGNOSIS — E55.9 VITAMIN D DEFICIENCY: ICD-10-CM

## 2023-08-07 DIAGNOSIS — E78.2 MIXED HYPERLIPIDEMIA: ICD-10-CM

## 2023-08-07 DIAGNOSIS — I10 PRIMARY HYPERTENSION: ICD-10-CM

## 2023-08-07 DIAGNOSIS — K61.1 PERIRECTAL ABSCESS: ICD-10-CM

## 2023-08-07 DIAGNOSIS — E11.9 TYPE 2 DIABETES MELLITUS WITHOUT COMPLICATION, WITHOUT LONG-TERM CURRENT USE OF INSULIN (HCC): Primary | ICD-10-CM

## 2023-08-07 DIAGNOSIS — E87.6 CHRONIC HYPOKALEMIA: ICD-10-CM

## 2023-08-07 PROCEDURE — 99214 OFFICE O/P EST MOD 30 MIN: CPT | Performed by: FAMILY MEDICINE

## 2023-08-07 NOTE — ASSESSMENT & PLAN NOTE
Vitamin D currently is normal, but low end of normal.  Could benefit from some increase in vitamin D. Recheck in 6 months.

## 2023-08-07 NOTE — ASSESSMENT & PLAN NOTE
Cholesterol much improved from before. HDL is fantastic now. Check in 6 months. I would like to have a lower LDL given her diabetes. Consider statins. Would consider Crestor 5.

## 2023-08-07 NOTE — PROGRESS NOTES
Name: Phill Fry      : 1965      MRN: [id number]  Encounter Provider: Kyle Gaxiola MD  Encounter Date: 2023   Encounter department: [place] [address]     1. Type 2 diabetes mellitus without complication, without long-term current use of insulin (Formerly [facility])  Assessment & Plan:  A1c is at goal, for diabetic. This does mean that she is definitely at diabetes. She has a 126. Continue with lifestyle modifications as she is not currently on medications. She would not need any until her A1c would go above 7.5, and then we could make adjustments in medications. Recheck in approximately 3 months. Lab Results   Component Value Date    HGBA1C 6.4 (H) 2023       Orders:  -     Comprehensive metabolic panel; Future; Expected date: 2024  -     Hemoglobin A1C; Future; Expected date: 2024  -     Albumin / creatinine urine ratio; Future; Expected date: 2023  -     Comprehensive metabolic panel; Future; Expected date: 2023  -     Hemoglobin A1C; Future; Expected date: 2023    2. Mixed hyperlipidemia  Assessment & Plan:  Cholesterol much improved from before. HDL is fantastic now. Check in 6 months. I would like to have a lower LDL given her diabetes. Consider statins. Would consider Crestor 5. Orders:  -     Cholesterol, total; Future; Expected date: 2024  -     Comprehensive metabolic panel; Future; Expected date: 2024  -     HDL cholesterol; Future; Expected date: 2024  -     LDL cholesterol, direct; Future; Expected date: 2024    3. Chronic hypokalemia  Assessment & Plan:  Potassium level was normal.    Orders:  -     Comprehensive metabolic panel; Future; Expected date: 2024    4. Vitamin B12 deficiency  Assessment & Plan:  Last several B12 have been normal.  In fact, supranormal.  Does not need to continue on supplementation. Can recheck in 6 months. Orders:  -     Vitamin B12; Future    5. Vitamin D deficiency  Assessment & Plan:  Vitamin D currently is normal, but low end of normal.  Could benefit from some increase in vitamin D. Recheck in 6 months. Orders:  -     Vitamin D 25 hydroxy; Future    6. Perirectal abscess  Comments:  Possible abscess. Not able to examine today. Discussed about antibiotics, but not really having problems. Colorectal surgery. Orders:  -     Ambulatory Referral to Colorectal Surgery; Future    7. Primary hypertension  Assessment & Plan:  Blood pressure today is good. 8. Sebaceous cyst  Assessment & Plan:  Low back. No drainage currently, no irritation. Follow-up as needed. Tobacco Cessation Counseling: Tobacco cessation counseling was provided. The patient is sincerely urged to quit consumption of tobacco. She is not ready to quit tobacco. Medication options and side effects of medication discussed. Patient refused medication. Patient understands risks. She is looking to quit, but not ready at this time. Subjective      Chief Complaint   Patient presents with   • Follow-up     Discuss lab results. mgb       Labs reviewed. See chart. Reviewed about medical concerns. Has had some issues with Hydradenitis, as noted by GYN. Had follow up with Derm. She also noted that there has been some drainage from the anus/rectum with squeezing the area, as well as drainage from the area. Had a small draining cyst in the back. No longer draining. DM: Not on medications. Review of Systems   Constitutional: Negative. HENT: Negative. Eyes: Negative. Respiratory: Negative. Cardiovascular: Negative. Gastrointestinal: Negative.         Current Outpatient Medications on File Prior to Visit   Medication Sig   • albuterol (PROVENTIL HFA,VENTOLIN HFA) 90 mcg/act inhaler INHALE 2 PUFFS EVERY 6 HOURS AS NEEDED FOR WHEEZE   • Aspirin 81 MG EC tablet Take by mouth   • azelastine (ASTELIN) 0.1 % nasal spray 1 spray into each nostril 2 (two) times a day Use in each nostril as directed   • Blood Glucose Monitoring Suppl (OneTouch Verio) w/Device KIT USE DAILY AS DIRECTED   • Cholecalciferol (VITAMIN D3) 1000 units CAPS Take 2,000 Units by mouth   • clindamycin (CLEOCIN T) 1 % lotion APPLY ONCE DAILY TO AFFECTED AREAS UNDER ARMS. • [facility] Olopatadine HCl 0.2 % opth drops INSTILL 1 DROP INTO BOTH EYES DAILY   • Cyanocobalamin (B-12) 1000 MCG CAPS Take by mouth   • escitalopram (LEXAPRO) 20 mg tablet TAKE 1 TABLET BY MOUTH EVERY DAY   • famotidine (PEPCID) 40 MG tablet TAKE 1 TABLET BY MOUTH EVERY DAY   • fluticasone (FLONASE) 50 mcg/act nasal spray SPRAY 1 SPRAY INTO EACH NOSTRIL EVERY DAY   • glucose blood (OneTouch Verio) test strip USE TO TEST ONCE DAILY   • loperamide (IMODIUM A-D) 2 MG tablet Take 2 mg by mouth 4 (four) times a day as needed for diarrhea   • loratadine (CLARITIN) 10 mg tablet Take 10 mg by mouth   • losartan (COZAAR) 50 mg tablet Take 1 tablet (50 mg total) by mouth 2 (two) times a day   • montelukast (SINGULAIR) 10 mg tablet TAKE 1 TABLET BY MOUTH EVERY DAY   • OneTouch Delica Lancets 66F MISC USE DAILY AS DIRECTED   • pantoprazole (PROTONIX) 40 mg tablet TAKE 1 TABLET BY MOUTH EVERY DAY   • pimecrolimus (ELIDEL) 1 % cream APPLY TOPICALLY 2 TIMES A DAY AS NEEDED FOR RASH   • sucralfate (CARAFATE) 1 g tablet TAKE 1 TABLET BY MOUTH FOUR TIMES A DAY   • traZODone (DESYREL) 50 mg tablet TAKE 1 TABLET BY MOUTH EVERYDAY AT BEDTIME   • [START ON 5/5/2024] dexamethasone (DECADRON) 1 mg tablet Take 1 tab at 11pm and get cortisol levels checked between 7:00 a.m. and 9:00 a.m. on the next morning. Do not start before May 5, 2024. (Patient not taking: Reported on 8/7/2023 Do not start before May 5, 2024.)   • EPINEPHrine (EPIPEN) 0.3 mg/0.3 mL SOAJ AS NEEDED FOR ALLERGIC REACTION.  BRING TO ALLERGY SHOTS (Patient not taking: Reported on 6/26/2023)   • Klor-Con M20 20 MEQ tablet  (Patient not taking: Reported on 4/11/2023)       Objective     B12 1378.  Vit D 31.5  Na 139, K 4.1, Ca 9.2. , ALT 23. Creat 0.68, GFR 98. Glucose 126. A1c 6.4. Cholesterol 199, , HDL 60, triglycerides 161. /70 (BP Location: Right arm, Patient Position: Sitting, Cuff Size: Large)   Pulse 60   Temp (!) 96.7 °F (35.9 °C) (Temporal)   Wt 93.5 kg (206 lb 2 oz)   SpO2 98%   BMI 34.30 kg/m²     Physical Exam  Vitals and nursing note reviewed. Constitutional:       Appearance: She is well-developed. HENT:      Head: Normocephalic and atraumatic. Cardiovascular:      Rate and Rhythm: Normal rate and regular rhythm. Pulses:           Carotid pulses are 2+ on the right side and 2+ on the left side. Heart sounds: Normal heart sounds. Pulmonary:      Effort: Pulmonary effort is normal.      Breath sounds: Normal breath sounds. No wheezing or rales. Chest:      Chest wall: No tenderness. Genitourinary:     Comments: Patient declined exam today.   Skin:             Cammie Loomis MD

## 2023-08-07 NOTE — PATIENT INSTRUCTIONS
Type 2 Diabetes in Adults: New Diagnosis   AMBULATORY CARE:   Type 2 diabetes  is a disease that affects how your body uses glucose (sugar). Normally, when the blood sugar level increases, the pancreas makes more insulin. Insulin helps move sugar out of the blood so it can be used for energy. Type 2 diabetes develops because either the body cannot make enough insulin, or it cannot use the insulin correctly. Type 2 diabetes can be controlled to prevent damage to your heart, blood vessels, and other organs. Common symptoms include the following:   Numbness in your fingers or toes    Blurred vision    Urinating often    More hunger or thirst than usual    Have someone call your local emergency number (911 in the [address]) if:   You have any of the following signs of a stroke:      Numbness or drooping on one side of your face     Weakness in an arm or leg    Confusion or difficulty speaking    Dizziness, a severe headache, or vision loss    You have any of the following signs of a heart attack:      Squeezing, pressure, or pain in your chest    You may  also have any of the following:     Discomfort or pain in your back, neck, jaw, stomach, or arm    Shortness of breath    Nausea or vomiting    Lightheadedness or a sudden cold sweat    You have trouble breathing. Seek care immediately if:   You have severe abdominal pain. You vomit for more than 2 hours. You have trouble staying awake or focusing. You are shaking or sweating. You feel weak or more tired than usual.    You have blurred or double vision. Your breath has a fruity, sweet smell. Call your doctor or diabetes care team provider if:   Your arms and legs are swollen. You have an upset stomach and cannot eat the foods on your meal plan. You feel dizzy, have headaches, or are easily irritated. Your skin is red, warm, dry, or swollen. You have a wound that does not heal.    You have numbness in your arms or legs.     You have trouble coping with your illness, or you feel anxious or depressed. You have questions or concerns about your condition or care. Treatment for type 2 diabetes  helps prevent or delay complications, including heart and kidney disease. You must eat healthy meals and do regular physical activity. You may  need any of the following:  Diabetes medicines or insulin  may be given to decrease the amount of sugar in your blood. Blood pressure medicine  may be given to lower your blood pressure. Cholesterol-lowering medicine  may be given to prevent heart disease. Antiplatelets , such as aspirin, help prevent blood clots. Take your antiplatelet medicine exactly as directed. These medicines make it more likely for you to bleed or bruise. If you are told to take aspirin, do not take acetaminophen or ibuprofen instead. Take your medicine as directed. Contact your healthcare provider if you think your medicine is not helping or if you have side effects. Tell your provider if you are allergic to any medicine. Keep a list of the medicines, vitamins, and herbs you take. Include the amounts, and when and why you take them. Bring the list or the pill bottles to follow-up visits. Carry your medicine list with you in case of an emergency. Tests  may be used to check for type 2 diabetes starting at age 28. Any of the following may be used to diagnose diabetes or check that it is well controlled: An A1c test  shows the average amount of sugar in your blood over the past 2 to 3 months. Your diabetes care team provider will tell you the A1c level that is right for you. A fasting plasma glucose test  is when your blood sugar level is tested after you have not eaten for 8 hours. A 2-hour plasma glucose test  starts with a blood sugar level check after you have not eaten for 8 hours. You are then given a glucose drink. Your blood sugar level is checked after 2 hours.     A random glucose test  may be done any time of day, no matter how long ago you ate. Diabetes education:  Diabetes education will start right away. Diabetes education may also happen later to refresh your memory. Your diabetes care team may include physicians, nurse practitioners, and physician assistants. It may also include nurses, dietitians, exercise specialists, pharmacists, dentists, and podiatrists. Family members, or others who are close to you, may also be part of the team. You and your team will make goals and plans to manage diabetes and other health problems. The plans and goals will be specific to your needs. Members of your diabetes care team will teach you the following:  About nutrition:  A dietitian will help you make a meal plan to keep your blood sugar level steady. You will learn how food affects your blood sugar levels. You will also learn to keep track of sugar and starchy foods (carbohydrates). Do not skip meals. Your blood sugar level may drop too low if you have taken diabetes medicine and do not eat. You may be taught to use the plate method for portion control. With the plate method, ½ of your plate contains vegetables. The other half is divided so that ¼ contains protein or meat, and ¼ contains starches, such as potatoes. About physical activity and diabetes: You will learn why physical activity, such as walking, is important. You and your care team provider will make a plan for your activity. Your care team provider will tell you what a healthy weight is for you. He or she will help you make a plan to get to that weight and stay there. Maintain a healthy weight to help delay or prevent complications of diabetes. About your blood sugar level: You will learn what your blood sugar level should be. You will be given information on when and how to check your blood sugar level. You may need to check by testing a drop of blood in a glucose monitor.  You may instead be given a continuous glucose monitoring (CGM) device. A sensor is placed in your abdomen or on your arm. You put a transmitter on the sensor to get a reading that shows up on the monitor. You will learn what to do if your level is too high or too low. Write down the times of your checks and your levels. Take them to all follow-up appointments. About diabetes medicine: You may need oral diabetes medicine, insulin, or both to help control your blood sugar levels. Your healthcare provider will teach you how and when to take oral diabetes medicine. You will also be taught about side effects oral diabetes medicine can cause. Insulin may be added if oral diabetes medicine becomes less effective over time. Insulin may be injected, or given through a pump or pen. You and your care team will discuss which method is best for you. An insulin pump  is an implanted device that gives your insulin 24 hours a day. An insulin pump prevents the need for multiple insulin injections in a day. An insulin pen  is a device prefilled with the right amount of insulin. You and your family members will be taught how to draw up and give insulin  if this is the best method for you. Your education team will also teach you how to dispose of needles and syringes. You will learn how much insulin you need  and when to give it. You will be taught when not to give insulin. You will also be taught what to do if your blood sugar level drops too low. This may happen if you take insulin and do not eat the right amount of carbohydrates. Other ways to help manage type 2 diabetes:   Talk to your care team if you have increased stress about your diagnosis. Stress about your diagnosis can keep you from taking care of yourself properly. Your care team can help by offering tips about self-care. Your care team may suggest you talk to a mental health provider. The provider can listen and offer help with self-care issues. Check your feet each day for sores.   Wear shoes and socks that fit correctly. Do not trim your toenails. Go to a podiatrist. Ask your care team for more information about foot care. Do not smoke. Nicotine and other chemicals in cigarettes and cigars can cause lung damage and make diabetes harder to manage. Ask your care team provider for information if you currently smoke and need help to quit. E-cigarettes or smokeless tobacco still contain nicotine. Talk to your care team provider before you use these products. Drink water instead of sugary drinks such as soft drinks and fruit juices. Sugary drinks increase your blood sugar level and weight. Your healthcare provider may tell you that diet drinks do not help with weight loss. Know the risks if you choose to drink alcohol. Alcohol can cause your blood sugar levels to be low if you use insulin. Alcohol can cause high blood sugar levels and weight gain if you drink too much. A drink of alcohol is 12 ounces of beer, 5 ounces of wine, or 1½ ounces of liquor. Your care team can tell you how many drinks are okay to have in 24 hours and in 1 week. Check your blood pressure as directed. If you have high blood pressure (BP), talk to your care team about your BP goals. Together you can create a plan to lower your BP if needed and keep it in a healthy range. The plan may include lifestyle changes or medicines. A normal BP is 119/79 or lower. A normal blood pressure can help prevent or delay certain complications from diabetes. Examples include retinopathy (eye damage) and kidney damage. Wear medical alert identification. Wear medical alert jewelry or carry a card that says you have diabetes. Ask your care team provider where to get these items. Ask about vaccines you may need. You have a higher risk for serious illness if you get the flu, pneumonia, COVID-19, or hepatitis.  Ask your provider if you should get vaccines to prevent these or other diseases, and when to get the vaccines. Risks of type 2 diabetes: It is important to learn to keep your diabetes in control. Uncontrolled diabetes can damage your nerves, veins, and arteries. Your risk for dementia increases faster the longer your diabetes is not controlled. High blood sugar levels may damage other body tissues and organs over time. Damage to arteries may increase your risk for heart attack and stroke. Nerve damage may also lead to other heart, stomach, and nerve problems. Diabetes can become life-threatening if it is not controlled. Follow up with your care team providers as directed: You may need to return to have your A1c checked every 3 months. You will need to have your feet checked during at least 1 visit each year. You will need an eye exam 1 time each year to check for retinopathy. You will also need tests to check for kidney or heart disease, and high blood pressure. Write down your questions so you remember to ask them during your visits. © Copyright [employer] 2022 Information is for End User's use only and may not be sold, redistributed or otherwise used for commercial purposes. The above information is an  only. It is not intended as medical advice for individual conditions or treatments. Talk to your doctor, nurse or pharmacist before following any medical regimen to see if it is safe and effective for you. 1. Type 2 diabetes mellitus without complication, without long-term current use of insulin ([address])  Assessment & Plan:  A1c is at goal, for diabetic. This does mean that she is definitely at diabetes. She has a 126. Continue with lifestyle modifications as she is not currently on medications. She would not need any until her A1c would go above 7.5, and then we could make adjustments in medications. Recheck in approximately 3 months. Lab Results   Component Value Date    HGBA1C 6.4 (H) 08/03/2023       Orders:  -     Comprehensive metabolic panel;  Future; Expected date: 02/07/2024  - Hemoglobin A1C; Future; Expected date: 02/07/2024  -     Albumin / creatinine urine ratio; Future; Expected date: 11/07/2023  -     Comprehensive metabolic panel; Future; Expected date: 11/07/2023  -     Hemoglobin A1C; Future; Expected date: 11/07/2023    2. Mixed hyperlipidemia  Assessment & Plan:  Cholesterol much improved from before. HDL is fantastic now. Check in 6 months. I would like to have a lower LDL given her diabetes. Consider statins. Would consider Crestor 5. Orders:  -     Cholesterol, total; Future; Expected date: 02/07/2024  -     Comprehensive metabolic panel; Future; Expected date: 02/07/2024  -     HDL cholesterol; Future; Expected date: 02/07/2024  -     LDL cholesterol, direct; Future; Expected date: 02/07/2024    3. Chronic hypokalemia  Assessment & Plan:  Potassium level was normal.    Orders:  -     Comprehensive metabolic panel; Future; Expected date: 02/07/2024    4. Vitamin B12 deficiency  Assessment & Plan:  Last several B12 have been normal.  In fact, supranormal.  Does not need to continue on supplementation. Can recheck in 6 months. Orders:  -     Vitamin B12; Future    5. Vitamin D deficiency  Assessment & Plan:  Vitamin D currently is normal, but low end of normal.  Could benefit from some increase in vitamin D. Recheck in 6 months. Orders:  -     Vitamin D 25 hydroxy; Future    6. Perirectal abscess  Comments:  Possible abscess. Not able to examine today. Discussed about antibiotics, but not really having problems. Colorectal surgery. Orders:  -     Ambulatory Referral to Colorectal Surgery; Future    7. Primary hypertension  Assessment & Plan:  Blood pressure today is good. 8. Sebaceous cyst  Assessment & Plan:  Low back. No drainage currently, no irritation. Follow-up as needed. COVID 19 Instructions    Dorie Carrel was advised to limit contact with others to essential tasks such as getting food, medications, and medical care.     Proper handwashing reviewed, and Hand sanitzer when washing is not available. If the patient develops symptoms of COVID 19, the patient should call the office as soon as possible. For 1379-6329 Flu season, it is strongly recommended that Flu Vaccinations be obtained. Virtual Visits:  Chio: This works on smart phones (any phone with Internet browsing capability). You should get a text message when the provider is ready to see you. Click on the link in the text message, and the call should start. You will need to type in your name, and allow camera and microphone access. This is HIPPA compliant, and secure. If you have not already done so, get immunized to COVID 19. We are committed to getting you vaccinated as soon as possible and will be closely following CDC and [facility] guidelines as they are released and revised. Please refer to our COVID-19 vaccine webpage for the most up to date information on the vaccine and our distribution efforts. This site will also have the most up to date recommendations for COVID booster vaccine. [email or web address]    Call [phone] ([phone]), option 7    OUR NEW LOCATION:    [facility]  [address] [facility], [address] [place] [address], [address]  Fax: [phone]    Lab services and OB/GYN are at this location as well.

## 2023-08-07 NOTE — ASSESSMENT & PLAN NOTE
A1c is at goal, for diabetic. This does mean that she is definitely at diabetes. She has a 126. Continue with lifestyle modifications as she is not currently on medications. She would not need any until her A1c would go above 7.5, and then we could make adjustments in medications. Recheck in approximately 3 months.   Lab Results   Component Value Date    HGBA1C 6.4 (H) 08/03/2023

## 2023-08-07 NOTE — ASSESSMENT & PLAN NOTE
Last several B12 have been normal.  In fact, supranormal.  Does not need to continue on supplementation. Can recheck in 6 months.

## 2023-08-14 ENCOUNTER — OFFICE VISIT (OUTPATIENT)
Dept: OBGYN CLINIC | Facility: MEDICAL CENTER | Age: 58
End: 2023-08-14
Payer: COMMERCIAL

## 2023-08-14 VITALS
HEART RATE: 62 BPM | BODY MASS INDEX: 34.32 KG/M2 | DIASTOLIC BLOOD PRESSURE: 81 MMHG | HEIGHT: 65 IN | WEIGHT: 206 LBS | SYSTOLIC BLOOD PRESSURE: 131 MMHG

## 2023-08-14 DIAGNOSIS — M17.0 PRIMARY OSTEOARTHRITIS OF BOTH KNEES: Primary | ICD-10-CM

## 2023-08-14 PROCEDURE — 20610 DRAIN/INJ JOINT/BURSA W/O US: CPT | Performed by: PHYSICIAN ASSISTANT

## 2023-08-14 PROCEDURE — 99213 OFFICE O/P EST LOW 20 MIN: CPT | Performed by: PHYSICIAN ASSISTANT

## 2023-08-14 RX ORDER — BUPIVACAINE HYDROCHLORIDE 5 MG/ML
6 INJECTION, SOLUTION EPIDURAL; INTRACAUDAL
Status: COMPLETED | OUTPATIENT
Start: 2023-08-14 | End: 2023-08-14

## 2023-08-14 RX ORDER — KETOTIFEN FUMARATE 0.35 MG/ML
SOLUTION/ DROPS OPHTHALMIC
COMMUNITY
Start: 2023-08-09

## 2023-08-14 RX ORDER — METHYLPREDNISOLONE ACETATE 40 MG/ML
1 INJECTION, SUSPENSION INTRA-ARTICULAR; INTRALESIONAL; INTRAMUSCULAR; SOFT TISSUE
Status: COMPLETED | OUTPATIENT
Start: 2023-08-14 | End: 2023-08-14

## 2023-08-14 RX ADMIN — BUPIVACAINE HYDROCHLORIDE 6 ML: 5 INJECTION, SOLUTION EPIDURAL; INTRACAUDAL at 12:00

## 2023-08-14 RX ADMIN — METHYLPREDNISOLONE ACETATE 1 ML: 40 INJECTION, SUSPENSION INTRA-ARTICULAR; INTRALESIONAL; INTRAMUSCULAR; SOFT TISSUE at 12:00

## 2023-09-03 DIAGNOSIS — F33.1 MODERATE EPISODE OF RECURRENT MAJOR DEPRESSIVE DISORDER (HCC): ICD-10-CM

## 2023-09-05 RX ORDER — ESCITALOPRAM OXALATE 20 MG/1
TABLET ORAL
Qty: 90 TABLET | Refills: 1 | Status: SHIPPED | OUTPATIENT
Start: 2023-09-05

## 2023-10-03 ENCOUNTER — OFFICE VISIT (OUTPATIENT)
Dept: DERMATOLOGY | Facility: CLINIC | Age: 58
End: 2023-10-03
Payer: COMMERCIAL

## 2023-10-03 VITALS — HEIGHT: 65 IN | BODY MASS INDEX: 33.66 KG/M2 | TEMPERATURE: 97.6 F | WEIGHT: 202 LBS

## 2023-10-03 DIAGNOSIS — L73.2 HIDRADENITIS SUPPURATIVA: Primary | ICD-10-CM

## 2023-10-03 DIAGNOSIS — M77.9 BONE SPUR: ICD-10-CM

## 2023-10-03 PROCEDURE — 99204 OFFICE O/P NEW MOD 45 MIN: CPT | Performed by: DERMATOLOGY

## 2023-10-03 RX ORDER — DOXYCYCLINE HYCLATE 100 MG
100 TABLET ORAL 2 TIMES DAILY
COMMUNITY
Start: 2023-09-21 | End: 2023-10-03 | Stop reason: SDUPTHER

## 2023-10-03 RX ORDER — DOXYCYCLINE HYCLATE 100 MG
TABLET ORAL
Qty: 60 TABLET | Refills: 2 | Status: SHIPPED | OUTPATIENT
Start: 2023-10-03 | End: 2024-01-03

## 2023-10-03 NOTE — PROGRESS NOTES
[facility] Note     Patient Name: Shelbi Solano  Encounter Date: 10/3/2023    Have you been cared for by a [facility]atologist in the last 3 years and, if so, which description applies to you? NO. I am considered a "new" patient and must complete all patient intake questions. I am FEMALE/of child-bearing potential.    REVIEW OF SYSTEMS:  Have you recently had or currently have any of the following? · Recent fever or chills? No  · Any non-healing wound? No  · Are you pregnant or planning to become pregnant? No  · Are you currently or planning to be nursing or breast feeding? No   PAST MEDICAL HISTORY:  Have you personally ever had or currently have any of the following? If "YES," then please provide more detail. · Skin cancer (such as Melanoma, Basal Cell Carcinoma, Squamous Cell Carcinoma? No  · Tuberculosis, HIV/AIDS, Hepatitis B or C: No  · Systemic Immunosuppression such as Diabetes, Biologic or Immunotherapy, Chemotherapy, Organ Transplantation, Bone Marrow Transplantation No  · Radiation Treatment No   FAMILY HISTORY:  Any "first degree relatives" (parent, brother, sister, or child) with the following? • Skin Cancer, Pancreatic or Other Cancer? No   PATIENT EXPERIENCE:    • Do you want the Dermatologist to perform a COMPLETE skin exam today including a clinical examination under the "bra and underwear" areas? NO  • If necessary, do we have your permission to call and leave a detailed message on your Preferred Phone number that includes your specific medical information? Yes      Allergies   Allergen Reactions   • Albuterol      Other reaction(s): felt weird   • Azithromycin GI Intolerance   • Duloxetine      Category: Adverse Reaction; Annotation - [id number]: Sweating   • Erythromycin Vomiting   • Hydrocodone-Acetaminophen      Other reaction(s): sweating, feel faint, diarrhea   • Hydrocodone-Acetaminophen    • Ketorolac Diarrhea and Nausea Only     Category:  Adverse Reaction; Annotation - [id number]: Symptoms were noted after injection into bursa with Toradol at orthopedics   • Penicillin G    • Penicillins Hives and Vomiting   • Tramadol       Current Outpatient Medications:   •  albuterol (PROVENTIL HFA,VENTOLIN HFA) 90 mcg/act inhaler, INHALE 2 PUFFS EVERY 6 HOURS AS NEEDED FOR WHEEZE, Disp: 18 g, Rfl: 2  •  Aspirin 81 MG EC tablet, Take by mouth, Disp: , Rfl:   •  azelastine (ASTELIN) 0.1 % nasal spray, 1 spray into each nostril 2 (two) times a day Use in each nostril as directed, Disp: 30 mL, Rfl: 2  •  clindamycin (CLEOCIN T) 1 % lotion, APPLY ONCE DAILY TO AFFECTED AREAS UNDER ARMS., Disp: , Rfl: 3  •  [facility] Olopatadine HCl 0.2 % opth drops, INSTILL 1 DROP INTO BOTH EYES DAILY, Disp: 7.5 mL, Rfl: 1  •  escitalopram (LEXAPRO) 20 mg tablet, TAKE 1 TABLET BY MOUTH EVERY DAY, Disp: 90 tablet, Rfl: 1  •  famotidine (PEPCID) 40 MG tablet, TAKE 1 TABLET BY MOUTH EVERY DAY, Disp: 90 tablet, Rfl: 3  •  fluticasone (FLONASE) 50 mcg/act nasal spray, SPRAY 1 SPRAY INTO EACH NOSTRIL EVERY DAY, Disp: 24 mL, Rfl: 2  •  ketotifen (ZADITOR) 0.025 % ophthalmic solution, INSTILL 1 DROP PER EYE DAILY AS NEEDED, Disp: , Rfl:   •  loratadine (CLARITIN) 10 mg tablet, Take 10 mg by mouth, Disp: , Rfl:   •  losartan (COZAAR) 50 mg tablet, Take 1 tablet (50 mg total) by mouth 2 (two) times a day, Disp: 120 tablet, Rfl: 3  •  montelukast (SINGULAIR) 10 mg tablet, TAKE 1 TABLET BY MOUTH EVERY DAY, Disp: 90 tablet, Rfl: 3  •  pantoprazole (PROTONIX) 40 mg tablet, TAKE 1 TABLET BY MOUTH EVERY DAY, Disp: 90 tablet, Rfl: 3  •  sucralfate (CARAFATE) 1 g tablet, TAKE 1 TABLET BY MOUTH FOUR TIMES A DAY, Disp: 360 tablet, Rfl: 1  •  traZODone (DESYREL) 50 mg tablet, TAKE 1 TABLET BY MOUTH EVERYDAY AT BEDTIME, Disp: 90 tablet, Rfl: 1  •  Blood Glucose Monitoring Suppl (OneTouch Verio) w/Device KIT, USE DAILY AS DIRECTED (Patient not taking: Reported on 8/14/2023), Disp: 1 kit, Rfl: 0  •  Cholecalciferol (VITAMIN D3) 1000 units CAPS, Take 2,000 Units by mouth (Patient not taking: Reported on 8/14/2023), Disp: , Rfl:   •  Cyanocobalamin (B-12) 1000 MCG CAPS, Take by mouth (Patient not taking: Reported on 8/14/2023), Disp: , Rfl:   •  [START ON 5/5/2024] dexamethasone (DECADRON) 1 mg tablet, Take 1 tab at 11pm and get cortisol levels checked between 7:00 a.m. and 9:00 a.m. on the next morning. Do not start before May 5, 2024. (Patient not taking: Reported on 8/7/2023 Do not start before May 5, 2024.), Disp: 1 tablet, Rfl: 0  •  EPINEPHrine (EPIPEN) 0.3 mg/0.3 mL SOAJ, AS NEEDED FOR ALLERGIC REACTION. BRING TO ALLERGY SHOTS (Patient not taking: Reported on 6/26/2023), Disp: , Rfl:   •  glucose blood (OneTouch Verio) test strip, USE TO TEST ONCE DAILY (Patient not taking: Reported on 8/14/2023), Disp: 100 strip, Rfl: 1  •  Klor-Con M20 20 MEQ tablet, , Disp: , Rfl:   •  loperamide (IMODIUM A-D) 2 MG tablet, Take 2 mg by mouth 4 (four) times a day as needed for diarrhea (Patient not taking: Reported on 10/3/2023), Disp: , Rfl:   •  OneTouch Delica Lancets 97N MISC, USE DAILY AS DIRECTED (Patient not taking: Reported on 8/14/2023), Disp: 100 each, Rfl: 0  •  pimecrolimus (ELIDEL) 1 % cream, APPLY TOPICALLY 2 TIMES A DAY AS NEEDED FOR RASH (Patient not taking: Reported on 8/14/2023), Disp: 30 g, Rfl: 2          • Whom besides the patient is providing clinical information about today's encounter?   o NO ADDITIONAL HISTORIAN (patient alone provided history)    Physical Exam and Assessment/Plan by Diagnosis:      HISTORY OF HIDRADENITIS SUPPURATIVA    Physical Exam:  • Anatomic Location Affected:  Groin, axillary vaults  • Morphological Description:  Subacute scars in L axillary vault. Single 1 cm erythematous tender nodule on left medial thigh. No visible fistula  • Pertinent Positives:  • Pertinent Negatives: Additional History of Present Condition:  Presents for at least 4 years. HS Started on the underarms.  She has been using prescription antibacterial body wash twice a week for months. The spot on the groin area is swollen and painful to the touch. She is having discharge from the area when she pushes on it. She notes at times the discharge comes out her anus when she manipulates it. She did have anal fissures that had to be surgically repaired. Has been on doxycycline x 10d for a sinus infection which she thinks has helped calm it down. Assessment and Plan: Suspect single persistent nodule of HS. However, some concern for fistula given description of discharge out of anus when touched. Recommend GI consult and exam to better evaluate given history of anal fissures. For now, would not inject or excise until this is ruled out though I favor a simple HS nodule. Based on a thorough discussion of this condition and the management approach to it (including a comprehensive discussion of the known risks, side effects and potential benefits of treatment), the patient (family) agrees to implement the following specific plan:    • Continue doxycycline 100 mg BID for 30 days  o The risks of doxycycline were discussed at length including nausea/vomiting/diarrhea, abdominal pain, photosensitivity, esophagitis, and bacterial resistance. The patient was instructed to take the medication with a full meal and a glass of water, and not to lie down for 1 hour after taking. o Send MyChart update in 4 weeks  • Alternate over the counter Benzoyl peroxide and topical Hibiclens wash. • Further disposition and plan for excision vs medical therapy pending GI evaluation      What is hidradenitis suppurativa? Hidradenitis suppurativa is an inflammatory skin disease that affects apocrine gland-bearing skin in the axillae, in the groin, and under the breasts. It is characterised by recurrent boil-like nodules and abscesses that culminate in pus-like discharge, difficult-to-heal open wounds (sinuses) and scarring.  Hidradenitis suppurativa also has significant psychological impact and many patients suffer from impairment of body image, depression and anxiety. The term hidradenitis implies it starts as an inflammatory disorder of sweat glands, which is now known to be incorrect. Hidradenitis suppurativa is also known as acne inversa. Who gets hidradenitis suppurativa? Hidradenitis often starts at puberty, and is most active between the ages of 21 and 36 years, and in women, can resolve at menopause. It is three times more common in females than in males. Risk factors include:  • Other family members with hidradenitis suppurativa  • Obesity and insulin resistance/metabolic syndrome  • Cigarette smoking  • Follicular occlusion disorders: acne conglobata, dissecting cellulitis, pilonidal sinus  • Inflammatory bowel disease (Crohn disease)  • Rare autoinflammatory syndromes associated with abnormalities of PSTPIP1 gene. *    * PAPA syndrome (pyogenic arthritis, pyoderma gangrenosum and acne), PASH syndrome (pyoderma gangrenosum, acne, suppurative hidradenitis) and PAPASH syndrome (pyogenic arthritis, pyoderma gangrenosum, acne, suppurative hidradenitis). What causes hidradenitis suppurativa? Hidradenitis suppurativa is an autoinflammatory disorder. Although the exact cause is not yet understood, contributing factors include:  • Friction from clothing and body folds  • Aberrant immune response to commensal bacteria  • Abnormal cutaneous or follicular microbiome  • Follicular occlusion  • Release of pro-inflammatory cytokines  • Inflammation causing rupture of the follicular wall and destroying apocrine glands and ducts  • Secondary bacterial infection  • Certain drugs. What are the clinical features of hidradenitis suppurativa? Hidradenitis can affect a single or multiple areas in the armpits, neck, sub mammary area, and inner thighs.  Anogenital involvement most commonly affects the groin, mons pubis, vulva (in females), sides of the scrotum (in males), perineum, buttocks and perianal folds. Signs include:  • Open and closed comedones  • Painful firm papules, larger nodules and pleated ridges  • Pustules, fluctuant pseudocysts and abscesses  • Pyogenic granulomas  • Draining sinuses linking inflammatory lesions  • Hypertrophic and atrophic scars. Many patients with hidradenitis suppurativa also suffer from other skin disorders, including acne, hirsutism and psoriasis. The severity and extent of hidradenitis suppurativa is recorded at assessment and when determining the impact of a treatment. The Silas system describes three distinct clinical stages:  1. Solitary or multiple, isolated abscess formation without scarring or sinus tracts  2. Recurrent abscesses, single or multiple widely  lesions, with sinus tract formation  3. Diffuse or broad involvement, with multiple interconnected sinus tracts and abscesses. Severe hidradenitis (Mckoy Stage 3) has been associated with:  • Male sex  • Axillary and perianal involvement  • Obesity  • Smoking  • Higher risk of stroke, coronary artery disease, heart failure, and peripheral artery disease  • Disease duration. What is the treatment for hidradenitis suppurativa? General measures  • Weight loss; follow an anti-inflammatory, low-sugar, low-grain, low-dairy diet (mainly plants)  • Smoking cessation: this can lead to improvement within a few months  • Loose fitting clothing  • Daily unfragranced antiperspirants  • If prone to secondary infection, wash with antiseptics or take bleach baths  • Apply hydrogen peroxide solution or medical grade honey to reduce malodour  • Use peeling agents such as resorcinol 15% cream to de-roof nodules  • Apply simple dressings to draining sinuses  • Analgesics, such as paracetamol (acetaminophen), for pain control  • Seek help to manage anxiety and depression.     Medical management of hidradenitis suppurativa  Medical management of hidradenitis suppurativa is difficult. Treatment is required long term. Effective options are listed below. Antibiotics  • Topical clindamycin, with benzoyl peroxide to reduce bacterial resistance  • Short course of oral antibiotics for acute staphylococcal abscesses, eg flucloxacillin  • Prolonged courses (minimum 3 months) of tetracycline, metronidazole, trimethoprim + sulphamethoxazole, fluoroquinolones, ertapenem or dapsone for their anti-inflammatory action  • 6-12 week courses of the combination of clindamycin (or doxycycline) and rifampicin for severe disease. Antiandrogens  • Long-term oral contraceptive pill; antiandrogenic progesterones drospirenone or cyproterone acetate may be more effective than standard combined pills. These are more suitable than progesterone-only pills or devices. • Spironolactone and finasteride  • Response takes 6 months or longer. Immunomodulatory treatments for severe disease  • Intralesional corticosteroids into nodules  • Systemic corticosteroids short-term for flares  • Methotrexate, ciclosporin, and azathioprine  • TNF-? inhibitors adalimumab and infliximab, used in higher dose than required for psoriasis, are the most successful treatments to date. Note that paradoxically, they may sometimes induce new-onset hidradenitis suppurativa  • Other biologics are under investigation, such as the IL-1?  antagonist, canakinumab    Other medical treatments  • Metformin in patients with insulin resistance  • Acitretin (unsuitable for females of childbearing potential)  • Isotretinoin -- effective for acne but appears unhelpful for most cases of hidradenitis  • Colchicine  • Medical management of anxiety and depression    Surgical management of hidradenitis suppurativa  • Incision and drainage of acute abscesses  • Curettage and deroofing of nodules, abscesses and sinuses  • Laser ablation of nodules, abscesses and sinuses  • Wide local excision of persistent nodules  • Radical excisional surgery of entire affected area  • Nd:YAG laser hair removal      NEOPLASM UNSPECIFIED BEHAVIOR: POSSIBLE CYSTS R EXTENSOR ELBOW, POSSIBLE BONY PROTUBERANCE POSTERIOR SCALP     Physical Exam:  • Anatomic Location Affected:  Right extensor elbow and posterior scalp   • Morphological Description:  Clustered firm subcutaneous papules on R elbow. Firm bony protuberance on posterior scalp  • Pertinent Positives:  • Pertinent Negatives: Additional History of Present Condition:      Assessment and Plan:  Based on a thorough discussion of this condition and the management approach to it (including a comprehensive discussion of the known risks, side effects and potential benefits of treatment), the patient (family) agrees to implement the following specific plan:  • Ultrasound ordered to the right elbow and posterior scalp to better characterize before planning any definitive therapy.        Scribe Attestation    I,:  Vanessa Burt am acting as a scribe while in the presence of the attending physician.:       I,:  Haily Pena MD personally performed the services described in this documentation    as scribed in my presence.:

## 2023-10-11 ENCOUNTER — HOSPITAL ENCOUNTER (OUTPATIENT)
Dept: ULTRASOUND IMAGING | Facility: HOSPITAL | Age: 58
Discharge: HOME | End: 2023-10-11
Attending: DERMATOLOGY
Payer: COMMERCIAL

## 2023-10-11 ENCOUNTER — HOSPITAL ENCOUNTER (OUTPATIENT)
Dept: ULTRASOUND IMAGING | Facility: HOSPITAL | Age: 58
Discharge: HOME | End: 2023-10-11
Payer: COMMERCIAL

## 2023-10-11 DIAGNOSIS — M77.9 BONE SPUR: ICD-10-CM

## 2023-10-11 PROCEDURE — 76536 US EXAM OF HEAD AND NECK: CPT

## 2023-10-11 PROCEDURE — 76882 US LMTD JT/FCL EVL NVASC XTR: CPT

## 2023-10-20 ENCOUNTER — TELEPHONE (OUTPATIENT)
Dept: DERMATOLOGY | Facility: CLINIC | Age: 58
End: 2023-10-20

## 2023-10-23 ENCOUNTER — TELEPHONE (OUTPATIENT)
Dept: DERMATOLOGY | Facility: CLINIC | Age: 58
End: 2023-10-23

## 2023-10-25 ENCOUNTER — TELEPHONE (OUTPATIENT)
Dept: DERMATOLOGY | Facility: CLINIC | Age: 58
End: 2023-10-25

## 2023-10-25 ENCOUNTER — TELEPHONE (OUTPATIENT)
Age: 58
End: 2023-10-25

## 2023-10-25 NOTE — TELEPHONE ENCOUNTER
Patient called returning Dr. Deangelo Shelton call with US results. I looked into her chart and Dr. Stefanie Monterroso had left her a know to let her know if she would prefer to have her call her back or just communicate through my chart. The patient said they would reach out to Dr Stefanie Monterroso through My Chart.
Yes

## 2023-10-26 ENCOUNTER — TELEPHONE (OUTPATIENT)
Dept: DERMATOLOGY | Facility: CLINIC | Age: 58
End: 2023-10-26

## 2023-10-26 NOTE — TELEPHONE ENCOUNTER
Attempted to reach patient with ultrasound results. No answer. Will continue to try.
Oriented - self; Oriented - place; Oriented - time

## 2023-10-27 ENCOUNTER — TELEPHONE (OUTPATIENT)
Dept: DERMATOLOGY | Facility: CLINIC | Age: 58
End: 2023-10-27

## 2023-10-27 DIAGNOSIS — J32.4 CHRONIC PANSINUSITIS: ICD-10-CM

## 2023-10-27 DIAGNOSIS — J30.9 ALLERGIC RHINITIS, UNSPECIFIED SEASONALITY, UNSPECIFIED TRIGGER: ICD-10-CM

## 2023-10-27 RX ORDER — FLUTICASONE PROPIONATE 50 MCG
SPRAY, SUSPENSION (ML) NASAL
Qty: 24 ML | Refills: 2 | Status: SHIPPED | OUTPATIENT
Start: 2023-10-27

## 2023-10-30 ENCOUNTER — TELEPHONE (OUTPATIENT)
Dept: DERMATOLOGY | Facility: CLINIC | Age: 58
End: 2023-10-30

## 2023-11-10 ENCOUNTER — OFFICE VISIT (OUTPATIENT)
Dept: OBGYN CLINIC | Facility: MEDICAL CENTER | Age: 58
End: 2023-11-10
Payer: COMMERCIAL

## 2023-11-10 VITALS
HEART RATE: 60 BPM | HEIGHT: 65 IN | WEIGHT: 202 LBS | SYSTOLIC BLOOD PRESSURE: 167 MMHG | BODY MASS INDEX: 33.66 KG/M2 | DIASTOLIC BLOOD PRESSURE: 98 MMHG

## 2023-11-10 DIAGNOSIS — M17.0 PRIMARY OSTEOARTHRITIS OF BOTH KNEES: Primary | ICD-10-CM

## 2023-11-10 PROCEDURE — 99213 OFFICE O/P EST LOW 20 MIN: CPT | Performed by: ORTHOPAEDIC SURGERY

## 2023-11-10 NOTE — PROGRESS NOTES
Orthopaedic Surgery - Office Note  Supriya Valle (40 y.o. female)   : 1965   MRN: [id number]  Encounter Date: 11/10/2023    Chief Complaint   Patient presents with    Left Knee - Follow-up    Right Knee - Follow-up       Assessment / Plan  Bilateral knee osteoarthritis     Left knee visco-supplement was ordered. The patient has on-going knee pain and stiffness in which interferes with their daily activity and sleep. Examination of knee includes crepitus with range of motion. The patient rates their pain a 7/10 at times. The patient has tried home exercise program learned from past physical therapy. The patient has tried oral medications and steroid injections with limited benefit. The patient has been counseled on weight loss she has lost about 50 lbs over the past year     Return for Visco injections. History of Present Illness  Supriya Valle is a 66 y.o. female who presents today for follow up regarding bilateral knee osteoarthritis. Patient was provided with CS injection at her last visit on 23. Patient reports minimal relief from the injections. Patient does also have a hx of lumbar issue and trochanteric bursitis. Review of Systems  Pertinent items are noted in HPI. All other systems were reviewed and are negative. Physical Exam  /98   Pulse 60   Ht 5' 5" (1.651 m)   Wt 91.6 kg (202 lb)   BMI 33.61 kg/m²   Cons: Appears well. No apparent distress. Psych: Alert. Oriented x3. Mood and affect normal.  Eyes: PERRLA, EOMI  Resp: Normal effort. No audible wheezing or stridor. CV: Palpable pulse. No discernable arrhythmia. No LE edema. Lymph:  No palpable cervical, axillary, or inguinal lymphadenopathy. Skin: Warm. No palpable masses. No visible lesions. Neuro: Normal muscle tone. Normal and symmetric DTR's. Bilateral Knee Exam  Alignment:  Normal knee alignment. Inspection:  No swelling. Palpation:  lateral joint line tenderness.   ROM:  Normal knee ROM.  Strength:  5/5 quadriceps and hamstrings. Stability:  No objective knee instability. Stable Varus / Valgus stress, Lachman, and Posterior drawer. Tests:  No pertinent positive or negative tests. Patella:  Not tested. Neurovascular:  Sensation intact in DP/SP/Villavicencio/Sa/T nerve distributions. 2+ DP & PT pulses. Gait:  Normal.    Studies Reviewed  No studies to review    Procedures  No procedures today. Medical, Surgical, Family, and Social History  The patient's medical history, family history, and social history, were reviewed and updated as appropriate. Past Medical History:   Diagnosis Date    Ankle fracture     Anxiety     Asthma     Bleeding hemorrhoids 4/13/2017    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    Formatting of this note might be different from the original. Added automatically from request for surgery [id number]    Chronic venous embolism and thrombosis of deep vessels of lower extremity ([address])     Costochondritis     RESOLVED: [id number]    Depression     Diabetes mellitus ([address]) 11/2021    Endometriosis     Fibromyalgia     GERD (gastroesophageal reflux disease)     Hematuria     LAST ASSESSED: [id number]    Hydradenitis     axillary area and under breast    Hypertension     LAST ASSESSED: [id number]    Irregular heart beat     Pulmonary embolism ([address])     RESOLED: [id number] - secondary to a lupron injection for endometriosis    RBBB (right bundle branch block)     Sleep apnea     Cannot tolerate CPAP    Umbilical hernia     LAST ASSESSED: [id number]       Past Surgical History:   Procedure Laterality Date    HERNIA REPAIR      umbilical    HUMERUS FRACTURE SURGERY W/ IMPLANT Left     LAPAROSCOPIC TOTAL HYSTERECTOMY  2014    PELVIC LAPAROSCOPY      x4 for endometriosis    [place] ESOPHAGOGASTRODUODENOSCOPY TRANSORAL DIAGNOSTIC N/A 05/02/2019    Procedure: ESOPHAGOGASTRODUODENOSCOPY (EGD) with bx;  Surgeon: Cameron Kuo MD;  Location: [facility];   Service: Gastroenterology       Family History   Problem Relation Age of Onset    Hypertension Mother     Diabetes Father     Hypertension Father     Obesity Father     No Known Problems Maternal Grandmother     No Known Problems Maternal Grandfather     Breast cancer Paternal Grandmother 80    No Known Problems Paternal Grandfather     No Known Problems Maternal Aunt        Social History     Occupational History    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   Tobacco Use    Smoking status: Every Day     Packs/day: 0.50     Types: Cigarettes     Last attempt to quit: 8/10/2018     Years since quittin.2    Smokeless tobacco: Never    Tobacco comments:     smokes 1 cigarette occasionally   Vaping Use    Vaping Use: Never used   Substance and Sexual Activity    Alcohol use: No    Drug use: No    Sexual activity: Yes     Comment: seldom /hysterectomy       Allergies   Allergen Reactions    Albuterol      Other reaction(s): felt weird    Azithromycin GI Intolerance    Duloxetine      Category: Adverse Reaction; Annotation - [id number]: Sweating    Erythromycin Vomiting    Hydrocodone-Acetaminophen      Other reaction(s): sweating, feel faint, diarrhea    Hydrocodone-Acetaminophen     Ketorolac Diarrhea and Nausea Only     Category: Adverse Reaction;  Annotation - [id number]: Symptoms were noted after injection into bursa with Toradol at orthopedics    Penicillin G     Penicillins Hives and Vomiting    Tramadol          Current Outpatient Medications:     albuterol (PROVENTIL HFA,VENTOLIN HFA) 90 mcg/act inhaler, INHALE 2 PUFFS EVERY 6 HOURS AS NEEDED FOR WHEEZE, Disp: 18 g, Rfl: 2    Aspirin 81 MG EC tablet, Take by mouth, Disp: , Rfl:     azelastine (ASTELIN) 0.1 % nasal spray, 1 spray into each nostril 2 (two) times a day Use in each nostril as directed, Disp: 30 mL, Rfl: 2    clindamycin (CLEOCIN T) 1 % lotion, APPLY ONCE DAILY TO AFFECTED AREAS UNDER ARMS., Disp: , Rfl: 3    [employer]S Olopatadine HCl 0.2 % opth drops, INSTILL 1 DROP INTO BOTH EYES DAILY, Disp: 7.5 mL, Rfl: 1    doxycycline hyclate (VIBRA-TABS) 100 mg tablet, Take one tab PO BID with food and water. Do not lie down for one hour after taking. Avoid the sun or apply SPF50+ broad spectrum sunscreen every 2 hours while outdoors. , Disp: 60 tablet, Rfl: 2    escitalopram (LEXAPRO) 20 mg tablet, TAKE 1 TABLET BY MOUTH EVERY DAY, Disp: 90 tablet, Rfl: 1    famotidine (PEPCID) 40 MG tablet, TAKE 1 TABLET BY MOUTH EVERY DAY, Disp: 90 tablet, Rfl: 3    fluticasone (FLONASE) 50 mcg/act nasal spray, SPRAY 1 SPRAY INTO EACH NOSTRIL EVERY DAY, Disp: 24 mL, Rfl: 2    ketotifen (ZADITOR) 0.025 % ophthalmic solution, INSTILL 1 DROP PER EYE DAILY AS NEEDED, Disp: , Rfl:     loratadine (CLARITIN) 10 mg tablet, Take 10 mg by mouth, Disp: , Rfl:     losartan (COZAAR) 50 mg tablet, Take 1 tablet (50 mg total) by mouth 2 (two) times a day, Disp: 120 tablet, Rfl: 3    montelukast (SINGULAIR) 10 mg tablet, TAKE 1 TABLET BY MOUTH EVERY DAY, Disp: 90 tablet, Rfl: 3    pantoprazole (PROTONIX) 40 mg tablet, TAKE 1 TABLET BY MOUTH EVERY DAY, Disp: 90 tablet, Rfl: 3    sucralfate (CARAFATE) 1 g tablet, TAKE 1 TABLET BY MOUTH FOUR TIMES A DAY, Disp: 360 tablet, Rfl: 1    traZODone (DESYREL) 50 mg tablet, TAKE 1 TABLET BY MOUTH EVERYDAY AT BEDTIME, Disp: 90 tablet, Rfl: 1    Blood Glucose Monitoring Suppl (OneTouch Verio) w/Device KIT, USE DAILY AS DIRECTED (Patient not taking: Reported on 8/14/2023), Disp: 1 kit, Rfl: 0    Cholecalciferol (VITAMIN D3) 1000 units CAPS, Take 2,000 Units by mouth (Patient not taking: Reported on 8/14/2023), Disp: , Rfl:     Cyanocobalamin (B-12) 1000 MCG CAPS, Take by mouth (Patient not taking: Reported on 8/14/2023), Disp: , Rfl:     [START ON 5/5/2024] dexamethasone (DECADRON) 1 mg tablet, Take 1 tab at 11pm and get cortisol levels checked between 7:00 a.m. and 9:00 a.m. on the next morning. Do not start before May 5, 2024.  (Patient not taking: Reported on 8/7/2023 Do not start before May 5, 2024.), Disp: 1 tablet, Rfl: 0 EPINEPHrine (EPIPEN) 0.3 mg/0.3 mL SOAJ, AS NEEDED FOR ALLERGIC REACTION.  BRING TO ALLERGY SHOTS (Patient not taking: Reported on 6/26/2023), Disp: , Rfl:     glucose blood (OneTouch Verio) test strip, USE TO TEST ONCE DAILY (Patient not taking: Reported on 8/14/2023), Disp: 100 strip, Rfl: 1    Klor-Con M20 20 MEQ tablet, , Disp: , Rfl:     loperamide (IMODIUM A-D) 2 MG tablet, Take 2 mg by mouth 4 (four) times a day as needed for diarrhea (Patient not taking: Reported on 10/3/2023), Disp: , Rfl:     OneTouch Delica Lancets 77S MISC, USE DAILY AS DIRECTED (Patient not taking: Reported on 8/14/2023), Disp: 100 each, Rfl: 0    pimecrolimus (ELIDEL) 1 % cream, APPLY TOPICALLY 2 TIMES A DAY AS NEEDED FOR RASH (Patient not taking: Reported on 8/14/2023), Disp: 30 g, Rfl: 2      [facility]      I,:  Francois am acting as a scribe while in the presence of the attending physician.:       I,:  Rodri Marks MD personally performed the services described in this documentation    as scribed in my presence.:

## 2023-11-16 ENCOUNTER — APPOINTMENT (OUTPATIENT)
Dept: LAB | Facility: CLINIC | Age: 58
End: 2023-11-16
Payer: COMMERCIAL

## 2023-11-16 DIAGNOSIS — E11.9 TYPE 2 DIABETES MELLITUS WITHOUT COMPLICATION, WITHOUT LONG-TERM CURRENT USE OF INSULIN (HCC): ICD-10-CM

## 2023-11-16 DIAGNOSIS — E78.2 MIXED HYPERLIPIDEMIA: ICD-10-CM

## 2023-11-16 DIAGNOSIS — E87.6 CHRONIC HYPOKALEMIA: ICD-10-CM

## 2023-11-16 LAB
ALBUMIN SERPL BCP-MCNC: 4.3 G/DL (ref 3.5–5)
ALP SERPL-CCNC: 74 U/L (ref 34–104)
ALT SERPL W P-5'-P-CCNC: 17 U/L (ref 7–52)
ANION GAP SERPL CALCULATED.3IONS-SCNC: 8 MMOL/L
AST SERPL W P-5'-P-CCNC: 19 U/L (ref 13–39)
BILIRUB SERPL-MCNC: 0.73 MG/DL (ref 0.2–1)
BUN SERPL-MCNC: 9 MG/DL (ref 5–25)
CALCIUM SERPL-MCNC: 9.5 MG/DL (ref 8.4–10.2)
CHLORIDE SERPL-SCNC: 104 MMOL/L (ref 96–108)
CO2 SERPL-SCNC: 31 MMOL/L (ref 21–32)
CREAT SERPL-MCNC: 0.61 MG/DL (ref 0.6–1.3)
CREAT UR-MCNC: 94.3 MG/DL
GFR SERPL CREATININE-BSD FRML MDRD: 101 ML/MIN/1.73SQ M
GLUCOSE P FAST SERPL-MCNC: 121 MG/DL (ref 65–99)
MICROALBUMIN UR-MCNC: 8.2 MG/L
MICROALBUMIN/CREAT 24H UR: 9 MG/G CREATININE (ref 0–30)
POTASSIUM SERPL-SCNC: 4.7 MMOL/L (ref 3.5–5.3)
PROT SERPL-MCNC: 7.2 G/DL (ref 6.4–8.4)
SODIUM SERPL-SCNC: 143 MMOL/L (ref 135–147)

## 2023-11-16 PROCEDURE — 82043 UR ALBUMIN QUANTITATIVE: CPT

## 2023-11-16 PROCEDURE — 80053 COMPREHEN METABOLIC PANEL: CPT

## 2023-11-16 PROCEDURE — 83036 HEMOGLOBIN GLYCOSYLATED A1C: CPT

## 2023-11-16 PROCEDURE — 82570 ASSAY OF URINE CREATININE: CPT

## 2023-11-16 PROCEDURE — 36415 COLL VENOUS BLD VENIPUNCTURE: CPT

## 2023-11-17 ENCOUNTER — PROCEDURE VISIT (OUTPATIENT)
Dept: OBGYN CLINIC | Facility: MEDICAL CENTER | Age: 58
End: 2023-11-17
Payer: COMMERCIAL

## 2023-11-17 VITALS — BODY MASS INDEX: 34.81 KG/M2 | WEIGHT: 209.2 LBS

## 2023-11-17 DIAGNOSIS — M17.0 PRIMARY OSTEOARTHRITIS OF BOTH KNEES: Primary | ICD-10-CM

## 2023-11-17 LAB
EST. AVERAGE GLUCOSE BLD GHB EST-MCNC: 146 MG/DL
HBA1C MFR BLD: 6.7 %

## 2023-11-17 PROCEDURE — 20610 DRAIN/INJ JOINT/BURSA W/O US: CPT | Performed by: PHYSICIAN ASSISTANT

## 2023-11-17 RX ORDER — BUPIVACAINE HYDROCHLORIDE 2.5 MG/ML
4 INJECTION, SOLUTION INFILTRATION; PERINEURAL
Status: COMPLETED | OUTPATIENT
Start: 2023-11-17 | End: 2023-11-17

## 2023-11-17 RX ADMIN — BUPIVACAINE HYDROCHLORIDE 4 ML: 2.5 INJECTION, SOLUTION INFILTRATION; PERINEURAL at 09:30

## 2023-11-17 NOTE — PROGRESS NOTES
Orthopaedic Surgery - Office Note  Esau Robert (5 y.o. female)   : 1965   MRN: [id number]  Encounter Date: 2023    Chief Complaint   Patient presents with    Left Knee - Pain, Follow-up    Right Knee - Pain, Follow-up       Assessment / Plan  Bilateral knee osteoarthritis, left worse than right    After discussion of risk and benefits the patient agreed to proceed left knee Synvisc 1 injection. This was prepared and injected sterilely and tolerated well. She did not feel her right knee needed the injection at this time so since improved we will plan on administering that as needed going forward. We did again discuss arthritis treatment going forward for both knees. This includes conservative treatment such as anti-inflammatories and modalities such as ice or heat. This also included steroid injections every 3 months versus viscosupplementation every 6 months. Continue with ice and NSAIDs/analgesics as needed. Continue with activity as tolerated. Return if symptoms worsen or fail to improve, for schedule for follow up with Gloria May when injections needed. Ramona Menjivar History of Present Illness  Esau Robert is a 66 y.o. female who presents today for bilateral knee pain secondary to arthritis. On 2023 patient did have a right knee steroid injection which she feels is still helping. Prior to that patient received bilateral steroid injections on 2022. Patient has been doing well with steroid injections with pretty lasting relief with each of the shots. Patient describes her pain as achy. Patient states she has trouble going up and down stairs, getting in and out of the car, as well as long distance walking. Patient uses ice and OTC medications for pain. Patient has been working on weight loss. Patient would like to proceed with viscosupplementation of the left knee at this time as her right knee seems to be doing well since her last injection.     Review of Systems  Pertinent items are noted in HPI.  All other systems were reviewed and are negative. Physical Exam  Wt 94.9 kg (209 lb 3.2 oz)   BMI 34.81 kg/m²   Cons: Appears well. No apparent distress. Psych: Alert. Oriented x3. Mood and affect normal.  Eyes: PERRLA, EOMI  Resp: Normal effort. No audible wheezing or stridor. CV: Palpable pulse. No discernable arrhythmia. No LE edema. Lymph:  No palpable cervical, axillary, or inguinal lymphadenopathy. Skin: Warm. No palpable masses. No visible lesions. Neuro: Normal muscle tone. Normal and symmetric DTR's. Bilateral Knee Exam  Alignment:  Normal knee alignment. Inspection:  No swelling. Palpation:   lateral joint line tenderness. ROM:  Normal knee ROM. Strength:  5/5 quadriceps and hamstrings. Stability:  No objective knee instability. Stable Varus / Valgus stress, Lachman, and Posterior drawer. Tests:  No pertinent positive or negative tests. Patella:  Not tested. Neurovascular:  Sensation intact in DP/SP/Villavicencio/Sa/T nerve distributions. 2+ DP & PT pulses. Gait:  Normal.    Studies Reviewed  No studies to review    Large joint arthrocentesis: L knee  Universal Protocol:  Consent: Verbal consent obtained. Consent given by: patient  Patient identity confirmed: verbally with patient  Supporting Documentation  Indications: pain   Procedure Details  Location: knee - L knee  Needle size: 22 G  Approach: lateral  Medications administered: 4 mL bupivacaine 0.25 %; 48 mg hylan 48 MG/6ML    Patient tolerance: patient tolerated the procedure well with no immediate complications  Dressing:  Sterile dressing applied             Medical, Surgical, Family, and Social History  The patient's medical history, family history, and social history, were reviewed and updated as appropriate.     Past Medical History:   Diagnosis Date    Ankle fracture     Anxiety     Asthma     Bleeding hemorrhoids 4/13/2017    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    Formatting of this note might be different from the original. Added automatically from request for surgery [id number]    Chronic venous embolism and thrombosis of deep vessels of lower extremity ([address])     Costochondritis     RESOLVED: [id number]    Depression     Diabetes mellitus ([address]) 2021    Endometriosis     Fibromyalgia     GERD (gastroesophageal reflux disease)     Hematuria     LAST ASSESSED: [id number]    Hydradenitis     axillary area and under breast    Hypertension     LAST ASSESSED: [id number]    Irregular heart beat     Pulmonary embolism ([address])     RESOLED: [id number] - secondary to a lupron injection for endometriosis    RBBB (right bundle branch block)     Sleep apnea     Cannot tolerate CPAP    Umbilical hernia     LAST ASSESSED: [id number]       Past Surgical History:   Procedure Laterality Date    HERNIA REPAIR      umbilical    HUMERUS FRACTURE SURGERY W/ IMPLANT Left     LAPAROSCOPIC TOTAL HYSTERECTOMY      PELVIC LAPAROSCOPY      x4 for endometriosis    [place] ESOPHAGOGASTRODUODENOSCOPY TRANSORAL DIAGNOSTIC N/A 2019    Procedure: ESOPHAGOGASTRODUODENOSCOPY (EGD) with bx;  Surgeon: Rose Avila MD;  Location: [facility];   Service: Gastroenterology       Family History   Problem Relation Age of Onset    Hypertension Mother     Diabetes Father     Hypertension Father     Obesity Father     No Known Problems Maternal Grandmother     No Known Problems Maternal Grandfather     Breast cancer Paternal Grandmother 80    No Known Problems Paternal Grandfather     No Known Problems Maternal Aunt        Social History     Occupational History    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   Tobacco Use    Smoking status: Every Day     Packs/day: 0.50     Types: Cigarettes     Last attempt to quit: 8/10/2018     Years since quittin.2    Smokeless tobacco: Never    Tobacco comments:     smokes 1 cigarette occasionally   Vaping Use    Vaping Use: Never used   Substance and Sexual Activity    Alcohol use: No    Drug use: No    Sexual activity: Yes     Comment: seldom /hysterectomy       Allergies   Allergen Reactions    Albuterol      Other reaction(s): felt weird    Azithromycin GI Intolerance    Duloxetine      Category: Adverse Reaction; Annotation - [id number]: Sweating    Erythromycin Vomiting    Hydrocodone-Acetaminophen      Other reaction(s): sweating, feel faint, diarrhea    Hydrocodone-Acetaminophen     Ketorolac Diarrhea and Nausea Only     Category: Adverse Reaction; Annotation - [id number]: Symptoms were noted after injection into bursa with Toradol at orthopedics    Penicillin G     Penicillins Hives and Vomiting    Tramadol          Current Outpatient Medications:     albuterol (PROVENTIL HFA,VENTOLIN HFA) 90 mcg/act inhaler, INHALE 2 PUFFS EVERY 6 HOURS AS NEEDED FOR WHEEZE, Disp: 18 g, Rfl: 2    Aspirin 81 MG EC tablet, Take by mouth, Disp: , Rfl:     azelastine (ASTELIN) 0.1 % nasal spray, 1 spray into each nostril 2 (two) times a day Use in each nostril as directed, Disp: 30 mL, Rfl: 2    Blood Glucose Monitoring Suppl (OneTouch Verio) w/Device KIT, USE DAILY AS DIRECTED (Patient not taking: Reported on 8/14/2023), Disp: 1 kit, Rfl: 0    Cholecalciferol (VITAMIN D3) 1000 units CAPS, Take 2,000 Units by mouth (Patient not taking: Reported on 8/14/2023), Disp: , Rfl:     clindamycin (CLEOCIN T) 1 % lotion, APPLY ONCE DAILY TO AFFECTED AREAS UNDER ARMS., Disp: , Rfl: 3    CVS Olopatadine HCl 0.2 % opth drops, INSTILL 1 DROP INTO BOTH EYES DAILY, Disp: 7.5 mL, Rfl: 1    Cyanocobalamin (B-12) 1000 MCG CAPS, Take by mouth (Patient not taking: Reported on 8/14/2023), Disp: , Rfl:     [START ON 5/5/2024] dexamethasone (DECADRON) 1 mg tablet, Take 1 tab at 11pm and get cortisol levels checked between 7:00 a.m. and 9:00 a.m. on the next morning. Do not start before May 5, 2024. (Patient not taking: Reported on 8/7/2023 Do not start before May 5, 2024.), Disp: 1 tablet, Rfl: 0    doxycycline hyclate (VIBRA-TABS) 100 mg tablet, Take one tab PO BID with food and water.  Do not lie down for one hour after taking. Avoid the sun or apply SPF50+ broad spectrum sunscreen every 2 hours while outdoors. , Disp: 60 tablet, Rfl: 2    EPINEPHrine (EPIPEN) 0.3 mg/0.3 mL SOAJ, AS NEEDED FOR ALLERGIC REACTION.  BRING TO ALLERGY SHOTS (Patient not taking: Reported on 6/26/2023), Disp: , Rfl:     escitalopram (LEXAPRO) 20 mg tablet, TAKE 1 TABLET BY MOUTH EVERY DAY, Disp: 90 tablet, Rfl: 1    famotidine (PEPCID) 40 MG tablet, TAKE 1 TABLET BY MOUTH EVERY DAY, Disp: 90 tablet, Rfl: 3    fluticasone (FLONASE) 50 mcg/act nasal spray, SPRAY 1 SPRAY INTO EACH NOSTRIL EVERY DAY, Disp: 24 mL, Rfl: 2    glucose blood (OneTouch Verio) test strip, USE TO TEST ONCE DAILY (Patient not taking: Reported on 8/14/2023), Disp: 100 strip, Rfl: 1    ketotifen (ZADITOR) 0.025 % ophthalmic solution, INSTILL 1 DROP PER EYE DAILY AS NEEDED, Disp: , Rfl:     Klor-Con M20 20 MEQ tablet, , Disp: , Rfl:     loperamide (IMODIUM A-D) 2 MG tablet, Take 2 mg by mouth 4 (four) times a day as needed for diarrhea (Patient not taking: Reported on 10/3/2023), Disp: , Rfl:     loratadine (CLARITIN) 10 mg tablet, Take 10 mg by mouth, Disp: , Rfl:     losartan (COZAAR) 50 mg tablet, Take 1 tablet (50 mg total) by mouth 2 (two) times a day, Disp: 120 tablet, Rfl: 3    montelukast (SINGULAIR) 10 mg tablet, TAKE 1 TABLET BY MOUTH EVERY DAY, Disp: 90 tablet, Rfl: 3    OneTouch Delica Lancets 69A MISC, USE DAILY AS DIRECTED (Patient not taking: Reported on 8/14/2023), Disp: 100 each, Rfl: 0    pantoprazole (PROTONIX) 40 mg tablet, TAKE 1 TABLET BY MOUTH EVERY DAY, Disp: 90 tablet, Rfl: 3    pimecrolimus (ELIDEL) 1 % cream, APPLY TOPICALLY 2 TIMES A DAY AS NEEDED FOR RASH (Patient not taking: Reported on 8/14/2023), Disp: 30 g, Rfl: 2    sucralfate (CARAFATE) 1 g tablet, TAKE 1 TABLET BY MOUTH FOUR TIMES A DAY, Disp: 360 tablet, Rfl: 1    traZODone (DESYREL) 50 mg tablet, TAKE 1 TABLET BY MOUTH EVERYDAY AT BEDTIME, Disp: 90 tablet, Rfl: MUKUND Thompson    Scribe Attestation      I,:   am acting as a scribe while in the presence of the attending physician.:       I,:   personally performed the services described in this documentation    as scribed in my presence.:

## 2023-11-30 DIAGNOSIS — F33.1 MODERATE EPISODE OF RECURRENT MAJOR DEPRESSIVE DISORDER (HCC): ICD-10-CM

## 2023-11-30 DIAGNOSIS — K21.00 GASTROESOPHAGEAL REFLUX DISEASE WITH ESOPHAGITIS, UNSPECIFIED WHETHER HEMORRHAGE: ICD-10-CM

## 2023-11-30 RX ORDER — SUCRALFATE 1 G/1
TABLET ORAL
Qty: 360 TABLET | Refills: 0 | Status: SHIPPED | OUTPATIENT
Start: 2023-11-30

## 2023-11-30 RX ORDER — TRAZODONE HYDROCHLORIDE 50 MG/1
TABLET ORAL
Qty: 90 TABLET | Refills: 0 | Status: SHIPPED | OUTPATIENT
Start: 2023-11-30

## 2023-12-09 DIAGNOSIS — I10 PRIMARY HYPERTENSION: ICD-10-CM

## 2023-12-12 NOTE — PROGRESS NOTES
Colon and Rectal Surgery   Mitchell Rousseau 66 y.o. female MRN: [id number]   Encounter: [id number]  12/12/23   2:57 PM        ASSESSMENT:        PLAN:        HPI  Mitchell Rousseau is a 66 y.o. female referred for evaluation today by Dr. Roberto Bowling for a perirectal abscess. Surgical history of fissurectomy and Botox injection in the internal anal sphincter on 12/20/2018 with [facility], Dr. Lois Lloyd. Historical Information   Past Medical History:   Diagnosis Date    Ankle fracture     Anxiety     Asthma     Bleeding hemorrhoids 4/13/2017    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    Formatting of this note might be different from the original. Added automatically from request for surgery [id number]    Chronic venous embolism and thrombosis of deep vessels of lower extremity ([address])     Costochondritis     RESOLVED: [id number]    Depression     Diabetes mellitus ([address]) 11/2021    Endometriosis     Fibromyalgia     GERD (gastroesophageal reflux disease)     Hematuria     LAST ASSESSED: [id number]    Hydradenitis     axillary area and under breast    Hypertension     LAST ASSESSED: [id number]    Irregular heart beat     Pulmonary embolism ([address])     RESOLED: [id number] - secondary to a lupron injection for endometriosis    RBBB (right bundle branch block)     Sleep apnea     Cannot tolerate CPAP    Umbilical hernia     LAST ASSESSED: [id number]     Past Surgical History:   Procedure Laterality Date    HERNIA REPAIR      umbilical    HUMERUS FRACTURE SURGERY W/ IMPLANT Left     LAPAROSCOPIC TOTAL HYSTERECTOMY  2014    PELVIC LAPAROSCOPY      x4 for endometriosis    MI ESOPHAGOGASTRODUODENOSCOPY TRANSORAL DIAGNOSTIC N/A 05/02/2019    Procedure: ESOPHAGOGASTRODUODENOSCOPY (EGD) with bx;  Surgeon: Kade Russ MD;  Location: [facility];   Service: Gastroenterology       Meds/Allergies       Current Outpatient Medications:     albuterol (PROVENTIL HFA,VENTOLIN HFA) 90 mcg/act inhaler, INHALE 2 PUFFS EVERY 6 HOURS AS NEEDED FOR WHEEZE, Disp: 18 g, Rfl: 2    Aspirin 81 MG EC tablet, Take by mouth, Disp: , Rfl:     azelastine (ASTELIN) 0.1 % nasal spray, 1 spray into each nostril 2 (two) times a day Use in each nostril as directed, Disp: 30 mL, Rfl: 2    Blood Glucose Monitoring Suppl (OneTouch Verio) w/Device KIT, USE DAILY AS DIRECTED (Patient not taking: Reported on 8/14/2023), Disp: 1 kit, Rfl: 0    Cholecalciferol (VITAMIN D3) 1000 units CAPS, Take 2,000 Units by mouth (Patient not taking: Reported on 8/14/2023), Disp: , Rfl:     clindamycin (CLEOCIN T) 1 % lotion, APPLY ONCE DAILY TO AFFECTED AREAS UNDER ARMS., Disp: , Rfl: 3    [employer] Olopatadine HCl 0.2 % opth drops, INSTILL 1 DROP INTO BOTH EYES DAILY, Disp: 7.5 mL, Rfl: 1    Cyanocobalamin (B-12) 1000 MCG CAPS, Take by mouth (Patient not taking: Reported on 8/14/2023), Disp: , Rfl:     [START ON 5/5/2024] dexamethasone (DECADRON) 1 mg tablet, Take 1 tab at 11pm and get cortisol levels checked between 7:00 a.m. and 9:00 a.m. on the next morning. Do not start before May 5, 2024. (Patient not taking: Reported on 8/7/2023 Do not start before May 5, 2024.), Disp: 1 tablet, Rfl: 0    doxycycline hyclate (VIBRA-TABS) 100 mg tablet, Take one tab PO BID with food and water. Do not lie down for one hour after taking. Avoid the sun or apply SPF50+ broad spectrum sunscreen every 2 hours while outdoors. , Disp: 60 tablet, Rfl: 2    EPINEPHrine (EPIPEN) 0.3 mg/0.3 mL SOAJ, AS NEEDED FOR ALLERGIC REACTION.  BRING TO ALLERGY SHOTS (Patient not taking: Reported on 6/26/2023), Disp: , Rfl:     escitalopram (LEXAPRO) 20 mg tablet, TAKE 1 TABLET BY MOUTH EVERY DAY, Disp: 90 tablet, Rfl: 1    famotidine (PEPCID) 40 MG tablet, TAKE 1 TABLET BY MOUTH EVERY DAY, Disp: 90 tablet, Rfl: 3    fluticasone (FLONASE) 50 mcg/act nasal spray, SPRAY 1 SPRAY INTO EACH NOSTRIL EVERY DAY, Disp: 24 mL, Rfl: 2    glucose blood (OneTouch Verio) test strip, USE TO TEST ONCE DAILY (Patient not taking: Reported on 8/14/2023), Disp: 100 strip, Rfl: 1 ketotifen (ZADITOR) 0.025 % ophthalmic solution, INSTILL 1 DROP PER EYE DAILY AS NEEDED, Disp: , Rfl:     Klor-Con M20 20 MEQ tablet, , Disp: , Rfl:     loperamide (IMODIUM A-D) 2 MG tablet, Take 2 mg by mouth 4 (four) times a day as needed for diarrhea (Patient not taking: Reported on 10/3/2023), Disp: , Rfl:     loratadine (CLARITIN) 10 mg tablet, Take 10 mg by mouth, Disp: , Rfl:     losartan (COZAAR) 50 mg tablet, Take 1 tablet (50 mg total) by mouth 2 (two) times a day, Disp: 120 tablet, Rfl: 3    montelukast (SINGULAIR) 10 mg tablet, TAKE 1 TABLET BY MOUTH EVERY DAY, Disp: 90 tablet, Rfl: 3    OneTouch Delica Lancets 65G MISC, USE DAILY AS DIRECTED (Patient not taking: Reported on 8/14/2023), Disp: 100 each, Rfl: 0    pantoprazole (PROTONIX) 40 mg tablet, TAKE 1 TABLET BY MOUTH EVERY DAY, Disp: 90 tablet, Rfl: 3    pimecrolimus (ELIDEL) 1 % cream, APPLY TOPICALLY 2 TIMES A DAY AS NEEDED FOR RASH (Patient not taking: Reported on 8/14/2023), Disp: 30 g, Rfl: 2    sucralfate (CARAFATE) 1 g tablet, TAKE 1 TABLET BY MOUTH FOUR TIMES A DAY, Disp: 360 tablet, Rfl: 0    traZODone (DESYREL) 50 mg tablet, TAKE 1 TABLET BY MOUTH EVERYDAY AT BEDTIME, Disp: 90 tablet, Rfl: 0      Allergies   Allergen Reactions    Albuterol      Other reaction(s): felt weird    Azithromycin GI Intolerance    Duloxetine      Category: Adverse Reaction; Annotation - [id number]: Sweating    Erythromycin Vomiting    Hydrocodone-Acetaminophen      Other reaction(s): sweating, feel faint, diarrhea    Hydrocodone-Acetaminophen     Ketorolac Diarrhea and Nausea Only     Category: Adverse Reaction;  Annotation - [id number]: Symptoms were noted after injection into bursa with Toradol at orthopedics    Penicillin G     Penicillins Hives and Vomiting    Tramadol          Social History   Social History     Substance and Sexual Activity   Alcohol Use No     Social History     Substance and Sexual Activity   Drug Use No     Social History     Tobacco Use Smoking Status Every Day    Packs/day: 0.50    Types: Cigarettes    Last attempt to quit: 8/10/2018    Years since quittin.3   Smokeless Tobacco Never   Tobacco Comments    smokes 1 cigarette occasionally         Family History:   Family History   Problem Relation Age of Onset    Hypertension Mother     Diabetes Father     Hypertension Father     Obesity Father     No Known Problems Maternal Grandmother     No Known Problems Maternal Grandfather     Breast cancer Paternal Grandmother 80    No Known Problems Paternal Grandfather     No Known Problems Maternal Aunt        Review of Systems    Objective     Current Vitals: There were no vitals filed for this visit.       Physical Exam:  General:no distress  Eyes:perrla/eomi  ENT:moist mucus membranes  Neck:supple  Pulm:no increased work of breathing  CV:sinus  Abdomen:soft,nontender  Rectal:normal perianal skin/sphincter tone, no masses palpated  Extremities:no edema  Lymphatics:no neck/axillary/groin lymphadenopathy

## 2023-12-13 ENCOUNTER — OFFICE VISIT (OUTPATIENT)
Dept: FAMILY MEDICINE CLINIC | Facility: CLINIC | Age: 58
End: 2023-12-13
Payer: COMMERCIAL

## 2023-12-13 VITALS
DIASTOLIC BLOOD PRESSURE: 86 MMHG | SYSTOLIC BLOOD PRESSURE: 132 MMHG | WEIGHT: 205 LBS | BODY MASS INDEX: 34.16 KG/M2 | RESPIRATION RATE: 16 BRPM | HEART RATE: 84 BPM | HEIGHT: 65 IN | OXYGEN SATURATION: 97 %

## 2023-12-13 DIAGNOSIS — E78.2 MIXED HYPERLIPIDEMIA: ICD-10-CM

## 2023-12-13 DIAGNOSIS — I10 PRIMARY HYPERTENSION: ICD-10-CM

## 2023-12-13 DIAGNOSIS — Z23 NEED FOR INFLUENZA VACCINATION: ICD-10-CM

## 2023-12-13 DIAGNOSIS — E11.9 TYPE 2 DIABETES MELLITUS WITHOUT COMPLICATION, WITHOUT LONG-TERM CURRENT USE OF INSULIN (HCC): Primary | ICD-10-CM

## 2023-12-13 DIAGNOSIS — F33.1 MODERATE EPISODE OF RECURRENT MAJOR DEPRESSIVE DISORDER (HCC): ICD-10-CM

## 2023-12-13 DIAGNOSIS — Z23 NEED FOR VACCINATION FOR STREP PNEUMONIAE: ICD-10-CM

## 2023-12-13 DIAGNOSIS — L73.2 HYDRADENITIS: ICD-10-CM

## 2023-12-13 DIAGNOSIS — M79.7 FIBROMYALGIA: ICD-10-CM

## 2023-12-13 DIAGNOSIS — Z23 NEED FOR COVID-19 VACCINE: ICD-10-CM

## 2023-12-13 DIAGNOSIS — K21.00 GASTROESOPHAGEAL REFLUX DISEASE WITH ESOPHAGITIS, UNSPECIFIED WHETHER HEMORRHAGE: ICD-10-CM

## 2023-12-13 DIAGNOSIS — Z23 NEED FOR ZOSTER VACCINE: ICD-10-CM

## 2023-12-13 DIAGNOSIS — Z00.00 ENCOUNTER FOR ANNUAL WELLNESS VISIT (AWV) IN MEDICARE PATIENT: ICD-10-CM

## 2023-12-13 DIAGNOSIS — K58.2 IRRITABLE BOWEL SYNDROME WITH BOTH CONSTIPATION AND DIARRHEA: ICD-10-CM

## 2023-12-13 DIAGNOSIS — Z12.11 COLON CANCER SCREENING: ICD-10-CM

## 2023-12-13 PROCEDURE — 99214 OFFICE O/P EST MOD 30 MIN: CPT | Performed by: FAMILY MEDICINE

## 2023-12-13 PROCEDURE — G0439 PPPS, SUBSEQ VISIT: HCPCS | Performed by: FAMILY MEDICINE

## 2023-12-13 NOTE — PROGRESS NOTES
Assessment and Plan:     1. Type 2 diabetes mellitus without complication, without long-term current use of insulin (Formerly [facility])  Assessment & Plan:    Lab Results   Component Value Date    HGBA1C 6.7 (H) 11/16/2023   Patient's A1c is doing quite well at 6.7. It is higher than what it was prior, but still at goal.  Should be below 7 based on ADA criteria. This means she does not need to start medications currently. Would strongly encourage her to continue exercise, despite the colder temperatures. Limit carbohydrate intake. Recheck in approximately 3 months. Orders:  -     Comprehensive metabolic panel; Future; Expected date: 03/13/2024  -     Hemoglobin A1C; Future; Expected date: 03/13/2024    2. Primary hypertension  Assessment & Plan:  Blood pressure today was reasonable. No changes needed. Encouraged her to continue with exercise, limiting sodium, weight loss. Orders:  -     Comprehensive metabolic panel; Future; Expected date: 03/13/2024    3. Mixed hyperlipidemia  Assessment & Plan: Will be due for recheck in the future. Not currently on statins. Discussed about diabetes and increased risk of CAD. Orders:  -     Comprehensive metabolic panel; Future; Expected date: 03/13/2024  -     Cholesterol, total; Future; Expected date: 03/13/2024  -     HDL cholesterol; Future; Expected date: 03/13/2024  -     LDL cholesterol, direct; Future; Expected date: 03/13/2024    4. Moderate episode of recurrent major depressive disorder [facility])  Assessment & Plan:  Patient does have depression, but dealing relatively well. She has significant stress. Will continue to follow. Negative SI, positive contract. Continue on current treatment including Lexapro 20 and as needed trazodone at at bedtime. 5. Gastroesophageal reflux disease with esophagitis, unspecified whether hemorrhage  Assessment & Plan:  Stable moment. She did mention that her diet will change things.   She tries to limit those changes in her diet.  Will follow-up in the future. 6. Irritable bowel syndrome with both constipation and diarrhea  Assessment & Plan:  Patient reports minimal issues at the moment. She has noticed some constipation at times, previously was predominantly diarrhea. 7. Fibromyalgia  Assessment & Plan:  FMS seems a bit worse lately. Multiple joints, multiple muscle groups as well. Recommend current treatment. This would include heat, range of motion, exercise. Try to limit nonsteroidals on a chronic basis, reserving them for more severe days. 8. Hydradenitis  Assessment & Plan:  Patient does have a history of hidradenitis. She now has a lesion that she thinks may also be this in the peritoneum. She has been to dermatology. Her next follow-up is with gastroenterology to confirm that it is not something related to that first.        9. Colon cancer screening  Comments:  Recommend colonoscopy. 10. Need for influenza vaccination  Comments:  Recommend yearly flu vaccine secondary to diabetes. 11. Need for zoster vaccine    12. Need for vaccination for Strep pneumoniae  Comments:  Recommend Prevnar 20 secondary to diabetes. 13. Need for COVID-19 vaccine  Comments:  Recommend COVID-vaccine. Patient understands. 14. Encounter for annual wellness visit (AWV) in Medicare patient  Comments:  Patient does have advanced directives. Recommend bring a copy to the office. Reviewed health maintenance including vaccination. Preventive health issues were discussed with patient, and age appropriate screening tests were ordered as noted in patient's After Visit Summary. Personalized health advice and appropriate referrals for health education or preventive services given if needed, as noted in patient's After Visit Summary.   Chief Complaint   Patient presents with   • Follow-up     Labs results   • Medicare Wellness Visit        History of Present Illness:     Patient presents for a Medicare Wellness Visit    Patient is here to review laboratory studies, review multiple medical issues, as well as her annual wellness visit. Diabetes: Reviewed labs. Not currently on medications. She did mention that her sugar this morning was 164 on fingerstick. She was concerned that she may have to go back on medication. Hypertension: Currently on losartan 50. Depression: Currently on Lexapro 20, trazodone 50 (rarely takes this). Feels OK, but does have increased stress. FMS: Increased aches in all joints. Right 3rd finger is starting to trigger. Osteoarthritis: Patient did go to orthopedics. Had an injection in her left knee. Had viscosupplementation. Hyperlipidemia: Patient is not currently on statins. She was not started on these previously.        Patient Care Team:  Ady Vargas MD as PCP - General  DO Beverly Dai PA-C Rufina Pinks, DO Cathaleen Proper, MD (Endocrinology)  Prasanna Canchoal as Nurse Practitioner (Nephrology)     Review of Systems:     Review of Systems     Problem List:     Patient Active Problem List   Diagnosis   • Chronic nonseasonal allergic rhinitis due to pollen   • Depression   • Hyperlipidemia   • Endometriosis   • Fibromyalgia   • Greater trochanteric bursitis of both hips   • Fatigue   • Type 2 diabetes mellitus ([address])   • Hydradenitis   • Hypertension   • Chronic hypokalemia   • Lumbar radiculopathy   • Lumbar spondylosis   • MARIE (obstructive sleep apnea)   • Sacroiliitis ([facility])   • Vitamin B12 deficiency   • Vitamin D deficiency   • Allergic rhinitis   • Obesity   • Right bundle branch block   • Chest pain   • Abdominal pain   • GERD (gastroesophageal reflux disease)   • Anxiety   • Fatty liver   • Menopause syndrome   • Leg pain   • PMR (polymyalgia rheumatica) ([facility])   • Esophageal dysphagia   • Irritable bowel syndrome with both constipation and diarrhea   • Osteoarthritis of both knees   • Mass of arm, left   • Acute medial meniscus tear   • Vulvar cyst   • Trochanteric bursitis   • Hyperhidrosis   • Sebaceous cyst   • Gall stone   • Adenoma of left adrenal gland   • Proteinuria   • Chronic pansinusitis   • GARCIA (dyspnea on exertion)   • Tobacco user   • Recurrent major depressive disorder, in full remission ([address])   • Rash      Past Medical and Surgical History:     Past Medical History:   Diagnosis Date   • Ankle fracture    • Anxiety    • Asthma    • Bleeding hemorrhoids 4/13/2017   • 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    Formatting of this note might be different from the original. Added automatically from request for surgery [id number]   • Chronic venous embolism and thrombosis of deep vessels of lower extremity ([address])    • Costochondritis     RESOLVED: [id number]   • Depression    • Diabetes mellitus ([address]) 11/2021   • Endometriosis    • Fibromyalgia    • GERD (gastroesophageal reflux disease)    • Hematuria     LAST ASSESSED: [id number]   • Hydradenitis     axillary area and under breast   • Hypertension     LAST ASSESSED: [id number]   • Irregular heart beat    • Pulmonary embolism ([address])     RESOLED: [id number] - secondary to a lupron injection for endometriosis   • RBBB (right bundle branch block)    • Sleep apnea     Cannot tolerate CPAP   • Umbilical hernia     LAST ASSESSED: [id number]     Past Surgical History:   Procedure Laterality Date   • HERNIA REPAIR      umbilical   • HUMERUS FRACTURE SURGERY W/ IMPLANT Left    • LAPAROSCOPIC TOTAL HYSTERECTOMY  2014   • PELVIC LAPAROSCOPY      x4 for endometriosis   • AL ESOPHAGOGASTRODUODENOSCOPY TRANSORAL DIAGNOSTIC N/A 05/02/2019    Procedure: ESOPHAGOGASTRODUODENOSCOPY (EGD) with bx;  Surgeon: Korin Hernandez MD;  Location: [facility];   Service: Gastroenterology      Family History:     Family History   Problem Relation Age of Onset   • Hypertension Mother    • Diabetes Father    • Hypertension Father    • Obesity Father    • No Known Problems Maternal Grandmother    • No Known Problems Maternal Grandfather    • Breast cancer Paternal Grandmother 80   • No Known Problems Paternal Grandfather    • No Known Problems Maternal Aunt       Social History:     Social History     Socioeconomic History   • Marital status: /Civil Union     Spouse name: None   • Number of children: None   • Years of education: None   • Highest education level: None   Occupational History   • 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   Tobacco Use   • Smoking status: Every Day     Current packs/day: 0.00     Types: Cigarettes     Last attempt to quit: 8/10/2018     Years since quittin.3   • Smokeless tobacco: Never   • Tobacco comments:     smokes 1 cigarette occasionally   Vaping Use   • Vaping status: Never Used   Substance and Sexual Activity   • Alcohol use: No   • Drug use: No   • Sexual activity: Yes     Comment: seldom /hysterectomy   Other Topics Concern   • None   Social History Narrative    CONSUMES 2 CANS OF SODA PER DAY     Social Determinants of Health     Financial Resource Strain: Medium Risk (2023)    Overall Financial Resource Strain (CARDIA)    • Difficulty of Paying Living Expenses: Somewhat hard   Food Insecurity: Not on file   Transportation Needs: No Transportation Needs (2023)    PRAPARE - Transportation    • Lack of Transportation (Medical): No    • Lack of Transportation (Non-Medical):  No   Physical Activity: Not on file   Stress: Not on file   Social Connections: Not on file   Intimate Partner Violence: Not on file   Housing Stability: Not on file      Medications and Allergies:     Current Outpatient Medications   Medication Sig Dispense Refill   • albuterol (PROVENTIL HFA,VENTOLIN HFA) 90 mcg/act inhaler INHALE 2 PUFFS EVERY 6 HOURS AS NEEDED FOR WHEEZE 18 g 2   • Aspirin 81 MG EC tablet Take by mouth     • azelastine (ASTELIN) 0.1 % nasal spray 1 spray into each nostril 2 (two) times a day Use in each nostril as directed 30 mL 2   • Blood Glucose Monitoring Suppl (OneTouch Verio) w/Device KIT USE DAILY AS DIRECTED (Patient not taking: Reported on 8/14/2023) 1 kit 0   • Cholecalciferol (VITAMIN D3) 1000 units CAPS Take 2,000 Units by mouth (Patient not taking: Reported on 8/14/2023)     • clindamycin (CLEOCIN T) 1 % lotion APPLY ONCE DAILY TO AFFECTED AREAS UNDER ARMS. 3   • [facility] Olopatadine HCl 0.2 % opth drops INSTILL 1 DROP INTO BOTH EYES DAILY 7.5 mL 1   • Cyanocobalamin (B-12) 1000 MCG CAPS Take by mouth (Patient not taking: Reported on 8/14/2023)     • [START ON 5/5/2024] dexamethasone (DECADRON) 1 mg tablet Take 1 tab at 11pm and get cortisol levels checked between 7:00 a.m. and 9:00 a.m. on the next morning. Do not start before May 5, 2024. (Patient not taking: Reported on 8/7/2023 Do not start before May 5, 2024.) 1 tablet 0   • doxycycline hyclate (VIBRA-TABS) 100 mg tablet Take one tab PO BID with food and water. Do not lie down for one hour after taking. Avoid the sun or apply SPF50+ broad spectrum sunscreen every 2 hours while outdoors. 60 tablet 2   • EPINEPHrine (EPIPEN) 0.3 mg/0.3 mL SOAJ AS NEEDED FOR ALLERGIC REACTION.  BRING TO ALLERGY SHOTS (Patient not taking: Reported on 6/26/2023)     • escitalopram (LEXAPRO) 20 mg tablet TAKE 1 TABLET BY MOUTH EVERY DAY 90 tablet 1   • famotidine (PEPCID) 40 MG tablet TAKE 1 TABLET BY MOUTH EVERY DAY 90 tablet 3   • fluticasone (FLONASE) 50 mcg/act nasal spray SPRAY 1 SPRAY INTO EACH NOSTRIL EVERY DAY 24 mL 2   • glucose blood (OneTouch Verio) test strip USE TO TEST ONCE DAILY (Patient not taking: Reported on 8/14/2023) 100 strip 1   • ketotifen (ZADITOR) 0.025 % ophthalmic solution INSTILL 1 DROP PER EYE DAILY AS NEEDED     • Klor-Con M20 20 MEQ tablet  (Patient not taking: Reported on 4/11/2023)     • loperamide (IMODIUM A-D) 2 MG tablet Take 2 mg by mouth 4 (four) times a day as needed for diarrhea (Patient not taking: Reported on 10/3/2023)     • loratadine (CLARITIN) 10 mg tablet Take 10 mg by mouth     • losartan (COZAAR) 50 mg tablet Take 1 tablet (50 mg total) by mouth 2 (two) times a day 120 tablet 3   • montelukast (SINGULAIR) 10 mg tablet TAKE 1 TABLET BY MOUTH EVERY DAY 90 tablet 3   • OneTouch Delica Lancets 76C MISC USE DAILY AS DIRECTED (Patient not taking: Reported on 8/14/2023) 100 each 0   • pantoprazole (PROTONIX) 40 mg tablet TAKE 1 TABLET BY MOUTH EVERY DAY 90 tablet 3   • pimecrolimus (ELIDEL) 1 % cream APPLY TOPICALLY 2 TIMES A DAY AS NEEDED FOR RASH (Patient not taking: Reported on 8/14/2023) 30 g 2   • sucralfate (CARAFATE) 1 g tablet TAKE 1 TABLET BY MOUTH FOUR TIMES A  tablet 0   • traZODone (DESYREL) 50 mg tablet TAKE 1 TABLET BY MOUTH EVERYDAY AT BEDTIME 90 tablet 0     No current facility-administered medications for this visit. Allergies   Allergen Reactions   • Albuterol      Other reaction(s): felt weird   • Azithromycin GI Intolerance   • Duloxetine      Category: Adverse Reaction; Annotation - [id number]: Sweating   • Erythromycin Vomiting   • Hydrocodone-Acetaminophen      Other reaction(s): sweating, feel faint, diarrhea   • Hydrocodone-Acetaminophen    • Ketorolac Diarrhea and Nausea Only     Category: Adverse Reaction;  Annotation - [id number]: Symptoms were noted after injection into bursa with Toradol at orthopedics   • Penicillin G    • Penicillins Hives and Vomiting   • Tramadol       Immunizations:     Immunization History   Administered Date(s) Administered   • INFLUENZA 12/14/2005   • Influenza Quadrivalent 3 years and older 01/25/2018   • Influenza Quadrivalent Preservative Free 3 years and older IM 10/21/2015, 12/08/2016   • Influenza, recombinant, quadrivalent,injectable, preservative free 09/26/2018, 11/07/2019, 10/28/2020   • Influenza, seasonal, injectable 10/12/2004, 11/07/2013   • Tuberculin Skin Test-PPD Intradermal 02/22/2010, 03/08/2010      Health Maintenance:         Topic Date Due   • Hepatitis C Screening  Never done   • HIV Screening  Never done   • Cervical Cancer Screening  09/24/2016   • Colorectal Cancer Screening  11/08/2018   • Breast Cancer Screening: Mammogram  02/28/2023         Topic Date Due   • COVID-19 Vaccine (1) Never done   • Pneumococcal Vaccine: Pediatrics (0 to 5 Years) and At-Risk Patients (6 to 59 Years) (1 - PCV) Never done   • Influenza Vaccine (1) 09/01/2023      Medicare Screening Tests and Risk Assessments:     Franny Baig is here for her Subsequent Wellness visit. Last Medicare Wellness visit information reviewed, patient interviewed, no change since last AWV. Health Risk Assessment:   Patient rates overall health as good. Patient feels that their physical health rating is same. Patient is very satisfied with their life. Eyesight was rated as same. Hearing was rated as same. Patient feels that their emotional and mental health rating is same. Patients states they are never, rarely angry. Patient states they are sometimes unusually tired/fatigued. Pain experienced in the last 7 days has been some. Patient's pain rating has been 5/10. Patient states that she has experienced no weight loss or gain in last 6 months. Depression Screening:   PHQ-9 Score: 0      Fall Risk Screening: In the past year, patient has experienced: no history of falling in past year      Urinary Incontinence Screening:   Patient has not leaked urine accidently in the last six months. Home Safety:  Patient has trouble with stairs inside or outside of their home. Patient has working smoke alarms and has working carbon monoxide detector. Home safety hazards include: none. Nutrition:   Current diet is Regular. Medications:   Patient is currently taking over-the-counter supplements. OTC medications include: see medication list. Patient is able to manage medications.      Activities of Daily Living (ADLs)/Instrumental Activities of Daily Living (IADLs):   Walk and transfer into and out of bed and chair?: Yes  Dress and groom yourself?: Yes    Bathe or shower yourself?: Yes    Feed yourself? Yes  Do your laundry/housekeeping?: Yes  Manage your money, pay your bills and track your expenses?: Yes  Make your own meals?: Yes    Do your own shopping?: Yes    Previous Hospitalizations:   Any hospitalizations or ED visits within the last 12 months?: No      Advance Care Planning:   Living will: Yes    Durable POA for healthcare: Yes    Advanced directive: Yes    Advanced directive counseling given: Yes      Cognitive Screening:   Provider or family/friend/caregiver concerned regarding cognition?: No    PREVENTIVE SCREENINGS      Cardiovascular Screening:    General: Screening Not Indicated and History Lipid Disorder      Diabetes Screening:     General: Screening Not Indicated and History Diabetes      Colorectal Cancer Screening:     General: Risks and Benefits Discussed    Due for: Colonoscopy - Low Risk      Breast Cancer Screening:     General: Screening Current      Cervical Cancer Screening:    General: Screening Not Indicated      Osteoporosis Screening:    General: Risks and Benefits Discussed and Screening Current      Abdominal Aortic Aneurysm (AAA) Screening:        General: Screening Not Indicated      Lung Cancer Screening:     General: Screening Not Indicated      Hepatitis C Screening:    General: Screening Not Indicated    Screening, Brief Intervention, and Referral to Treatment (SBIRT)    Screening  Typical number of drinks in a day: 0  Typical number of drinks in a week: 0  Interpretation: Low risk drinking behavior. Single Item Drug Screening:  How often have you used an illegal drug (including marijuana) or a prescription medication for non-medical reasons in the past year? never    Single Item Drug Screen Score: 0  Interpretation: Negative screen for possible drug use disorder    No results found. A1c 6.7. Sodium 143, potassium 4.7, calcium 9.5. Blood sugar 121. Creatinine 1.61, . AST 19, ALT 17.   Albumin to creatinine ratio is 9.     Physical Exam:     /86 (BP Location: Left arm, Patient Position: Sitting, Cuff Size: Large)   Pulse 84   Resp 16   Ht 5' 5" (1.651 m)   Wt 93 kg (205 lb)   SpO2 97%   BMI 34.11 kg/m²     Physical Exam  Vitals and nursing note reviewed. Constitutional:       Appearance: Normal appearance. HENT:      Head: Normocephalic. Cardiovascular:      Rate and Rhythm: Normal rate and regular rhythm. Pulses: Normal pulses. Carotid pulses are 2+ on the right side and 2+ on the left side. Heart sounds: Normal heart sounds. No murmur heard. No friction rub. No gallop. Pulmonary:      Effort: Pulmonary effort is normal. No respiratory distress. Breath sounds: Normal breath sounds. No stridor. No wheezing, rhonchi or rales. Chest:      Chest wall: No tenderness. Neurological:      Mental Status: She is alert.           Liset Bishop MD

## 2023-12-13 NOTE — ASSESSMENT & PLAN NOTE
Patient does have a history of hidradenitis. She now has a lesion that she thinks may also be this in the peritoneum. She has been to dermatology.   Her next follow-up is with gastroenterology to confirm that it is not something related to that first.

## 2023-12-13 NOTE — ASSESSMENT & PLAN NOTE
Blood pressure today was reasonable. No changes needed. Encouraged her to continue with exercise, limiting sodium, weight loss.

## 2023-12-13 NOTE — ASSESSMENT & PLAN NOTE
Lab Results   Component Value Date    HGBA1C 6.7 (H) 11/16/2023   Patient's A1c is doing quite well at 6.7. It is higher than what it was prior, but still at goal.  Should be below 7 based on ADA criteria. This means she does not need to start medications currently. Would strongly encourage her to continue exercise, despite the colder temperatures. Limit carbohydrate intake. Recheck in approximately 3 months.

## 2023-12-13 NOTE — PATIENT INSTRUCTIONS
Medicare Preventive Visit Patient Instructions  Thank you for completing your Welcome to Medicare Visit or Medicare Annual Wellness Visit today. Your next wellness visit will be due in one year (12/13/2024). The screening/preventive services that you may require over the next 5-10 years are detailed below. Some tests may not apply to you based off risk factors and/or age. Screening tests ordered at today's visit but not completed yet may show as past due. Also, please note that scanned in results may not display below. Preventive Screenings:  Service Recommendations Previous Testing/Comments   Colorectal Cancer Screening  * Colonoscopy    * Fecal Occult Blood Test (FOBT)/Fecal Immunochemical Test (FIT)  * Fecal DNA/Cologuard Test  * Flexible Sigmoidoscopy Age: 43-73 years old   Colonoscopy: every 10 years (may be performed more frequently if at higher risk)  OR  FOBT/FIT: every 1 year  OR  Cologuard: every 3 years  OR  Sigmoidoscopy: every 5 years  Screening may be recommended earlier than age 39 if at higher risk for colorectal cancer. Also, an individualized decision between you and your healthcare provider will decide whether screening between the ages of 77-80 would be appropriate. Colonoscopy: Not on file  FOBT/FIT: Not on file  Cologuard: Not on file  Sigmoidoscopy: Not on file          Breast Cancer Screening Age: 36 years old  Frequency: every 1-2 years  Not required if history of left and right mastectomy Mammogram: 02/28/2022    Screening Current   Cervical Cancer Screening Between the ages of 21-29, pap smear recommended once every 3 years. Between the ages of 32-69, can perform pap smear with HPV co-testing every 5 years.    Recommendations may differ for women with a history of total hysterectomy, cervical cancer, or abnormal pap smears in past. Pap Smear: 09/24/2014    Screening Not Indicated   Hepatitis C Screening Once for adults born between 1945 and 1965  More frequently in patients at high risk for Hepatitis C Hep C Antibody: Not on file        Diabetes Screening 1-2 times per year if you're at risk for diabetes or have pre-diabetes Fasting glucose: 121 mg/dL (11/16/2023)  A1C: 6.7 % (11/16/2023)  Screening Not Indicated  History Diabetes   Cholesterol Screening Once every 5 years if you don't have a lipid disorder. May order more often based on risk factors. Lipid panel: 08/03/2023    Screening Not Indicated  History Lipid Disorder     Other Preventive Screenings Covered by Medicare:  Abdominal Aortic Aneurysm (AAA) Screening: covered once if your at risk. You're considered to be at risk if you have a family history of AAA. Lung Cancer Screening: covers low dose CT scan once per year if you meet all of the following conditions: (1) Age 48-67; (2) No signs or symptoms of lung cancer; (3) Current smoker or have quit smoking within the last 15 years; (4) You have a tobacco smoking history of at least 20 pack years (packs per day multiplied by number of years you smoked); (5) You get a written order from a healthcare provider. Glaucoma Screening: covered annually if you're considered high risk: (1) You have diabetes OR (2) Family history of glaucoma OR (3)  aged 48 and older OR (3)  American aged 72 and older  Osteoporosis Screening: covered every 2 years if you meet one of the following conditions: (1) You're estrogen deficient and at risk for osteoporosis based off medical history and other findings; (2) Have a vertebral abnormality; (3) On glucocorticoid therapy for more than 3 months; (4) Have primary hyperparathyroidism; (5) On osteoporosis medications and need to assess response to drug therapy. Last bone density test (DXA Scan): 03/02/2022. HIV Screening: covered annually if you're between the age of 14-79. Also covered annually if you are younger than 13 and older than 72 with risk factors for HIV infection.  For pregnant patients, it is covered up to 3 times per pregnancy. Immunizations:  Immunization Recommendations   Influenza Vaccine Annual influenza vaccination during flu season is recommended for all persons aged >= 6 months who do not have contraindications   Pneumococcal Vaccine   * Pneumococcal conjugate vaccine = PCV13 (Prevnar 13), PCV15 (Vaxneuvance), PCV20 (Prevnar 20)  * Pneumococcal polysaccharide vaccine = PPSV23 (Pneumovax) Adults 10-05 yo with certain risk factors or if 69+ yo  If never received any pneumonia vaccine: recommend Prevnar 20 (PCV20)  Give PCV20 if previously received 1 dose of PCV13 or PPSV23   Hepatitis B Vaccine 3 dose series if at intermediate or high risk (ex: diabetes, end stage renal disease, liver disease)   Respiratory syncytial virus (RSV) Vaccine - COVERED BY MEDICARE PART D  * RSVPreF3 (Arexvy) CDC recommends that adults 61years of age and older may receive a single dose of RSV vaccine using shared clinical decision-making (SCDM)   Tetanus (Td) Vaccine - COST NOT COVERED BY MEDICARE PART B Following completion of primary series, a booster dose should be given every 10 years to maintain immunity against tetanus. Td may also be given as tetanus wound prophylaxis. Tdap Vaccine - COST NOT COVERED BY MEDICARE PART B Recommended at least once for all adults. For pregnant patients, recommended with each pregnancy.    Shingles Vaccine (Shingrix) - COST NOT COVERED BY MEDICARE PART B  2 shot series recommended in those 23 years and older who have or will have weakened immune systems or those 50 years and older     Health Maintenance Due:      Topic Date Due    Hepatitis C Screening  Never done    HIV Screening  Never done    Cervical Cancer Screening  09/24/2016    Colorectal Cancer Screening  11/08/2018    Breast Cancer Screening: Mammogram  02/28/2023     Immunizations Due:      Topic Date Due    COVID-19 Vaccine (1) Never done    Pneumococcal Vaccine: Pediatrics (0 to 5 Years) and At-Risk Patients (6 to 59 Years) (1 - PCV) Never done Influenza Vaccine (1) 09/01/2023     Advance Directives   What are advance directives? Advance directives are legal documents that state your wishes and plans for medical care. These plans are made ahead of time in case you lose your ability to make decisions for yourself. Advance directives can apply to any medical decision, such as the treatments you want, and if you want to donate organs. What are the types of advance directives? There are many types of advance directives, and each state has rules about how to use them. You may choose a combination of any of the following:  Living will: This is a written record of the treatment you want. You can also choose which treatments you do not want, which to limit, and which to stop at a certain time. This includes surgery, medicine, IV fluid, and tube feedings. Durable power of  for healthcare [facility]): This is a written record that states who you want to make healthcare choices for you when you are unable to make them for yourself. This person, called a proxy, is usually a family member or a friend. You may choose more than 1 proxy. Do not resuscitate (DNR) order:  A DNR order is used in case your heart stops beating or you stop breathing. It is a request not to have certain forms of treatment, such as CPR. A DNR order may be included in other types of advance directives. Medical directive: This covers the care that you want if you are in a coma, near death, or unable to make decisions for yourself. You can list the treatments you want for each condition. Treatment may include pain medicine, surgery, blood transfusions, dialysis, IV or tube feedings, and a ventilator (breathing machine). Values history: This document has questions about your views, beliefs, and how you feel and think about life. This information can help others choose the care that you would choose. Why are advance directives important?   An advance directive helps you control your care. Although spoken wishes may be used, it is better to have your wishes written down. Spoken wishes can be misunderstood, or not followed. Treatments may be given even if you do not want them. An advance directive may make it easier for your family to make difficult choices about your care. Cigarette Smoking and Your Health   Risks to your health if you smoke:  Nicotine and other chemicals found in tobacco damage every cell in your body. Even if you are a light smoker, you have an increased risk for cancer, heart disease, and lung disease. If you are pregnant or have diabetes, smoking increases your risk for complications. Benefits to your health if you stop smoking: You decrease respiratory symptoms such as coughing, wheezing, and shortness of breath. You reduce your risk for cancers of the lung, mouth, throat, kidney, bladder, pancreas, stomach, and cervix. If you already have cancer, you increase the benefits of chemotherapy. You also reduce your risk for cancer returning or a second cancer from developing. You reduce your risk for heart disease, blood clots, heart attack, and stroke. You reduce your risk for lung infections, and diseases such as pneumonia, asthma, chronic bronchitis, and emphysema. Your circulation improves. More oxygen can be delivered to your body. If you have diabetes, you lower your risk for complications, such as kidney, artery, and eye diseases. You also lower your risk for nerve damage. Nerve damage can lead to amputations, poor vision, and blindness. You improve your body's ability to heal and to fight infections. For more information and support to stop smoking:   [employer]  Phone: 9- 500 - [phone]  Web Address: www.[employer]. [employer]  Weight Management   Why it is important to manage your weight:  Being overweight increases your risk of health conditions such as heart disease, high blood pressure, type 2 diabetes, and certain types of cancer.  It can also increase your risk for osteoarthritis, sleep apnea, and other respiratory problems. Aim for a slow, steady weight loss. Even a small amount of weight loss can lower your risk of health problems. How to lose weight safely:  A safe and healthy way to lose weight is to eat fewer calories and get regular exercise. You can lose up about 1 pound a week by decreasing the number of calories you eat by 500 calories each day. Healthy meal plan for weight management:  A healthy meal plan includes a variety of foods, contains fewer calories, and helps you stay healthy. A healthy meal plan includes the following:  Eat whole-grain foods more often. A healthy meal plan should contain fiber. Fiber is the part of grains, fruits, and vegetables that is not broken down by your body. Whole-grain foods are healthy and provide extra fiber in your diet. Some examples of whole-grain foods are whole-wheat breads and pastas, oatmeal, brown rice, and bulgur. Eat a variety of vegetables every day. Include dark, leafy greens such as spinach, kale, jose greens, and mustard greens. Eat yellow and orange vegetables such as carrots, sweet potatoes, and winter squash. Eat a variety of fruits every day. Choose fresh or canned fruit (canned in its own juice or light syrup) instead of juice. Fruit juice has very little or no fiber. Eat low-fat dairy foods. Drink fat-free (skim) milk or 1% milk. Eat fat-free yogurt and low-fat cottage cheese. Try low-fat cheeses such as mozzarella and other reduced-fat cheeses. Choose meat and other protein foods that are low in fat. Choose beans or other legumes such as split peas or lentils. Choose fish, skinless poultry (chicken or turkey), or lean cuts of red meat (beef or pork). Before you cook meat or poultry, cut off any visible fat. Use less fat and oil. Try baking foods instead of frying them. Add less fat, such as margarine, sour cream, regular salad dressing and mayonnaise to foods.  Eat fewer high-fat foods. Some examples of high-fat foods include french fries, doughnuts, ice cream, and cakes. Eat fewer sweets. Limit foods and drinks that are high in sugar. This includes candy, cookies, regular soda, and sweetened drinks. Exercise:  Exercise at least 30 minutes per day on most days of the week. Some examples of exercise include walking, biking, dancing, and swimming. You can also fit in more physical activity by taking the stairs instead of the elevator or parking farther away from stores. Ask your healthcare provider about the best exercise plan for you. © Copyright [employer] 2018 Information is for End User's use only and may not be sold, redistributed or otherwise used for commercial purposes. All illustrations and images included in CareNotes® are the copyrighted property of [employer] or  [address]    1. Type 2 diabetes mellitus without complication, without long-term current use of insulin ([facility])  Assessment & Plan:    Lab Results   Component Value Date    HGBA1C 6.7 (H) 11/16/2023   Patient's A1c is doing quite well at 6.7. It is higher than what it was prior, but still at goal.  Should be below 7 based on ADA criteria. This means she does not need to start medications currently. Would strongly encourage her to continue exercise, despite the colder temperatures. Limit carbohydrate intake. Recheck in approximately 3 months. Orders:  -     Comprehensive metabolic panel; Future; Expected date: 03/13/2024  -     Hemoglobin A1C; Future; Expected date: 03/13/2024    2. Primary hypertension  Assessment & Plan:  Blood pressure today was reasonable. No changes needed. Encouraged her to continue with exercise, limiting sodium, weight loss. Orders:  -     Comprehensive metabolic panel; Future; Expected date: 03/13/2024    3. Mixed hyperlipidemia  Assessment & Plan: Will be due for recheck in the future. Not currently on statins.   Discussed about diabetes and increased risk of CAD. Orders:  -     Comprehensive metabolic panel; Future; Expected date: 03/13/2024  -     Cholesterol, total; Future; Expected date: 03/13/2024  -     HDL cholesterol; Future; Expected date: 03/13/2024  -     LDL cholesterol, direct; Future; Expected date: 03/13/2024    4. Moderate episode of recurrent major depressive disorder [facility])  Assessment & Plan:  Patient does have depression, but dealing relatively well. She has significant stress. Will continue to follow. Negative SI, positive contract. Continue on current treatment including Lexapro 20 and as needed trazodone at at bedtime. 5. Gastroesophageal reflux disease with esophagitis, unspecified whether hemorrhage  Assessment & Plan:  Stable moment. She did mention that her diet will change things. She tries to limit those changes in her diet. Will follow-up in the future. 6. Irritable bowel syndrome with both constipation and diarrhea  Assessment & Plan:  Patient reports minimal issues at the moment. She has noticed some constipation at times, previously was predominantly diarrhea. 7. Fibromyalgia  Assessment & Plan:  FMS seems a bit worse lately. Multiple joints, multiple muscle groups as well. Recommend current treatment. This would include heat, range of motion, exercise. Try to limit nonsteroidals on a chronic basis, reserving them for more severe days. 8. Hydradenitis  Assessment & Plan:  Patient does have a history of hidradenitis. She now has a lesion that she thinks may also be this in the peritoneum. She has been to dermatology. Her next follow-up is with gastroenterology to confirm that it is not something related to that first.        9. Colon cancer screening  Comments:  Recommend colonoscopy. 10. Need for influenza vaccination  Comments:  Recommend yearly flu vaccine secondary to diabetes. 11. Need for zoster vaccine    12.  Need for vaccination for Strep pneumoniae  Comments:  Recommend Prevnar 20 secondary to diabetes. 13. Need for COVID-19 vaccine  Comments:  Recommend COVID-vaccine. Patient understands. 14. Encounter for annual wellness visit (AWV) in Medicare patient  Comments:  Patient does have advanced directives. Recommend bring a copy to the office. Reviewed health maintenance including vaccination. COVID 19 Instructions    Isis Barnett was advised to limit contact with others to essential tasks such as getting food, medications, and medical care. Proper handwashing reviewed, and Hand sanitzer when washing is not available. If the patient develops symptoms of COVID 19, the patient should call the office as soon as possible. It is strongly recommended that Flu Vaccinations be obtained. Virtual Visits:  Chio: This works on smart phones (any phone with Internet browsing capability). You should get a text message when the provider is ready to see you. Click on the link in the text message, and the call should start. You will need to type in your name, and allow camera and microphone access. This is HIPPA compliant, and secure. If you have not already done so, get immunized to COVID 19. We are committed to getting you vaccinated as soon as possible and will be closely following CDC and [facility] guidelines as they are released and revised. Please refer to our COVID-19 vaccine webpage for the most up to date information on the vaccine and our distribution efforts. This site will also have the most up to date recommendations for COVID booster vaccine. Zita    Call [phone] ([phone]), option 7    You can also visit [facility]. to find vaccines in your area.     OUR LOCATION:    [facility]  [address], [address] [address], [address] [place] [address]  Fax: [phone]    Lab services, Rheumatology, and OB/GYN are at this location as well.

## 2023-12-13 NOTE — ASSESSMENT & PLAN NOTE
Will be due for recheck in the future. Not currently on statins. Discussed about diabetes and increased risk of CAD.

## 2023-12-13 NOTE — ASSESSMENT & PLAN NOTE
Patient reports minimal issues at the moment. She has noticed some constipation at times, previously was predominantly diarrhea.

## 2023-12-13 NOTE — ASSESSMENT & PLAN NOTE
Stable moment. She did mention that her diet will change things. She tries to limit those changes in her diet. Will follow-up in the future.

## 2023-12-13 NOTE — ASSESSMENT & PLAN NOTE
FMS seems a bit worse lately. Multiple joints, multiple muscle groups as well. Recommend current treatment. This would include heat, range of motion, exercise. Try to limit nonsteroidals on a chronic basis, reserving them for more severe days.

## 2023-12-15 ENCOUNTER — PREP FOR PROCEDURE (OUTPATIENT)
Age: 58
End: 2023-12-15

## 2023-12-15 ENCOUNTER — OFFICE VISIT (OUTPATIENT)
Age: 58
End: 2023-12-15
Payer: COMMERCIAL

## 2023-12-15 ENCOUNTER — TELEPHONE (OUTPATIENT)
Age: 58
End: 2023-12-15

## 2023-12-15 VITALS — HEIGHT: 65 IN | BODY MASS INDEX: 34.32 KG/M2 | WEIGHT: 206 LBS

## 2023-12-15 DIAGNOSIS — Z12.11 SCREENING FOR COLON CANCER: ICD-10-CM

## 2023-12-15 DIAGNOSIS — Z12.11 SCREENING FOR COLON CANCER: Primary | ICD-10-CM

## 2023-12-15 DIAGNOSIS — K60.3 ANAL FISTULA: ICD-10-CM

## 2023-12-15 DIAGNOSIS — K60.3 ANAL FISTULA: Primary | ICD-10-CM

## 2023-12-15 PROCEDURE — 46600 DIAGNOSTIC ANOSCOPY SPX: CPT | Performed by: COLON & RECTAL SURGERY

## 2023-12-15 PROCEDURE — 99204 OFFICE O/P NEW MOD 45 MIN: CPT | Performed by: COLON & RECTAL SURGERY

## 2023-12-15 NOTE — PROGRESS NOTES
Colon and Rectal Surgery   Lena Sanchez 66 y.o. female MRN: [id number]   Encounter: [id number]  12/15/23   1:18 PM        ASSESSMENT:    Laure Morales is a 32-year-old female, multiple medical history including diabetes, hypertension, pulmonary embolism, active smoker. Prior hidradenitis history, axilla, presents with question of anal fistula in the left anterior lateral perianal position, this appears to track towards the anal canal, on anoscopy she has normal anorectal mucosa save for scar from prior anterior midline fissure described in fissurectomy report from 2018. I suspect this is likely anal fissure/fistula and unlikely hidradenitis though this cannot be ruled out. Anterior lateral punctum without obvious inflammation or other signs of hidradenitis, possibility of anal fistula, especially with prior fissure site in the anterior position. We discussed examination under anesthesia, possible fistulotomy, possible seton placement,anorectal surgery and in a face to face, personal informed consent the alternatives,benefits risks including not limited to bleeding, infection, risks of anesthesia, damage to sphincter/incontinence, possibility of seton placement and staged surgery. They understood these risks, signed informed consent, and wish to proceed. PLAN:  -Discussed with her tobacco cessation, this is important for future procedures and risk reduction  -Colonoscopy was scheduled today in screening, mother with history of colon polyps, no prior colonoscopy  -We will after this, at a later date plan exam under anesthesia, possible fistulotomy, possible seton placement  CC:  Dr. Rosy Best      [facility]  Lena Cox is a 66 y.o. female referred for evaluation today by Dr. Rosy Walker for hydrandenitis in the perineum vs. Fistula. She states she has the rash for months now and notices periods of swelling and discharge.       She had a fissurectomy with botox injection in 2018 with Dr. 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 Added automatically from request for surgery [id number]   • Chronic venous embolism and thrombosis of deep vessels of lower extremity ([address])    • Costochondritis     RESOLVED: [id number]   • Depression    • Diabetes mellitus ([address]) 11/2021   • Endometriosis    • Fibromyalgia    • GERD (gastroesophageal reflux disease)    • Hematuria     LAST ASSESSED: [id number]   • Hydradenitis     axillary area and under breast   • Hypertension     LAST ASSESSED: [id number]   • Irregular heart beat    • Pulmonary embolism ([address])     RESOLED: [id number] - secondary to a lupron injection for endometriosis   • RBBB (right bundle branch block)    • Sleep apnea     Cannot tolerate CPAP   • Umbilical hernia     LAST ASSESSED: [id number]     Past Surgical History:   Procedure Laterality Date   • HERNIA REPAIR      umbilical   • HUMERUS FRACTURE SURGERY W/ IMPLANT Left    • LAPAROSCOPIC TOTAL HYSTERECTOMY  2014   • PELVIC LAPAROSCOPY      x4 for endometriosis   • ME ESOPHAGOGASTRODUODENOSCOPY TRANSORAL DIAGNOSTIC N/A 05/02/2019    Procedure: ESOPHAGOGASTRODUODENOSCOPY (EGD) with bx;  Surgeon: Jomar Bartholomew MD;  Location: [facility];   Service: Gastroenterology       Meds/Allergies       Current Outpatient Medications:   •  albuterol (PROVENTIL HFA,VENTOLIN HFA) 90 mcg/act inhaler, INHALE 2 PUFFS EVERY 6 HOURS AS NEEDED FOR WHEEZE, Disp: 18 g, Rfl: 2  •  Aspirin 81 MG EC tablet, Take by mouth, Disp: , Rfl:   •  azelastine (ASTELIN) 0.1 % nasal spray, 1 spray into each nostril 2 (two) times a day Use in each nostril as directed, Disp: 30 mL, Rfl: 2  •  Blood Glucose Monitoring Suppl (OneTouch Verio) w/Device KIT, USE DAILY AS DIRECTED (Patient not taking: Reported on 8/14/2023), Disp: 1 kit, Rfl: 0  •  Cholecalciferol (VITAMIN D3) 1000 units CAPS, Take 2,000 Units by mouth (Patient not taking: Reported on 8/14/2023), Disp: , Rfl:   •  clindamycin (CLEOCIN T) 1 % lotion, APPLY ONCE DAILY TO AFFECTED AREAS UNDER ARMS., Disp: , Rfl: 3  •  CVS Olopatadine HCl 0.2 % opth drops, INSTILL 1 DROP INTO BOTH EYES DAILY, Disp: 7.5 mL, Rfl: 1  •  Cyanocobalamin (B-12) 1000 MCG CAPS, Take by mouth (Patient not taking: Reported on 8/14/2023), Disp: , Rfl:   •  [START ON 5/5/2024] dexamethasone (DECADRON) 1 mg tablet, Take 1 tab at 11pm and get cortisol levels checked between 7:00 a.m. and 9:00 a.m. on the next morning. Do not start before May 5, 2024. (Patient not taking: Reported on 8/7/2023 Do not start before May 5, 2024.), Disp: 1 tablet, Rfl: 0  •  doxycycline hyclate (VIBRA-TABS) 100 mg tablet, Take one tab PO BID with food and water. Do not lie down for one hour after taking. Avoid the sun or apply SPF50+ broad spectrum sunscreen every 2 hours while outdoors. , Disp: 60 tablet, Rfl: 2  •  EPINEPHrine (EPIPEN) 0.3 mg/0.3 mL SOAJ, AS NEEDED FOR ALLERGIC REACTION.  BRING TO ALLERGY SHOTS (Patient not taking: Reported on 6/26/2023), Disp: , Rfl:   •  escitalopram (LEXAPRO) 20 mg tablet, TAKE 1 TABLET BY MOUTH EVERY DAY, Disp: 90 tablet, Rfl: 1  •  famotidine (PEPCID) 40 MG tablet, TAKE 1 TABLET BY MOUTH EVERY DAY, Disp: 90 tablet, Rfl: 3  •  fluticasone (FLONASE) 50 mcg/act nasal spray, SPRAY 1 SPRAY INTO EACH NOSTRIL EVERY DAY, Disp: 24 mL, Rfl: 2  •  glucose blood (OneTouch Verio) test strip, USE TO TEST ONCE DAILY (Patient not taking: Reported on 8/14/2023), Disp: 100 strip, Rfl: 1  •  ketotifen (ZADITOR) 0.025 % ophthalmic solution, INSTILL 1 DROP PER EYE DAILY AS NEEDED, Disp: , Rfl:   •  Klor-Con M20 20 MEQ tablet, , Disp: , Rfl:   •  loperamide (IMODIUM A-D) 2 MG tablet, Take 2 mg by mouth 4 (four) times a day as needed for diarrhea (Patient not taking: Reported on 10/3/2023), Disp: , Rfl:   •  loratadine (CLARITIN) 10 mg tablet, Take 10 mg by mouth, Disp: , Rfl:   •  losartan (COZAAR) 50 mg tablet, Take 1 tablet (50 mg total) by mouth 2 (two) times a day, Disp: 120 tablet, Rfl: 3  •  montelukast (SINGULAIR) 10 mg tablet, TAKE 1 TABLET BY MOUTH EVERY DAY, Disp: 90 tablet, Rfl: 3  •  OneTouch Delica Lancets 65P MISC, USE DAILY AS DIRECTED (Patient not taking: Reported on 2023), Disp: 100 each, Rfl: 0  •  pantoprazole (PROTONIX) 40 mg tablet, TAKE 1 TABLET BY MOUTH EVERY DAY, Disp: 90 tablet, Rfl: 3  •  pimecrolimus (ELIDEL) 1 % cream, APPLY TOPICALLY 2 TIMES A DAY AS NEEDED FOR RASH (Patient not taking: Reported on 2023), Disp: 30 g, Rfl: 2  •  sucralfate (CARAFATE) 1 g tablet, TAKE 1 TABLET BY MOUTH FOUR TIMES A DAY, Disp: 360 tablet, Rfl: 0  •  traZODone (DESYREL) 50 mg tablet, TAKE 1 TABLET BY MOUTH EVERYDAY AT BEDTIME, Disp: 90 tablet, Rfl: 0      Allergies   Allergen Reactions   • Albuterol      Other reaction(s): felt weird   • Azithromycin GI Intolerance   • Duloxetine      Category: Adverse Reaction; Annotation - [id number]: Sweating   • Erythromycin Vomiting   • Hydrocodone-Acetaminophen      Other reaction(s): sweating, feel faint, diarrhea   • Hydrocodone-Acetaminophen    • Ketorolac Diarrhea and Nausea Only     Category: Adverse Reaction;  Annotation - [id number]: Symptoms were noted after injection into bursa with Toradol at orthopedics   • Penicillin G    • Penicillins Hives and Vomiting   • Tramadol          Social History   Social History     Substance and Sexual Activity   Alcohol Use No     Social History     Substance and Sexual Activity   Drug Use No     Social History     Tobacco Use   Smoking Status Every Day   • Current packs/day: 0.00   • Types: Cigarettes   • Last attempt to quit: 8/10/2018   • Years since quittin.3   Smokeless Tobacco Never   Tobacco Comments    smokes 1 cigarette occasionally         Family History:   Family History Problem Relation Age of Onset   • Hypertension Mother    • Diabetes Father    • Hypertension Father    • Obesity Father    • No Known Problems Maternal Grandmother    • No Known Problems Maternal Grandfather    • Breast cancer Paternal Grandmother 80   • No Known Problems Paternal Grandfather    • No Known Problems Maternal Aunt        Review of Systems   Constitutional: Negative. HENT: Negative. Eyes: Negative. Respiratory: Negative. Cardiovascular: Negative. Gastrointestinal: Negative. Endocrine: Negative. Genitourinary: Negative. Musculoskeletal: Negative. Skin: Negative. Allergic/Immunologic: Negative. Neurological: Negative. Hematological: Negative. Psychiatric/Behavioral: Negative. Objective   Current Vitals:   Vitals:    12/15/23 1259   Weight: 93.4 kg (206 lb)   Height: 5' 5" (1.651 m)     Physical Exam:  General:no distress  ENT:moist mucus membranes  Neck:supple  Pulm:no increased work of breathing, clear bilateral  CV:sinus  Abdomen:soft,nontender  Rectal:normal perianal skin/sphincter tone,anterior midline scar from prior fissure, left anterolateral punctum with drainage, tracks towards anus with lacrimal probe  Extremities:no edema    Anoscopy    Date/Time: 12/15/2023 1:10 PM    Performed by: Connie Tompkins MD  Authorized by: Connie Tompkins MD    Verbal consent obtained?: Yes    Consent given by:  Patient  Patient identity confirmed:  Verbally with patient  Indications: rectal irritation    Scope type: Anoscope   Anterior scar/prior chronic fissure, otherwise normal anorectal mucosa.

## 2023-12-15 NOTE — PATIENT INSTRUCTIONS
ASSESSMENT:    Franky Charles is a 73-year-old female, multiple medical history including diabetes, hypertension, pulmonary embolism, active smoker. Prior hidradenitis history, axilla, presents with question of anal fistula in the left anterior lateral perianal position, this appears to track towards the anal canal, on anoscopy she has normal anorectal mucosa save for scar from prior anterior midline fissure described in fissurectomy report from 2018. I suspect this is likely anal fissure/fistula and unlikely hidradenitis though this cannot be ruled out. Anterior lateral punctum without obvious inflammation or other signs of hidradenitis, possibility of anal fistula, especially with prior fissure site in the anterior position. We discussed examination under anesthesia, possible fistulotomy, possible seton placement,anorectal surgery and in a face to face, personal informed consent the alternatives,benefits risks including not limited to bleeding, infection, risks of anesthesia, damage to sphincter/incontinence, possibility of seton placement and staged surgery. They understood these risks, signed informed consent, and wish to proceed.     PLAN:  -Discussed with her tobacco cessation, this is important for future procedures and risk reduction  -Colonoscopy was scheduled today in screening, mother with history of colon polyps, no prior colonoscopy  -We will after this, at a later date plan exam under anesthesia, possible fistulotomy, possible seton placement  CC:  Dr. Efren Ramirez

## 2023-12-15 NOTE — LETTER
Rhonda Dukes  [address]  [place] [address]        FLEXIBLE COLONOSCOPY INSTRUCTIONS  PLEASE NOTE...  AS OF JUNE 1, 2014, OUR OFFICE REQUIRES 72 HOURS NOTICE OF CANCELLATION/RESCHEDULE OF A PROCEDURE TO AVOID INCURRING A MISSED APPOINTMENT FEE.    Your Colonoscopy Procedure has been scheduled at:[address] [address]     The Date of your Procedure is: January 17, 2024      The hospital facility will contact you the evening prior to your scheduled procedure with your arrival time.     Use the bowel preparation as directed.    Check with your family doctor if you are taking a blood thinner (Coumadin, Plavix, Xarelto, Pradaxa, Gingko biloba, Ginseng, Feverfew, Scar's Wort).  We suggest stopping these for 3 days. Special instructions may be needed if you are taking aspirin or any aspirin-containing medication.  Check with your family physician.      If you are on DIABETIC MEDICATION (tablets or insulin) your doctor may make changes in your preparation.    Take all medications usual unless otherwise instructed.    As always, if you have any questions or concerns, feel free to call the office.  Our  staff will be happy to connect you with one of the nurses to answer any questions or address any concerns you have regarding your procedure.      Do not wear any jewelry the day of your procedure including wedding rings.    Arrangements must be made for a responsible party to drive you home from the procedure.  If you do not have a responsible family member or friend to drive you home, the procedure will not be done.  Vast or a taxi is not acceptable.    It is important you notify our office of any insurance changes prior to your procedure and, if necessary, supply us with referrals from your primary care physician.            COLONOSCOPY PREPARATION INSTRUCTIONS    Purchase (prescription not required):  · 238 gram bottle of Miralax® (Glycolax®)  · 4  Dulcolax® (Bisacodyl) Laxative Tablets  · 64 oz. bottle of Gatorade® or your preference of a non-carbonated clear liquid - NOT RED OR PURPLE     One Day Prior to Colonoscopy Procedure  · Nothing to eat the day before your procedure, only clear liquids.  · It is important that you drink plenty of clear liquids throughout the day to prevent dehydration.    Clear Liquids include:  o Water/Iced Tea/Lemonade/Gatorade®/Black Coffee or tea (no milk or creamers  o Soft drinks: orange, ginger ale, cola, Pepsi®, Sprite®, 7Up®  o Layo-Aid® (lemonade or orange flavors only)  o Strained fruit juices without pulp such as apple, white grape, white cranberry  o Jell-O®, lemon, lime or orange (no fruit or toppings)  o Popsicles, Italian Ice (No Ice Cream, sherbets, or fruit bars)  o Chicken or beef bouillon/broth  DO NOT EAT OR DRINK ANYTHING RED OR PURPLE  DO NOT DRINK ANY ALCOHOLIC BEVERAGES  DIABETIC PATIENTS: Consult your physician    At 4:00 pm, take (2) Dulcolax® (Bisacodyl) Laxative Tablets. Swallow the tablets whole with an 8 oz. glass of water. At 8:00 pm, take the additional (2) Dulcolax® (Bisacodyl) Laxative Tablets with 8 oz. of water. The package may direct you not to exceed (2) tablets at any time but for the purpose of this examination, you should take (4) total.    Mix the 238 gm of Miralax® in 64 oz. of Gatorade® and shake the solution until the Miralax® is dissolved. You will drink half (32 oz) of this solution this evening, beginning between 4 and 6 o'clock. Drink 8 oz glassfuls at your own pace. It may take several hours to drink the solution.    Remember to stay close to toilet facilities.    DAY OF COLONOSCOPY PROCEDURE    Five (5) hours before your procedure, drink the other half (32 oz) of the Miralax®/Gatorade® mixture within a two (2) hour period. This may require you to get up very early if you are scheduled for an early procedure.    NOTHING IS TO BE TAKEN BY MOUTH 3 HOURS PRIOR TO PROCEDURE.     If you  use an inhaler, please bring it with you to your procedure.

## 2023-12-15 NOTE — TELEPHONE ENCOUNTER
DE ov 12/15/23; Screening colonoscopy; BMI 34    Scheduled 1/17/24 @ [facility]   Paperwork handed to patient.

## 2023-12-16 DIAGNOSIS — J32.4 CHRONIC PANSINUSITIS: ICD-10-CM

## 2023-12-16 DIAGNOSIS — J30.9 ALLERGIC RHINITIS, UNSPECIFIED SEASONALITY, UNSPECIFIED TRIGGER: ICD-10-CM

## 2023-12-18 RX ORDER — AZELASTINE HYDROCHLORIDE 137 UG/1
SPRAY, METERED NASAL
Qty: 30 ML | Refills: 2 | Status: SHIPPED | OUTPATIENT
Start: 2023-12-18

## 2023-12-22 DIAGNOSIS — J32.4 CHRONIC PANSINUSITIS: ICD-10-CM

## 2023-12-22 DIAGNOSIS — J30.9 ALLERGIC RHINITIS, UNSPECIFIED SEASONALITY, UNSPECIFIED TRIGGER: ICD-10-CM

## 2023-12-22 RX ORDER — FLUTICASONE PROPIONATE 50 MCG
SPRAY, SUSPENSION (ML) NASAL
Qty: 48 ML | Refills: 2 | Status: SHIPPED | OUTPATIENT
Start: 2023-12-22

## 2023-12-25 DIAGNOSIS — L73.2 HIDRADENITIS SUPPURATIVA: ICD-10-CM

## 2023-12-27 RX ORDER — DOXYCYCLINE HYCLATE 100 MG
TABLET ORAL
Qty: 60 TABLET | Refills: 2 | Status: SHIPPED | OUTPATIENT
Start: 2023-12-27 | End: 2024-03-26

## 2023-12-29 NOTE — TELEPHONE ENCOUNTER
Attempted to contact patient to schedule surgery,no answer. Left v/m requesting call back. Waiting on response from patient.     Offering 2/6

## 2023-12-29 NOTE — PATIENT INSTRUCTIONS
Problem List Items Addressed This Visit     Flushing     Patient does have significant amount of vasomotor symptoms of menopause  Given her prior DVT, she was advised not to take estrogen  Based on this, would avoid using estrogen where possible, including over-the-counter preparations such as black cohosh  That is a phyto-estrogen, but still estrogen  Other Visit Diagnoses     Acute recurrent pansinusitis    -  Primary    Patient appears to have a recurrent sinus infection  Given that she has been on antibiotics x2, and has multiple allergies, recommend CT sinuses    Consider ENT    Relevant Orders    CT sinus wo contrast Yes...

## 2024-01-03 RX ORDER — LOSARTAN POTASSIUM 50 MG/1
50 TABLET ORAL 2 TIMES DAILY
Qty: 180 TABLET | Refills: 2 | Status: SHIPPED | OUTPATIENT
Start: 2024-01-03

## 2024-01-17 ENCOUNTER — ANESTHESIA EVENT (OUTPATIENT)
Dept: GASTROENTEROLOGY | Facility: HOSPITAL | Age: 59
End: 2024-01-17

## 2024-01-17 ENCOUNTER — HOSPITAL ENCOUNTER (OUTPATIENT)
Dept: GASTROENTEROLOGY | Facility: HOSPITAL | Age: 59
Setting detail: OUTPATIENT SURGERY
Discharge: HOME | End: 2024-01-17
Attending: COLON & RECTAL SURGERY
Payer: COMMERCIAL

## 2024-01-17 ENCOUNTER — ANESTHESIA (OUTPATIENT)
Dept: GASTROENTEROLOGY | Facility: HOSPITAL | Age: 59
End: 2024-01-17

## 2024-01-17 VITALS
RESPIRATION RATE: 18 BRPM | SYSTOLIC BLOOD PRESSURE: 116 MMHG | TEMPERATURE: 97 F | HEART RATE: 71 BPM | OXYGEN SATURATION: 95 % | DIASTOLIC BLOOD PRESSURE: 55 MMHG

## 2024-01-17 DIAGNOSIS — K60.3 ANAL FISTULA: ICD-10-CM

## 2024-01-17 DIAGNOSIS — Z12.11 SCREENING FOR COLON CANCER: ICD-10-CM

## 2024-01-17 PROCEDURE — 88305 TISSUE EXAM BY PATHOLOGIST: CPT | Performed by: PATHOLOGY

## 2024-01-17 PROCEDURE — 45385 COLONOSCOPY W/LESION REMOVAL: CPT | Performed by: COLON & RECTAL SURGERY

## 2024-01-17 RX ORDER — SODIUM CHLORIDE 9 MG/ML
INJECTION, SOLUTION INTRAVENOUS CONTINUOUS PRN
Status: DISCONTINUED | OUTPATIENT
Start: 2024-01-17 | End: 2024-01-17

## 2024-01-17 RX ORDER — PROPOFOL 10 MG/ML
INJECTION, EMULSION INTRAVENOUS CONTINUOUS PRN
Status: DISCONTINUED | OUTPATIENT
Start: 2024-01-17 | End: 2024-01-17

## 2024-01-17 RX ORDER — LIDOCAINE HYDROCHLORIDE 10 MG/ML
INJECTION, SOLUTION EPIDURAL; INFILTRATION; INTRACAUDAL; PERINEURAL AS NEEDED
Status: DISCONTINUED | OUTPATIENT
Start: 2024-01-17 | End: 2024-01-17

## 2024-01-17 RX ORDER — PROPOFOL 10 MG/ML
INJECTION, EMULSION INTRAVENOUS AS NEEDED
Status: DISCONTINUED | OUTPATIENT
Start: 2024-01-17 | End: 2024-01-17

## 2024-01-17 RX ADMIN — PROPOFOL 10 MG: 10 INJECTION, EMULSION INTRAVENOUS at 11:31

## 2024-01-17 RX ADMIN — LIDOCAINE HYDROCHLORIDE 50 MG: 10 INJECTION, SOLUTION EPIDURAL; INFILTRATION; INTRACAUDAL; PERINEURAL at 11:28

## 2024-01-17 RX ADMIN — PROPOFOL 120 MCG/KG/MIN: 10 INJECTION, EMULSION INTRAVENOUS at 11:29

## 2024-01-17 RX ADMIN — SODIUM CHLORIDE: 0.9 INJECTION, SOLUTION INTRAVENOUS at 11:25

## 2024-01-17 RX ADMIN — PROPOFOL 40 MG: 10 INJECTION, EMULSION INTRAVENOUS at 11:47

## 2024-01-17 RX ADMIN — PROPOFOL 40 MG: 10 INJECTION, EMULSION INTRAVENOUS at 11:33

## 2024-01-17 RX ADMIN — PROPOFOL 30 MG: 10 INJECTION, EMULSION INTRAVENOUS at 11:51

## 2024-01-17 RX ADMIN — PROPOFOL 50 MG: 10 INJECTION, EMULSION INTRAVENOUS at 11:28

## 2024-01-17 NOTE — H&P
History and Physical   Colon and Rectal Surgery   Rhonda Dukes 58 y.o. female MRN: [id number]  Unit/Bed#:  Encounter: [id number]  01/17/24   11:18 AM      No chief complaint on file.    ASSESSMENT:  Prior hidradenitis history, axilla, presents with question of anal fistula in the left anterior lateral perianal position,at office visit this appears to track towards the anal canal, on anoscopy she has normal anorectal mucosa save for scar from prior anterior midline fissure described in fissurectomy report from 2018.  I suspect this is likely anal fissure/fistula and unlikely hidradenitis though this cannot be ruled out.  Anterior lateral punctum without obvious inflammation or other signs of hidradenitis, possibility of anal fistula, especially with prior fissure site in the anterior position.      PLAN:  -Colonoscopy was scheduled today in screening, mother with history of colon polyps, no prior colonoscopy    History of Present Illness   HPI:  Rhonda Dukes is a 58 y.o. female who presents for colonoscopy.    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 Added automatically from request for surgery [id number]    Chronic venous embolism and thrombosis of deep vessels of lower extremity (HCC)     Costochondritis     RESOLVED: [id number]    CPAP (continuous positive airway pressure) dependence     Depression     Diabetes mellitus (HCC) 11/2021    Endometriosis     Fibromyalgia     GERD (gastroesophageal reflux disease)     Hematuria     LAST ASSESSED: [id number]    Hydradenitis     axillary area and under breast    Hypertension     LAST ASSESSED: [id number]    Irregular heart beat     Pulmonary embolism (HCC)     RESOLED: [id number] - secondary to a lupron injection for endometriosis    RBBB (right bundle branch block)     Sleep apnea      Cannot tolerate CPAP    Umbilical hernia     LAST ASSESSED: [id number]     Past Surgical History:   Procedure Laterality Date    HERNIA REPAIR      umbilical    HUMERUS FRACTURE SURGERY W/ IMPLANT Left     LAPAROSCOPIC TOTAL HYSTERECTOMY  2014    PELVIC LAPAROSCOPY      x4 for endometriosis    AR ESOPHAGOGASTRODUODENOSCOPY TRANSORAL DIAGNOSTIC N/A 05/02/2019    Procedure: ESOPHAGOGASTRODUODENOSCOPY (EGD) with bx;  Surgeon: Emanuel Rick MD;  Location: [facility];  Service: Gastroenterology       Meds/Allergies     (Not in a hospital admission)        Current Outpatient Medications:     albuterol (PROVENTIL HFA,VENTOLIN HFA) 90 mcg/act inhaler, INHALE 2 PUFFS EVERY 6 HOURS AS NEEDED FOR WHEEZE, Disp: 18 g, Rfl: 2    Aspirin 81 MG EC tablet, Take by mouth, Disp: , Rfl:     Azelastine HCl 137 MCG/SPRAY SOLN, 1 SPRAY INTO EACH NOSTRIL 2 (TWO) TIMES A DAY USE IN EACH NOSTRIL AS DIRECTED, Disp: 30 mL, Rfl: 2    Cholecalciferol (VITAMIN D3) 1000 units CAPS, Take 2,000 Units by mouth, Disp: , Rfl:     escitalopram (LEXAPRO) 20 mg tablet, TAKE 1 TABLET BY MOUTH EVERY DAY, Disp: 90 tablet, Rfl: 1    famotidine (PEPCID) 40 MG tablet, TAKE 1 TABLET BY MOUTH EVERY DAY, Disp: 90 tablet, Rfl: 3    fluticasone (FLONASE) 50 mcg/act nasal spray, SPRAY 1 SPRAY INTO EACH NOSTRIL EVERY DAY, Disp: 48 mL, Rfl: 2    loratadine (CLARITIN) 10 mg tablet, Take 10 mg by mouth, Disp: , Rfl:     losartan (COZAAR) 50 mg tablet, TAKE 1 TABLET BY MOUTH TWICE A DAY, Disp: 180 tablet, Rfl: 2    montelukast (SINGULAIR) 10 mg tablet, TAKE 1 TABLET BY MOUTH EVERY DAY, Disp: 90 tablet, Rfl: 3    pantoprazole (PROTONIX) 40 mg tablet, TAKE 1 TABLET BY MOUTH EVERY DAY, Disp: 90 tablet, Rfl: 3    sucralfate (CARAFATE) 1 g tablet, TAKE 1 TABLET BY MOUTH FOUR TIMES A DAY, Disp: 360 tablet, Rfl: 0    Blood Glucose Monitoring Suppl (OneTouch Verio) w/Device KIT, USE DAILY AS DIRECTED (Patient not taking: Reported on 8/14/2023), Disp: 1 kit, Rfl: 0    clindamycin  (CLEOCIN T) 1 % lotion, APPLY ONCE DAILY TO AFFECTED AREAS UNDER ARMS., Disp: , Rfl: 3    CVS Olopatadine HCl 0.2 % opth drops, INSTILL 1 DROP INTO BOTH EYES DAILY, Disp: 7.5 mL, Rfl: 1    Cyanocobalamin (B-12) 1000 MCG CAPS, Take by mouth (Patient not taking: Reported on 8/14/2023), Disp: , Rfl:     [START ON 5/5/2024] dexamethasone (DECADRON) 1 mg tablet, Take 1 tab at 11pm and get cortisol levels checked between 7:00 a.m. and 9:00 a.m. on the next morning. Do not start before May 5, 2024. (Patient not taking: Reported on 8/7/2023 Do not start before May 5, 2024.), Disp: 1 tablet, Rfl: 0    doxycycline hyclate (VIBRA-TABS) 100 mg tablet, TAKE 1 TABLET BY MOUTH TWICE A DAY WITH FOOD AND WATER. DO NOT LIE DOWN FOR ONE HOUR AFTER TAKING. AVOID THE SUN OR APPLY SPF50+ BROAD SPECTRUM SUNSCREEN EVERY 2 HOURS WHILE OUTDOORS., Disp: 60 tablet, Rfl: 2    EPINEPHrine (EPIPEN) 0.3 mg/0.3 mL SOAJ, AS NEEDED FOR ALLERGIC REACTION. BRING TO ALLERGY SHOTS (Patient not taking: Reported on 6/26/2023), Disp: , Rfl:     glucose blood (OneTouch Verio) test strip, USE TO TEST ONCE DAILY (Patient not taking: Reported on 8/14/2023), Disp: 100 strip, Rfl: 1    ketotifen (ZADITOR) 0.025 % ophthalmic solution, INSTILL 1 DROP PER EYE DAILY AS NEEDED, Disp: , Rfl:     Klor-Con M20 20 MEQ tablet, , Disp: , Rfl:     loperamide (IMODIUM A-D) 2 MG tablet, Take 2 mg by mouth 4 (four) times a day as needed for diarrhea (Patient not taking: Reported on 10/3/2023), Disp: , Rfl:     OneTouch Delica Lancets 33G MISC, USE DAILY AS DIRECTED (Patient not taking: Reported on 8/14/2023), Disp: 100 each, Rfl: 0    pimecrolimus (ELIDEL) 1 % cream, APPLY TOPICALLY 2 TIMES A DAY AS NEEDED FOR RASH (Patient not taking: Reported on 8/14/2023), Disp: 30 g, Rfl: 2    traZODone (DESYREL) 50 mg tablet, TAKE 1 TABLET BY MOUTH EVERYDAY AT BEDTIME, Disp: 90 tablet, Rfl: 0    Allergies   Allergen Reactions    Albuterol      Other reaction(s): felt weird    Azithromycin  GI Intolerance    Duloxetine      Category: Adverse Reaction; Annotation - [id number]: Sweating    Erythromycin Vomiting    Hydrocodone-Acetaminophen      Other reaction(s): sweating, feel faint, diarrhea    Hydrocodone-Acetaminophen     Ketorolac Diarrhea and Nausea Only     Category: Adverse Reaction; Annotation - [id number]: Symptoms were noted after injection into bursa with Toradol at orthopedics    Penicillin G     Penicillins Hives and Vomiting    Tramadol          Social History   Social History     Substance and Sexual Activity   Alcohol Use No     Social History     Substance and Sexual Activity   Drug Use No     Social History     Tobacco Use   Smoking Status Every Day    Current packs/day: 0.00    Types: Cigarettes    Last attempt to quit: 8/10/2018    Years since quittin.4   Smokeless Tobacco Never   Tobacco Comments    smokes 1 cigarette occasionally         Family History:   Family History   Problem Relation Age of Onset    Hypertension Mother     Diabetes Father     Hypertension Father     Obesity Father     No Known Problems Maternal Grandmother     No Known Problems Maternal Grandfather     Breast cancer Paternal Grandmother 85    No Known Problems Paternal Grandfather     No Known Problems Maternal Aunt          Objective     Current Vitals:   Blood Pressure: 145/82 (24 1008)  Pulse: 86 (24 1008)  Temperature: 97.9 °F (36.6 °C) (24 1008)  Temp Source: Tympanic (24 1008)  Respirations: 20 (24 1008)  SpO2: 96 % (24 1008)  No intake or output data in the 24 hours ending 24 1118    Physical Exam:  General:no distress  Eyes:perrla/eomi  ENT:moist mucus membranes  Neck:supple  Pulm:no increased work of breathing  CV:sinus  Abdomen:soft,nontender  Extremities:no edema

## 2024-01-17 NOTE — ANESTHESIA PREPROCEDURE EVALUATION
Procedure:  COLONOSCOPY    Relevant Problems   CARDIO   (+) Chest pain   (+) GARCIA (dyspnea on exertion)   (+) Hyperlipidemia   (+) Hypertension   (+) Right bundle branch block      ENDO   (+) Type 2 diabetes mellitus (HCC)      GI/HEPATIC   (+) Esophageal dysphagia   (+) Fatty liver   (+) GERD (gastroesophageal reflux disease)      MUSCULOSKELETAL   (+) Fibromyalgia   (+) Lumbar spondylosis   (+) Osteoarthritis of both knees   (+) Sacroiliitis (HCC)      NEURO/PSYCH   (+) Anxiety   (+) Depression   (+) Fibromyalgia   (+) Recurrent major depressive disorder, in full remission (HCC)      PULMONARY   (+) GARCIA (dyspnea on exertion)   (+) MARIE (obstructive sleep apnea)        Physical Exam    Airway    Mallampati score: III  TM Distance: >3 FB  Neck ROM: full     Dental   No notable dental hx     Cardiovascular  Rhythm: regular, Rate: normal, Cardiovascular exam normal    Pulmonary  Pulmonary exam normal Breath sounds clear to auscultation    Other Findings  post-pubertal.      Anesthesia Plan  ASA Score- 3     Anesthesia Type- IV sedation with anesthesia with ASA Monitors.         Additional Monitors:     Airway Plan:            Plan Factors-Exercise tolerance (METS): >4 METS.    Chart reviewed. EKG reviewed. Imaging results reviewed. Existing labs reviewed. Patient summary reviewed.    Patient is not a current smoker.  Patient did not smoke on day of surgery.            Induction- intravenous.    Postoperative Plan-     Informed Consent- Anesthetic plan and risks discussed with patient.  I personally reviewed this patient with the CRNA. Discussed and agreed on the Anesthesia Plan with the CRNA..

## 2024-01-17 NOTE — ANESTHESIA POSTPROCEDURE EVALUATION
Post-Op Assessment Note    CV Status:  Stable  Pain Score: 0    Pain management: adequate       Mental Status:  Alert and awake   Hydration Status:  Euvolemic   PONV Controlled:  Controlled   Airway Patency:  Patent     Post Op Vitals Reviewed: Yes      Staff: CRNA               /67 (01/17/24 1158)    Temp (!) 97 °F (36.1 °C) (01/17/24 1158)    Pulse 73 (01/17/24 1158)   Resp 18 (01/17/24 1158)    SpO2 96 % (01/17/24 1158)

## 2024-01-18 PROCEDURE — 88305 TISSUE EXAM BY PATHOLOGIST: CPT | Performed by: PATHOLOGY

## 2024-01-22 ENCOUNTER — PREP FOR PROCEDURE (OUTPATIENT)
Age: 59
End: 2024-01-22

## 2024-01-22 DIAGNOSIS — K60.3 ANAL FISTULA: Primary | ICD-10-CM

## 2024-01-23 ENCOUNTER — ANESTHESIA EVENT (OUTPATIENT)
Dept: ANESTHESIOLOGY | Facility: HOSPITAL | Age: 59
End: 2024-01-23

## 2024-01-23 ENCOUNTER — ANESTHESIA (OUTPATIENT)
Dept: ANESTHESIOLOGY | Facility: HOSPITAL | Age: 59
End: 2024-01-23

## 2024-01-25 ENCOUNTER — OFFICE VISIT (OUTPATIENT)
Dept: DERMATOLOGY | Facility: CLINIC | Age: 59
End: 2024-01-25
Payer: COMMERCIAL

## 2024-01-25 VITALS — BODY MASS INDEX: 35.32 KG/M2 | TEMPERATURE: 97.9 F | WEIGHT: 212 LBS | HEIGHT: 65 IN

## 2024-01-25 DIAGNOSIS — M77.9 BONE SPUR: Primary | ICD-10-CM

## 2024-01-25 PROCEDURE — 99214 OFFICE O/P EST MOD 30 MIN: CPT | Performed by: DERMATOLOGY

## 2024-01-25 NOTE — PROGRESS NOTES
"[facility] Note     Patient Name: Rhonda Dukes  Encounter Date: 1/25/24     Have you been cared for by a [facility] Dermatologist in the last 3 years and, if so, which description applies to you?    Yes.  I have been here within the last 3 years, and my medical history has NOT changed since that time.  I am FEMALE/of child-bearing potential.    REVIEW OF SYSTEMS:  Have you recently had or currently have any of the following? No changes in my recent health.   PAST MEDICAL HISTORY:  Have you personally ever had or currently have any of the following?  If \"YES,\" then please provide more detail. No changes in my medical history.   HISTORY OF IMMUNOSUPPRESSION: Do you have a history of any of the following:  Systemic Immunosuppression such as Diabetes, Biologic or Immunotherapy, Chemotherapy, Organ Transplantation, Bone Marrow Transplantation?  YES, Diabetes     Answering \"YES\" requires the addition of the dotphrase \"IMMUNOSUPPRESSED\" as the first diagnosis of the patient's visit.   FAMILY HISTORY:  Any \"first degree relatives\" (parent, brother, sister, or child) with the following?    No changes in my family's known health.   PATIENT EXPERIENCE:    Do you want the Dermatologist to perform a COMPLETE skin exam today including a clinical examination under the \"bra and underwear\" areas?  NO  If necessary, do we have your permission to call and leave a detailed message on your Preferred Phone number that includes your specific medical information?  Yes      Allergies   Allergen Reactions    Albuterol      Other reaction(s): felt weird    Azithromycin GI Intolerance    Duloxetine      Category: Adverse Reaction; Annotation - [id number]: Sweating    Erythromycin Vomiting    Hydrocodone-Acetaminophen      Other reaction(s): sweating, feel faint, diarrhea    Hydrocodone-Acetaminophen     Ketorolac Diarrhea and Nausea Only     Category: Adverse Reaction; Annotation - [id number]: Symptoms were noted after " injection into bursa with Toradol at orthopedics    Penicillin G     Penicillins Hives and Vomiting    Tramadol       Current Outpatient Medications:     albuterol (PROVENTIL HFA,VENTOLIN HFA) 90 mcg/act inhaler, INHALE 2 PUFFS EVERY 6 HOURS AS NEEDED FOR WHEEZE, Disp: 18 g, Rfl: 2    Aspirin 81 MG EC tablet, Take by mouth, Disp: , Rfl:     Blood Glucose Monitoring Suppl (OneTouch Verio) w/Device KIT, USE DAILY AS DIRECTED, Disp: 1 kit, Rfl: 0    Cholecalciferol (VITAMIN D3) 1000 units CAPS, Take 2,000 Units by mouth, Disp: , Rfl:     clindamycin (CLEOCIN T) 1 % lotion, APPLY ONCE DAILY TO AFFECTED AREAS UNDER ARMS., Disp: , Rfl: 3    [employer] Olopatadine HCl 0.2 % opth drops, INSTILL 1 DROP INTO BOTH EYES DAILY, Disp: 7.5 mL, Rfl: 1    EPINEPHrine (EPIPEN) 0.3 mg/0.3 mL SOAJ, , Disp: , Rfl:     escitalopram (LEXAPRO) 20 mg tablet, TAKE 1 TABLET BY MOUTH EVERY DAY, Disp: 90 tablet, Rfl: 1    famotidine (PEPCID) 40 MG tablet, TAKE 1 TABLET BY MOUTH EVERY DAY, Disp: 90 tablet, Rfl: 3    fluticasone (FLONASE) 50 mcg/act nasal spray, SPRAY 1 SPRAY INTO EACH NOSTRIL EVERY DAY, Disp: 48 mL, Rfl: 2    glucose blood (OneTouch Verio) test strip, USE TO TEST ONCE DAILY, Disp: 100 strip, Rfl: 1    loratadine (CLARITIN) 10 mg tablet, Take 10 mg by mouth, Disp: , Rfl:     losartan (COZAAR) 50 mg tablet, TAKE 1 TABLET BY MOUTH TWICE A DAY, Disp: 180 tablet, Rfl: 2    montelukast (SINGULAIR) 10 mg tablet, TAKE 1 TABLET BY MOUTH EVERY DAY, Disp: 90 tablet, Rfl: 3    OneTouch Delica Lancets 33G MISC, USE DAILY AS DIRECTED, Disp: 100 each, Rfl: 0    pantoprazole (PROTONIX) 40 mg tablet, TAKE 1 TABLET BY MOUTH EVERY DAY, Disp: 90 tablet, Rfl: 3    sucralfate (CARAFATE) 1 g tablet, TAKE 1 TABLET BY MOUTH FOUR TIMES A DAY, Disp: 360 tablet, Rfl: 0    traZODone (DESYREL) 50 mg tablet, TAKE 1 TABLET BY MOUTH EVERYDAY AT BEDTIME, Disp: 90 tablet, Rfl: 0    Azelastine HCl 137 MCG/SPRAY SOLN, 1 SPRAY INTO EACH NOSTRIL 2 (TWO) TIMES A DAY USE IN  EACH NOSTRIL AS DIRECTED, Disp: 30 mL, Rfl: 2    Cyanocobalamin (B-12) 1000 MCG CAPS, Take by mouth (Patient not taking: Reported on 8/14/2023), Disp: , Rfl:     [START ON 5/5/2024] dexamethasone (DECADRON) 1 mg tablet, Take 1 tab at 11pm and get cortisol levels checked between 7:00 a.m. and 9:00 a.m. on the next morning. Do not start before May 5, 2024. (Patient not taking: Reported on 8/7/2023 Do not start before May 5, 2024.), Disp: 1 tablet, Rfl: 0    doxycycline hyclate (VIBRA-TABS) 100 mg tablet, TAKE 1 TABLET BY MOUTH TWICE A DAY WITH FOOD AND WATER. DO NOT LIE DOWN FOR ONE HOUR AFTER TAKING. AVOID THE SUN OR APPLY SPF50+ BROAD SPECTRUM SUNSCREEN EVERY 2 HOURS WHILE OUTDOORS. (Patient not taking: Reported on 1/25/2024), Disp: 60 tablet, Rfl: 2    ketotifen (ZADITOR) 0.025 % ophthalmic solution, INSTILL 1 DROP PER EYE DAILY AS NEEDED (Patient not taking: Reported on 1/25/2024), Disp: , Rfl:     Klor-Con M20 20 MEQ tablet, , Disp: , Rfl:     loperamide (IMODIUM A-D) 2 MG tablet, Take 2 mg by mouth 4 (four) times a day as needed for diarrhea (Patient not taking: Reported on 10/3/2023), Disp: , Rfl:     pimecrolimus (ELIDEL) 1 % cream, APPLY TOPICALLY 2 TIMES A DAY AS NEEDED FOR RASH (Patient not taking: Reported on 8/14/2023), Disp: 30 g, Rfl: 2          Whom besides the patient is providing clinical information about today's encounter?   NO ADDITIONAL HISTORIAN (patient alone provided history)    Physical Exam and Assessment/Plan by Diagnosis:  HISTORY OF HIDRADENITIS SUPPURATIVA  ANAL FISTULA     Physical Exam:  Anatomic Location Affected:  Groin, axillary vaults  Morphological Description:  Not examined today  Pertinent Positives:  Pertinent Negatives:     Additional History of Present Condition:  Present for at least 4-5 years. HS Started on the underarms and has not been active recently. At last visit, had a draining lesion on the groin that has since been discovered to be an anal fistula pending surgery on  FEB6. She did have previous anal fissures that had to be surgically repaired. Completed 30 days of doxycycline. She has a history of numerous surgeries for endometriosis as well as a hysterectomy. No history of IBD.     Assessment and Plan: HS is fairly well controlled at this point, but fistula is pending surgery February 6.     Based on a thorough discussion of this condition and the management approach to it (including a comprehensive discussion of the known risks, side effects and potential benefits of treatment), the patient (family) agrees to implement the following specific plan:       Alternate over the counter Benzoyl peroxide and topical Hibiclens wash.  Complete surgery as planned which will hopefully lead to resolution of draining fistula  Follow up for further flares of HS moving forward; symptoms currently not active           Narrative & Impression   SUPERFICIAL SOFT TISSUE ULTRASOUND     INDICATION:   M77.9: Enthesopathy, unspecified.     COMPARISON:  None     TECHNIQUE:   Real-time ultrasound of the right elbow was performed with a linear transducer with both volumetric sweeps and still imaging techniques.     FINDINGS:  No abnormality identified throughout the imaged region of concern.     IMPRESSION:     Normal.        Workstation performed: [id number]     Narrative & Impression   SUPERFICIAL SOFT TISSUE ULTRASOUND     INDICATION:   M77.9: Enthesopathy, unspecified.     COMPARISON: Multiple priors most recently head CT 5/9/2009     TECHNIQUE:   Real-time ultrasound of the posterior scalp and upper back was performed with a linear transducer with both volumetric sweeps and still imaging techniques.     FINDINGS: Region of interest underlying the scalp represents smooth bony prominence.     Region of interest upper back there is an ovoid isoechoic structure within the subcutaneous tissues without internal vascularity measuring 1.5 x 0.6 x 1.5 cm.     IMPRESSION:     Findings in the upper back  "are typical of lipoma.  Recommend clinical correlation and follow-up.  If clinical findings are not typical for lipoma, this lesion can be further characterized with MRI or CT.     Region of interest underlying the scalp represents a smooth bony prominence, of no clinical significance.        Workstation performed: [id number]     BONY PROTUBERANCE     Physical Exam:  Anatomic Location Affected:  posterior scalp   Morphological Description:   Firm bony protuberance on posterior scalp  Pertinent Positives: Ultrasound confirmed  Pertinent Negatives:     Additional History of Present Condition:  Patient present after Ultrasound ordered to the right elbow and posterior scalp to better characterize before planning any definitive therapy.Ultrasound results reviewed. Region of interest underlying the scalp represents a \"smooth bony prominence, of no clinical significance.\"         Assessment and Plan:  Based on a thorough discussion of this condition and the management approach to it (including a comprehensive discussion of the known risks, side effects and potential benefits of treatment), the patient (family) agrees to implement the following specific plan:  Nothing further to do at this time  Will continue to monitor      SUBCUTANEOUS NODULES- EXTENSOR ELBOWS     Physical Exam:  Anatomic Location Affected:  extensor elbows  Morphological Description:  No residual lesions today on exam  Pertinent Positives: Ultrasound without abnormality  Pertinent Negatives:     Additional History of Present Condition:  Patient had palpable lesions on last exam under surface of skin. Normal ultrasound. They have since resolved.         Assessment and Plan:  Based on a thorough discussion of this condition and the management approach to it (including a comprehensive discussion of the known risks, side effects and potential benefits of treatment), the patient (family) agrees to implement the following specific plan:  Nothing further to " do at this time  Will continue to monitor  Consider biopsy should lesions recur to better characterize        LIPOMA     Physical Exam:  Anatomic Location Affected:  upper back at base of neck  Morphological Description:  2 cm poorly defined soft subcutaneous nodule  Pertinent Positives: Ultrasound c.w lipoma  Pertinent Negatives:     Additional History of Present Condition: Patient states site is sore at times. No growth or change. Ultrasound confirmed lipoma.         Assessment and Plan: Patient counseled on options to observe or excise given normal ultrasound findings. She prefers to monitor. She agrees to let me know if it changes or if symptoms become more severe in which case I recommend excision with tissue diagnosis with general surgery.            Scribe Attestation      I,:  Mila Sanches am acting as a scribe while in the presence of the attending physician.:       I,:  Rae Espinoza MD personally performed the services described in this documentation    as scribed in my presence.:

## 2024-01-27 DIAGNOSIS — E11.65 TYPE 2 DIABETES MELLITUS WITH HYPERGLYCEMIA, WITHOUT LONG-TERM CURRENT USE OF INSULIN (HCC): ICD-10-CM

## 2024-01-27 DIAGNOSIS — J30.89 CHRONIC NONSEASONAL ALLERGIC RHINITIS DUE TO POLLEN: ICD-10-CM

## 2024-01-29 RX ORDER — LANCETS 33 GAUGE
EACH MISCELLANEOUS
Qty: 100 EACH | Refills: 0 | Status: SHIPPED | OUTPATIENT
Start: 2024-01-29

## 2024-01-29 RX ORDER — BLOOD-GLUCOSE METER
EACH MISCELLANEOUS
Qty: 1 KIT | Refills: 0 | Status: SHIPPED | OUTPATIENT
Start: 2024-01-29

## 2024-01-29 RX ORDER — ALBUTEROL SULFATE 90 UG/1
2 AEROSOL, METERED RESPIRATORY (INHALATION) EVERY 4 HOURS PRN
Qty: 18 G | Refills: 2 | Status: SHIPPED | OUTPATIENT
Start: 2024-01-29

## 2024-01-29 RX ORDER — BLOOD SUGAR DIAGNOSTIC
STRIP MISCELLANEOUS
Qty: 100 STRIP | Refills: 0 | Status: SHIPPED | OUTPATIENT
Start: 2024-01-29

## 2024-02-22 DIAGNOSIS — I10 PRIMARY HYPERTENSION: ICD-10-CM

## 2024-02-24 DIAGNOSIS — K21.00 GASTROESOPHAGEAL REFLUX DISEASE WITH ESOPHAGITIS, UNSPECIFIED WHETHER HEMORRHAGE: ICD-10-CM

## 2024-02-24 DIAGNOSIS — F33.1 MODERATE EPISODE OF RECURRENT MAJOR DEPRESSIVE DISORDER (HCC): ICD-10-CM

## 2024-02-25 RX ORDER — SUCRALFATE 1 G/1
TABLET ORAL
Qty: 360 TABLET | Refills: 0 | Status: SHIPPED | OUTPATIENT
Start: 2024-02-25

## 2024-02-25 RX ORDER — TRAZODONE HYDROCHLORIDE 50 MG/1
TABLET ORAL
Qty: 90 TABLET | Refills: 0 | Status: SHIPPED | OUTPATIENT
Start: 2024-02-25

## 2024-02-25 RX ORDER — LOSARTAN POTASSIUM 50 MG/1
50 TABLET ORAL 2 TIMES DAILY
Qty: 180 TABLET | Refills: 2 | Status: SHIPPED | OUTPATIENT
Start: 2024-02-25

## 2024-02-28 DIAGNOSIS — F33.1 MODERATE EPISODE OF RECURRENT MAJOR DEPRESSIVE DISORDER (HCC): ICD-10-CM

## 2024-02-28 DIAGNOSIS — K21.9 GASTROESOPHAGEAL REFLUX DISEASE: ICD-10-CM

## 2024-02-28 RX ORDER — ESCITALOPRAM OXALATE 20 MG/1
TABLET ORAL
Qty: 90 TABLET | Refills: 1 | Status: SHIPPED | OUTPATIENT
Start: 2024-02-28

## 2024-02-28 RX ORDER — PANTOPRAZOLE SODIUM 40 MG/1
TABLET, DELAYED RELEASE ORAL
Qty: 90 TABLET | Refills: 1 | Status: SHIPPED | OUTPATIENT
Start: 2024-02-28

## 2024-03-13 DIAGNOSIS — R13.19 ESOPHAGEAL DYSPHAGIA: ICD-10-CM

## 2024-03-13 DIAGNOSIS — K21.00 GASTROESOPHAGEAL REFLUX DISEASE WITH ESOPHAGITIS, UNSPECIFIED WHETHER HEMORRHAGE: ICD-10-CM

## 2024-03-14 RX ORDER — FAMOTIDINE 40 MG/1
TABLET, FILM COATED ORAL
Qty: 90 TABLET | Refills: 1 | Status: SHIPPED | OUTPATIENT
Start: 2024-03-14

## 2024-03-21 ENCOUNTER — TELEPHONE (OUTPATIENT)
Dept: NEPHROLOGY | Facility: CLINIC | Age: 59
End: 2024-03-21

## 2024-03-21 DIAGNOSIS — I10 PRIMARY HYPERTENSION: ICD-10-CM

## 2024-03-21 RX ORDER — LOSARTAN POTASSIUM 50 MG/1
50 TABLET ORAL 2 TIMES DAILY
Qty: 200 TABLET | Refills: 1 | Status: SHIPPED | OUTPATIENT
Start: 2024-03-21

## 2024-03-21 NOTE — TELEPHONE ENCOUNTER
Received a fax from [facility] requesting a refill for the patient's Losartan medication 50 mg 100 day supply with 3 refills.

## 2024-04-20 NOTE — PROGRESS NOTES
Assessment and Plan:    Problem List Items Addressed This Visit     Hydradenitis     Patient does have some irritation on the left side of the chest wall, just below the bra line  Would recommend Bactrim DS  This would cover the sinus infection, lung infection, as well as skin  Continue topical clindamycin  Relevant Medications    sulfamethoxazole-trimethoprim (Bactrim DS) 800-160 mg per tablet    Hypertension     Stable at the moment  She reports her home blood pressures are better  Will continue to follow  Continue on Cozaar  Type 2 diabetes mellitus (HCC)       Lab Results   Component Value Date    HGBA1C 6 8 (H) 02/15/2022   Some hypoglycemic events  Reviewed about having carbohydrates on hand in case she does have more problems with it  Consider switching the Amaryl to half tablet  Due for A1c  She does have blood work ordered previously  Is planning on going very shortly  Because of this, elected not to do fingerstick A1c today  Other Visit Diagnoses     Acute non-recurrent pansinusitis    -  Primary    Bactrim DS  Allergy to penicillin and Zithromax  Follow in 2 weeks if needed  Relevant Medications    sulfamethoxazole-trimethoprim (Bactrim DS) 800-160 mg per tablet    Bronchitis        Lungs were reasonable  Bactrim should help  Use albuterol as needed for cough and shortness of breath  Relevant Medications    sulfamethoxazole-trimethoprim (Bactrim DS) 800-160 mg per tablet                 Diagnoses and all orders for this visit:    Acute non-recurrent pansinusitis  Comments:  Bactrim DS  Allergy to penicillin and Zithromax  Follow in 2 weeks if needed  Orders:  -     sulfamethoxazole-trimethoprim (Bactrim DS) 800-160 mg per tablet; Take 1 tablet by mouth every 12 (twelve) hours for 10 days    Bronchitis  Comments:  Lungs were reasonable  Bactrim should help  Use albuterol as needed for cough and shortness of breath    Orders:  - [place] sulfamethoxazole-trimethoprim (Bactrim DS) 800-160 mg per tablet; Take 1 tablet by mouth every 12 (twelve) hours for 10 days    Hydradenitis  -     sulfamethoxazole-trimethoprim (Bactrim DS) 800-160 mg per tablet; Take 1 tablet by mouth every 12 (twelve) hours for 10 days    Primary hypertension    Type 2 diabetes mellitus without complication, without long-term current use of insulin (HCC)            Subjective:      Patient ID: Tadeo Stokes is a 56 y o  female  CC:    Chief Complaint   Patient presents with    Sinusitis     Pt states she has been having sinus pressure headaches but that cleared up alittle and now she has a cough  This has been ongoing for 2 weeks  kw       HPI:    Patient started with Cold symptoms, but then it went to her chest   Now it seems to be back in the head as well  Please see chief complaint  currently using DayQuil and Mucinex as needed  No real change with these  Has had sinus infections in the past, this does feel very similar  With regard to the chest congestion, she has been using albuterol in the mornings on occasion  Patient did initially have significant nasal discharge that was quite thick  Denies any face or tooth pain with this  Patient also some irritation on the left side  Seem to be related to hidradenitis that she previously  Topical Cleocin so far, which seemed to help somewhat  Hypertension:  Currently on losartan 50 mg  Diabetes:  Currently on Amaryl  She has noted some low sugars at times  Also has had some issues with knee pains and irritation  Had twisted knee recently  Tried NSAIDs and that helped  The following portions of the patient's history were reviewed and updated as appropriate: allergies, current medications, past family history, past medical history, past social history, past surgical history and problem list       Review of Systems   Constitutional: Negative      HENT: Positive for congestion, postnasal drip, rhinorrhea, sinus pressure and sore throat  Negative for dental problem  Eyes: Negative  Respiratory: Positive for cough  Negative for shortness of breath  Cardiovascular: Negative  Gastrointestinal: Negative  Endocrine: Negative  Genitourinary: Negative  Musculoskeletal: Negative  Skin: Negative  Allergic/Immunologic: Negative  Neurological: Negative  Hematological: Negative  Psychiatric/Behavioral: Negative  Data to review:       Objective:    Vitals:    09/21/22 1538   BP: 138/68   BP Location: Left arm   Patient Position: Sitting   Pulse: 70   SpO2: 98%   Weight: 102 kg (225 lb)   Height: 5' 5" (1 651 m)        Physical Exam  Vitals and nursing note reviewed  Constitutional:       Appearance: Normal appearance  HENT:      Head: Normocephalic  Cardiovascular:      Rate and Rhythm: Normal rate and regular rhythm  Pulses: Normal pulses  Carotid pulses are 2+ on the right side and 2+ on the left side  Heart sounds: Normal heart sounds  No murmur heard  No friction rub  No gallop  Pulmonary:      Effort: Pulmonary effort is normal  No respiratory distress  Breath sounds: Normal breath sounds  No stridor  No wheezing, rhonchi or rales  Chest:      Chest wall: No tenderness  Neurological:      Mental Status: She is alert

## 2024-05-09 ENCOUNTER — LAB (OUTPATIENT)
Dept: LAB | Facility: CLINIC | Age: 59
End: 2024-05-09
Payer: COMMERCIAL

## 2024-05-09 DIAGNOSIS — D35.02 ADENOMA OF LEFT ADRENAL GLAND: ICD-10-CM

## 2024-05-09 LAB — CORTIS AM PEAK SERPL-MCNC: 1.8 UG/DL (ref 6.7–22.6)

## 2024-05-09 PROCEDURE — 36415 COLL VENOUS BLD VENIPUNCTURE: CPT

## 2024-05-09 PROCEDURE — 82533 TOTAL CORTISOL: CPT

## 2024-05-10 ENCOUNTER — TELEPHONE (OUTPATIENT)
Dept: ENDOCRINOLOGY | Facility: CLINIC | Age: 59
End: 2024-05-10

## 2024-05-10 NOTE — TELEPHONE ENCOUNTER
----- Message from Adrienne Lloyd MD sent at 5/10/2024  7:54 AM EDT -----  Please call the patient regarding labs -low cortisol-appropriate response to dexamethasone suppression test

## 2024-05-16 ENCOUNTER — OFFICE VISIT (OUTPATIENT)
Dept: FAMILY MEDICINE CLINIC | Facility: CLINIC | Age: 59
End: 2024-05-16
Payer: COMMERCIAL

## 2024-05-16 ENCOUNTER — APPOINTMENT (OUTPATIENT)
Dept: LAB | Facility: CLINIC | Age: 59
End: 2024-05-16
Payer: COMMERCIAL

## 2024-05-16 VITALS
HEART RATE: 75 BPM | BODY MASS INDEX: 35.99 KG/M2 | HEIGHT: 65 IN | DIASTOLIC BLOOD PRESSURE: 86 MMHG | OXYGEN SATURATION: 96 % | SYSTOLIC BLOOD PRESSURE: 122 MMHG | WEIGHT: 216 LBS

## 2024-05-16 DIAGNOSIS — E55.9 VITAMIN D DEFICIENCY: ICD-10-CM

## 2024-05-16 DIAGNOSIS — E78.2 MIXED HYPERLIPIDEMIA: ICD-10-CM

## 2024-05-16 DIAGNOSIS — M35.3 PMR (POLYMYALGIA RHEUMATICA) (HCC): ICD-10-CM

## 2024-05-16 DIAGNOSIS — F41.9 ANXIETY: ICD-10-CM

## 2024-05-16 DIAGNOSIS — E11.9 TYPE 2 DIABETES MELLITUS WITHOUT COMPLICATION, WITHOUT LONG-TERM CURRENT USE OF INSULIN (HCC): ICD-10-CM

## 2024-05-16 DIAGNOSIS — I10 PRIMARY HYPERTENSION: ICD-10-CM

## 2024-05-16 DIAGNOSIS — E53.8 VITAMIN B12 DEFICIENCY: ICD-10-CM

## 2024-05-16 DIAGNOSIS — R80.9 PROTEINURIA, UNSPECIFIED TYPE: ICD-10-CM

## 2024-05-16 DIAGNOSIS — J01.10 ACUTE NON-RECURRENT FRONTAL SINUSITIS: Primary | ICD-10-CM

## 2024-05-16 DIAGNOSIS — F33.1 MODERATE EPISODE OF RECURRENT MAJOR DEPRESSIVE DISORDER (HCC): ICD-10-CM

## 2024-05-16 LAB
25(OH)D3 SERPL-MCNC: 31.9 NG/ML (ref 30–100)
ALBUMIN SERPL BCP-MCNC: 4.2 G/DL (ref 3.5–5)
ALP SERPL-CCNC: 74 U/L (ref 34–104)
ALT SERPL W P-5'-P-CCNC: 13 U/L (ref 7–52)
ANION GAP SERPL CALCULATED.3IONS-SCNC: 11 MMOL/L (ref 4–13)
AST SERPL W P-5'-P-CCNC: 15 U/L (ref 13–39)
BACTERIA UR QL AUTO: ABNORMAL /HPF
BILIRUB SERPL-MCNC: 0.43 MG/DL (ref 0.2–1)
BILIRUB UR QL STRIP: NEGATIVE
BUN SERPL-MCNC: 15 MG/DL (ref 5–25)
CALCIUM SERPL-MCNC: 9.6 MG/DL (ref 8.4–10.2)
CHLORIDE SERPL-SCNC: 106 MMOL/L (ref 96–108)
CHOLEST SERPL-MCNC: 205 MG/DL
CLARITY UR: CLEAR
CO2 SERPL-SCNC: 25 MMOL/L (ref 21–32)
COLOR UR: ABNORMAL
CREAT SERPL-MCNC: 0.63 MG/DL (ref 0.6–1.3)
CREAT UR-MCNC: 120.5 MG/DL
EST. AVERAGE GLUCOSE BLD GHB EST-MCNC: 134 MG/DL
GFR SERPL CREATININE-BSD FRML MDRD: 99 ML/MIN/1.73SQ M
GLUCOSE P FAST SERPL-MCNC: 144 MG/DL (ref 65–99)
GLUCOSE UR STRIP-MCNC: NEGATIVE MG/DL
HBA1C MFR BLD: 6.3 %
HDLC SERPL-MCNC: 46 MG/DL
HGB UR QL STRIP.AUTO: NEGATIVE
KETONES UR STRIP-MCNC: NEGATIVE MG/DL
LDLC SERPL DIRECT ASSAY-MCNC: 142 MG/DL (ref 0–100)
LEFT EYE DIABETIC RETINOPATHY: NORMAL
LEFT EYE IMAGE QUALITY: NORMAL
LEFT EYE MACULAR EDEMA: NORMAL
LEFT EYE OTHER RETINOPATHY: NORMAL
LEUKOCYTE ESTERASE UR QL STRIP: ABNORMAL
NITRITE UR QL STRIP: NEGATIVE
NON-SQ EPI CELLS URNS QL MICRO: ABNORMAL /HPF
PH UR STRIP.AUTO: 5.5 [PH]
POTASSIUM SERPL-SCNC: 3.9 MMOL/L (ref 3.5–5.3)
PROT SERPL-MCNC: 7 G/DL (ref 6.4–8.4)
PROT UR STRIP-MCNC: NEGATIVE MG/DL
PROT UR-MCNC: 8 MG/DL
PROT/CREAT UR: 0.07 MG/G{CREAT} (ref 0–0.1)
RBC #/AREA URNS AUTO: ABNORMAL /HPF
RIGHT EYE DIABETIC RETINOPATHY: NORMAL
RIGHT EYE IMAGE QUALITY: NORMAL
RIGHT EYE MACULAR EDEMA: NORMAL
RIGHT EYE OTHER RETINOPATHY: NORMAL
SEVERITY (EYE EXAM): NORMAL
SODIUM SERPL-SCNC: 142 MMOL/L (ref 135–147)
SP GR UR STRIP.AUTO: 1.02 (ref 1–1.03)
UROBILINOGEN UR STRIP-ACNC: <2 MG/DL
VIT B12 SERPL-MCNC: 396 PG/ML (ref 180–914)
WBC #/AREA URNS AUTO: ABNORMAL /HPF

## 2024-05-16 PROCEDURE — 83718 ASSAY OF LIPOPROTEIN: CPT

## 2024-05-16 PROCEDURE — 82570 ASSAY OF URINE CREATININE: CPT

## 2024-05-16 PROCEDURE — 82306 VITAMIN D 25 HYDROXY: CPT

## 2024-05-16 PROCEDURE — 83721 ASSAY OF BLOOD LIPOPROTEIN: CPT

## 2024-05-16 PROCEDURE — 92250 FUNDUS PHOTOGRAPHY W/I&R: CPT | Performed by: FAMILY MEDICINE

## 2024-05-16 PROCEDURE — 80053 COMPREHEN METABOLIC PANEL: CPT

## 2024-05-16 PROCEDURE — 83036 HEMOGLOBIN GLYCOSYLATED A1C: CPT

## 2024-05-16 PROCEDURE — 99214 OFFICE O/P EST MOD 30 MIN: CPT | Performed by: FAMILY MEDICINE

## 2024-05-16 PROCEDURE — G2211 COMPLEX E/M VISIT ADD ON: HCPCS | Performed by: FAMILY MEDICINE

## 2024-05-16 PROCEDURE — 84156 ASSAY OF PROTEIN URINE: CPT

## 2024-05-16 PROCEDURE — 82465 ASSAY BLD/SERUM CHOLESTEROL: CPT

## 2024-05-16 PROCEDURE — 81001 URINALYSIS AUTO W/SCOPE: CPT

## 2024-05-16 PROCEDURE — 82607 VITAMIN B-12: CPT

## 2024-05-16 PROCEDURE — 36415 COLL VENOUS BLD VENIPUNCTURE: CPT

## 2024-05-16 RX ORDER — HYDROXYZINE PAMOATE 25 MG/1
25 CAPSULE ORAL 3 TIMES DAILY PRN
Qty: 30 CAPSULE | Refills: 0 | Status: SHIPPED | OUTPATIENT
Start: 2024-05-16

## 2024-05-16 RX ORDER — SOD CHLOR,BICARB/SQUEEZ BOTTLE
PACKET, WITH RINSE DEVICE NASAL
COMMUNITY
Start: 2024-02-12

## 2024-05-16 NOTE — PROGRESS NOTES
Ambulatory Visit  Name: Rhonda Dukes      : 1965      MRN: [id number]  Encounter Provider: Markie Magdaleno MD  Encounter Date: 2024   Encounter department: [facility]    Assessment & Plan   1. Acute non-recurrent frontal sinusitis  Comments:  Currently on doxycycline.  Finish 10-day course.  Side effects with other antibiotics.  2. Type 2 diabetes mellitus without complication, without long-term current use of insulin ([facility])  Assessment & Plan:    Lab Results   Component Value Date    HGBA1C 6.7 (H) 2023   Awaiting labs.  Last were reasonable with a 6.7 A1c.  No change at the moment.  Orders:  -     IRIS Diabetic eye exam  3. Mixed hyperlipidemia  Assessment & Plan:  Awaiting labs.  No specific issues.  They were drawn this morning.  4. Primary hypertension  Assessment & Plan:  Blood pressure doing quite well today.  No change.  5. Moderate episode of recurrent major depressive disorder ([facility])  Assessment & Plan:  Has been feeling okay, but within the last week and a half she has had some increasing problems with panic and anxiety symptoms.  Unsure if it is related to true panic and anxiety versus the sinus infection that she has.  Will treat with doxycycline for the sinus that she already started, continue Lexapro.    Orders:  -     hydrOXYzine pamoate (VISTARIL) 25 mg capsule; Take 1 capsule (25 mg total) by mouth 3 (three) times a day as needed for itching  6. Anxiety  Assessment & Plan:  Some increased anxiety lately.  Unsure as to cause, but not anything specific.  She does have some physical changes as well.  This relates to back issues, as well as shoulders.  Will see if there is any improvement by using the doxycycline.  Could consider using hydroxyzine.  Orders:  -     hydrOXYzine pamoate (VISTARIL) 25 mg capsule; Take 1 capsule (25 mg total) by mouth 3 (three) times a day as needed for itching  7. PMR (polymyalgia rheumatica) ([facility])         Chief Complaint  "  Patient presents with   • Panic Attack     feeling \"off\" lately; started getting panic attack again within last few weeks; taking lexapro; intermitten pain in right ear; tension in back part of head   • Headache   • Earache     Right ear pain, sinus congestion       History of Present Illness     Patient is here to follow-up on multiple issues.    See chief complaint.    Increased panic, but has some tingling in the back of the head.  Some pressure in the back and the shoulders.  Muscle aches.    Also has some irritation in sinuses and ears.  Had increased nasal discharge, right ear pain on occasion with this, and clogged ear.  Has HA as well.  For over 2 weeks now.    Some swelling in fingers now.  Middle finger right now showing trigger behaviors.    She did have some labs this AM. Pending right now.          Review of Systems   Constitutional:  Positive for activity change and fatigue.   HENT:  Positive for congestion, postnasal drip, rhinorrhea, sinus pressure and sinus pain.    Respiratory:  Positive for cough. Negative for shortness of breath and wheezing.    Cardiovascular: Negative.    Gastrointestinal: Negative.        Objective     /86 (BP Location: Left arm, Patient Position: Sitting, Cuff Size: Large)   Pulse 75   Ht 5' 5\" (1.651 m)   Wt 98 kg (216 lb)   SpO2 96%   BMI 35.94 kg/m²   Diabetic Foot Exam    Patient's shoes and socks removed.    Right Foot/Ankle   Right Foot Inspection  Skin Exam: skin normal and skin intact. No dry skin, no warmth, no callus, no erythema, no maceration, no abnormal color, no pre-ulcer, no ulcer and no callus.     Toe Exam: ROM and strength within normal limits.     Sensory   Vibration: intact  Proprioception: intact  Monofilament testing: intact    Vascular  Capillary refills: < 3 seconds  The right DP pulse is 2+. The right PT pulse is 2+.     Left Foot/Ankle  Left Foot Inspection  Skin Exam: skin normal and skin intact. No dry skin, no warmth, no erythema, no " maceration, normal color, no pre-ulcer, no ulcer and no callus.     Toe Exam: ROM and strength within normal limits.     Sensory   Vibration: intact  Proprioception: intact  Monofilament testing: intact    Vascular  Capillary refills: < 3 seconds  The left DP pulse is 2+. The left PT pulse is 2+.     Assign Risk Category  No deformity present  No loss of protective sensation  No weak pulses  Risk: 0    Physical Exam  Vitals and nursing note reviewed.   Constitutional:       Appearance: Normal appearance.   HENT:      Head: Normocephalic.     Cardiovascular:      Rate and Rhythm: Normal rate and regular rhythm.      Pulses: Normal pulses. no weak pulses.           Carotid pulses are 2+ on the right side and 2+ on the left side.       Dorsalis pedis pulses are 2+ on the right side and 2+ on the left side.        Posterior tibial pulses are 2+ on the right side and 2+ on the left side.      Heart sounds: Normal heart sounds. No murmur heard.     No friction rub. No gallop.   Pulmonary:      Effort: Pulmonary effort is normal. No respiratory distress.      Breath sounds: Normal breath sounds. No stridor. No wheezing, rhonchi or rales.   Chest:      Chest wall: No tenderness.   Feet:      Right foot:      Skin integrity: No ulcer, skin breakdown, erythema, warmth, callus or dry skin.      Left foot:      Skin integrity: No ulcer, skin breakdown, erythema, warmth, callus or dry skin.   Lymphadenopathy:      Cervical: No cervical adenopathy.   Neurological:      Mental Status: She is alert.       Administrative Statements

## 2024-05-16 NOTE — ASSESSMENT & PLAN NOTE
Has been feeling okay, but within the last week and a half she has had some increasing problems with panic and anxiety symptoms.  Unsure if it is related to true panic and anxiety versus the sinus infection that she has.  Will treat with doxycycline for the sinus that she already started, continue Lexapro.

## 2024-05-16 NOTE — PATIENT INSTRUCTIONS
1. Acute non-recurrent frontal sinusitis  Comments:  Currently on doxycycline.  Finish 10-day course.  Side effects with other antibiotics.  2. Type 2 diabetes mellitus without complication, without long-term current use of insulin ([facility])  Assessment & Plan:    Lab Results   Component Value Date    HGBA1C 6.7 (H) 11/16/2023   Awaiting labs.  Last were reasonable with a 6.7 A1c.  No change at the moment.  Orders:  -     IRIS Diabetic eye exam  3. Mixed hyperlipidemia  Assessment & Plan:  Awaiting labs.  No specific issues.  They were drawn this morning.  4. Primary hypertension  Assessment & Plan:  Blood pressure doing quite well today.  No change.  5. Moderate episode of recurrent major depressive disorder ([facility])  Assessment & Plan:  Has been feeling okay, but within the last week and a half she has had some increasing problems with panic and anxiety symptoms.  Unsure if it is related to true panic and anxiety versus the sinus infection that she has.  Will treat with doxycycline for the sinus that she already started, continue Lexapro.    Orders:  -     hydrOXYzine pamoate (VISTARIL) 25 mg capsule; Take 1 capsule (25 mg total) by mouth 3 (three) times a day as needed for itching  6. Anxiety  Assessment & Plan:  Some increased anxiety lately.  Unsure as to cause, but not anything specific.  She does have some physical changes as well.  This relates to back issues, as well as shoulders.  Will see if there is any improvement by using the doxycycline.  Could consider using hydroxyzine.  Orders:  -     hydrOXYzine pamoate (VISTARIL) 25 mg capsule; Take 1 capsule (25 mg total) by mouth 3 (three) times a day as needed for itching  7. PMR (polymyalgia rheumatica) ([facility])      COVID 19 Instructions    Rhonda Dukes was advised to limit contact with others to essential tasks such as getting food, medications, and medical care.    Proper handwashing reviewed, and Hand sanitzer when washing is not available.    If the patient  develops symptoms of COVID 19, the patient should call the office as soon as possible.    It is strongly recommended that Flu Vaccinations be obtained.      Virtual Visits:  Chio: This works on smart phones (any phone with Internet browsing capability).  You should get a text message when the provider is ready to see you.  Click on the link in the text message, and the call should start.  You will need to type in your name, and allow camera and microphone access.  This is HIPPA compliant, and secure.      If you have not already done so, get immunized to COVID 19.      We are committed to getting you vaccinated as soon as possible and will be closely following [facility] and [facility] guidelines as they are released and revised.  Please refer to our COVID-19 vaccine webpage for the most up to date information on the vaccine and our distribution efforts.    This site will also have the most up to date recommendations for COVID booster vaccine.    https://[email or web address]/protect-[email or web address]/covid-[email or web address]-vaccine    Call [phone] ([phone]), option 7    You can also visit https://www.vaccines.gov/ to find vaccines in your area.    OUR LOCATION:    [facility]  [facility] [facility], [address]  [place], [place], [address] [phone]  Fax: [phone]    Lab services, Rheumatology, and OB/GYN are at this location as well.

## 2024-05-16 NOTE — ASSESSMENT & PLAN NOTE
Lab Results   Component Value Date    HGBA1C 6.7 (H) 11/16/2023   Awaiting labs.  Last were reasonable with a 6.7 A1c.  No change at the moment.

## 2024-05-16 NOTE — ASSESSMENT & PLAN NOTE
Some increased anxiety lately.  Unsure as to cause, but not anything specific.  She does have some physical changes as well.  This relates to back issues, as well as shoulders.  Will see if there is any improvement by using the doxycycline.  Could consider using hydroxyzine.

## 2024-05-21 NOTE — RESULT ENCOUNTER NOTE
Tests were not normal, and should follow at  your scheduled Office visit. Please call about this, if [employer] message is not available or not read by patient.

## 2024-05-26 DIAGNOSIS — K21.00 GASTROESOPHAGEAL REFLUX DISEASE WITH ESOPHAGITIS, UNSPECIFIED WHETHER HEMORRHAGE: ICD-10-CM

## 2024-05-26 DIAGNOSIS — J30.89 CHRONIC NONSEASONAL ALLERGIC RHINITIS DUE TO POLLEN: ICD-10-CM

## 2024-05-26 DIAGNOSIS — F33.1 MODERATE EPISODE OF RECURRENT MAJOR DEPRESSIVE DISORDER (HCC): ICD-10-CM

## 2024-05-27 RX ORDER — SUCRALFATE 1 G/1
TABLET ORAL
Qty: 360 TABLET | Refills: 1 | Status: SHIPPED | OUTPATIENT
Start: 2024-05-27

## 2024-05-27 RX ORDER — TRAZODONE HYDROCHLORIDE 50 MG/1
TABLET ORAL
Qty: 90 TABLET | Refills: 1 | Status: SHIPPED | OUTPATIENT
Start: 2024-05-27

## 2024-05-27 RX ORDER — MONTELUKAST SODIUM 10 MG/1
TABLET ORAL
Qty: 90 TABLET | Refills: 1 | Status: SHIPPED | OUTPATIENT
Start: 2024-05-27

## 2024-05-30 ENCOUNTER — OFFICE VISIT (OUTPATIENT)
Dept: FAMILY MEDICINE CLINIC | Facility: CLINIC | Age: 59
End: 2024-05-30
Payer: COMMERCIAL

## 2024-05-30 VITALS
OXYGEN SATURATION: 96 % | HEART RATE: 68 BPM | WEIGHT: 217 LBS | DIASTOLIC BLOOD PRESSURE: 76 MMHG | HEIGHT: 65 IN | BODY MASS INDEX: 36.15 KG/M2 | SYSTOLIC BLOOD PRESSURE: 132 MMHG

## 2024-05-30 DIAGNOSIS — M79.7 FIBROMYALGIA: ICD-10-CM

## 2024-05-30 DIAGNOSIS — E11.9 TYPE 2 DIABETES MELLITUS WITHOUT COMPLICATION, WITHOUT LONG-TERM CURRENT USE OF INSULIN (HCC): ICD-10-CM

## 2024-05-30 DIAGNOSIS — I10 PRIMARY HYPERTENSION: ICD-10-CM

## 2024-05-30 DIAGNOSIS — E78.2 MIXED HYPERLIPIDEMIA: ICD-10-CM

## 2024-05-30 DIAGNOSIS — H65.06 RECURRENT ACUTE SEROUS OTITIS MEDIA OF BOTH EARS: Primary | ICD-10-CM

## 2024-05-30 DIAGNOSIS — D35.02 ADENOMA OF LEFT ADRENAL GLAND: ICD-10-CM

## 2024-05-30 PROCEDURE — G2211 COMPLEX E/M VISIT ADD ON: HCPCS | Performed by: FAMILY MEDICINE

## 2024-05-30 PROCEDURE — 99214 OFFICE O/P EST MOD 30 MIN: CPT | Performed by: FAMILY MEDICINE

## 2024-05-30 RX ORDER — ROSUVASTATIN CALCIUM 5 MG/1
5 TABLET, COATED ORAL DAILY
Qty: 30 TABLET | Refills: 5 | Status: SHIPPED | OUTPATIENT
Start: 2024-05-30

## 2024-05-30 RX ORDER — CLINDAMYCIN HYDROCHLORIDE 150 MG/1
150 CAPSULE ORAL EVERY 8 HOURS SCHEDULED
Qty: 30 CAPSULE | Refills: 0 | Status: SHIPPED | OUTPATIENT
Start: 2024-05-30 | End: 2024-06-09

## 2024-05-30 NOTE — ASSESSMENT & PLAN NOTE
Lab Results   Component Value Date    HGBA1C 6.3 (H) 05/16/2024   Not currently on medications.  Currently her A1c is excellent at 6.3, though she is having high and low sugars.  Try to limit carbohydrates.  Reviewed about this today.  Her downfall seems to be Pasta.  Check again in 3 months or so.

## 2024-05-30 NOTE — ASSESSMENT & PLAN NOTE
History of FMS.  No specific change at the moment.  Reviewed about using statins, more specifically she was concerned about the aches that can happen from them, and while this is truly a possibility, it is not necessarily a probability, and we are trying to reduce the risk of heart attack and stroke, therefore I would use statins at this time.

## 2024-05-30 NOTE — ASSESSMENT & PLAN NOTE
Follow with nephrology/endocrinology.  Had dexamethasone suppression test which was normal according to endocrinology.

## 2024-05-30 NOTE — PATIENT INSTRUCTIONS
1. Recurrent acute serous otitis media of both ears  Comments:  Despite having recently been on doxycycline, the ears continue to have some irritation, as well as findings consistent with otitis media.  Clindamycin, ENT.  Orders:  -     clindamycin (CLEOCIN) 150 mg capsule; Take 1 capsule (150 mg total) by mouth every 8 (eight) hours for 10 days  2. Primary hypertension  Assessment & Plan:  Blood pressure doing quite well.  No change.  Orders:  -     Comprehensive metabolic panel; Future; Expected date: 11/30/2024  3. Mixed hyperlipidemia  Assessment & Plan:  Cholesterol is not at goal.  Total and LDL are both high.  HDL is reasonable.    Would recommend that she consider using Crestor.    Orders:  -     rosuvastatin (CRESTOR) 5 mg tablet; Take 1 tablet (5 mg total) by mouth daily  -     Comprehensive metabolic panel; Future; Expected date: 11/30/2024  -     Lipid panel; Future; Expected date: 11/30/2024  4. Type 2 diabetes mellitus without complication, without long-term current use of insulin (HCC)  Assessment & Plan:    Lab Results   Component Value Date    HGBA1C 6.3 (H) 05/16/2024   Not currently on medications.  Currently her A1c is excellent at 6.3, though she is having high and low sugars.  Try to limit carbohydrates.  Reviewed about this today.  Her downfall seems to be Pasta.  Check again in 3 months or so.  Orders:  -     Comprehensive metabolic panel; Future; Expected date: 08/30/2024  -     Hemoglobin A1C; Future; Expected date: 08/30/2024  -     Comprehensive metabolic panel; Future; Expected date: 11/30/2024  5. Adenoma of left adrenal gland  Assessment & Plan:  Follow with nephrology/endocrinology.  Had dexamethasone suppression test which was normal according to endocrinology.  6. Fibromyalgia  Assessment & Plan:  History of FMS.  No specific change at the moment.  Reviewed about using statins, more specifically she was concerned about the aches that can happen from them, and while this is truly a  possibility, it is not necessarily a probability, and we are trying to reduce the risk of heart attack and stroke, therefore I would use statins at this time.          COVID 19 Instructions    Rhonda Dukes was advised to limit contact with others to essential tasks such as getting food, medications, and medical care.    Proper handwashing reviewed, and Hand sanitzer when washing is not available.    If the patient develops symptoms of COVID 19, the patient should call the office as soon as possible.    It is strongly recommended that Flu Vaccinations be obtained.      Virtual Visits:  Chio: This works on smart phones (any phone with Internet browsing capability).  You should get a text message when the provider is ready to see you.  Click on the link in the text message, and the call should start.  You will need to type in your name, and allow camera and microphone access.  This is HIPPA compliant, and secure.      If you have not already done so, get immunized to COVID 19.      We are committed to getting you vaccinated as soon as possible and will be closely following CDC and [facility] guidelines as they are released and revised.  Please refer to our COVID-19 vaccine webpage for the most up to date information on the vaccine and our distribution efforts.    This site will also have the most up to date recommendations for COVID booster vaccine.    https://[email or web address]/covid-19/protect-yourself/covid-[email or web address]-vaccine    Call [phone] ([phone]), option 7    You can also visit https://www.vaccines.gov/ to find vaccines in your area.    OUR LOCATION:    [facility]  [address] [place] [address], [address]  [place], [place], [address] [phone]  Fax: [phone]    Lab services, Rheumatology, and OB/GYN are at this location as well.

## 2024-05-30 NOTE — ASSESSMENT & PLAN NOTE
Cholesterol is not at goal.  Total and LDL are both high.  HDL is reasonable.    Would recommend that she consider using Crestor.

## 2024-05-30 NOTE — PROGRESS NOTES
Ambulatory Visit  Name: Rhonda Dukes      : 1965      MRN: [id number]  Encounter Provider: Markie Magdaleno MD  Encounter Date: 2024   Encounter department: [facility]    Assessment & Plan   1. Recurrent acute serous otitis media of both ears  Comments:  Despite having recently been on doxycycline, the ears continue to have some irritation, as well as findings consistent with otitis media.  Clindamycin, ENT.  Orders:  -     clindamycin (CLEOCIN) 150 mg capsule; Take 1 capsule (150 mg total) by mouth every 8 (eight) hours for 10 days  2. Primary hypertension  Assessment & Plan:  Blood pressure doing quite well.  No change.  Orders:  -     Comprehensive metabolic panel; Future; Expected date: 2024  3. Mixed hyperlipidemia  Assessment & Plan:  Cholesterol is not at goal.  Total and LDL are both high.  HDL is reasonable.    Would recommend that she consider using Crestor.    Orders:  -     rosuvastatin (CRESTOR) 5 mg tablet; Take 1 tablet (5 mg total) by mouth daily  -     Comprehensive metabolic panel; Future; Expected date: 2024  -     Lipid panel; Future; Expected date: 2024  4. Type 2 diabetes mellitus without complication, without long-term current use of insulin (HCC)  Assessment & Plan:    Lab Results   Component Value Date    HGBA1C 6.3 (H) 2024   Not currently on medications.  Currently her A1c is excellent at 6.3, though she is having high and low sugars.  Try to limit carbohydrates.  Reviewed about this today.  Her downfall seems to be Pasta.  Check again in 3 months or so.  Orders:  -     Comprehensive metabolic panel; Future; Expected date: 2024  -     Hemoglobin A1C; Future; Expected date: 2024  -     Comprehensive metabolic panel; Future; Expected date: 2024  5. Adenoma of left adrenal gland  Assessment & Plan:  Follow with nephrology/endocrinology.  Had dexamethasone suppression test which was normal according to  "endocrinology.  6. Fibromyalgia  Assessment & Plan:  History of FMS.  No specific change at the moment.  Reviewed about using statins, more specifically she was concerned about the aches that can happen from them, and while this is truly a possibility, it is not necessarily a probability, and we are trying to reduce the risk of heart attack and stroke, therefore I would use statins at this time.           Chief Complaint   Patient presents with   • Follow-up     2 week follow up       History of Present Illness     Patient is here to follow-up after her last visit.    She does notice that the ear feels a bit full, but overall feels a lot better than she did.  That was the sinus infection issue.  She did complete the course of doxycycline.    Anxiety: Feels much better at this point.  Not having any episodes since she started on antibiotics.  Does have the a hydroxyzine available.    She has had some headaches that continue, mostly on the posterior right side of the scalp.  Initially had symptoms in the back of the head with the headaches, but that seems to have improved some.        Review of Systems   Constitutional: Negative.    HENT:  Positive for ear pain.    Eyes: Negative.    Respiratory: Negative.     Cardiovascular: Negative.    Gastrointestinal: Negative.    Endocrine: Negative.    Genitourinary: Negative.    Musculoskeletal: Negative.    Skin: Negative.    Allergic/Immunologic: Negative.    Neurological: Negative.    Hematological: Negative.    Psychiatric/Behavioral: Negative.         Objective     /76 (BP Location: Right arm, Patient Position: Sitting, Cuff Size: Standard)   Pulse 68   Ht 5' 5\" (1.651 m)   Wt 98.4 kg (217 lb)   SpO2 96%   BMI 36.11 kg/m²     Sodium 142, potassium 3.9, calcium 9.6.  Blood sugar 144.  Creatinine 0.63, GFR 99.  AST 15, ALT 13.  A1c 6.3.  Total cholesterol 205, , HDL 46.  Vitamin D31.9.  Vitamin antivirals 396.  Protein to creatinine ratio " 0.07  Dexamethasone suppression test per endocrinology was reasonable at 1.8 cortisol.    Physical Exam  Vitals and nursing note reviewed.   Constitutional:       Appearance: Normal appearance. She is well-developed.   HENT:      Head: Normocephalic and atraumatic.      Right Ear: Hearing, ear canal and external ear normal. Tympanic membrane is bulging.      Left Ear: Hearing, ear canal and external ear normal. Tympanic membrane is bulging.   Cardiovascular:      Rate and Rhythm: Normal rate and regular rhythm.      Pulses:           Carotid pulses are 2+ on the right side and 2+ on the left side.     Heart sounds: Normal heart sounds.   Pulmonary:      Effort: Pulmonary effort is normal.      Breath sounds: Normal breath sounds. No wheezing or rales.   Chest:      Chest wall: No tenderness.   Neurological:      Mental Status: She is alert.       Administrative Statements

## 2024-05-31 DIAGNOSIS — E11.65 TYPE 2 DIABETES MELLITUS WITH HYPERGLYCEMIA, WITHOUT LONG-TERM CURRENT USE OF INSULIN (HCC): ICD-10-CM

## 2024-06-01 RX ORDER — BLOOD SUGAR DIAGNOSTIC
STRIP MISCELLANEOUS
Qty: 100 STRIP | Refills: 0 | Status: SHIPPED | OUTPATIENT
Start: 2024-06-01

## 2024-06-06 DIAGNOSIS — F41.9 ANXIETY: ICD-10-CM

## 2024-06-06 DIAGNOSIS — F33.1 MODERATE EPISODE OF RECURRENT MAJOR DEPRESSIVE DISORDER (HCC): ICD-10-CM

## 2024-06-06 RX ORDER — HYDROXYZINE PAMOATE 25 MG/1
25 CAPSULE ORAL 3 TIMES DAILY PRN
Qty: 30 CAPSULE | Refills: 0 | Status: SHIPPED | OUTPATIENT
Start: 2024-06-06

## 2024-06-22 DIAGNOSIS — E78.2 MIXED HYPERLIPIDEMIA: ICD-10-CM

## 2024-06-22 RX ORDER — ROSUVASTATIN CALCIUM 5 MG/1
5 TABLET, COATED ORAL DAILY
Qty: 90 TABLET | Refills: 1 | Status: SHIPPED | OUTPATIENT
Start: 2024-06-22

## 2024-07-11 NOTE — ADDENDUM NOTE
Addended by: Cindy Garza on: 3/10/2023 02:10 PM     Modules accepted: Orders ED Provider Note  [facility]      History     Chief Complaint   Patient presents with    Altered Mental Status     HPI  Linda Loredo is a 81 year old female who has medical history significant for multiple medical issues including history of CHF, chronic kidney disease, paroxysmal atrial fibrillation anticoagulated on warfarin, chronic opioid use on methadone, fibromyalgia, history of left lower extremity amputation, essentially bedbound, presenting to the emergency department with concerns regarding episode of confusion, with altered mental status.  Family members also arrive later, and noted that patient has had worsened redness of the right lower extremity despite outpatient cephalexin treatment.    Initial history obtained from EMS.  Patient is somewhat resistant to cares.  Noted episode of confusion this evening where patient was sitting on the edge of the bed, and less responsive to staff members.  No focal weakness.  No recent medication changes, and actually did not take medication this evening.    I reviewed discharge summary from TCU.  Patient had completed her therapies.  Requires 2 people assist for transfers.        Independent Historian:    I discussed with daughter.  Daughter states that patient had been confused this evening, which is new and different for the patient.  She also mention the increased amounts of right lower extremity redness.    Review of External Notes:  As above      Allergies:  Allergies   Allergen Reactions    Prednisone Nausea and Vomiting    Morphine Nausea and Vomiting    Morphine [Fumaric Acid] Nausea and Vomiting    Nitrofurantoin Unknown     Per nursing home       Problem List:    Patient Active Problem List    Diagnosis Date Noted    Other chronic pain 07/11/2024     Priority: Medium    Acute kidney injury (H24) 07/11/2024     Priority: Medium    Sepsis, due to unspecified organism, unspecified whether acute organ dysfunction present  (H) 07/11/2024     Priority: Medium    Chronic kidney disease, stage 3b (H) 06/12/2024     Priority: Medium    Cellulitis of right lower extremity 06/06/2024     Priority: Medium    Adverse effect of anticoagulants, initial encounter 04/16/2024     Priority: Medium    Hx of osteomyelitis 04/16/2024     Priority: Medium    Major depressive disorder, recurrent, in partial remission (H24) 04/16/2024     Priority: Medium    Constipation 04/16/2024     Priority: Medium    Venous stasis 04/16/2024     Priority: Medium    Skin ulcer of right lower leg with fat layer exposed (H) 02/26/2024     Priority: Medium    Lymphedema, not elsewhere classified 02/09/2024     Priority: Medium    Chronic right-sided heart failure (H) 11/16/2023     Priority: Medium    Muscle weakness (generalized) 11/15/2023     Priority: Medium    Nausea 11/15/2023     Priority: Medium    Drug induced constipation 11/15/2023     Priority: Medium    Other injury of unspecified body region, subsequent encounter 11/15/2023     Priority: Medium    Other abnormalities of gait and mobility 11/15/2023     Priority: Medium    Osteomyelitis (H) 10/24/2023     Priority: Medium    Diabetic foot ulcer (H) 09/29/2023     Priority: Medium    History of atrial fibrillation 09/22/2023     Priority: Medium    Non-healing wound of left heel 09/22/2023     Priority: Medium    Unable to care for self 09/22/2023     Priority: Medium    Unspecified open wound, left foot, subsequent encounter 09/22/2023     Priority: Medium    Personal history of other diseases of the circulatory system 09/22/2023     Priority: Medium    Cellulitis - bilateral lower extremities 05/27/2023     Priority: Medium    Decubitus ulcers - 5 present on buttocks/perineal region, numerous scabbed over and unstagable on shin and bilateral feet with some bloody blisters noted on feet 05/27/2023     Priority: Medium    Warfarin-induced coagulopathy (H24) 05/27/2023     Priority: Medium    Hypoglycemia  05/27/2023     Priority: Medium    Supratherapeutic INR 05/27/2023     Priority: Medium    Cellulitis of buttock 05/26/2023     Priority: Medium    Sepsis without acute organ dysfunction, due to unspecified organism (H) 05/26/2023     Priority: Medium    UTI (urinary tract infection) - possible 05/26/2023     Priority: Medium    GUSTAVO (acute kidney injury) (H24) 05/26/2023     Priority: Medium    Dehydration 05/26/2023     Priority: Medium    Weakness 05/02/2023     Priority: Medium    Opioid dependence, uncomplicated (H) 04/26/2023     Priority: Medium    Abscess of left foot 10/31/2022     Priority: Medium    Vitamin D deficiency 09/08/2022     Priority: Medium    Anticoagulation monitoring, INR range 2-3 07/06/2022     Priority: Medium    Paroxysmal atrial fibrillation (H) 05/30/2022     Priority: Medium     Formatting of this note might be different from the original.  New onset during 5/2022 admission      Obesity, Class III, BMI 40-49.9 (morbid obesity) (H) 05/05/2022     Priority: Medium    Lower extremity edema 05/29/2019     Priority: Medium    Systolic murmur 05/29/2019     Priority: Medium    Cardiac murmur, unspecified 05/29/2019     Priority: Medium    Localized edema 05/29/2019     Priority: Medium    Umbilical hernia without obstruction and without gangrene 12/13/2018     Priority: Medium    Microalbuminuria due to type 2 diabetes mellitus (H) 10/25/2016     Priority: Medium    Osteopenia 05/20/2015     Priority: Medium    Other specified disorders of bone density and structure, unspecified site 05/20/2015     Priority: Medium    Primary hyperparathyroidism (H24) 01/23/2013     Priority: Medium     Formatting of this note might be different from the original.  work up January 2013 Dr. Villareal      Hypotension 10/27/2011     Priority: Medium    Fibromyalgia 10/20/2011     Priority: Medium    Anxiety 10/20/2011     Priority: Medium    Chronic kidney disease 10/20/2011     Priority: Medium     Problem  list name updated by automated process. Provider to review      Elevated liver enzymes 10/20/2011     Priority: Medium    Fracture of fibula, distal, left, closed 10/20/2011     Priority: Medium     ORIF 10/13/11      COPD (chronic obstructive pulmonary disease) (H) 04/26/2010     Priority: Medium    Chronic pain 04/26/2010     Priority: Medium    Allergic rhinitis 04/08/2008     Priority: Medium    Major depressive disorder, recurrent episode, moderate (H) 04/08/2008     Priority: Medium    Hypersomnia with sleep apnea 10/23/2007     Priority: Medium     Sleep study 2004 showing severe OBSTRUCTIVE SLEEP APNEA and recommend CPAP  Problem list name updated by automated process. Provider to review      Generalized osteoarthrosis, unspecified site 09/18/2006     Priority: Medium     February 26, 2007: Linda felt that Naprosyn was not helpful.      Routine general medical examination at a health care facility 07/19/2006     Priority: Medium     Mammogram:  Mammogram due 2/27/07.  Pap: February 26, 2007 done.  Colonoscopy: scheduled 2/07.  Lipids: 12/22/2006: CHOL 132, HDL  40,LDL 77,TRIG 78,  Influenza vaccine: 10/06  Pneuovax  Vaccine: 11/06  Adacel  Vaccine: 1/07      Diabetic polyneuropathy associated with type 2 diabetes mellitus (H) 04/07/2006     Priority: Medium     February 26, 2007: on gabapentin.  She has been switched from gabapentin to lyrica.  Problem list name updated by automated process. Provider to review       OBESITY 03/13/2006     Priority: Medium     February 26, 2007: Body mass index is 48.07 kg/(m^2).       Mixed hyperlipidemia 12/05/2005     Priority: Medium     Lipids: 12/22/2006: CHOL 132, HDL  40,LDL 77,TRIG 78,  February 26, 2007: taking: Zetia and atorvastatin.      Insomnia 07/15/2005     Priority: Medium     February 26, 2007: see by Dr. Car. On ambien and trazadone.  Problem list name updated by automated process. Provider to review      Herpes zoster 06/20/2005     Priority:  Medium     February 26, 2007: On gabapentin and lidoderm.   She has been switched from gabapentin to lyrica.  Problem list name updated by automated process. Provider to review      Essential hypertension with goal blood pressure less than 140/90 05/09/2005     Priority: Medium     Cardiology referral:   February 26, 2007: taking: losartan, lisinopril, labetalol, aspirin, and furosemide.  Problem list name updated by automated process. Provider to review    Formatting of this note is different from the original.    BP Readings from Last 1 Encounters:   07/11/22 118/66     CREATININE (mg/dL)   Date Value   06/04/2022 0.89     ALBUMIN TO CREATININE RATIO,RAND UR      (mg/g creat)   Date Value   11/12/2009 25.7      combination of stress and urge URINARY INCONTINENCE 05/09/2005     Priority: Medium     February 26, 2007: On Detrol LA.      Type 2 diabetes mellitus with complication, with long-term current use of insulin (H) 04/18/2005     Priority: Medium     11/05: ophthalmology=Dr. German, exam 11/05 no evidence of diabetic retinopathy.  February 26, 2007: Poor control A1C      8.6   12/22/2006.   Medications: insulin pump: Humulog (Basal rate is 1.8 units and 1/2 usual meal bolus of insulin), Byetta, metformin. A CGMS (of insulin pump) is currently being done with diabetic educator Rosemary Pendleton.    Endocrine Consult at [facility]: see scanned notes for details. Linda has not followed up with them as of yet.  Problem list name updated by automated process. Provider to review    Formatting of this note is different from the original.  diagnosis 2001    HEMOGLOBIN A1C MONITORING (POCT) (%)   Date Value   05/28/2022 7.4 (H)   06/17/2021 7.1 (H)          Past Medical History:    Past Medical History:   Diagnosis Date    Depressive disorder, not elsewhere classified     Herpes zoster without mention of complication     Hypercalcemia 02/24/2007    Mild intermittent asthma     Mixed hyperlipidemia due to type 2 diabetes  mellitus (H) 04/08/2008    Other urinary incontinence     Type II or unspecified type diabetes mellitus with renal manifestations, uncontrolled(250.42) (H)     Type II or unspecified type diabetes mellitus without mention of complication, not stated as uncontrolled     Unspecified essential hypertension        Past Surgical History:    Past Surgical History:   Procedure Laterality Date    AMPUTATE LEG BELOW KNEE Left 10/7/2023    Procedure: LEFT GUILLOTINE BELOW KNEE AMPUTATION.;  Surgeon: Lan Otto MD;  Location: [facility] OR    AMPUTATE LEG BELOW KNEE Left 10/14/2023    Procedure: debridement of left below the knee amputation;  Surgeon: Lan Otto MD;  Location: [facility] OR    APPLY WOUND VAC Left 1/2/2024    Procedure: WOUND VAC APPLICATION TO LEFT BELOW KNEE STUMP;  Surgeon: Lan Otto MD;  Location:  OR    CHOLECYSTECTOMY      GRAFT SKIN SPLIT THICKNESS FROM TRUNK TO HEAD Left 1/2/2024    Procedure: APPLICATION OF SPLIT-THICKNESS SKIN GRAFT TO LEFT BELOW KNEE STUMP, GRAFT FROM LEFT THIGH;  Surgeon: Lan Otto MD;  Location: [facility] OR    INCISION AND DRAINAGE FOOT, COMBINED Left 9/26/2023    Procedure: Surgical debridement of all nonviable skin and soft tissue and bone left foot;  Surgeon: Nolberto Thorne DPM;  Location: [facility] OR    IR LOWER EXTREMITY ANGIOGRAM LEFT  10/3/2023    ligation of fallopian tube      OPEN REDUCTION INTERNAL FIXATION ANKLE  10/13/11    left    pinning of left 2nd toe         Family History:    Family History   Problem Relation Age of Onset    Hypertension Mother     Heart Disease Father         heart attack and cardiomegaly    Diabetes Sister         type 2    Hypertension Sister     Respiratory Sister     Psychotic Disorder Sister         bipolar    Cancer Maternal Grandmother         throat    Cancer Paternal Grandmother         gastric       Social History:  Marital Status:   [4]  Social History     Tobacco Use    Smoking status: Never     "Smokeless tobacco: Never   Substance Use Topics    Alcohol use: No    Drug use: No        Medications:    No current outpatient medications on file.        Review of Systems  A medically appropriate review of systems was performed with pertinent positives and negatives noted in the HPI, and all other systems negative.    Physical Exam   Patient Vitals for the past 24 hrs:   BP Temp Temp src Pulse Resp SpO2 Height Weight   07/11/24 0302 100/49 -- -- -- -- 98 % 1.676 m (5' 6\") 99.6 kg (219 lb 9.3 oz)   07/11/24 0257 92/42 97.6  F (36.4  C) Oral 94 18 98 % -- --   07/11/24 0130 133/58 -- -- 96 -- 99 % -- --   07/11/24 0100 124/56 -- -- 84 -- 99 % -- --   07/11/24 0030 -- -- -- -- -- 100 % -- --   07/11/24 0000 125/61 -- -- 85 -- 100 % -- --   07/10/24 2345 110/60 97.5  F (36.4  C) Oral 101 18 100 % -- --          Physical Exam  General: alert and in no acute distress on arrival  Head: atraumatic, normocephalic  Lungs:  nonlabored  CV:  extremities warm and perfused  Abd: nondistended  Back: Buttocks wounds present with decubitus ulcers  Skin: As pictured below, with significant erythema, and healing wounds of the right lower extremity.  Left lower extremity with BKA          Neuro: Patient awake, alert, speech is fluent,   Psychiatric: affect/mood normal,        ED Course                 Procedures                           Results for orders placed or performed during the hospital encounter of 07/10/24 (from the past 24 hour(s))   CBC with platelets differential    Narrative    The following orders were created for panel order CBC with platelets differential.  Procedure                               Abnormality         Status                     ---------                               -----------         ------                     CBC with platelets and d...[[id number]]  Abnormal            Final result                 Please view results for these tests on the individual orders.   Comprehensive metabolic panel "   Result Value Ref Range    Sodium 132 (L) 135 - 145 mmol/L    Potassium 3.6 3.4 - 5.3 mmol/L    Carbon Dioxide (CO2) 31 (H) 22 - 29 mmol/L    Anion Gap 13 7 - 15 mmol/L    Urea Nitrogen 84.1 (H) 8.0 - 23.0 mg/dL    Creatinine 2.00 (H) 0.51 - 0.95 mg/dL    GFR Estimate 25 (L) >60 mL/min/1.73m2    Calcium 9.9 8.8 - 10.2 mg/dL    Chloride 88 (L) 98 - 107 mmol/L    Glucose 77 70 - 99 mg/dL    Alkaline Phosphatase 101 40 - 150 U/L    AST 56 (H) 0 - 45 U/L    ALT 21 0 - 50 U/L    Protein Total 8.3 6.4 - 8.3 g/dL    Albumin 2.9 (L) 3.5 - 5.2 g/dL    Bilirubin Total 0.7 <=1.2 mg/dL   Lactic Acid Whole Blood with 1X Repeat in 2 HR when >2   Result Value Ref Range    Lactic Acid, Initial 1.4 0.7 - 2.0 mmol/L   Procalcitonin   Result Value Ref Range    Procalcitonin 0.25 <0.50 ng/mL   INR   Result Value Ref Range    INR 2.96 (H) 0.85 - 1.15   CBC with platelets and differential   Result Value Ref Range    WBC Count 11.1 (H) 4.0 - 11.0 10e3/uL    RBC Count 3.44 (L) 3.80 - 5.20 10e6/uL    Hemoglobin 10.3 (L) 11.7 - 15.7 g/dL    Hematocrit 31.1 (L) 35.0 - 47.0 %    MCV 90 78 - 100 fL    MCH 29.9 26.5 - 33.0 pg    MCHC 33.1 31.5 - 36.5 g/dL    RDW 15.4 (H) 10.0 - 15.0 %    Platelet Count 289 150 - 450 10e3/uL    % Neutrophils 71 %    % Lymphocytes 18 %    % Monocytes 9 %    % Eosinophils 2 %    % Basophils 0 %    % Immature Granulocytes 0 %    Absolute Neutrophils 7.9 1.6 - 8.3 10e3/uL    Absolute Lymphocytes 2.0 0.8 - 5.3 10e3/uL    Absolute Monocytes 1.0 0.0 - 1.3 10e3/uL    Absolute Eosinophils 0.2 0.0 - 0.7 10e3/uL    Absolute Basophils 0.0 0.0 - 0.2 10e3/uL    Absolute Immature Granulocytes 0.0 <=0.4 10e3/uL       MEDICATIONS GIVEN IN THE EMERGENCY DEPARTMENT:  Medications   sodium chloride (PF) 0.9% PF flush 3 mL (has no administration in time range)   sodium chloride (PF) 0.9% PF flush 3 mL ( Intracatheter Canceled Entry 7/11/24 0011)   piperacillin-tazobactam (ZOSYN) 2.25 g vial to attach to  ml bag (0 g Intravenous  Stopped 7/11/24 0155)   atorvastatin (LIPITOR) tablet 40 mg (has no administration in time range)   acetaminophen (TYLENOL) tablet 500-1,000 mg (has no administration in time range)   insulin degludec (TRESIBA) 100 UNIT/ML injection 34 Units (has no administration in time range)   methadone (DOLOPHINE) half-tab 5 mg (has no administration in time range)   metolazone (ZAROXOLYN) tablet 2.5 mg (has no administration in time range)   miconazole (MICATIN) 2 % powder (has no administration in time range)   multivitamin (CENTRUM SILVER) TABS 1 tablet (has no administration in time range)   polyethylene glycol (MIRALAX) powder 17 g (has no administration in time range)   potassium chloride ER (K-TAB) TBCR 20 mEq (has no administration in time range)   senna-docusate (SENOKOT-S/PERICOLACE) 8.6-50 MG per tablet 1 tablet (has no administration in time range)   torsemide (DEMADEX) tablet 60 mg (has no administration in time range)   warfarin ANTICOAGULANT (COUMADIN) tablet 1 mg (has no administration in time range)   lidocaine 1 % 0.1-1 mL (has no administration in time range)   lidocaine (LMX4) kit (has no administration in time range)   sodium chloride (PF) 0.9% PF flush 3 mL (has no administration in time range)   sodium chloride (PF) 0.9% PF flush 3 mL (has no administration in time range)   senna-docusate (SENOKOT-S/PERICOLACE) 8.6-50 MG per tablet 1 tablet (has no administration in time range)     Or   senna-docusate (SENOKOT-S/PERICOLACE) 8.6-50 MG per tablet 2 tablet (has no administration in time range)   calcium carbonate (TUMS) chewable tablet 1,000 mg (has no administration in time range)   Patient is already receiving anticoagulation with heparin, enoxaparin (LOVENOX), warfarin (COUMADIN)  or other anticoagulant medication (has no administration in time range)   sodium chloride 0.9% BOLUS 1,000 mL (0 mLs Intravenous Stopped 7/11/24 0153)   methadone (DOLOPHINE) tablet 5 mg (5 mg Oral $Given 7/11/24 0104)            Independent Interpretation (X-rays, CTs, rhythm strip):  None    Consultations/Discussion of Management or Tests:  None       Social Determinants of Health affecting care:         Assessments & Plan (with Medical Decision Making)  81 year old female who presents to the Emergency Department for evaluation of increased amounts of confusion, in addition to worsening redness of the right lower extremity.  Patient does have recent hospitalization June 6 through [facility].  Subsequently spent 1 month in the transitional care unit.  Was recently discharged back to her nursing home.  Now presents for episode of altered mental status where she had increased amounts of difficulty arousing according to EMS report.    Patient with findings of increased amounts of lower extremity redness, concerning for worsening cellulitis.  Was recently started on cephalexin on July 3.  Despite oral antibiotics, patient has had worsening redness.  No reports of fever.    Initial septic workup is performed in the emergency department.  Does have slightly elevated white blood cell count at 11.1.  Lactate is normal.  Creatinine is notably elevated at 2.0, increased from baseline closer to 1.01-month ago.  Therefore, given the findings of acute kidney injury, elevated white blood cell count, with worsening cellulitis, will treat patient for sepsis with skin source.  Encephalopathy also present with septic source.    Patient will be started on Zosyn.  Blood cultures have been drawn.  I discussed with hospitalist, Dr. Robles at 1:10 AM.  Patient accepted for inpatient hospitalization.       I have reviewed the nursing notes.    I have reviewed the findings, diagnosis, plan and need for follow up with the patient.       Critical Care time:  none      NEW PRESCRIPTIONS STARTED AT TODAY'S ER VISIT  Current Discharge Medication List          Final diagnoses:   Cellulitis of right lower extremity   Sepsis, due to unspecified organism, unspecified  whether acute organ dysfunction present (H)   Venous stasis   Acute kidney injury (H24)   Diabetic polyneuropathy associated with type 2 diabetes mellitus (H)   Anticoagulation monitoring, INR range 2-3   Paroxysmal atrial fibrillation (H)   Other chronic pain       7/10/2024   [facility] EMERGENCY DEPT       Jah, Eleuterio Dumont MD  07/11/24 2840

## 2024-08-17 DIAGNOSIS — I10 PRIMARY HYPERTENSION: ICD-10-CM

## 2024-08-20 RX ORDER — LOSARTAN POTASSIUM 50 MG/1
50 TABLET ORAL 2 TIMES DAILY
Qty: 180 TABLET | Refills: 2 | Status: SHIPPED | OUTPATIENT
Start: 2024-08-20

## 2024-08-23 DIAGNOSIS — K21.9 GASTROESOPHAGEAL REFLUX DISEASE: ICD-10-CM

## 2024-08-23 DIAGNOSIS — F33.1 MODERATE EPISODE OF RECURRENT MAJOR DEPRESSIVE DISORDER (HCC): ICD-10-CM

## 2024-08-23 RX ORDER — ESCITALOPRAM OXALATE 20 MG/1
TABLET ORAL
Qty: 90 TABLET | Refills: 1 | Status: SHIPPED | OUTPATIENT
Start: 2024-08-23

## 2024-08-23 RX ORDER — PANTOPRAZOLE SODIUM 40 MG/1
TABLET, DELAYED RELEASE ORAL
Qty: 90 TABLET | Refills: 1 | Status: SHIPPED | OUTPATIENT
Start: 2024-08-23

## 2024-08-29 ENCOUNTER — APPOINTMENT (OUTPATIENT)
Dept: LAB | Facility: CLINIC | Age: 59
End: 2024-08-29
Payer: COMMERCIAL

## 2024-08-29 DIAGNOSIS — E11.9 TYPE 2 DIABETES MELLITUS WITHOUT COMPLICATION, WITHOUT LONG-TERM CURRENT USE OF INSULIN (HCC): ICD-10-CM

## 2024-08-29 LAB
ALBUMIN SERPL BCG-MCNC: 4 G/DL (ref 3.5–5)
ALP SERPL-CCNC: 76 U/L (ref 34–104)
ALT SERPL W P-5'-P-CCNC: 25 U/L (ref 7–52)
ANION GAP SERPL CALCULATED.3IONS-SCNC: 11 MMOL/L (ref 4–13)
AST SERPL W P-5'-P-CCNC: 21 U/L (ref 5–45)
BILIRUB SERPL-MCNC: 0.69 MG/DL (ref 0.2–1)
BUN SERPL-MCNC: 13 MG/DL (ref 5–25)
CALCIUM SERPL-MCNC: 9.5 MG/DL (ref 8.4–10.2)
CHLORIDE SERPL-SCNC: 104 MMOL/L (ref 96–108)
CO2 SERPL-SCNC: 26 MMOL/L (ref 21–32)
CREAT SERPL-MCNC: 0.63 MG/DL (ref 0.6–1.3)
EST. AVERAGE GLUCOSE BLD GHB EST-MCNC: 140 MG/DL
GLUCOSE P FAST SERPL-MCNC: 137 MG/DL (ref 65–99)
HBA1C MFR BLD: 6.5 %
POTASSIUM SERPL-SCNC: 4.8 MMOL/L (ref 3.5–5.3)
PROT SERPL-MCNC: 7 G/DL (ref 6.4–8.4)
SODIUM SERPL-SCNC: 141 MMOL/L (ref 135–147)

## 2024-08-29 PROCEDURE — 36415 COLL VENOUS BLD VENIPUNCTURE: CPT

## 2024-08-29 PROCEDURE — 80053 COMPREHEN METABOLIC PANEL: CPT

## 2024-08-29 PROCEDURE — 83036 HEMOGLOBIN GLYCOSYLATED A1C: CPT

## 2024-08-29 PROCEDURE — 3044F HG A1C LEVEL LT 7.0%: CPT | Performed by: FAMILY MEDICINE

## 2024-08-30 ENCOUNTER — OFFICE VISIT (OUTPATIENT)
Dept: FAMILY MEDICINE CLINIC | Facility: CLINIC | Age: 59
End: 2024-08-30
Payer: COMMERCIAL

## 2024-08-30 VITALS
SYSTOLIC BLOOD PRESSURE: 108 MMHG | WEIGHT: 215.6 LBS | HEIGHT: 65 IN | HEART RATE: 72 BPM | BODY MASS INDEX: 35.92 KG/M2 | TEMPERATURE: 98 F | DIASTOLIC BLOOD PRESSURE: 76 MMHG | OXYGEN SATURATION: 96 % | RESPIRATION RATE: 16 BRPM

## 2024-08-30 DIAGNOSIS — I10 PRIMARY HYPERTENSION: ICD-10-CM

## 2024-08-30 DIAGNOSIS — E78.2 MIXED HYPERLIPIDEMIA: ICD-10-CM

## 2024-08-30 DIAGNOSIS — M35.3 PMR (POLYMYALGIA RHEUMATICA) (HCC): ICD-10-CM

## 2024-08-30 DIAGNOSIS — G47.33 OSA (OBSTRUCTIVE SLEEP APNEA): ICD-10-CM

## 2024-08-30 DIAGNOSIS — E11.9 TYPE 2 DIABETES MELLITUS WITHOUT COMPLICATION, WITHOUT LONG-TERM CURRENT USE OF INSULIN (HCC): Primary | ICD-10-CM

## 2024-08-30 DIAGNOSIS — E66.01 CLASS 2 SEVERE OBESITY DUE TO EXCESS CALORIES WITH SERIOUS COMORBIDITY AND BODY MASS INDEX (BMI) OF 35.0 TO 35.9 IN ADULT (HCC): ICD-10-CM

## 2024-08-30 DIAGNOSIS — F33.1 MODERATE EPISODE OF RECURRENT MAJOR DEPRESSIVE DISORDER (HCC): ICD-10-CM

## 2024-08-30 PROCEDURE — G2211 COMPLEX E/M VISIT ADD ON: HCPCS | Performed by: FAMILY MEDICINE

## 2024-08-30 PROCEDURE — 99214 OFFICE O/P EST MOD 30 MIN: CPT | Performed by: FAMILY MEDICINE

## 2024-08-30 RX ORDER — METHYLPREDNISOLONE 4 MG
TABLET, DOSE PACK ORAL
Qty: 21 EACH | Refills: 0 | Status: SHIPPED | OUTPATIENT
Start: 2024-08-30

## 2024-08-30 NOTE — ASSESSMENT & PLAN NOTE
Longstanding history of PMR.  No specific change.  Does have significant discomfort in the shoulders bilaterally.  Trial Medrol.  Has already tried anti-inflammatories and Tylenol without much success.

## 2024-08-30 NOTE — ASSESSMENT & PLAN NOTE
Patient's cholesterol is due to be rechecked later on.  Continue on current medications.  No change.

## 2024-08-30 NOTE — PROGRESS NOTES
Ambulatory Visit  Name: Rhonda Dukes      : 1965      MRN: [id number]  Encounter Provider: Markie Magdaleno MD  Encounter Date: 2024   [facility]    Assessment & Plan   1. Type 2 diabetes mellitus without complication, without long-term current use of insulin ([facility])  Assessment & Plan:    Lab Results   Component Value Date    HGBA1C 6.5 (H) 2024   A1c is doing well at 6.5.  It is minimally higher than before, but not significant.  Does not need to have treatment at this point.  Will check in 3 months with next blood work.  Orders:  -     Hemoglobin A1C; Future; Expected date: 2024  2. Mixed hyperlipidemia  Assessment & Plan:  Patient's cholesterol is due to be rechecked later on.  Continue on current medications.  No change.    3. Primary hypertension  4. MARIE (obstructive sleep apnea)  Assessment & Plan:  Patient is continuing to use CPAP.  She does not like to use it, but it definitely makes a difference.    Continue.  No change.  5. Class 2 severe obesity due to excess calories with serious comorbidity and body mass index (BMI) of 35.0 to 35.9 in adult ([facility])  Assessment & Plan:  Patient's weight is a bit down from before, would encourage her to continue with exercise and weight loss as well as lifestyle management.  6. PMR (polymyalgia rheumatica) ([facility])  Assessment & Plan:  Longstanding history of PMR.  No specific change.  Does have significant discomfort in the shoulders bilaterally.  Trial Medrol.  Has already tried anti-inflammatories and Tylenol without much success.  Orders:  -     methylPREDNISolone 4 MG tablet therapy pack; Use as directed on package  7. Moderate episode of recurrent major depressive disorder ([facility])  Assessment & Plan:  Patient does have history of depression.  Has been doing relatively well.  No specific change recommended.  Continues on Lexapro.       History of Present Illness     Patient is here to follow-up on  "multiple issues.    She did mention that she has significant amount of pain in that shoulder and collarbone of both sides.  The left seems to be a bit worse than the right.  So far she has tried aspirin, Aleve, Tylenol.  No real change with that.      Diabetes: Reviewed labs.  Not currently on medications.    Hyperlipidemia: Not due yet for labs.            Review of Systems   Constitutional:  Negative for appetite change and fever.   Respiratory:  Negative for chest tightness and shortness of breath.    Cardiovascular:  Negative for chest pain, palpitations and leg swelling.   Gastrointestinal:  Negative for abdominal pain.   Genitourinary:  Negative for difficulty urinating.   Musculoskeletal:  Positive for neck pain and neck stiffness. Negative for arthralgias and myalgias.        Shoulder and collar bone pain   Skin:  Negative for wound.   Neurological:  Negative for dizziness, light-headedness and headaches.   Psychiatric/Behavioral:  Negative for dysphoric mood and sleep disturbance. The patient is not nervous/anxious.        Objective     /76 (BP Location: Right arm, Patient Position: Sitting, Cuff Size: Adult)   Pulse 72   Temp 98 °F (36.7 °C) (Temporal)   Resp 16   Ht 5' 5\" (1.651 m)   Wt 97.8 kg (215 lb 9.6 oz)   SpO2 96%   BMI 35.88 kg/m²     Sodium 140 potassium 4.8, calcium 9.5.  Blood sugar 137.  Creatinine 0.63.  GFR was not reported.  A1c 6.5.    Physical Exam  Vitals and nursing note reviewed.   Constitutional:       Appearance: Normal appearance. She is well-developed.   HENT:      Head: Normocephalic and atraumatic.   Cardiovascular:      Rate and Rhythm: Normal rate and regular rhythm.      Pulses:           Carotid pulses are 2+ on the right side and 2+ on the left side.     Heart sounds: Normal heart sounds.   Pulmonary:      Effort: Pulmonary effort is normal.      Breath sounds: Normal breath sounds. No wheezing or rales.   Chest:      Chest wall: No tenderness. "   Musculoskeletal:      Right shoulder: Tenderness present. No bony tenderness or crepitus. Normal range of motion. Normal strength.      Left shoulder: Tenderness present. No bony tenderness or crepitus. Normal range of motion. Normal strength.   Neurological:      Mental Status: She is alert.       Administrative Statements

## 2024-08-30 NOTE — ASSESSMENT & PLAN NOTE
Patient is continuing to use CPAP.  She does not like to use it, but it definitely makes a difference.    Continue.  No change.

## 2024-08-30 NOTE — ASSESSMENT & PLAN NOTE
Lab Results   Component Value Date    HGBA1C 6.5 (H) 08/29/2024   A1c is doing well at 6.5.  It is minimally higher than before, but not significant.  Does not need to have treatment at this point.  Will check in 3 months with next blood work.

## 2024-08-30 NOTE — PATIENT INSTRUCTIONS
1. Type 2 diabetes mellitus without complication, without long-term current use of insulin ([facility])  Assessment & Plan:    Lab Results   Component Value Date    HGBA1C 6.5 (H) 08/29/2024   A1c is doing well at 6.5.  It is minimally higher than before, but not significant.  Does not need to have treatment at this point.  Will check in 3 months with next blood work.  Orders:  -     Hemoglobin A1C; Future; Expected date: 11/30/2024  2. Mixed hyperlipidemia  Assessment & Plan:  Patient's cholesterol is due to be rechecked later on.  Continue on current medications.  No change.    3. Primary hypertension  4. MARIE (obstructive sleep apnea)  Assessment & Plan:  Patient is continuing to use CPAP.  She does not like to use it, but it definitely makes a difference.    Continue.  No change.  5. Class 2 severe obesity due to excess calories with serious comorbidity and body mass index (BMI) of 35.0 to 35.9 in adult ([facility])  Assessment & Plan:  Patient's weight is a bit down from before, would encourage her to continue with exercise and weight loss as well as lifestyle management.  6. PMR (polymyalgia rheumatica) ([facility])  Assessment & Plan:  Longstanding history of PMR.  No specific change.  Does have significant discomfort in the shoulders bilaterally.  Trial Medrol.  Has already tried anti-inflammatories and Tylenol without much success.  Orders:  -     methylPREDNISolone 4 MG tablet therapy pack; Use as directed on package  7. Moderate episode of recurrent major depressive disorder ([facility])  Assessment & Plan:  Patient does have history of depression.  Has been doing relatively well.  No specific change recommended.  Continues on Lexapro.      COVID 19 Instructions    Rhonda Dukes was advised to limit contact with others to essential tasks such as getting food, medications, and medical care.    Proper handwashing reviewed, and Hand sanitzer when washing is not available.    If the patient develops symptoms of COVID 19, the patient should  call the office as soon as possible.    It is strongly recommended that Flu Vaccinations be obtained.      Virtual Visits:  Chio: This works on smart phones (any phone with Internet browsing capability).  You should get a text message when the provider is ready to see you.  Click on the link in the text message, and the call should start.  You will need to type in your name, and allow camera and microphone access.  This is HIPPA compliant, and secure.      If you have not already done so, get immunized to COVID 19.      We are committed to getting you vaccinated as soon as possible and will be closely following [facility] and [facility] guidelines as they are released and revised.  Please refer to our COVID-19 vaccine webpage for the most up to date information on the vaccine and our distribution efforts.    This site will also have the most up to date recommendations for COVID booster vaccine.    [email or web address]    Call [phone] ([phone]), option 7    You can also visit https://www.vaccines.gov/ to find vaccines in your area.    OUR LOCATION:    [facility]  [address] [place] [address], [address]  [place], [place], [address] [phone]  Fax: [phone]    Lab services, Rheumatology, and OB/GYN are at this location as well.

## 2024-08-30 NOTE — ASSESSMENT & PLAN NOTE
Patient does have history of depression.  Has been doing relatively well.  No specific change recommended.  Continues on Lexapro.

## 2024-08-30 NOTE — ASSESSMENT & PLAN NOTE
Patient's weight is a bit down from before, would encourage her to continue with exercise and weight loss as well as lifestyle management.

## 2024-09-11 DIAGNOSIS — E11.65 TYPE 2 DIABETES MELLITUS WITH HYPERGLYCEMIA, WITHOUT LONG-TERM CURRENT USE OF INSULIN (HCC): ICD-10-CM

## 2024-09-11 RX ORDER — BLOOD SUGAR DIAGNOSTIC
STRIP MISCELLANEOUS
Qty: 100 STRIP | Refills: 1 | Status: SHIPPED | OUTPATIENT
Start: 2024-09-11

## 2024-09-22 NOTE — MISCELLANEOUS
Message  Patient called in this morning, Tuesday, 04/04/17 (returning my call) stating she will like all her Procedure Notes & Imaging by Dr Griselda Daugherty faxed over to Dr Nick Pond office  I advised patient that Dr Griselda Daugherty didn't order any imaging, therefore there is no Imaging that will be faxed  I did advise patient that I will fax over the Procedure Notes  I then asked patient for the fax number and she didn't have one  I advised patient to contact that office and obtain a fax number and call me back, in order for me to send her records  Patient stated she can't call back today but will call me back tomorrow  Once Fax # is received, I will fax over the procedure notes  Active Problems    1  Allergic rhinitis (477 9) (J30 9)   2  Bacterial vaginosis (616 10,041 9) (N76 0,B96 89)   3  Depression (311) (F32 9)   4  Diarrhea (787 91) (R19 7)   5  Dyslipidemia (272 4) (E78 5)   6  Encounter for routine gynecological examination (V72 31) (Z01 419)   7  Encounter for screening mammogram for malignant neoplasm of breast (V76 12)   (Z12 31)   8  Endometriosis (617 9) (N80 9)   9  Fatigue (780 79) (R53 83)   10  Fibromyalgia (729 1) (M79 7)   11  Greater trochanteric bursitis of both hips (726 5) (M70 61,M70 62)   12  Heel pain (729 5) (M79 673)   13  Hydradenitis (705 83) (L73 2)   14  Hyperglycemia (790 29) (R73 9)   15  Hypertension (401 9) (I10)   16  Hypokalemia (276 8) (E87 6)   17  Hypothyroidism (244 9) (E03 9)   18  Low back pain (724 2) (M54 5)   19  Lumbar radiculopathy (724 4) (M54 16)   20  Lumbar spondylosis (721 3) (M47 816)   21  Lumbosacral spondylosis (721 3) (M47 817)   22  Need for prophylactic vaccination and inoculation against influenza (V04 81) (Z23)   23  MARIE (obstructive sleep apnea) (327 23) (G47 33)   24  Right upper quadrant pain (789 01) (R10 11)   25  Sacroiliitis (720 2) (M46 1)   26  Vitamin B12 deficiency (266 2) (E53 8)   27  Vulvar cyst (624 8) (N90 7)    Current Meds   1   Adult Aspirin Low Strength 81 MG TBDP; CHEW AND SWALLOW 1 TABLET DAILY AS   DIRECTED Recorded   2  Atenolol-Chlorthalidone 50-25 MG Oral Tablet; take 1 tablet by mouth every day; Therapy: 53FKT6135 to (Evaluate:03Dec2017)  Requested for: 60NAO6816; Last   Rx:39Vtx8437 Ordered   3  B-12 1000 MCG Oral Capsule; Therapy: 16HON1571 to Recorded   4  Doxycycline Hyclate 100 MG Oral Tablet; TAKE 1 TABLET TWICE DAILY UNTIL GONE;   Therapy: 24XEO3996 to (Evaluate:20Apr2017)  Requested for: 52CYW6199; Last   Rx:30Mar2017 Ordered   5  Fexofenadine HCl - 180 MG Oral Tablet; Take one daily as needed; Therapy: 86CAH0571 to (Last Rx:10Mar2017)  Requested for: 64ODR4045 Ordered   6  Meloxicam 15 MG Oral Tablet; TAKE 1 TABLET DAILY; Therapy: 42BJV0050 to (Evaluate:09Apr2017)  Requested for: 95KKW0319; Last   Rx:10Mar2017 Ordered   7  Montelukast Sodium 10 MG Oral Tablet (Singulair); Take one daily; Therapy: 01BDW2779 to (Last Rx:10Mar2017)  Requested for: 38IGO3484 Ordered   8  Zantac 75 TABS Recorded    Allergies    1  Penicillins   2  Erythromycin TABS   3  Toradol SOLN   4  Ventolin HFA   5   Vicodin TABS    Signatures   Electronically signed by : Lilian Olmos, ; Apr 4 2017 10:24AM EST                       (Author) Never smoker

## 2024-10-25 DIAGNOSIS — M35.3 PMR (POLYMYALGIA RHEUMATICA) (HCC): Primary | ICD-10-CM

## 2024-10-25 RX ORDER — PREDNISONE 5 MG/1
TABLET ORAL
Qty: 35 TABLET | Refills: 0 | Status: SHIPPED | OUTPATIENT
Start: 2024-10-25 | End: 2024-11-22

## 2024-11-19 DIAGNOSIS — F33.1 MODERATE EPISODE OF RECURRENT MAJOR DEPRESSIVE DISORDER (HCC): ICD-10-CM

## 2024-11-19 DIAGNOSIS — J30.89 CHRONIC NONSEASONAL ALLERGIC RHINITIS DUE TO POLLEN: ICD-10-CM

## 2024-11-20 RX ORDER — MONTELUKAST SODIUM 10 MG/1
10 TABLET ORAL DAILY
Qty: 90 TABLET | Refills: 1 | Status: SHIPPED | OUTPATIENT
Start: 2024-11-20

## 2024-11-20 RX ORDER — TRAZODONE HYDROCHLORIDE 50 MG/1
50 TABLET, FILM COATED ORAL
Qty: 90 TABLET | Refills: 1 | Status: SHIPPED | OUTPATIENT
Start: 2024-11-20

## 2024-11-24 DIAGNOSIS — J32.4 CHRONIC PANSINUSITIS: ICD-10-CM

## 2024-11-24 DIAGNOSIS — J30.9 ALLERGIC RHINITIS, UNSPECIFIED SEASONALITY, UNSPECIFIED TRIGGER: ICD-10-CM

## 2024-11-26 RX ORDER — FLUTICASONE PROPIONATE 50 MCG
SPRAY, SUSPENSION (ML) NASAL
Qty: 48 ML | Refills: 1 | Status: SHIPPED | OUTPATIENT
Start: 2024-11-26

## 2024-12-03 ENCOUNTER — APPOINTMENT (OUTPATIENT)
Dept: LAB | Facility: CLINIC | Age: 59
End: 2024-12-03
Payer: COMMERCIAL

## 2024-12-03 DIAGNOSIS — E78.2 MIXED HYPERLIPIDEMIA: ICD-10-CM

## 2024-12-03 DIAGNOSIS — I10 PRIMARY HYPERTENSION: ICD-10-CM

## 2024-12-03 DIAGNOSIS — E11.9 TYPE 2 DIABETES MELLITUS WITHOUT COMPLICATION, WITHOUT LONG-TERM CURRENT USE OF INSULIN (HCC): ICD-10-CM

## 2024-12-03 LAB
ALBUMIN SERPL BCG-MCNC: 4.2 G/DL (ref 3.5–5)
ALP SERPL-CCNC: 75 U/L (ref 34–104)
ALT SERPL W P-5'-P-CCNC: 19 U/L (ref 7–52)
ANION GAP SERPL CALCULATED.3IONS-SCNC: 11 MMOL/L (ref 4–13)
AST SERPL W P-5'-P-CCNC: 22 U/L (ref 13–39)
BILIRUB SERPL-MCNC: 0.92 MG/DL (ref 0.2–1)
BUN SERPL-MCNC: 12 MG/DL (ref 5–25)
CALCIUM SERPL-MCNC: 9.5 MG/DL (ref 8.4–10.2)
CHLORIDE SERPL-SCNC: 102 MMOL/L (ref 96–108)
CHOLEST SERPL-MCNC: 142 MG/DL (ref ?–200)
CO2 SERPL-SCNC: 27 MMOL/L (ref 21–32)
CREAT SERPL-MCNC: 0.65 MG/DL (ref 0.6–1.3)
EST. AVERAGE GLUCOSE BLD GHB EST-MCNC: 137 MG/DL
GFR SERPL CREATININE-BSD FRML MDRD: 98 ML/MIN/1.73SQ M
GLUCOSE P FAST SERPL-MCNC: 143 MG/DL (ref 65–99)
HBA1C MFR BLD: 6.4 %
HDLC SERPL-MCNC: 46 MG/DL
LDLC SERPL CALC-MCNC: 63 MG/DL (ref 0–100)
NONHDLC SERPL-MCNC: 96 MG/DL
POTASSIUM SERPL-SCNC: 3.9 MMOL/L (ref 3.5–5.3)
PROT SERPL-MCNC: 7.1 G/DL (ref 6.4–8.4)
SODIUM SERPL-SCNC: 140 MMOL/L (ref 135–147)
TRIGL SERPL-MCNC: 165 MG/DL (ref ?–150)

## 2024-12-03 PROCEDURE — 80053 COMPREHEN METABOLIC PANEL: CPT

## 2024-12-03 PROCEDURE — 83036 HEMOGLOBIN GLYCOSYLATED A1C: CPT

## 2024-12-03 PROCEDURE — 80061 LIPID PANEL: CPT

## 2024-12-03 PROCEDURE — 36415 COLL VENOUS BLD VENIPUNCTURE: CPT

## 2024-12-04 ENCOUNTER — RESULTS FOLLOW-UP (OUTPATIENT)
Dept: FAMILY MEDICINE CLINIC | Facility: CLINIC | Age: 59
End: 2024-12-04

## 2024-12-04 NOTE — RESULT ENCOUNTER NOTE
Tests were not normal, and should follow at  your scheduled Office visit. Please call about this, if Meilishuo message is not available or not read by patient.

## 2024-12-05 ENCOUNTER — OFFICE VISIT (OUTPATIENT)
Dept: FAMILY MEDICINE CLINIC | Facility: CLINIC | Age: 59
End: 2024-12-05
Payer: COMMERCIAL

## 2024-12-05 VITALS
HEART RATE: 73 BPM | RESPIRATION RATE: 16 BRPM | DIASTOLIC BLOOD PRESSURE: 76 MMHG | BODY MASS INDEX: 36.02 KG/M2 | SYSTOLIC BLOOD PRESSURE: 132 MMHG | TEMPERATURE: 97.9 F | OXYGEN SATURATION: 98 % | HEIGHT: 65 IN | WEIGHT: 216.2 LBS

## 2024-12-05 DIAGNOSIS — Z00.00 ENCOUNTER FOR ANNUAL WELLNESS VISIT (AWV) IN MEDICARE PATIENT: ICD-10-CM

## 2024-12-05 DIAGNOSIS — G47.33 OSA (OBSTRUCTIVE SLEEP APNEA): ICD-10-CM

## 2024-12-05 DIAGNOSIS — L73.2 HYDRADENITIS: ICD-10-CM

## 2024-12-05 DIAGNOSIS — I10 PRIMARY HYPERTENSION: ICD-10-CM

## 2024-12-05 DIAGNOSIS — E11.9 TYPE 2 DIABETES MELLITUS WITHOUT COMPLICATION, WITHOUT LONG-TERM CURRENT USE OF INSULIN (HCC): Primary | ICD-10-CM

## 2024-12-05 DIAGNOSIS — F33.1 MODERATE EPISODE OF RECURRENT MAJOR DEPRESSIVE DISORDER (HCC): ICD-10-CM

## 2024-12-05 DIAGNOSIS — Z12.4 CERVICAL CANCER SCREENING: ICD-10-CM

## 2024-12-05 DIAGNOSIS — M35.3 PMR (POLYMYALGIA RHEUMATICA) (HCC): ICD-10-CM

## 2024-12-05 DIAGNOSIS — Z23 NEED FOR INFLUENZA VACCINATION: ICD-10-CM

## 2024-12-05 DIAGNOSIS — Z23 NEED FOR VACCINATION FOR STREP PNEUMONIAE: ICD-10-CM

## 2024-12-05 DIAGNOSIS — H65.06 RECURRENT ACUTE SEROUS OTITIS MEDIA OF BOTH EARS: ICD-10-CM

## 2024-12-05 DIAGNOSIS — Z12.31 BREAST CANCER SCREENING BY MAMMOGRAM: ICD-10-CM

## 2024-12-05 DIAGNOSIS — K21.9 GASTROESOPHAGEAL REFLUX DISEASE WITHOUT ESOPHAGITIS: ICD-10-CM

## 2024-12-05 DIAGNOSIS — Z78.0 POST-MENOPAUSE: ICD-10-CM

## 2024-12-05 DIAGNOSIS — Z12.31 ENCOUNTER FOR SCREENING MAMMOGRAM FOR MALIGNANT NEOPLASM OF BREAST: ICD-10-CM

## 2024-12-05 DIAGNOSIS — E78.2 MIXED HYPERLIPIDEMIA: ICD-10-CM

## 2024-12-05 DIAGNOSIS — Z23 NEED FOR COVID-19 VACCINE: ICD-10-CM

## 2024-12-05 PROCEDURE — G0439 PPPS, SUBSEQ VISIT: HCPCS | Performed by: FAMILY MEDICINE

## 2024-12-05 PROCEDURE — 99214 OFFICE O/P EST MOD 30 MIN: CPT | Performed by: FAMILY MEDICINE

## 2024-12-05 RX ORDER — CIPROFLOXACIN 250 MG/1
250 TABLET, FILM COATED ORAL EVERY 12 HOURS SCHEDULED
Qty: 20 TABLET | Refills: 0 | Status: SHIPPED | OUTPATIENT
Start: 2024-12-05 | End: 2024-12-15

## 2024-12-05 RX ORDER — PREDNISONE 5 MG/1
5 TABLET ORAL DAILY
Qty: 30 TABLET | Refills: 0 | Status: SHIPPED | OUTPATIENT
Start: 2024-12-05

## 2024-12-05 NOTE — PATIENT INSTRUCTIONS
1. Type 2 diabetes mellitus without complication, without long-term current use of insulin ([facility])  Assessment & Plan:    Lab Results   Component Value Date    HGBA1C 6.4 (H) 12/03/2024   A1c is doing quite well at 6.4.  No changes.  Try to continue limiting carbohydrate intake.  Not on meds.  Continue to follow labs.         2. Moderate episode of recurrent major depressive disorder ([facility])  Assessment & Plan:  Same as prior.  Continues on trazodone, Lexapro.         3. Mixed hyperlipidemia  Assessment & Plan:  Stable.  Cholesterol doing quite well with Crestor 5.  Continue to limit carbs, fried foods, fatty foods, beef, pork.       4. Primary hypertension  Assessment & Plan:  Blood pressure today seems reasonable.  No changes.  Continue losartan 50 twice a day.       5. MARIE (obstructive sleep apnea)  Assessment & Plan:  Uses CPAP daily.  Has good relief.  Doing quite well.       6. Gastroesophageal reflux disease without esophagitis  Assessment & Plan:  Stable at the moment.  No change.       7. PMR (polymyalgia rheumatica) ([facility])  Assessment & Plan:  Patient does continue to have some PMR symptoms.  When she had steroids recently it significantly improved her symptoms.  Will trial low-dose of 5 mg daily for the next month, then reevaluate.  Reviewed about steroids and their benefits as well as side effects and problems.  Orders:    predniSONE 5 mg tablet; Take 1 tablet (5 mg total) by mouth daily    Orders:  -     predniSONE 5 mg tablet; Take 1 tablet (5 mg total) by mouth daily  8. Hydradenitis  Assessment & Plan:  Follow-up as needed.       9. Encounter for annual wellness visit (AWV) in Medicare patient  Comments:  HM, advanced directives, and immunizations reviewed.  10. Need for COVID-19 vaccine  Comments:  Recommend COVID-vaccine.  Patient declined  11. Need for vaccination for Strep pneumoniae  Comments:  Recommend Prevnar secondary to diabetes.  Patient declines  12. Need for influenza  vaccination  Comments:  Would recommend yearly flu vaccine due to age and diabetes.  Patient declines.  Understands risks.  13. Breast cancer screening by mammogram  Comments:  Recommend mammogram.  14. Encounter for screening mammogram for malignant neoplasm of breast  -     Mammo screening bilateral w 3d and cad; Future  15. Cervical cancer screening  Comments:  In approximately 2014, the patient had laparoscopic-assisted vaginal hysterectomy, therefore no longer has a cervix and does not need cervical cancer screening  16. Post-menopause  Comments:  Recommend DEXA scan.  Orders:  -     DXA bone density spine hip and pelvis; Future; Expected date: 12/05/2024  17. Recurrent acute serous otitis media of both ears  Comments:  Patient appears to have otitis media.  Cipro 250 due to her allergies.  Orders:  -     ciprofloxacin (CIPRO) 250 mg tablet; Take 1 tablet (250 mg total) by mouth every 12 (twelve) hours for 10 days      COVID 19 Instructions    Rhonda Dukes was advised to limit contact with others to essential tasks such as getting food, medications, and medical care.    Proper handwashing reviewed, and Hand sanitzer when washing is not available.    If the patient develops symptoms of COVID 19, the patient should call the office as soon as possible.    It is strongly recommended that Flu Vaccinations be obtained.      Virtual Visits:  AmWell: This works on smart phones (any phone with Internet browsing capability).  You should get a text message when the provider is ready to see you.  Click on the link in the text message, and the call should start.  You will need to type in your name, and allow camera and microphone access.  This is HIPPA compliant, and secure.      If you have not already done so, get immunized to COVID 19.      We are committed to getting you vaccinated as soon as possible and will be closely following CDC and [place] [employer] guidelines as they are released and revised.  Please refer to our  COVID-19 vaccine webpage for the most up to date information on the vaccine and our distribution efforts.    This site will also have the most up to date recommendations for COVID booster vaccine.    [email or web address].[email or web address]/covid-19/protect-yourself/covid-19-vaccine    Call [phone] ([phone]), option 7    You can also visit https://www.vaccines.gov/ to find vaccines in your area.    OUR LOCATION:    [facility]  [address] [place] [address], [address]  [place], [address] [phone]  Fax: [phone]    Lab services, Rheumatology, and OB/GYN are at this location as well.

## 2024-12-05 NOTE — PROGRESS NOTES
Name: Rhonda Dukes      : 1965      MRN: [id number]  Encounter Provider: Markie Magdaleno MD  Encounter Date: 2024   Encounter department: [facility] PRIMARY [facility]    Assessment & Plan  Type 2 diabetes mellitus without complication, without long-term current use of insulin ([facility])    Lab Results   Component Value Date    HGBA1C 6.4 (H) 2024   A1c is doing quite well at 6.4.  No changes.  Try to continue limiting carbohydrate intake.  Not on meds.  Continue to follow labs.         Moderate episode of recurrent major depressive disorder ([facility])  Same as prior.  Continues on trazodone, Lexapro.         Mixed hyperlipidemia  Stable.  Cholesterol doing quite well with Crestor 5.  Continue to limit carbs, fried foods, fatty foods, beef, pork.       Primary hypertension  Blood pressure today seems reasonable.  No changes.  Continue losartan 50 twice a day.       MARIE (obstructive sleep apnea)  Uses CPAP daily.  Has good relief.  Doing quite well.       Gastroesophageal reflux disease without esophagitis  Stable at the moment.  No change.       PMR (polymyalgia rheumatica) ([facility])  Patient does continue to have some PMR symptoms.  When she had steroids recently it significantly improved her symptoms.  Will trial low-dose of 5 mg daily for the next month, then reevaluate.  Reviewed about steroids and their benefits as well as side effects and problems.  Orders:    predniSONE 5 mg tablet; Take 1 tablet (5 mg total) by mouth daily    Hydradenitis  Follow-up as needed.       Encounter for annual wellness visit (AWV) in Medicare patient         Need for COVID-19 vaccine         Need for vaccination for Strep pneumoniae         Need for influenza vaccination         Breast cancer screening by mammogram         Encounter for screening mammogram for malignant neoplasm of breast    Orders:    Mammo screening bilateral w 3d and cad; Future    Cervical cancer screening         Post-menopause    Orders:    DXA  bone density spine hip and pelvis; Future    Recurrent acute serous otitis media of both ears    Orders:    ciprofloxacin (CIPRO) 250 mg tablet; Take 1 tablet (250 mg total) by mouth every 12 (twelve) hours for 10 days       Preventive health issues were discussed with patient, and age appropriate screening tests were ordered as noted in patient's After Visit Summary. Personalized health advice and appropriate referrals for health education or preventive services given if needed, as noted in patient's After Visit Summary.      1. Type 2 diabetes mellitus without complication, without long-term current use of insulin ([facility])  Assessment & Plan:    Lab Results   Component Value Date    HGBA1C 6.4 (H) 12/03/2024   A1c is doing quite well at 6.4.  No changes.  Try to continue limiting carbohydrate intake.  Not on meds.  Continue to follow labs.         2. Moderate episode of recurrent major depressive disorder ([facility])  Assessment & Plan:  Same as prior.  Continues on trazodone, Lexapro.         3. Mixed hyperlipidemia  Assessment & Plan:  Stable.  Cholesterol doing quite well with Crestor 5.  Continue to limit carbs, fried foods, fatty foods, beef, pork.       4. Primary hypertension  Assessment & Plan:  Blood pressure today seems reasonable.  No changes.  Continue losartan 50 twice a day.       5. MARIE (obstructive sleep apnea)  Assessment & Plan:  Uses CPAP daily.  Has good relief.  Doing quite well.       6. Gastroesophageal reflux disease without esophagitis  Assessment & Plan:  Stable at the moment.  No change.       7. PMR (polymyalgia rheumatica) ([facility])  -     predniSONE 5 mg tablet; Take 1 tablet (5 mg total) by mouth daily  8. Hydradenitis  Assessment & Plan:  Follow-up as needed.       9. Encounter for annual wellness visit (AWV) in Medicare patient  Comments:  HM, advanced directives, and immunizations reviewed.  10. Need for COVID-19 vaccine  Comments:  Recommend COVID-vaccine.  Patient declined  11. Need for  vaccination for Strep pneumoniae  Comments:  Recommend Prevnar secondary to diabetes.  Patient declines  12. Need for influenza vaccination  Comments:  Would recommend yearly flu vaccine due to age and diabetes.  Patient declines.  Understands risks.  13. Breast cancer screening by mammogram  Comments:  Recommend mammogram.  14. Encounter for screening mammogram for malignant neoplasm of breast  -     Mammo screening bilateral w 3d and cad; Future  15. Cervical cancer screening  Comments:  In approximately 2014, the patient had laparoscopic-assisted vaginal hysterectomy, therefore no longer has a cervix and does not need cervical cancer screening  16. Post-menopause  Comments:  Recommend DEXA scan.  Orders:  -     DXA bone density spine hip and pelvis; Future; Expected date: 12/05/2024  17. Recurrent acute serous otitis media of both ears  Comments:  Patient appears to have otitis media.  Cipro 250 due to her allergies.  Orders:  -     ciprofloxacin (CIPRO) 250 mg tablet; Take 1 tablet (250 mg total) by mouth every 12 (twelve) hours for 10 days      Chief Complaint   Patient presents with    Medicare Wellness Visit     Patient in office for AWV and 3 months follow up         History of Present Illness     Some ear pains.    PMR: Patient does have history of PMR.  She recently had a flare, and used a prednisone taper.  The amount of pain that she was in prior to that was quite severe, but after the prednisone taper the pain decreased quite a bit.  Certainly was not gone, but it was much more reasonable and much better tolerated.  Still still has some irritation at this point.    Patient had in 2014 a laparoscopic vaginal hysterectomy.  Has not a cervix remaining since then.       Patient Care Team:  Markie Magdaleno MD as PCP - General  DO Carlos Dai PA-C Jason Erickson, DO Katherine I Brown, PA-C Santh Silparshetty, MD (Endocrinology)  ALEIDA Fang as Nurse Practitioner  (Nephrology)    Review of Systems   Constitutional:  Negative for appetite change and fever.   Respiratory:  Negative for chest tightness and shortness of breath.    Cardiovascular:  Negative for chest pain, palpitations and leg swelling.   Gastrointestinal:  Negative for abdominal pain.   Genitourinary:  Negative for difficulty urinating.   Musculoskeletal:  Positive for neck pain. Negative for arthralgias and myalgias.   Skin:  Negative for wound.   Neurological:  Negative for dizziness, light-headedness and headaches.   Psychiatric/Behavioral:  Negative for dysphoric mood and sleep disturbance. The patient is not nervous/anxious.      Medical History Reviewed by provider this encounter:  Surg Hx       Annual Wellness Visit Questionnaire   Rhonda is here for her Subsequent Wellness visit.     Health Risk Assessment:   Patient rates overall health as good. Patient feels that their physical health rating is same. Patient is satisfied with their life. Eyesight was rated as same. Hearing was rated as same. Patient feels that their emotional and mental health rating is same. Patients states they are never, rarely angry. Patient states they are often unusually tired/fatigued. Pain experienced in the last 7 days has been some. Patient's pain rating has been 5/10. Patient states that she has experienced no weight loss or gain in last 6 months.     Fall Risk Screening:   In the past year, patient has experienced: no history of falling in past year      Urinary Incontinence Screening:   Patient has not leaked urine accidently in the last six months.     Home Safety:  Patient has trouble with stairs inside or outside of their home. Patient has working smoke alarms and has working carbon monoxide detector. Home safety hazards include: none.     Nutrition:   Current diet is Low Carb and Limited junk food.     Medications:   Patient is not currently taking any over-the-counter supplements. Patient is able to manage medications.      Activities of Daily Living (ADLs)/Instrumental Activities of Daily Living (IADLs):   Walk and transfer into and out of bed and chair?: Yes  Dress and groom yourself?: Yes    Bathe or shower yourself?: Yes    Feed yourself? Yes  Do your laundry/housekeeping?: Yes  Manage your money, pay your bills and track your expenses?: Yes  Make your own meals?: Yes    Do your own shopping?: Yes    Previous Hospitalizations:   Any hospitalizations or ED visits within the last 12 months?: No      Advance Care Planning:   Living will: Yes    Durable POA for healthcare: Yes    Advanced directive: Yes    Advanced directive counseling given: Yes      Cognitive Screening:   Provider or family/friend/caregiver concerned regarding cognition?: No    PREVENTIVE SCREENINGS      Cardiovascular Screening:    General: Screening Not Indicated and History Lipid Disorder      Diabetes Screening:     General: Screening Not Indicated and History Diabetes      Colorectal Cancer Screening:     General: Screening Current      Breast Cancer Screening:     General: Risks and Benefits Discussed    Due for: Mammogram        Cervical Cancer Screening:    General: Screening Not Indicated      Osteoporosis Screening:    General: Risks and Benefits Discussed    Due for: DXA Axial      Abdominal Aortic Aneurysm (AAA) Screening:        General: Screening Not Indicated      Lung Cancer Screening:     General: Screening Not Indicated      Hepatitis C Screening:    General: Screening Not Indicated    Screening, Brief Intervention, and Referral to Treatment (SBIRT)    Screening  Typical number of drinks in a day: 0  Typical number of drinks in a week: 0  Interpretation: Low risk drinking behavior.    Single Item Drug Screening:  How often have you used an illegal drug (including marijuana) or a prescription medication for non-medical reasons in the past year? never    Single Item Drug Screen Score: 0  Interpretation: Negative screen for possible drug use  "disorder    SDOH Risk Assessment  Social determinants of health (SDOH) risk assesment tool was completed. The tool at a minimum covered housing stability, food insecurity, transportation needs, and utility difficulty. Patient had at risk responses for the following SDOH domains: housing stability.     Social Drivers of Health     Financial Resource Strain: Medium Risk (12/13/2023)    Overall Financial Resource Strain (CARDIA)     Difficulty of Paying Living Expenses: Somewhat hard   Food Insecurity: No Food Insecurity (12/5/2024)    Hunger Vital Sign     Worried About Running Out of Food in the Last Year: Never true     Ran Out of Food in the Last Year: Never true   Transportation Needs: No Transportation Needs (12/5/2024)    PRAPARE - Transportation     Lack of Transportation (Medical): No     Lack of Transportation (Non-Medical): No   Housing Stability: High Risk (12/5/2024)    Housing Stability Vital Sign     Unable to Pay for Housing in the Last Year: Yes     Number of Times Moved in the Last Year: 0     Homeless in the Last Year: No   Utilities: Not At Risk (12/5/2024)    [facility] Utilities     Threatened with loss of utilities: No     No results found.    A1c 6.4.  Sodium 140, potassium 3.9, calcium 9.5.  Blood sugar 143.  Creatinine 0.65, GFR 98.  AST 22, ALT 19.  Total cholesterol 142, LDL 63, HDL 46, triglycerides 165.    Objective   /76 (BP Location: Left arm, Patient Position: Sitting, Cuff Size: Adult)   Pulse 73   Temp 97.9 °F (36.6 °C) (Temporal)   Resp 16   Ht 5' 5\" (1.651 m)   Wt 98.1 kg (216 lb 3.2 oz)   SpO2 98%   BMI 35.98 kg/m²     Physical Exam  Vitals and nursing note reviewed.   Constitutional:       Appearance: Normal appearance. She is well-developed.   HENT:      Head: Normocephalic and atraumatic.      Right Ear: Hearing, ear canal and external ear normal. Tympanic membrane is erythematous and bulging.      Left Ear: Hearing, ear canal and external ear normal. Tympanic membrane is " erythematous and bulging.   Cardiovascular:      Rate and Rhythm: Normal rate and regular rhythm.      Pulses:           Carotid pulses are 2+ on the right side and 2+ on the left side.     Heart sounds: Normal heart sounds.   Pulmonary:      Effort: Pulmonary effort is normal.      Breath sounds: Normal breath sounds. No wheezing or rales.   Chest:      Chest wall: No tenderness.   Neurological:      Mental Status: She is alert.

## 2024-12-05 NOTE — ASSESSMENT & PLAN NOTE
Stable.  Cholesterol doing quite well with Crestor 5.  Continue to limit carbs, fried foods, fatty foods, beef, pork.

## 2024-12-05 NOTE — ASSESSMENT & PLAN NOTE
Patient does continue to have some PMR symptoms.  When she had steroids recently it significantly improved her symptoms.  Will trial low-dose of 5 mg daily for the next month, then reevaluate.  Reviewed about steroids and their benefits as well as side effects and problems.  Orders:    predniSONE 5 mg tablet; Take 1 tablet (5 mg total) by mouth daily

## 2024-12-06 DIAGNOSIS — E78.2 MIXED HYPERLIPIDEMIA: ICD-10-CM

## 2024-12-06 RX ORDER — ROSUVASTATIN CALCIUM 5 MG/1
5 TABLET, COATED ORAL DAILY
Qty: 90 TABLET | Refills: 1 | Status: SHIPPED | OUTPATIENT
Start: 2024-12-06

## 2024-12-22 ENCOUNTER — VBI (OUTPATIENT)
Dept: ADMINISTRATIVE | Facility: OTHER | Age: 59
End: 2024-12-22

## 2024-12-22 NOTE — TELEPHONE ENCOUNTER
12/22/24 9:59 AM     Chart reviewed for Hemoglobin A1c was/were submitted to the patient's insurance.     Montserrat Rogers   [facility]

## 2025-01-12 DIAGNOSIS — E11.65 TYPE 2 DIABETES MELLITUS WITH HYPERGLYCEMIA, WITHOUT LONG-TERM CURRENT USE OF INSULIN (HCC): ICD-10-CM

## 2025-01-14 RX ORDER — BLOOD SUGAR DIAGNOSTIC
STRIP MISCELLANEOUS
Qty: 100 STRIP | Refills: 1 | Status: SHIPPED | OUTPATIENT
Start: 2025-01-14

## 2025-01-17 DIAGNOSIS — M35.3 PMR (POLYMYALGIA RHEUMATICA) (HCC): ICD-10-CM

## 2025-01-17 NOTE — TELEPHONE ENCOUNTER
Requested Prescriptions     Pending Prescriptions Disp Refills    predniSONE 5 mg tablet [Pharmacy Med Name: PREDNISONE 5 MG TABLET] 30 tablet 0     Sig: TAKE 1 TABLET (5 MG TOTAL) BY MOUTH DAILY.      LOV 12/5/24 F/U 6/12/25 Labs Active

## 2025-01-20 RX ORDER — PREDNISONE 5 MG/1
5 TABLET ORAL DAILY
Qty: 30 TABLET | Refills: 0 | Status: SHIPPED | OUTPATIENT
Start: 2025-01-20

## 2025-02-05 ENCOUNTER — TELEPHONE (OUTPATIENT)
Dept: NEPHROLOGY | Facility: CLINIC | Age: 60
End: 2025-02-05

## 2025-02-05 NOTE — TELEPHONE ENCOUNTER
Called to see if he is interested in rescheduling from 3/3 to tomorrow  2/6 with Dr. Mireles in [place], would need blood work done today if so.

## 2025-03-31 ENCOUNTER — OFFICE VISIT (OUTPATIENT)
Dept: FAMILY MEDICINE CLINIC | Facility: CLINIC | Age: 60
End: 2025-03-31
Payer: COMMERCIAL

## 2025-03-31 VITALS
RESPIRATION RATE: 18 BRPM | HEART RATE: 65 BPM | BODY MASS INDEX: 37.32 KG/M2 | WEIGHT: 224 LBS | DIASTOLIC BLOOD PRESSURE: 100 MMHG | OXYGEN SATURATION: 96 % | SYSTOLIC BLOOD PRESSURE: 160 MMHG | TEMPERATURE: 98.7 F | HEIGHT: 65 IN

## 2025-03-31 DIAGNOSIS — H65.05 RECURRENT ACUTE SEROUS OTITIS MEDIA OF LEFT EAR: Primary | ICD-10-CM

## 2025-03-31 DIAGNOSIS — J30.1 SEASONAL ALLERGIC RHINITIS DUE TO POLLEN: ICD-10-CM

## 2025-03-31 DIAGNOSIS — Z12.31 ENCOUNTER FOR SCREENING MAMMOGRAM FOR BREAST CANCER: ICD-10-CM

## 2025-03-31 DIAGNOSIS — E11.9 TYPE 2 DIABETES MELLITUS WITHOUT COMPLICATION, WITHOUT LONG-TERM CURRENT USE OF INSULIN (HCC): ICD-10-CM

## 2025-03-31 PROCEDURE — G2211 COMPLEX E/M VISIT ADD ON: HCPCS | Performed by: FAMILY MEDICINE

## 2025-03-31 PROCEDURE — 99214 OFFICE O/P EST MOD 30 MIN: CPT | Performed by: FAMILY MEDICINE

## 2025-03-31 RX ORDER — CIPROFLOXACIN 500 MG/1
500 TABLET, FILM COATED ORAL EVERY 12 HOURS SCHEDULED
Qty: 20 TABLET | Refills: 0 | Status: SHIPPED | OUTPATIENT
Start: 2025-03-31 | End: 2025-04-10

## 2025-03-31 NOTE — PROGRESS NOTES
Name: Rhonda Dukes      : 1965      MRN: [id number]  Encounter Provider: Markie Magdaleno MD  Encounter Date: 3/31/2025   Encounter department: [facility] PRIMARY [facility]  :  Assessment & Plan  Recurrent acute serous otitis media of left ear    Orders:  •  Ambulatory Referral to Otolaryngology; Future  •  Ambulatory Referral to Allergy; Future  •  ciprofloxacin (CIPRO) 500 mg tablet; Take 1 tablet (500 mg total) by mouth every 12 (twelve) hours for 10 days    Seasonal allergic rhinitis due to pollen  Patient has had allergy testing before, and was getting desensitization shots, but really has not noticed significant improvement.  In fact, she continues to have significant issues with this.  Would consider second opinion from allergy and immunology, ENT.    Orders:  •  Ambulatory Referral to Otolaryngology; Future  •  Ambulatory Referral to Allergy; Future    Type 2 diabetes mellitus without complication, without long-term current use of insulin (HCC)  Patient's last A1c at 6.4 was reasonable.  She is due for labs in .  Follow-up afterwards.  Lab Results   Component Value Date    HGBA1C 6.4 (H) 2024            Encounter for screening mammogram for breast cancer    Orders:  •  Mammo screening bilateral w 3d and cad; Future          1. Recurrent acute serous otitis media of left ear  Comments:  Patient has a significant amount of changes on the ear canal, with severe bulging.  Infected with Doxy previously.  Cipro secondary to allergies.  Orders:  -     Ambulatory Referral to Otolaryngology; Future  -     Ambulatory Referral to Allergy; Future  -     ciprofloxacin (CIPRO) 500 mg tablet; Take 1 tablet (500 mg total) by mouth every 12 (twelve) hours for 10 days  2. Seasonal allergic rhinitis due to pollen  Assessment & Plan:  Patient has had allergy testing before, and was getting desensitization shots, but really has not noticed significant improvement.  In fact, she continues to have  "significant issues with this.  Would consider second opinion from allergy and immunology, ENT.    Orders:  •  Ambulatory Referral to Otolaryngology; Future  •  Ambulatory Referral to Allergy; Future    Orders:  -     Ambulatory Referral to Otolaryngology; Future  -     Ambulatory Referral to Allergy; Future  3. Type 2 diabetes mellitus without complication, without long-term current use of insulin ([facility])  Assessment & Plan:  Patient's last A1c at 6.4 was reasonable.  She is due for labs in June.  Follow-up afterwards.  Lab Results   Component Value Date    HGBA1C 6.4 (H) 12/03/2024            4. Encounter for screening mammogram for breast cancer  -     Mammo screening bilateral w 3d and cad; Future; Expected date: 03/31/2025      Chief Complaint   Patient presents with   • Cough   • Cold Like Symptoms   • Earache            History of Present Illness   Patient is here today with cold symptoms.  Please see chief complaint.  Some discomfort behind the left eye.  Left ear also feels blocked.  No tooth pain.  No other face pain.  Positive postnasal drip.  Some fever as well.  Staying low-grade fever in the afternoon, usually in the morning is better.  On the 19th it was quite high.        Review of Systems   Constitutional:  Positive for chills and fatigue.   HENT:  Positive for congestion, ear pain, postnasal drip, rhinorrhea, sinus pressure, sinus pain and sore throat. Negative for dental problem.    Respiratory:  Positive for cough.    Cardiovascular: Negative.        Objective   /100 (BP Location: Right arm, Patient Position: Sitting, Cuff Size: Large)   Pulse 65   Temp 98.7 °F (37.1 °C) (Tympanic)   Resp 18   Ht 5' 5\" (1.651 m)   Wt 102 kg (224 lb)   SpO2 96%   BMI 37.28 kg/m²      Physical Exam  Vitals and nursing note reviewed.   Constitutional:       Appearance: Normal appearance. She is well-developed.   HENT:      Head: Normocephalic and atraumatic.      Right Ear: Hearing, ear canal and external " ear normal. Tympanic membrane is retracted.      Left Ear: Ear canal and external ear normal. Tympanic membrane is erythematous and bulging.   Cardiovascular:      Rate and Rhythm: Normal rate and regular rhythm.      Pulses:           Carotid pulses are 2+ on the right side and 2+ on the left side.     Heart sounds: Normal heart sounds.   Pulmonary:      Effort: Pulmonary effort is normal.      Breath sounds: Normal breath sounds. No wheezing or rales.   Chest:      Chest wall: No tenderness.   Lymphadenopathy:      Cervical: No cervical adenopathy.      Comments: Tenderness submandibular, but not specifically lymph node enlargement   Neurological:      Mental Status: She is alert.

## 2025-03-31 NOTE — PATIENT INSTRUCTIONS
1. Recurrent acute serous otitis media of left ear  Comments:  Patient has a significant amount of changes on the ear canal, with severe bulging.  Infected with Doxy previously.  Cipro secondary to allergies.  Orders:  -     Ambulatory Referral to Otolaryngology; Future  -     Ambulatory Referral to Allergy; Future  -     ciprofloxacin (CIPRO) 500 mg tablet; Take 1 tablet (500 mg total) by mouth every 12 (twelve) hours for 10 days  2. Seasonal allergic rhinitis due to pollen  Assessment & Plan:  Patient has had allergy testing before, and was getting desensitization shots, but really has not noticed significant improvement.  In fact, she continues to have significant issues with this.  Would consider second opinion from allergy and immunology, ENT.    Orders:    Ambulatory Referral to Otolaryngology; Future    Ambulatory Referral to Allergy; Future    Orders:  -     Ambulatory Referral to Otolaryngology; Future  -     Ambulatory Referral to Allergy; Future  3. Type 2 diabetes mellitus without complication, without long-term current use of insulin (HCC)  Assessment & Plan:  Patient's last A1c at 6.4 was reasonable.  She is due for labs in June.  Follow-up afterwards.  Lab Results   Component Value Date    HGBA1C 6.4 (H) 12/03/2024            4. Encounter for screening mammogram for breast cancer  -     Mammo screening bilateral w 3d and cad; Future; Expected date: 03/31/2025      COVID 19 Instructions    Rhonda Dkues was advised to limit contact with others to essential tasks such as getting food, medications, and medical care.    Proper handwashing reviewed, and Hand sanitzer when washing is not available.    If the patient develops symptoms of COVID 19, the patient should call the office as soon as possible.    It is strongly recommended that Flu Vaccinations be obtained.      Virtual Visits:  Chio: This works on smart phones (any phone with Internet browsing capability).  You should get a text message when the  provider is ready to see you.  Click on the link in the text message, and the call should start.  You will need to type in your name, and allow camera and microphone access.  This is HIPPA compliant, and secure.      If you have not already done so, get immunized to COVID 19.      We are committed to getting you vaccinated as soon as possible and will be closely following [facility] and [facility] guidelines as they are released and revised.  Please refer to our COVID-19 vaccine webpage for the most up to date information on the vaccine and our distribution efforts.    This site will also have the most up to date recommendations for COVID booster vaccine.    [email or web address]    Call [phone] ([phone]), option 7    You can also visit https://www.vaccines.gov/ to find vaccines in your area.    OUR LOCATION:    [facility]  [address] [place] [address], [address]  [place], [place], [address] [phone]  Fax: [phone]    Lab services, Rheumatology, and OB/GYN are at this location as well.

## 2025-03-31 NOTE — ASSESSMENT & PLAN NOTE
Patient's last A1c at 6.4 was reasonable.  She is due for labs in June.  Follow-up afterwards.  Lab Results   Component Value Date    HGBA1C 6.4 (H) 12/03/2024

## 2025-03-31 NOTE — ASSESSMENT & PLAN NOTE
Patient has had allergy testing before, and was getting desensitization shots, but really has not noticed significant improvement.  In fact, she continues to have significant issues with this.  Would consider second opinion from allergy and immunology, ENT.    Orders:  •  Ambulatory Referral to Otolaryngology; Future  •  Ambulatory Referral to Allergy; Future

## 2025-04-04 DIAGNOSIS — J30.89 CHRONIC NONSEASONAL ALLERGIC RHINITIS DUE TO POLLEN: ICD-10-CM

## 2025-04-04 DIAGNOSIS — K21.9 GASTROESOPHAGEAL REFLUX DISEASE: ICD-10-CM

## 2025-04-04 DIAGNOSIS — E11.65 TYPE 2 DIABETES MELLITUS WITH HYPERGLYCEMIA, WITHOUT LONG-TERM CURRENT USE OF INSULIN (HCC): ICD-10-CM

## 2025-04-04 DIAGNOSIS — F33.1 MODERATE EPISODE OF RECURRENT MAJOR DEPRESSIVE DISORDER (HCC): ICD-10-CM

## 2025-04-04 RX ORDER — ESCITALOPRAM OXALATE 20 MG/1
20 TABLET ORAL DAILY
Qty: 90 TABLET | Refills: 0 | Status: SHIPPED | OUTPATIENT
Start: 2025-04-04

## 2025-04-04 RX ORDER — LANCETS 33 GAUGE
EACH MISCELLANEOUS
Qty: 100 EACH | Refills: 0 | Status: SHIPPED | OUTPATIENT
Start: 2025-04-04

## 2025-04-04 RX ORDER — PANTOPRAZOLE SODIUM 40 MG/1
40 TABLET, DELAYED RELEASE ORAL DAILY
Qty: 90 TABLET | Refills: 0 | Status: SHIPPED | OUTPATIENT
Start: 2025-04-04

## 2025-04-07 ENCOUNTER — TELEPHONE (OUTPATIENT)
Age: 60
End: 2025-04-07

## 2025-04-07 RX ORDER — ALBUTEROL SULFATE 90 UG/1
2 INHALANT RESPIRATORY (INHALATION) EVERY 4 HOURS PRN
Qty: 18 G | Refills: 0 | Status: SHIPPED | OUTPATIENT
Start: 2025-04-07

## 2025-04-22 PROBLEM — F17.210 CIGARETTE SMOKER: Status: ACTIVE | Noted: 2025-04-22

## 2025-05-17 DIAGNOSIS — J30.89 CHRONIC NONSEASONAL ALLERGIC RHINITIS DUE TO POLLEN: ICD-10-CM

## 2025-05-17 DIAGNOSIS — F33.1 MODERATE EPISODE OF RECURRENT MAJOR DEPRESSIVE DISORDER (HCC): ICD-10-CM

## 2025-05-17 RX ORDER — MONTELUKAST SODIUM 10 MG/1
10 TABLET ORAL DAILY
Qty: 90 TABLET | Refills: 1 | Status: SHIPPED | OUTPATIENT
Start: 2025-05-17

## 2025-05-17 RX ORDER — TRAZODONE HYDROCHLORIDE 50 MG/1
50 TABLET ORAL
Qty: 90 TABLET | Refills: 1 | Status: SHIPPED | OUTPATIENT
Start: 2025-05-17

## 2025-05-26 DIAGNOSIS — J32.4 CHRONIC PANSINUSITIS: ICD-10-CM

## 2025-05-26 DIAGNOSIS — E78.2 MIXED HYPERLIPIDEMIA: ICD-10-CM

## 2025-05-26 DIAGNOSIS — J30.9 ALLERGIC RHINITIS, UNSPECIFIED SEASONALITY, UNSPECIFIED TRIGGER: ICD-10-CM

## 2025-05-27 RX ORDER — FLUTICASONE PROPIONATE 50 MCG
SPRAY, SUSPENSION (ML) NASAL
Qty: 48 ML | Refills: 1 | Status: SHIPPED | OUTPATIENT
Start: 2025-05-27

## 2025-05-27 RX ORDER — ROSUVASTATIN CALCIUM 5 MG/1
5 TABLET, COATED ORAL DAILY
Qty: 90 TABLET | Refills: 1 | Status: SHIPPED | OUTPATIENT
Start: 2025-05-27

## 2025-06-05 DIAGNOSIS — J30.9 ALLERGIC RHINITIS, UNSPECIFIED SEASONALITY, UNSPECIFIED TRIGGER: ICD-10-CM

## 2025-06-05 DIAGNOSIS — J32.4 CHRONIC PANSINUSITIS: ICD-10-CM

## 2025-06-06 RX ORDER — AZELASTINE HYDROCHLORIDE 137 UG/1
SPRAY, METERED NASAL
Qty: 30 ML | Refills: 2 | Status: SHIPPED | OUTPATIENT
Start: 2025-06-06

## 2025-06-06 NOTE — TELEPHONE ENCOUNTER
Requested Prescriptions     Pending Prescriptions Disp Refills    Azelastine HCl 137 MCG/SPRAY SOLN [Pharmacy Med Name: AZELASTINE 0.1% (137 MCG) SPRY]  2     Si SPRAY INTO EACH NOSTRIL 2 (TWO) TIMES A DAY USE IN EACH NOSTRIL AS DIRECTED

## 2025-07-09 DIAGNOSIS — F33.1 MODERATE EPISODE OF RECURRENT MAJOR DEPRESSIVE DISORDER (HCC): ICD-10-CM

## 2025-07-11 RX ORDER — ESCITALOPRAM OXALATE 20 MG/1
20 TABLET ORAL DAILY
Qty: 90 TABLET | Refills: 0 | Status: SHIPPED | OUTPATIENT
Start: 2025-07-11

## 2025-07-11 NOTE — TELEPHONE ENCOUNTER
Requested Prescriptions     Pending Prescriptions Disp Refills    escitalopram (LEXAPRO) 20 mg tablet [Pharmacy Med Name: ESCITALOPRAM 20 MG TABLET] 90 tablet 0     Sig: TAKE 1 TABLET BY MOUTH EVERY DAY

## 2025-07-13 DIAGNOSIS — K21.9 GASTROESOPHAGEAL REFLUX DISEASE: ICD-10-CM

## 2025-07-15 RX ORDER — PANTOPRAZOLE SODIUM 40 MG/1
40 TABLET, DELAYED RELEASE ORAL DAILY
Qty: 90 TABLET | Refills: 1 | Status: SHIPPED | OUTPATIENT
Start: 2025-07-15

## 2025-08-01 ENCOUNTER — TELEPHONE (OUTPATIENT)
Age: 60
End: 2025-08-01

## 2025-08-07 DIAGNOSIS — E11.65 TYPE 2 DIABETES MELLITUS WITH HYPERGLYCEMIA, WITHOUT LONG-TERM CURRENT USE OF INSULIN (HCC): ICD-10-CM

## 2025-08-08 RX ORDER — LANCETS 33 GAUGE
EACH MISCELLANEOUS DAILY
Qty: 100 EACH | Refills: 1 | Status: SHIPPED | OUTPATIENT
Start: 2025-08-08

## 2025-08-12 ENCOUNTER — TELEPHONE (OUTPATIENT)
Dept: NEPHROLOGY | Facility: CLINIC | Age: 60
End: 2025-08-12

## 2025-08-17 DIAGNOSIS — E11.65 TYPE 2 DIABETES MELLITUS WITH HYPERGLYCEMIA, WITHOUT LONG-TERM CURRENT USE OF INSULIN (HCC): ICD-10-CM

## 2025-08-18 ENCOUNTER — OFFICE VISIT (OUTPATIENT)
Dept: OBGYN CLINIC | Facility: MEDICAL CENTER | Age: 60
End: 2025-08-18
Payer: COMMERCIAL

## 2025-08-18 VITALS — HEIGHT: 65 IN | BODY MASS INDEX: 36.19 KG/M2 | WEIGHT: 217.2 LBS

## 2025-08-18 DIAGNOSIS — M25.562 LEFT KNEE PAIN, UNSPECIFIED CHRONICITY: ICD-10-CM

## 2025-08-18 DIAGNOSIS — M25.561 RIGHT KNEE PAIN, UNSPECIFIED CHRONICITY: ICD-10-CM

## 2025-08-18 DIAGNOSIS — M17.0 PRIMARY OSTEOARTHRITIS OF BOTH KNEES: Primary | ICD-10-CM

## 2025-08-18 PROCEDURE — 99213 OFFICE O/P EST LOW 20 MIN: CPT | Performed by: ORTHOPAEDIC SURGERY

## 2025-08-18 RX ORDER — BLOOD SUGAR DIAGNOSTIC
STRIP MISCELLANEOUS
Qty: 100 STRIP | Refills: 1 | Status: SHIPPED | OUTPATIENT
Start: 2025-08-18

## 2025-08-21 ENCOUNTER — RESULTS FOLLOW-UP (OUTPATIENT)
Dept: NEPHROLOGY | Facility: CLINIC | Age: 60
End: 2025-08-21

## 2025-08-25 PROBLEM — D72.829 LEUKOCYTOSIS: Status: ACTIVE | Noted: 2025-08-25

## (undated) DEVICE — SINGLE-USE BIOPSY FORCEPS: Brand: RADIAL JAW 4